# Patient Record
Sex: MALE | Race: WHITE | NOT HISPANIC OR LATINO | Employment: FULL TIME | URBAN - NONMETROPOLITAN AREA
[De-identification: names, ages, dates, MRNs, and addresses within clinical notes are randomized per-mention and may not be internally consistent; named-entity substitution may affect disease eponyms.]

---

## 2017-01-12 ENCOUNTER — HOSPITAL ENCOUNTER (EMERGENCY)
Facility: HOSPITAL | Age: 32
Discharge: HOME | End: 2017-01-12
Admitting: EMERGENCY MEDICINE
Payer: COMMERCIAL

## 2017-01-12 VITALS
OXYGEN SATURATION: 100 % | BODY MASS INDEX: 26.37 KG/M2 | RESPIRATION RATE: 18 BRPM | HEART RATE: 72 BPM | DIASTOLIC BLOOD PRESSURE: 70 MMHG | WEIGHT: 194.4 LBS | TEMPERATURE: 98 F | SYSTOLIC BLOOD PRESSURE: 120 MMHG

## 2017-01-12 DIAGNOSIS — G89.29 CHRONIC BACK PAIN: Primary | ICD-10-CM

## 2017-01-12 DIAGNOSIS — M54.9 CHRONIC BACK PAIN: Primary | ICD-10-CM

## 2017-01-12 PROCEDURE — 99283 EMERGENCY DEPT VISIT LOW MDM: CPT

## 2017-01-12 PROCEDURE — 96372 THER/PROPH/DIAG INJ SC/IM: CPT

## 2017-01-12 RX ORDER — KETOROLAC TROMETHAMINE 30 MG/ML
15 INJECTION, SOLUTION INTRAMUSCULAR; INTRAVENOUS ONCE
Status: COMPLETED | OUTPATIENT
Start: 2017-01-12 | End: 2017-01-12

## 2017-01-12 RX ORDER — TRAMADOL HYDROCHLORIDE 50 MG/1
50 TABLET ORAL EVERY 6 HOURS PRN
Qty: 12 TABLET | Refills: 0 | Status: SHIPPED | OUTPATIENT
Start: 2017-01-12 | End: 2017-01-22

## 2017-01-12 RX ORDER — PREDNISONE 20 MG/1
20 TABLET ORAL 2 TIMES DAILY
Qty: 30 TABLET | Refills: 0 | Status: SHIPPED | OUTPATIENT
Start: 2017-01-12 | End: 2017-01-15

## 2017-01-12 RX ORDER — PREDNISONE 20 MG/1
40 TABLET ORAL ONCE
Status: COMPLETED | OUTPATIENT
Start: 2017-01-12 | End: 2017-01-12

## 2017-01-12 RX ADMIN — PREDNISONE 40 MG: 20 TABLET ORAL at 10:31

## 2017-01-12 RX ADMIN — KETOROLAC TROMETHAMINE 15 MG: 30 INJECTION, SOLUTION INTRAMUSCULAR at 10:32

## 2017-01-25 ENCOUNTER — ALLSCRIPTS OFFICE VISIT (OUTPATIENT)
Dept: OTHER | Facility: OTHER | Age: 32
End: 2017-01-25

## 2017-02-13 DIAGNOSIS — M54.6 PAIN IN THORACIC SPINE: ICD-10-CM

## 2017-02-22 ENCOUNTER — APPOINTMENT (OUTPATIENT)
Dept: PHYSICAL THERAPY | Facility: HOME HEALTHCARE | Age: 32
End: 2017-02-22
Payer: COMMERCIAL

## 2017-02-22 ENCOUNTER — GENERIC CONVERSION - ENCOUNTER (OUTPATIENT)
Dept: OTHER | Facility: OTHER | Age: 32
End: 2017-02-22

## 2017-02-22 PROCEDURE — 97535 SELF CARE MNGMENT TRAINING: CPT

## 2017-02-22 PROCEDURE — 97162 PT EVAL MOD COMPLEX 30 MIN: CPT

## 2017-03-20 ENCOUNTER — GENERIC CONVERSION - ENCOUNTER (OUTPATIENT)
Dept: OTHER | Facility: OTHER | Age: 32
End: 2017-03-20

## 2017-07-03 ENCOUNTER — ALLSCRIPTS OFFICE VISIT (OUTPATIENT)
Dept: OTHER | Facility: OTHER | Age: 32
End: 2017-07-03

## 2017-07-03 DIAGNOSIS — M54.6 PAIN IN THORACIC SPINE: ICD-10-CM

## 2017-07-05 ENCOUNTER — HOSPITAL ENCOUNTER (OUTPATIENT)
Dept: MRI IMAGING | Facility: HOSPITAL | Age: 32
Discharge: HOME | End: 2017-07-05
Payer: COMMERCIAL

## 2017-07-05 DIAGNOSIS — M54.6 PAIN IN THORACIC SPINE: ICD-10-CM

## 2017-07-05 PROCEDURE — 72146 MRI CHEST SPINE W/O DYE: CPT

## 2017-07-06 ENCOUNTER — GENERIC CONVERSION - ENCOUNTER (OUTPATIENT)
Dept: OTHER | Facility: OTHER | Age: 32
End: 2017-07-06

## 2017-08-04 ENCOUNTER — ALLSCRIPTS OFFICE VISIT (OUTPATIENT)
Dept: OTHER | Facility: OTHER | Age: 32
End: 2017-08-04

## 2017-08-04 DIAGNOSIS — M79.10 MYALGIA: ICD-10-CM

## 2017-08-04 DIAGNOSIS — M51.34 OTHER INTERVERTEBRAL DISC DEGENERATION, THORACIC REGION: ICD-10-CM

## 2017-10-24 ENCOUNTER — GENERIC CONVERSION - ENCOUNTER (OUTPATIENT)
Dept: OTHER | Facility: OTHER | Age: 32
End: 2017-10-24

## 2017-10-24 ENCOUNTER — GENERIC CONVERSION - ENCOUNTER (OUTPATIENT)
Dept: PAIN MEDICINE | Facility: CLINIC | Age: 32
End: 2017-10-24

## 2017-11-14 ENCOUNTER — ALLSCRIPTS OFFICE VISIT (OUTPATIENT)
Dept: OTHER | Facility: OTHER | Age: 32
End: 2017-11-14

## 2017-11-15 NOTE — PROGRESS NOTES
Assessment    1  Current every day smoker (305 1) (F17 200)   2  Chronic midline thoracic back pain (724 1,338 29) (M54 6,G89 29)    Plan  Chronic midline thoracic back pain    · *1 - [facility] Co-Management  *  Status: Active  Requested for:[id number]  Care Summary provided  : Yes   · Follow-up visit in 2 months Evaluation and Treatment  Follow-up  Status: Hold For -Scheduling  Requested for: [id number]    Discussion/Summary    I told patient that I concerned that since he is a former narcotic addict that I do not wish him to get addicted to anything else  We will refer him back to pain management  We will continue to prescribe tramadol at this time  I will see him back in 1-2 months, he knows that we will continue to monitor with drug screens  Possible side effects of new medications were reviewed with the patient/guardian today  The treatment plan was reviewed with the patient/guardian  The patient/guardian understands and agrees with the treatment plan      Chief Complaint  F/U  Patient is here today for follow up of chronic conditions described in HPI  History of Present Illness  F/U  Patient here today for follow-up on his thoracic back pain  Patient's recent drug screen showed marijuana and morphine  Patient states he took âfriends morphine pill when he was out on a job  Patient does state the tramadol helps some best  He missed his last appointment with pain management  Review of Systems   Constitutional: No fever or chills, feels well, no tiredness, no recent weight gain or weight loss  Cardiovascular: No complaints of slow heart rate, no fast heart rate, no chest pain, no palpitations, no leg claudication, no lower extremity  Respiratory: No complaints of shortness of breath, no wheezing, no cough, no SOB on exertion, no orthopnea or PND  Gastrointestinal: No complaints of abdominal pain, no constipation, no nausea or vomiting, no diarrhea or bloody stools    Genitourinary: No complaints of dysuria, no incontinence, no hesitancy, no nocturia, no genital lesion, no testicular pain  Musculoskeletal: as noted in HPI  Active Problems  1  Anxiety (300 00) (F41 9)   2  Chronic midline thoracic back pain (724 1,338 29) (M54 6,G89 29)   3  Degeneration of intervertebral disc of thoracic region (722 51) (M51 34)   4  Drug dependence, in remission (304 93) (F19 21)   5  Hepatitis C virus (070 70) (B19 20)   6  Myofascial pain (729 1) (M79 1)   7  Noncompliance with treatment (V15 81) (Z91 19)   8  Tobacco abuse counseling (V65 42,305 1) (Z71 6)    Past Medical History  1  History of Acute non-recurrent streptococcal tonsillitis (034 0) (J03 00)   2  Acute upper respiratory infection (465 9) (J06 9)   3  History of hepatitis (V12 09) (Z86 19)   4  History of influenza vaccination (V49 89) (Z92 29)   5  History of tobacco use disorder (V15 82) (Z87 891)   6  History of Increased BMI (783 9) (R63 8)   7  History of Screening for depression (V79 0) (Z13 89)   8  Sore throat (462) (J02 9)    The active problems and past medical history were reviewed and updated today  Surgical History    The surgical history was reviewed and updated today  Family History  Father    1  No pertinent family history  Family History    2  Family history of Hepatitis    The family history was reviewed and updated today  Social History     · Current every day smoker (305 1) (F17 200)   · Intravenous drug use   · Occupation   · Social alcohol use (Z78 9)  The social history was reviewed and updated today  The social history was reviewed and is unchanged  Current Meds   1  Naproxen 500 MG Oral Tablet; TAKE 1 TABLET Twice daily PRN pain take with food; Therapy: 50JVZ2096 to ([phone])  Requested for: 20Tgr7442; Last Rx:37Gju3939 Ordered   2  RaNITidine HCl - 150 MG Oral Tablet; take 1 tablet by mouth twice a day;  Therapy: 14MXE1403 to (NVSFRTAF:82YFV4427)  Requested for: 38Yvr7657; Last Rx: 63HXK8463 Ordered   3  TiZANidine HCl - 2 MG Oral Tablet; TAKE 1 TABLET EVERY 8 HOURS AS NEEDED; Therapy: 44KLO4941 to (Cherylle Fall)  Requested for: 85Cve5916; Last Rx:55Obj7850 Ordered    The medication list was reviewed and updated today  Allergies  1  Clindamycin    Vitals  Vital Signs    Recorded: 59ZIJ1995 55:41MQ   Systolic 153   Diastolic 66   Height 6 ft    Weight 204 lb 8 oz   BMI Calculated 27 74   BSA Calculated 2 15       Physical Exam   Constitutional  General appearance: No acute distress, well appearing and well nourished  Musculoskeletal  Inspection/palpation of joints, bones, and muscles: Abnormal   Palpation - upper mid-thoracic tenderness    Psychiatric  Orientation to person, place and time: Normal    Mood and affect: Normal          Future Appointments    Date/Time Provider Specialty Site   01/15/2018 09:15 AM Netta Becerra DO Family Medicine [address] [place] [address]       Signatures   Electronically signed by : Kevin Gandhi DO; Nov 14 2017 12:06PM EST                       (Author)

## 2017-12-04 ENCOUNTER — ALLSCRIPTS OFFICE VISIT (OUTPATIENT)
Dept: OTHER | Facility: OTHER | Age: 32
End: 2017-12-04

## 2017-12-04 DIAGNOSIS — M54.6 PAIN IN THORACIC SPINE: ICD-10-CM

## 2018-01-09 NOTE — RESULT NOTES
Verified Results  * MRI THORACIC SPINE [address] [id number] 10:27AM Eber Moe Order Number: [id number]    - Patient Instructions: To schedule this appointment, please contact Central Scheduling at [phone]  Test Name Result Flag Reference   MRI THORACIC SPINE WO CONTRAST (Report)     MRI THORACIC SPINE WITHOUT CONTRAST     INDICATION: Chronic mid and upper back pain     COMPARISON: Plain film dated 3/22/2016     TECHNIQUE: Sagittal T1, sagittal T2, sagittal inversion recovery, axial T2, axial 2D MERGE  IMAGE QUALITY: Diagnostic  FINDINGS:     ALIGNMENT: Normal alignment of the thoracic spine  No compression fracture  No subluxation  No scoliosis  MARROW SIGNAL: Scattered endplate Schmorl's nodes at lower levels including opposed Schmorl's nodes at T10-11 and T11-12  Minimal reactive endplate marrow changes at lower lumbar levels  No worrisome spine lesion  THORACIC CORD: Normal signal within the thoracic cord  PARAVERTEBRAL SOFT TISSUES: Paraspinal soft tissues are unremarkable  THORACIC DEGENERATIVE CHANGE:      At T8-9 there is a left paracentral disc herniation, protrusion type, resulting in ventral impression upon the thecal sac as well as slight left ventral distortion of the cord best seen on 6/30  No cord myelomalacia  No significant central canal or    foraminal encroachment  At T9-10 there is a right paracentral disc herniation, extrusion type, with minimal caudal and cranial migration resulting in right ventral impression upon the thecal sac as well as minimal right ventral cord distortion best seen on 6/34  No cord    myelomalacia  No significant central canal or foraminal encroachment  At T10-11 there is a left paracentral disc herniation, protrusion type resulting in left ventral thecal sac impression and slight left ventral CORD impression at best seen on 6/39 and 38 without cord myelomalacia   No central canal or foraminal stenosis    at this level      Remaining levels are unremarkable  IMPRESSION:   Mild lower thoracic spine spondylotic changes without significant stenosis as above         Workstation performed: [id number]     Signed by:   Shelley Kumar MD   7/6/17

## 2018-01-10 NOTE — MISCELLANEOUS
Message  GI Reminder Recall [address] [place] [address] [address]:   Date: 04/13/2016   Dear Homero Graham:     Review of our records shows you are due for the following: Follow Up Visit  Please call the following office to schedule your appointment:   [address] [place] [address], [place], Ana Rodriguez [address] [phone]  We look forward to hearing from you!      Sincerely,     [facility]      Signatures   Electronically signed by : Mikel Baum, ; Apr 13 2016  5:16PM EST                       (Author)

## 2018-01-12 VITALS
BODY MASS INDEX: 26.44 KG/M2 | WEIGHT: 195.25 LBS | DIASTOLIC BLOOD PRESSURE: 70 MMHG | HEIGHT: 72 IN | SYSTOLIC BLOOD PRESSURE: 124 MMHG

## 2018-01-12 VITALS
HEIGHT: 72 IN | WEIGHT: 204.5 LBS | SYSTOLIC BLOOD PRESSURE: 124 MMHG | BODY MASS INDEX: 27.7 KG/M2 | DIASTOLIC BLOOD PRESSURE: 66 MMHG

## 2018-01-13 VITALS
SYSTOLIC BLOOD PRESSURE: 92 MMHG | BODY MASS INDEX: 26.58 KG/M2 | WEIGHT: 196.25 LBS | TEMPERATURE: 97.9 F | DIASTOLIC BLOOD PRESSURE: 60 MMHG | HEIGHT: 72 IN

## 2018-01-13 VITALS
SYSTOLIC BLOOD PRESSURE: 122 MMHG | WEIGHT: 191.25 LBS | DIASTOLIC BLOOD PRESSURE: 70 MMHG | HEIGHT: 72 IN | BODY MASS INDEX: 25.9 KG/M2

## 2018-01-13 NOTE — MISCELLANEOUS
Provider Comments  Provider Comments:   Dyan Jara did not show up to his appointment today  Attempted to call patient to reschedule his no show appointment, but he did not answer and voicemail was full  A letter is being sent to patient to reschedule his missed appointment        Signatures   Electronically signed by : Lalit Rivera MD; Apr 13 2016  4:29PM EST                       (Author)

## 2018-01-18 NOTE — MISCELLANEOUS
Signatures   Electronically signed by :  Petr Washington, ; Oct 24 2017  3:25PM EST                       (Author)

## 2018-01-23 ENCOUNTER — GENERIC CONVERSION - ENCOUNTER (OUTPATIENT)
Dept: OTHER | Facility: OTHER | Age: 33
End: 2018-01-23

## 2018-01-23 VITALS
DIASTOLIC BLOOD PRESSURE: 60 MMHG | WEIGHT: 213 LBS | BODY MASS INDEX: 28.85 KG/M2 | SYSTOLIC BLOOD PRESSURE: 100 MMHG | HEIGHT: 72 IN

## 2018-01-24 NOTE — MISCELLANEOUS
Signatures   Electronically signed by :  Josefina Wilder, ; Jan 23 2018  4:00PM EST                       (Author)

## 2018-01-29 DIAGNOSIS — R52 PAIN: Primary | ICD-10-CM

## 2018-01-29 RX ORDER — TRAMADOL HYDROCHLORIDE 50 MG/1
TABLET ORAL
Qty: 60 TABLET | Refills: 0 | OUTPATIENT
Start: 2018-01-29 | End: 2018-02-05 | Stop reason: SDUPTHER

## 2018-02-05 DIAGNOSIS — R52 PAIN: ICD-10-CM

## 2018-02-05 RX ORDER — TRAMADOL HYDROCHLORIDE 50 MG/1
TABLET ORAL
Qty: 60 TABLET | Refills: 0 | OUTPATIENT
Start: 2018-02-05 | End: 2018-02-12 | Stop reason: SDUPTHER

## 2018-02-12 DIAGNOSIS — R52 PAIN: ICD-10-CM

## 2018-02-12 RX ORDER — TRAMADOL HYDROCHLORIDE 50 MG/1
TABLET ORAL
Qty: 60 TABLET | Refills: 0 | OUTPATIENT
Start: 2018-02-12 | End: 2018-02-19 | Stop reason: SDUPTHER

## 2018-02-19 DIAGNOSIS — F41.9 ANXIETY: Primary | ICD-10-CM

## 2018-02-19 DIAGNOSIS — R52 PAIN: ICD-10-CM

## 2018-02-19 RX ORDER — TRAMADOL HYDROCHLORIDE 50 MG/1
TABLET ORAL
Qty: 60 TABLET | Refills: 0 | OUTPATIENT
Start: 2018-02-19 | End: 2018-02-26 | Stop reason: SDUPTHER

## 2018-02-19 RX ORDER — PAROXETINE HYDROCHLORIDE 20 MG/1
1 TABLET, FILM COATED ORAL DAILY
COMMUNITY
Start: 2015-05-28 | End: 2018-02-19 | Stop reason: SDUPTHER

## 2018-02-19 RX ORDER — PAROXETINE HYDROCHLORIDE 20 MG/1
20 TABLET, FILM COATED ORAL DAILY
Qty: 30 TABLET | Refills: 5 | Status: SHIPPED | OUTPATIENT
Start: 2018-02-19 | End: 2018-03-12 | Stop reason: SDUPTHER

## 2018-02-26 DIAGNOSIS — R52 PAIN: ICD-10-CM

## 2018-02-26 RX ORDER — TRAMADOL HYDROCHLORIDE 50 MG/1
TABLET ORAL
Qty: 60 TABLET | Refills: 0 | OUTPATIENT
Start: 2018-02-26 | End: 2018-03-05 | Stop reason: SDUPTHER

## 2018-03-05 DIAGNOSIS — R52 PAIN: ICD-10-CM

## 2018-03-05 RX ORDER — TRAMADOL HYDROCHLORIDE 50 MG/1
TABLET ORAL
Qty: 60 TABLET | Refills: 0 | OUTPATIENT
Start: 2018-03-05 | End: 2018-03-12 | Stop reason: SDUPTHER

## 2018-03-12 DIAGNOSIS — R52 PAIN: ICD-10-CM

## 2018-03-12 DIAGNOSIS — F41.9 ANXIETY: ICD-10-CM

## 2018-03-12 RX ORDER — TRAMADOL HYDROCHLORIDE 50 MG/1
TABLET ORAL
Qty: 60 TABLET | Refills: 0 | OUTPATIENT
Start: 2018-03-12 | End: 2018-03-14

## 2018-03-12 RX ORDER — PAROXETINE HYDROCHLORIDE 20 MG/1
20 TABLET, FILM COATED ORAL DAILY
Qty: 30 TABLET | Refills: 0 | Status: SHIPPED | OUTPATIENT
Start: 2018-03-12 | End: 2018-03-13 | Stop reason: ALTCHOICE

## 2018-03-13 ENCOUNTER — HOSPITAL ENCOUNTER (EMERGENCY)
Facility: HOSPITAL | Age: 33
Discharge: HOME | End: 2018-03-13
Attending: EMERGENCY MEDICINE | Admitting: EMERGENCY MEDICINE
Payer: COMMERCIAL

## 2018-03-13 VITALS
RESPIRATION RATE: 18 BRPM | DIASTOLIC BLOOD PRESSURE: 88 MMHG | SYSTOLIC BLOOD PRESSURE: 151 MMHG | OXYGEN SATURATION: 99 % | HEART RATE: 102 BPM | TEMPERATURE: 98.9 F | BODY MASS INDEX: 28.11 KG/M2 | WEIGHT: 207.3 LBS

## 2018-03-13 DIAGNOSIS — F15.10 AMPHETAMINE ABUSE (HCC): ICD-10-CM

## 2018-03-13 DIAGNOSIS — F11.10 OPIOID ABUSE (HCC): ICD-10-CM

## 2018-03-13 DIAGNOSIS — F12.10 CANNABIS ABUSE: ICD-10-CM

## 2018-03-13 DIAGNOSIS — R56.9 SEIZURE (HCC): Primary | ICD-10-CM

## 2018-03-13 LAB
AMPHETAMINES SERPL QL SCN: POSITIVE
ANION GAP SERPL CALCULATED.3IONS-SCNC: 16 MMOL/L (ref 4–13)
BACTERIA UR QL AUTO: NORMAL /HPF
BARBITURATES UR QL: NEGATIVE
BASOPHILS # BLD AUTO: 0.02 THOUSANDS/ΜL (ref 0–0.1)
BASOPHILS NFR BLD AUTO: 0 % (ref 0–1)
BENZODIAZ UR QL: NEGATIVE
BILIRUB UR QL STRIP: NEGATIVE
BUN SERPL-MCNC: 19 MG/DL (ref 5–25)
CALCIUM SERPL-MCNC: 8.8 MG/DL (ref 8.3–10.1)
CHLORIDE SERPL-SCNC: 97 MMOL/L (ref 100–108)
CLARITY UR: CLEAR
CO2 SERPL-SCNC: 24 MMOL/L (ref 21–32)
COCAINE UR QL: NEGATIVE
COLOR UR: YELLOW
CREAT SERPL-MCNC: 1.17 MG/DL (ref 0.6–1.3)
EOSINOPHIL # BLD AUTO: 0.05 THOUSAND/ΜL (ref 0–0.61)
EOSINOPHIL NFR BLD AUTO: 1 % (ref 0–6)
ERYTHROCYTE [DISTWIDTH] IN BLOOD BY AUTOMATED COUNT: 13.7 % (ref 11.6–15.1)
GFR SERPL CREATININE-BSD FRML MDRD: 82 ML/MIN/1.73SQ M
GLUCOSE SERPL-MCNC: 102 MG/DL (ref 65–140)
GLUCOSE SERPL-MCNC: 94 MG/DL (ref 65–140)
GLUCOSE UR STRIP-MCNC: NEGATIVE MG/DL
HCT VFR BLD AUTO: 39.4 % (ref 36.5–49.3)
HGB BLD-MCNC: 13.7 G/DL (ref 12–17)
HGB UR QL STRIP.AUTO: NEGATIVE
KETONES UR STRIP-MCNC: NEGATIVE MG/DL
LEUKOCYTE ESTERASE UR QL STRIP: NEGATIVE
LYMPHOCYTES # BLD AUTO: 2.54 THOUSANDS/ΜL (ref 0.6–4.47)
LYMPHOCYTES NFR BLD AUTO: 24 % (ref 14–44)
MCH RBC QN AUTO: 30.8 PG (ref 26.8–34.3)
MCHC RBC AUTO-ENTMCNC: 34.8 G/DL (ref 31.4–37.4)
MCV RBC AUTO: 89 FL (ref 82–98)
METHADONE UR QL: NEGATIVE
MONOCYTES # BLD AUTO: 1.31 THOUSAND/ΜL (ref 0.17–1.22)
MONOCYTES NFR BLD AUTO: 13 % (ref 4–12)
NEUTROPHILS # BLD AUTO: 6.48 THOUSANDS/ΜL (ref 1.85–7.62)
NEUTS SEG NFR BLD AUTO: 62 % (ref 43–75)
NITRITE UR QL STRIP: NEGATIVE
NON-SQ EPI CELLS URNS QL MICRO: NORMAL /HPF
OPIATES UR QL SCN: POSITIVE
PCP UR QL: NEGATIVE
PH UR STRIP.AUTO: 6 [PH] (ref 4.5–8)
PLATELET # BLD AUTO: 276 THOUSANDS/UL (ref 149–390)
PMV BLD AUTO: 9.7 FL (ref 8.9–12.7)
POTASSIUM SERPL-SCNC: 3.3 MMOL/L (ref 3.5–5.3)
PROLACTIN SERPL-MCNC: 27.4 NG/ML (ref 2.5–17.4)
PROT UR STRIP-MCNC: ABNORMAL MG/DL
RBC # BLD AUTO: 4.45 MILLION/UL (ref 3.88–5.62)
RBC #/AREA URNS AUTO: NORMAL /HPF
SODIUM SERPL-SCNC: 137 MMOL/L (ref 136–145)
SP GR UR STRIP.AUTO: >=1.03 (ref 1–1.03)
THC UR QL: POSITIVE
UROBILINOGEN UR QL STRIP.AUTO: 0.2 E.U./DL
WBC # BLD AUTO: 10.4 THOUSAND/UL (ref 4.31–10.16)
WBC #/AREA URNS AUTO: NORMAL /HPF

## 2018-03-13 PROCEDURE — 84146 ASSAY OF PROLACTIN: CPT | Performed by: EMERGENCY MEDICINE

## 2018-03-13 PROCEDURE — 80307 DRUG TEST PRSMV CHEM ANLYZR: CPT | Performed by: EMERGENCY MEDICINE

## 2018-03-13 PROCEDURE — 99284 EMERGENCY DEPT VISIT MOD MDM: CPT

## 2018-03-13 PROCEDURE — 85025 COMPLETE CBC W/AUTO DIFF WBC: CPT | Performed by: EMERGENCY MEDICINE

## 2018-03-13 PROCEDURE — 80048 BASIC METABOLIC PNL TOTAL CA: CPT | Performed by: EMERGENCY MEDICINE

## 2018-03-13 PROCEDURE — 81001 URINALYSIS AUTO W/SCOPE: CPT | Performed by: EMERGENCY MEDICINE

## 2018-03-13 PROCEDURE — 36415 COLL VENOUS BLD VENIPUNCTURE: CPT | Performed by: EMERGENCY MEDICINE

## 2018-03-13 PROCEDURE — 82948 REAGENT STRIP/BLOOD GLUCOSE: CPT

## 2018-03-13 NOTE — ED PROVIDER NOTES
History  Chief Complaint   Patient presents with    Seizure - Prior Hx Of     Pt had witnessed seizure lasting less than 1 minute, he was assisted to ground, did not hit head  GF reports to EMS that he is a recovering addict that started taking percocet  Pt states he has a hx of seizures and was told "its when my sugar is low"  Patient arrives via EMS after his girlfriend reported a tonic-clonic seizure at home  He remembers going to work this morning, eating a cheeseburger for lunch and coming home  He last remembers sitting on the couch before awakening on the floor with his girlfriend crouched over him  He reports having a h/o sporadic seizures that he was told was due to hypoglycemic episodes though he is not a diabetic  He is not now and has never been on antiepileptic medications  He has also a recovering opioid addict; he states he last used Percocet about 3 weeks ago  He has used heroin as well  He denies any alcohol use in the past month  He reports getting typical, good sleep recently  He has not had any recent illnesses or injuries  He currently denies any pain  He has mild confusion (was unable to immediately recall what he did for work, though did recall it within a few minutes)  No recent travel or sick contacts  Denies preceding f/c, HA, CP, SOB, abdominal pain, n/v/d  12 system ROS o/w negative                 History provided by:  Patient, medical records and EMS personnel  Seizure - Prior Hx Of   Seizure activity on arrival: no    Seizure type:  Grand mal  Preceding symptoms: no sensation of an aura present, no dizziness, no euphoria, no headache, no hyperventilation, no nausea, no numbness, no panic and no vision change    Episode characteristics: confusion, generalized shaking and unresponsiveness    Episode characteristics: no incontinence and no tongue biting    Postictal symptoms: confusion (Brief)    Postictal symptoms: no memory loss and no somnolence    Return to baseline: yes    Severity:  Mild  Duration:  1 minute  Timing:  Once  Number of seizures this episode:  1  Progression:  Resolved  Context: drug use, possible hypoglycemia and possible medication ingestion    Context: not alcohol withdrawal, not change in medication, not sleeping less, not developmental delay, not emotional upset, not family hx of seizures, not fever, not flashing visual stimuli, not hydrocephalus, not intracranial lesion, not intracranial shunt, medical compliance, not pregnant, not previous head injury and not stress    Recent head injury:  No recent head injuries  PTA treatment:  None  History of seizures: yes        Prior to Admission Medications   Prescriptions Last Dose Informant Patient Reported? Taking?   traMADol (ULTRAM) 50 mg tablet   No No   Sig: Take 1-2 tabs every 6 hours      Facility-Administered Medications: None       Past Medical History:   Diagnosis Date    Addiction to drug ([place] [address] )     Chronic pain     Hepatitis C virus     HIV (human immunodeficiency virus infection) ([place] [address] )        History reviewed  No pertinent surgical history  History reviewed  No pertinent family history  I have reviewed and agree with the history as documented  Social History   Substance Use Topics    Smoking status: Heavy Tobacco Smoker     Packs/day: 1 00    Smokeless tobacco: Never Used    Alcohol use Yes      Comment: occasional        Review of Systems   Constitutional: Negative for chills, diaphoresis and fever  HENT: Negative for rhinorrhea, sore throat and trouble swallowing  Respiratory: Negative for cough, chest tightness, shortness of breath and wheezing  Cardiovascular: Negative for chest pain, palpitations and leg swelling  Gastrointestinal: Negative for abdominal distention, abdominal pain, constipation, diarrhea, nausea and vomiting  Genitourinary: Negative for difficulty urinating, dysuria, flank pain, frequency, hematuria and urgency     Musculoskeletal: Negative for arthralgias, back pain, myalgias, neck pain and neck stiffness  Skin: Negative for pallor and rash  Neurological: Negative for dizziness, weakness, light-headedness and headaches  Hematological: Negative for adenopathy  Psychiatric/Behavioral: Positive for confusion (Mild, improving)  The patient is not nervous/anxious  All other systems reviewed and are negative  Physical Exam  ED Triage Vitals [03/13/18 1654]   Temperature Pulse Respirations Blood Pressure SpO2   98 9 °F (37 2 °C) (!) 123 20 134/78 99 %      Temp Source Heart Rate Source Patient Position - Orthostatic VS BP Location FiO2 (%)   Temporal Monitor Sitting Left arm --      Pain Score       No Pain           Orthostatic Vital Signs  Vitals:    03/13/18 1654 03/13/18 1900 03/13/18 1922   BP: 134/78 137/83 151/88   Pulse: (!) 123 101 102   Patient Position - Orthostatic VS: Sitting  Sitting       Physical Exam   Constitutional: He is oriented to person, place, and time  He appears well-developed and well-nourished  No distress  HENT:   Head: Normocephalic  Mouth/Throat: Oropharynx is clear and moist    No tongue injury   Eyes: Conjunctivae and EOM are normal  Pupils are equal, round, and reactive to light  Neck: Normal range of motion  Neck supple  Cardiovascular: Normal rate and regular rhythm  No murmur heard  Pulmonary/Chest: Effort normal and breath sounds normal    Abdominal: Soft  Bowel sounds are normal  He exhibits no distension  There is no tenderness  Genitourinary:   Genitourinary Comments: No incontinence   Musculoskeletal: Normal range of motion  He exhibits no edema or tenderness  Lymphadenopathy:     He has no cervical adenopathy  Neurological: He is alert and oriented to person, place, and time  He has normal reflexes  No cranial nerve deficit or sensory deficit  He exhibits normal muscle tone  Skin: Skin is warm and dry  Capillary refill takes less than 2 seconds  No rash noted  He is not diaphoretic  No erythema  No pallor  Psychiatric: He has a normal mood and affect  His behavior is normal  Thought content normal    Vitals reviewed  ED Medications  Medications - No data to display    Diagnostic Studies  Results Reviewed     Procedure Component Value Units Date/Time    Rapid drug screen, urine [[id number]]  (Abnormal) Collected:  03/13/18 1838    Lab Status:  Final result Specimen:  Urine from Urine, Clean Catch Updated:  03/13/18 1905     Amph/Meth UR Positive (A)     Barbiturate Ur Negative     Benzodiazepine Urine Negative     Cocaine Urine Negative     Methadone Urine Negative     Opiate Urine Positive (A)     PCP Ur Negative     THC Urine Positive (A)    Narrative:         FOR MEDICAL PURPOSES ONLY  IF CONFIRMATION NEEDED PLEASE CONTACT THE LAB WITHIN 5 DAYS  Drug Screen Cutoff Levels:  AMPHETAMINE/METHAMPHETAMINES  1000 ng/mL  BARBITURATES     200 ng/mL  BENZODIAZEPINES     200 ng/mL  COCAINE      300 ng/mL  METHADONE      300 ng/mL  OPIATES      300 ng/mL  PHENCYCLIDINE     25 ng/mL  THC       50 ng/mL    Presumptive report  If requested, specimen will be sent to reference lab for confirmation      Urine Microscopic [[id number]]  (Normal) Collected:  03/13/18 1838    Lab Status:  Final result Specimen:  Urine from Urine, Clean Catch Updated:  03/13/18 1852     RBC, UA None Seen /hpf      WBC, UA None Seen /hpf      Epithelial Cells Occasional /hpf      Bacteria, UA Occasional /hpf     UA w Reflex to Microscopic w Reflex to Culture [[id number]]  (Abnormal) Collected:  03/13/18 1838    Lab Status:  Final result Specimen:  Urine from Urine, Clean Catch Updated:  03/13/18 1849     Color, UA Yellow     Clarity, UA Clear     Specific Gravity, UA >=1 030     pH, UA 6 0     Leukocytes, UA Negative     Nitrite, UA Negative     Protein, UA 30 (1+) (A) mg/dl      Glucose, UA Negative mg/dl      Ketones, UA Negative mg/dl      Urobilinogen, UA 0 2 E U /dl      Bilirubin, UA Negative     Blood, UA Negative Basic metabolic panel [[id number]]  (Abnormal) Collected:  03/13/18 1704    Lab Status:  Final result Specimen:  Blood from Arm, Right Updated:  03/13/18 1730     Sodium 137 mmol/L      Potassium 3 3 (L) mmol/L      Chloride 97 (L) mmol/L      CO2 24 mmol/L      Anion Gap 16 (H) mmol/L      BUN 19 mg/dL      Creatinine 1 17 mg/dL      Glucose 94 mg/dL      Calcium 8 8 mg/dL      eGFR 82 ml/min/1 73sq m     Narrative:         National Kidney Disease Education Program recommendations are as follows:  GFR calculation is accurate only with a steady state creatinine  Chronic Kidney disease less than 60 ml/min/1 73 sq  meters  Kidney failure less than 15 ml/min/1 73 sq  meters  CBC and differential [[id number]]  (Abnormal) Collected:  03/13/18 1704    Lab Status:  Final result Specimen:  Blood from Arm, Right Updated:  03/13/18 1711     WBC 10 40 (H) Thousand/uL      RBC 4 45 Million/uL      Hemoglobin 13 7 g/dL      Hematocrit 39 4 %      MCV 89 fL      MCH 30 8 pg      MCHC 34 8 g/dL      RDW 13 7 %      MPV 9 7 fL      Platelets 762 Thousands/uL      Neutrophils Relative 62 %      Lymphocytes Relative 24 %      Monocytes Relative 13 (H) %      Eosinophils Relative 1 %      Basophils Relative 0 %      Neutrophils Absolute 6 48 Thousands/µL      Lymphocytes Absolute 2 54 Thousands/µL      Monocytes Absolute 1 31 (H) Thousand/µL      Eosinophils Absolute 0 05 Thousand/µL      Basophils Absolute 0 02 Thousands/µL     Prolactin [[id number]] Collected:  03/13/18 1704    Lab Status: In process Specimen:  Blood from Arm, Right Updated:  03/13/18 1707    Fingerstick Glucose (POCT) [[id number]]  (Normal) Collected:  03/13/18 1658    Lab Status:  Final result Updated:  03/13/18 1701     POC Glucose 102 mg/dl                  No orders to display              Procedures  Procedures       Phone Contacts  ED Phone Contact    ED Course  ED Course as of Mar 13 1942   Tue Mar 13, 2018   1908 Results reviewed with patient  Feels better   All questions answered to the patient's satisfaction  Drug rehab referrals provided to patient  Recommend continued supportive treatment at home and follow up with PCP  MDM  Number of Diagnoses or Management Options  Diagnosis management comments: DDx: Breakthrough seizure - abnormal electrolytes, drug abuse, EtOH w/d, doubt trauma, medication misuse, sleep deprivation  A/P: Will check metabolic w/u, urine/UDS, treat symptoms, reevaluate for disposition  Amount and/or Complexity of Data Reviewed  Clinical lab tests: ordered and reviewed  Obtain history from someone other than the patient: yes (EMS)  Review and summarize past medical records: yes      CritCare Time    Disposition  Final diagnoses:   Seizure ([address] )   Amphetamine abuse   Opioid abuse   Cannabis abuse     Time reflects when diagnosis was documented in both MDM as applicable and the Disposition within this note     Time User Action Codes Description Comment    3/13/2018  7:08  [facility] [R56 9] Seizure ([facility] [address] )     3/13/2018  7:08 PM [address],[place]  [address] [F15 10] Amphetamine abuse     3/13/2018  7:09 PM [address],[place]  [address] [F11 10] Opioid abuse     3/13/2018  7:09 PM Destin Rascon Add [F12 10] Cannabis abuse       ED Disposition     ED Disposition Condition Comment    Discharge  Lisa Cherry discharge to home/self care  Condition at discharge: Stable        Follow-up Information     Follow up With Specialties Details Why [address], DO Family Medicine Schedule an appointment as soon as possible for a visit in 1 week  [address]  [place] [phone]  [phone]          Discharge Medication List as of 3/13/2018  7:10 PM      CONTINUE these medications which have NOT CHANGED    Details   traMADol (ULTRAM) 50 mg tablet Take 1-2 tabs every 6 hours, Phone In           No discharge procedures on file      ED Provider  Electronically Signed by           Lorraine Puckett DO  03/13/18 [address]

## 2018-03-13 NOTE — DISCHARGE INSTRUCTIONS
Methamphetamine Abuse   WHAT YOU NEED TO KNOW:   Methamphetamine (meth) abuse is any use of meth, or needing more meth for the same effects you got from smaller amounts  DISCHARGE INSTRUCTIONS:   Return to the emergency department if:   · You have chest pain, and your heartbeat or breathing is faster than usual     · You are so nervous that you cannot function  · You feel sick or vomit, or have headaches or trouble breathing while being around or cooking meth  You may also feel dizzy  · Children or others who have been near meth look or act ill, or will not wake up  · You have a seizure  · You want to hurt yourself or someone else  Contact your healthcare provider if:   · You have a fever  · You are using meth and know or think you may be pregnant  · You have withdrawal symptoms and want to start using meth again  · You have questions or concerns about your condition or care  Medicines:   · Medicines  may be given to help you stay calm, reduce depression, or decrease false thoughts  · Take your medicine as directed  Contact your healthcare provider if you think your medicine is not helping or if you have side effects  Tell him or her if you are allergic to any medicine  Keep a list of the medicines, vitamins, and herbs you take  Include the amounts, and when and why you take them  Bring the list or the pill bottles to follow-up visits  Carry your medicine list with you in case of an emergency  Long-term effects of meth abuse:   · Memory and concentration problems  can make it hard to learn or remember information  You may feel confused  You may also do things more slowly than before  · Behavior problems  may include violent or impulsive actions  Impulsive means you act without thinking first      · Physical problems  include heart weakness or damage  Your heart may have trouble working correctly  Men may have a decreased ability to have sex      · Self-care problems  include not keeping yourself clean and not eating properly because you are focused on using meth  Meth may cause you to look older than you really are  · Skin problems  may happen if you start picking at your skin or do not care for needle marks  You may think you see or feel bugs on or under your skin and try to pick them off  Skin picking causes sores to grow, and the sores can get infected  Meth injection causes needle marks on your skin  Needle marks can also get infected  · Mouth problems  can develop from meth use  Meth can cause dry mouth and make you chew, clench, or grind your teeth more than normal  This causes your teeth to wear down  Your teeth may turn dark or black  They may break, crumble, or fall apart  Your teeth may need to be pulled out  Dangers of cooking meth:  Meth is cooked from chemicals and materials that can cause a fire or explosion  These substances can cause severe burns  How meth affects unborn or  babies and children:   · If you are pregnant and use meth, your baby may not grow in your womb as he should  He may be born too early or die before birth  Your baby may have problems with his heart, brain, or body development  Do not breastfeed your baby if you use meth  You will give meth to your baby through your breast milk  Ask healthcare providers for more information about treatment programs and drug use while breastfeeding  · Your child may not grow as he should  He also may have trouble learning or managing anger  Meth withdrawal:  Withdrawal occurs when you decrease or stop using a drug you are addicted to  Meth users may have trouble coping with the symptoms of withdrawal and may start using meth again   Meth withdrawal can cause the following signs and symptoms:  · Seizures    · Feeling sad or wanting to kill yourself    · Strong cravings for meth    · Feeling tired, sleeping longer than usual or not being able sleep at all, or bad dreams    · Trouble focusing on a task, moving more slowly and taking longer to complete tasks, or feeling restless    · Feeling nervous, angry, hungry, or unwell, or thinking people are trying to hurt you  Meth abuse treatment:  Most people need therapy and support to stop using meth  Several different kinds of therapy and support are available  Ask your healthcare provider where you can find a therapy or support group  Follow up with your healthcare provider as directed:  Write down your questions so you remember to ask them during your visits  © 2017 [facility] Information is for End User's use only and may not be sold, redistributed or otherwise used for commercial purposes  All illustrations and images included in CareNotes® are the copyrighted property of A D A M , Inc  or [address]  The above information is an  only  It is not intended as medical advice for individual conditions or treatments  Talk to your doctor, nurse or pharmacist before following any medical regimen to see if it is safe and effective for you  Cannabis Abuse   WHAT YOU NEED TO KNOW:   Cannabis, also called marijuana, is a drug that comes from the cannabis sativa (hemp plant)  It may also be called pot, weed, or hash  Cannabis can be smoked, baked into food and eaten, or made into a tea you can drink  It can make you feel high, happy, or excited  The effects may start right away and last for 3 to 4 hours depending on whether you smoke or eat cannabis  DISCHARGE INSTRUCTIONS:   Return to the emergency department if:   · The effects of cannabis have worn off, and you have shortness of breath, a fast heart rate, or chest pain  · You want to hurt or kill yourself or others  Contact your healthcare provider if:   · You cannot fight the urge to use cannabis  · You have stopped using cannabis, and feel that you cannot cope with your withdrawal symptoms      · You want help or more information on how to decrease or stop using cannabis  · You have questions or concerns about your condition or care  Signs of cannabis abuse:  Cannabis abuse is a pattern of use that causes physical or mental problems:  · The use of cannabis makes you unable to function at work, school, or in your home  You may be absent often, or your work may be done poorly  You may not be able to take care of your children or your home  · You use cannabis when it is dangerous to be under the effects of the drug  This includes when you drive a vehicle or use machinery  · You have problems with the police when you are under the effects of cannabis  · You keep using cannabis even when you argue with your family and friends about your use  · You need to use more cannabis to give you the high feeling or other effects that you want  You have withdrawal symptoms after you stop using cannabis  How cannabis affects your baby:  Cannabis use may keep your unborn baby from growing as fast as he should  It may harm your unborn baby's eyes and nervous system  When he gets older, he may have difficulty in school and with solving problems  He may also have a poor memory, or problems focusing or paying attention  He may be impulsive (reacting without thinking first)  He may be at an increased risk for depression  He may have a higher risk of smoking cigarettes and using cannabis when he is an adult  Signs of cannabis withdrawal:  Cannabis withdrawal happens when you have used cannabis for a long period of time, and you suddenly take less or stop taking it  Withdrawal symptoms may start on the first day and may last up to 2 weeks   You may have more than one of the following:  · Decreased appetite and weight loss     · Night sweats and trouble sleeping    · Craving for cannabis    · Irritability     · Feeling agitated, anxious, or restless    · Depressed or negative mood  Treatment:   · Brief intervention therapy  is done by meeting with a healthcare provider who will talk to and encourage you  During therapy, you will discuss your cannabis use with the healthcare provider  The healthcare provider will help you understand that you are responsible for making changes in your life  He will explain how you can be helped by decreasing or stopping cannabis use, and give you treatment options  · Cognitive behavioral therapy (CBT)  helps you change your thinking and behavior  It can help you manage depression and anxiety caused by cannabis use  CBT can help you learn good coping skills and ways to manage stress  CBT can be done with you and a talk therapist or in a group with others  · Motivational enhancement therapy  is used to help you set goals to change your behavior and stop cannabis abuse  · Group, marriage, and family therapy  can help you find support and motivation to stop cannabis abuse  Self-help group therapy includes meeting with others who also want to stop using cannabis  Marriage and family therapy can help you by including others to support you in your treatment  · A voucher program  provides vouchers (rewards) for attending therapy or not using cannabis  You may need to provide urine samples for testing  If your urine shows no signs of cannabis use, you may get a voucher  This program may report you to the court, or tell your family or friends if you decide to use cannabis  Follow up with your healthcare provider as directed:  Write down your questions so you remember to ask them during your visits  For more information:   · [facility] on Drug Abuse  [address] [place] [address]  Phone: 3- 991 - 927-0339  Web Address: www fer nih gov  © 2017 [facility] Information is for End User's use only and may not be sold, redistributed or otherwise used for commercial purposes   All illustrations and images included in CareNotes® are the copyrighted property of A D A M , Inc  or [facility]  The above information is an  only  It is not intended as medical advice for individual conditions or treatments  Talk to your doctor, nurse or pharmacist before following any medical regimen to see if it is safe and effective for you  Generalized Tonic Clonic Seizures   WHAT YOU NEED TO KNOW:   A generalized tonic-clonic seizure may also be called a grand mal seizure  A seizure means an abnormal area in your brain sometimes sends bursts of electrical activity  A generalized seizure affects both sides of your brain  Tonic and clonic are phases that happen during the seizure  The tonic phase causes your muscles to become stiff  You lose consciousness and may fall down  The clonic phase causes convulsions (repeated muscle contractions)  A seizure may last from a few seconds up to 3 minutes  It is an emergency if it lasts longer than 5 minutes  DISCHARGE INSTRUCTIONS:   Call 911 for any of the following:   · This is the first seizure you have ever had  · You have trouble breathing or feeling alert after a seizure  · The seizure lasts longer than 5 minutes  · You had a seizure in water, such as in a swimming pool or hot tub  · You have diabetes or are pregnant and had a seizure  Return to the emergency department if:   · You have a second seizure within 24 hours of the first      · You are injured during a seizure  Contact your healthcare provider if:   · You feel you are not able to cope with your condition  · Your seizures start to happen more often  · You are confused longer than usual after a seizure  · You are planning to get pregnant or are currently pregnant  · You have questions or concerns about your condition or care  Medicines:   · Medicines  may be given to treat certain health conditions  You may need antiepileptic medicine if your seizures are caused by epilepsy  You may need medicine daily to prevent seizures or during a seizure to stop it   Do not stop taking your medicine unless directed by your healthcare provider  · Take your medicine as directed  Contact your healthcare provider if you think your medicine is not helping or if you have side effects  Tell him of her if you are allergic to any medicine  Keep a list of the medicines, vitamins, and herbs you take  Include the amounts, and when and why you take them  Bring the list or the pill bottles to follow-up visits  Carry your medicine list with you in case of an emergency  What you can do to prevent a tonic-clonic seizure: You may not be able to prevent every seizure  The following can help you manage triggers that may make a seizure start:  · Take antiseizure medicine every day at the same time  This will also help prevent medicine side effects  Set an alarm to help remind you to take your medicine every day  If you are a woman, talk to your provider about family planning while you are taking this medicine  · Manage stress  Stress can be a trigger for epilepsy  Exercise can help you reduce stress  Talk to your healthcare provider about exercise that is safe for you  Illness can be a form of stress  Eat a variety of healthy foods and drink plenty of liquids during an illness  Talk to your healthcare provider about other ways to manage stress  · Set a regular sleep schedule  A lack of sleep can trigger a seizure  Try to go to sleep and wake up at the same time every day  Keep your bedroom quiet and dark  Talk to your healthcare provider if you are having trouble sleeping  · Limit or do not drink alcohol as directed  Alcohol can trigger a seizure, especially if you drink a large amount at one time  A drink of alcohol is 12 ounces of beer, 1½ ounces of liquor, or 5 ounces of wine  Talk to your healthcare provider about a safe amount of alcohol for you  Your provider may recommend that you do not drink any alcohol  Tell him or her if you need help to quit drinking    What you can do to manage tonic-clonic seizures: The following can help you manage the seizures if you have more than one:  · Keep a seizure diary  This can help you find your triggers and avoid them  Possible triggers include illness, lack of sleep, hormone changes, alcohol, drugs, lights, and stress  Write down the dates of your seizures, where you were, and what you were doing  Include how you felt before and after  · Record any auras you have before a seizure  An aura is a sign that you are about to have a seizure  Auras happen before certain types of seizures that are in only 1 part of the brain  The aura may happen seconds before a seizure, or up to an hour before  You may feel, see, hear, or smell something  Examples include part of your body becoming hot  You may see a flash of light or hear something  You may have anxiety or déjà vu  If you have an aura, include it in your seizure diary  · Create a care plan  Tell family, friends, and coworkers about your epilepsy  Give them instructions that tell them how they can keep you safe if you have a seizure  · Ask what safety precautions you should take  Talk with your healthcare provider about driving  You may not be able to drive until you are seizure-free for a period of time  You will need to check the law where you live  Also talk to your healthcare provider about swimming and bathing  You may drown or develop life-threatening heart or lung damage if you have a seizure in water  · Carry medical alert identification  Wear medical alert jewelry or carry a card that says you have tonic-clonic seizures  Ask your healthcare provider where to get these items  How others can keep you safe during a seizure:  Give the following instructions to family, friends, and coworkers:  · Do not panic  · Do not hold me down or put anything in my mouth  · Gently guide me to the floor or a soft surface             · Place me on my side to help prevent me from swallowing saliva or vomit  · Protect me from injury  Remove sharp or hard objects from the area surrounding me, or cushion my head  · Loosen the clothing around my head and neck  · Time how long my seizure lasts  Call 911 if my seizure lasts longer than 5 minutes or if I have a second seizure  · Stay with me until my seizure ends  Let me rest until I am fully awake  · Perform CPR if I stop breathing or you cannot feel my pulse  · Do not give me anything to eat or drink until I am fully awake  Follow up with your healthcare provider or neurologist as directed: If you take antiseizure medicine, you will need blood tests to check the level in your blood  The medicine may need to be changed or adjusted  Write down your questions so you remember to ask them during your visits  © 2017 [address] Information is for End User's use only and may not be sold, redistributed or otherwise used for commercial purposes  All illustrations and images included in CareNotes® are the copyrighted property of [employer] M , Inc  or [employer]  The above information is an  only  It is not intended as medical advice for individual conditions or treatments  Talk to your doctor, nurse or pharmacist before following any medical regimen to see if it is safe and effective for you  Narcotic Abuse   WHAT YOU NEED TO KNOW:   Narcotics are medicines used to decrease or take away severe pain  Narcotics may also be called opioids  Some common names of narcotics ordered by a doctor are codeine and morphine  Heroin is an illegal street drug that is made from morphine  DISCHARGE INSTRUCTIONS:   Return to the emergency department if:   · You are very drowsy  · Your speech is slurred  · You have trouble thinking, remembering things, or focusing  Contact your healthcare provider if:   · You want help or information on how to stop using or abusing narcotics      Follow up with your healthcare provider as directed:  Write down your questions so you remember to ask them during your visits  Narcotic intoxication  usually lasts for several hours  You may have the following during or after you use narcotics:  · Behavior or mood changes, such as a great feeling followed by the feeling that you do not care about anyone or anything    · Trouble thinking, remembering things, or focusing    · Small pupils    · Feeling very drowsy    · Slurred speech  Narcotic withdrawal  occurs if you stop using narcotics after using them heavily over a period of time  Signs and symptoms may begin within minutes or days and continue for days or even months:  · Depression and anxiety    · Nausea or vomiting    · Muscle aches    · Watery eyes or runny nose    · Large pupils    · Sweating or goosebumps on your skin    · Diarrhea    · Fever    · Trouble sleeping  How narcotics can harm a pregnant woman and her baby:   · Tell your healthcare provider right away if you are trying to get pregnant or you are pregnant and you are using narcotics  Your doctor may suggest other medicines to control pain and prevent withdrawal  If you go through withdrawal while pregnant, you may miscarry your baby, or he may be stillborn  He may be very small and have other medical problems  · When your baby is born, he may show signs of withdrawal  This includes unexpected weight loss, poor feeding, and more crying than normal  Your baby may also have a fever, vomit, and have diarrhea  He may also have learning problems or other health issues when he gets older  If you have a baby and you are using narcotics, you may have trouble caring for your baby  Narcotics may be passed to your baby through breast milk  Talk to your healthcare provider before breastfeeding your baby if you are using narcotics    For support and more information:   · Substance Abuse and [address] [facility] , [facility] Address: https://GOQii/  · [facility] on Drug Abuse  [address] [facility] [place], [place] [address]  Phone: 8- 918 - 534-7192  Web Address: www fer nih gov  © 2017 [facility] [facility] Information is for End User's use only and may not be sold, redistributed or otherwise used for commercial purposes  All illustrations and images included in CareNotes® are the copyrighted property of A [employer] A M , [employer]  or [employer]  The above information is an  only  It is not intended as medical advice for individual conditions or treatments  Talk to your doctor, nurse or pharmacist before following any medical regimen to see if it is safe and effective for you

## 2018-03-14 ENCOUNTER — TELEPHONE (OUTPATIENT)
Dept: FAMILY MEDICINE CLINIC | Facility: CLINIC | Age: 33
End: 2018-03-14

## 2018-03-14 DIAGNOSIS — B19.20 HEPATITIS C VIRUS INFECTION WITHOUT HEPATIC COMA, UNSPECIFIED CHRONICITY: ICD-10-CM

## 2018-03-14 DIAGNOSIS — F41.9 ANXIETY: ICD-10-CM

## 2018-03-14 DIAGNOSIS — M54.6 CHRONIC MIDLINE THORACIC BACK PAIN: Primary | ICD-10-CM

## 2018-03-14 DIAGNOSIS — G89.29 CHRONIC MIDLINE THORACIC BACK PAIN: Primary | ICD-10-CM

## 2018-03-14 PROBLEM — M79.18 MYOFASCIAL PAIN: Status: ACTIVE | Noted: 2017-08-04

## 2018-03-14 PROBLEM — M51.34 DEGENERATION OF INTERVERTEBRAL DISC OF THORACIC REGION: Status: ACTIVE | Noted: 2017-08-04

## 2018-03-14 RX ORDER — NAPROXEN 500 MG/1
1 TABLET ORAL 2 TIMES DAILY
COMMUNITY
Start: 2017-01-25 | End: 2018-03-15 | Stop reason: SDUPTHER

## 2018-03-14 RX ORDER — RANITIDINE 150 MG/1
TABLET ORAL
Refills: 0 | COMMUNITY
Start: 2017-12-26 | End: 2018-03-15 | Stop reason: SDUPTHER

## 2018-03-14 NOTE — TELEPHONE ENCOUNTER
Girlfriend called, asking for you to call her  Patient had a seizer last night and over dosed  Almost didn't come back from it, used 40 Tramadol in 2 days, was positive for Meth and Mariajuana  He is a known Heroin user but she is unsure if he was using at this time  She wants to discuss this with you and what their next step should be      Please call her at [phone]

## 2018-03-14 NOTE — TELEPHONE ENCOUNTER
We will no longer prescribe tramadol  May need to seek counseling and /or rehab    They should call their insurance for referrals

## 2018-03-15 ENCOUNTER — TELEPHONE (OUTPATIENT)
Dept: FAMILY MEDICINE CLINIC | Facility: CLINIC | Age: 33
End: 2018-03-15

## 2018-03-15 RX ORDER — RANITIDINE 150 MG/1
150 TABLET ORAL 2 TIMES DAILY
Qty: 60 TABLET | Refills: 0 | Status: SHIPPED | OUTPATIENT
Start: 2018-03-15 | End: 2018-09-07

## 2018-03-15 RX ORDER — PAROXETINE HYDROCHLORIDE 20 MG/1
20 TABLET, FILM COATED ORAL DAILY
Qty: 30 TABLET | Refills: 1 | Status: SHIPPED | OUTPATIENT
Start: 2018-03-15 | End: 2018-03-29 | Stop reason: SDUPTHER

## 2018-03-15 RX ORDER — NAPROXEN 500 MG/1
500 TABLET ORAL 2 TIMES DAILY WITH MEALS
Qty: 60 TABLET | Refills: 0 | Status: SHIPPED | OUTPATIENT
Start: 2018-03-15 | End: 2018-05-02 | Stop reason: ALTCHOICE

## 2018-03-15 NOTE — TELEPHONE ENCOUNTER
I spoke to girlfriend and the patient  We will put in for naproxen for his back pain now  We will refer to GI for his hepatitis  We will get blood work  We will refer to Pain Management for his back pain  Follow-up in 2 weeks p r n

## 2018-03-26 ENCOUNTER — OFFICE VISIT (OUTPATIENT)
Dept: PAIN MEDICINE | Facility: CLINIC | Age: 33
End: 2018-03-26
Payer: COMMERCIAL

## 2018-03-26 VITALS — BODY MASS INDEX: 27.8 KG/M2 | SYSTOLIC BLOOD PRESSURE: 120 MMHG | WEIGHT: 205 LBS | DIASTOLIC BLOOD PRESSURE: 80 MMHG

## 2018-03-26 DIAGNOSIS — G89.29 CHRONIC MIDLINE THORACIC BACK PAIN: Primary | ICD-10-CM

## 2018-03-26 DIAGNOSIS — M79.18 MYOFASCIAL PAIN: ICD-10-CM

## 2018-03-26 DIAGNOSIS — M51.34 DEGENERATION OF INTERVERTEBRAL DISC OF THORACIC REGION: ICD-10-CM

## 2018-03-26 DIAGNOSIS — M54.6 CHRONIC MIDLINE THORACIC BACK PAIN: Primary | ICD-10-CM

## 2018-03-26 PROCEDURE — 99214 OFFICE O/P EST MOD 30 MIN: CPT | Performed by: ANESTHESIOLOGY

## 2018-03-26 RX ORDER — GABAPENTIN 100 MG/1
100 CAPSULE ORAL 3 TIMES DAILY
Qty: 90 CAPSULE | Refills: 1 | Status: SHIPPED | OUTPATIENT
Start: 2018-03-26 | End: 2018-07-26 | Stop reason: SDUPTHER

## 2018-03-26 RX ORDER — DICLOFENAC POTASSIUM 50 MG/1
50 TABLET, FILM COATED ORAL 3 TIMES DAILY
Qty: 90 TABLET | Refills: 1 | Status: SHIPPED | OUTPATIENT
Start: 2018-03-26 | End: 2018-05-02 | Stop reason: ALTCHOICE

## 2018-03-26 NOTE — PROGRESS NOTES
Pt is c/o back pain  Assessment:  1  Chronic midline thoracic back pain - Bilateral    2  Myofascial pain    3  Degeneration of intervertebral disc of thoracic region        Plan:  Pt is a 34yo M with h/o mid back pain with radiculoathy and myofacial pain presents today for f/u visit  Pt was seen in the [facility] secondary to LOC and seizures secondary to opioids  At this time we feel it is not appropriate to provide opioid regimen  1  We will schedule pt for T9-10 AMNOJ  2  We will trial diclofenac 50mg po TID  3  We will trial gabapentin 100mg titration schedule   4  We will f/u complete metabolic panel  5  D/c tramadol        Complete risks and benefits including bleeding, infection, tissue reaction, nerve injury and allergic reaction were discussed  The approach was demonstrated using models and literature was provided  Verbal and written consent was obtained  [place] Prescription Drug Monitoring Program report was reviewed and was appropriate       History of Present Illness: The patient is a 29 y o  male who presents for a follow up office visit in regards to Back Pain  The patients current symptoms include 7/10 constant sharp, throbbing, cramping, shooting, numbness, and pens and needles without any typical pain pattern,     Current pain medications includes: Tramadol  The patient reports that this regimen is providing 0% pain relief  The patient is reporting no side effects from this pain medication regimen  I have personally reviewed and/or updated the patient's past medical history, past surgical history, family history, social history, current medications, allergies, and vital signs today  Review of Systems  Review of Systems   Respiratory: Negative for shortness of breath  Cardiovascular: Negative for chest pain  Gastrointestinal: Negative for constipation, diarrhea, nausea and vomiting  Musculoskeletal: Negative for arthralgias, gait problem, joint swelling and myalgias  Difficulty walking  Decreased rom  Muscle weakness  Joint stiffness   Skin: Negative for rash  Neurological: Negative for dizziness, seizures and weakness  Memory loss   All other systems reviewed and are negative  Past Medical History:   Diagnosis Date    Addiction [facility] drug ([facility] )     Chronic pain     Hepatitis C virus     HIV (human immunodeficiency virus infection) ([facility] )        No past surgical history on file  No family history on file  Social History     Occupational History    Not on file  Social History Main Topics    Smoking status: Heavy Tobacco Smoker     Packs/day: 1 00    Smokeless tobacco: Never Used    Alcohol use Yes      Comment: occasional    Drug use: Yes     Types: Heroin, Prescription      Comment: past opiate abuser   Sexual activity: Yes         Current Outpatient Prescriptions:     naproxen (NAPROSYN) 500 mg tablet, Take 1 tablet (500 mg total) by mouth 2 (two) times a day with meals, Disp: 60 tablet, Rfl: 0    PARoxetine (PAXIL) 20 mg tablet, Take 1 tablet (20 mg total) by mouth daily, Disp: 30 tablet, Rfl: 1    ranitidine (ZANTAC) 150 mg tablet, Take 1 tablet (150 mg total) by mouth 2 (two) times a day, Disp: 60 tablet, Rfl: 0    Allergies   Allergen Reactions    Clindamycin/Lincomycin Hives       Physical Exam:    /80   Wt 93 kg (205 lb)   BMI 27 80 kg/m²     Constitutional:normal, well developed, well nourished, alert, in no distress and non-toxic and no overt pain behavior  Eyes:anicteric  HEENT:grossly intact  Neck:supple, symmetric, trachea midline and no masses   Pulmonary:even and unlabored  Cardiovascular:No edema or pitting edema present  Skin:Normal without rashes or lesions and well hydrated  Psychiatric:Mood and affect appropriate  Neurologic:Cranial Nerves II-XII grossly intact  Musculoskeletal:normal     Thoracic Spine Examination: Decreased Rom with flexion, extension, and rotation to the left and right with pain   Pain with palpation bilateral paraspinal muscles    Lumbar/Sacral Spine examination demonstrates  Full range of motion lumbar spine with pain upon: flexion, lateral rotation to the left/right, and bending to the left/right  Bilateral lumbar paraspinals tender to palpation  Muscle spasms noted in the lumbar area bilaterally  4/5 lower extremity strength in all muscle groups bilaterally  Negative seated straight leg raise for bilateral lower extremities  Sensitivity to light touch intact bilateral lower extremities  4+ reflexes in the patella and Achilles  No ankle clonus     Imaging  No orders to display   MRI thoracic spine wo contrast   Status: Final result   PACS Images     Show images for MRI thoracic spine wo contrast   Order Report      Order Details   Study Result     MRI THORACIC SPINE WITHOUT CONTRAST     INDICATION:  Chronic mid and upper back pain     COMPARISON:  Plain film dated 3/22/2016     TECHNIQUE:  Sagittal T1, sagittal T2, sagittal inversion recovery, axial T2,  axial 2D MERGE          IMAGE QUALITY: Diagnostic      FINDINGS:     ALIGNMENT: Normal alignment of the thoracic spine  No compression fracture  No subluxation  No scoliosis      MARROW SIGNAL:  Scattered endplate Schmorl's nodes at lower levels including opposed Schmorl's nodes at T10-11 and T11-12  Minimal reactive endplate marrow changes at lower lumbar levels  No worrisome spine lesion      THORACIC CORD: Normal signal within the thoracic cord      PARAVERTEBRAL SOFT TISSUES: Paraspinal soft tissues are unremarkable      THORACIC DEGENERATIVE CHANGE:      At T8-9 there is a left paracentral disc herniation, protrusion type, resulting in ventral impression upon the thecal sac as well as slight left ventral distortion of the cord best seen on 6/30  No cord myelomalacia    No significant central canal or   foraminal encroachment      At T9-10 there is a right paracentral disc herniation, extrusion type, with minimal caudal and cranial migration resulting in right ventral impression upon the thecal sac as well as minimal right ventral cord distortion best seen on 6/34  No cord   myelomalacia  No significant central canal or foraminal encroachment      At T10-11 there is a left paracentral disc herniation, protrusion type resulting in left ventral thecal sac impression and slight left ventral CORD impression at best seen on 6/39 and 38 without cord myelomalacia  No central canal or foraminal stenosis   at this level      Remaining levels are unremarkable      IMPRESSION:  Mild lower thoracic spine spondylotic changes without significant stenosis as above  No orders of the defined types were placed in this encounter

## 2018-03-27 PROBLEM — M51.34 DEGENERATION OF THORACIC INTERVERTEBRAL DISC: Status: ACTIVE | Noted: 2018-03-27

## 2018-03-29 DIAGNOSIS — F41.9 ANXIETY: ICD-10-CM

## 2018-03-29 RX ORDER — PAROXETINE HYDROCHLORIDE 20 MG/1
20 TABLET, FILM COATED ORAL DAILY
Qty: 30 TABLET | Refills: 1 | Status: SHIPPED | OUTPATIENT
Start: 2018-03-29 | End: 2018-05-24 | Stop reason: SDUPTHER

## 2018-04-06 ENCOUNTER — TELEPHONE (OUTPATIENT)
Dept: GASTROENTEROLOGY | Facility: HOSPITAL | Age: 33
End: 2018-04-06

## 2018-04-19 ENCOUNTER — APPOINTMENT (OUTPATIENT)
Dept: RADIOLOGY | Facility: HOSPITAL | Age: 33
End: 2018-04-19
Payer: COMMERCIAL

## 2018-04-19 ENCOUNTER — HOSPITAL ENCOUNTER (OUTPATIENT)
Facility: HOSPITAL | Age: 33
Setting detail: OUTPATIENT SURGERY
Discharge: HOME | End: 2018-04-19
Attending: ANESTHESIOLOGY | Admitting: ANESTHESIOLOGY
Payer: COMMERCIAL

## 2018-04-19 VITALS
DIASTOLIC BLOOD PRESSURE: 65 MMHG | RESPIRATION RATE: 18 BRPM | HEART RATE: 109 BPM | BODY MASS INDEX: 27.77 KG/M2 | OXYGEN SATURATION: 99 % | HEIGHT: 72 IN | WEIGHT: 205 LBS | TEMPERATURE: 97.6 F | SYSTOLIC BLOOD PRESSURE: 125 MMHG

## 2018-04-19 PROCEDURE — 64479 NJX AA&/STRD TFRM EPI C/T 1: CPT | Performed by: ANESTHESIOLOGY

## 2018-04-19 PROCEDURE — 72070 X-RAY EXAM THORAC SPINE 2VWS: CPT

## 2018-04-19 RX ORDER — LIDOCAINE HYDROCHLORIDE 10 MG/ML
INJECTION, SOLUTION INFILTRATION; PERINEURAL AS NEEDED
Status: DISCONTINUED | OUTPATIENT
Start: 2018-04-19 | End: 2018-04-19 | Stop reason: HOSPADM

## 2018-04-19 RX ORDER — METHYLPREDNISOLONE ACETATE 80 MG/ML
INJECTION, SUSPENSION INTRA-ARTICULAR; INTRALESIONAL; INTRAMUSCULAR; SOFT TISSUE AS NEEDED
Status: DISCONTINUED | OUTPATIENT
Start: 2018-04-19 | End: 2018-04-19 | Stop reason: HOSPADM

## 2018-04-19 RX ADMIN — IOHEXOL 49 ML: 300 INJECTION, SOLUTION INTRAVENOUS at 13:00

## 2018-04-19 NOTE — H&P (VIEW-ONLY)
Pt is c/o back pain  Assessment:  1  Chronic midline thoracic back pain - Bilateral    2  Myofascial pain    3  Degeneration of intervertebral disc of thoracic region        Plan:  Pt is a 36yo M with h/o mid back pain with radiculoathy and myofacial pain presents today for f/u visit  Pt was seen in the Er secondary to LOC and seizures secondary to opioids  At this time we feel it is not appropriate to provide opioid regimen  1  We will schedule pt for T9-10 MANOJ  2  We will trial diclofenac 50mg po TID  3  We will trial gabapentin 100mg titration schedule   4  We will f/u complete metabolic panel  5  D/c tramadol        Complete risks and benefits including bleeding, infection, tissue reaction, nerve injury and allergic reaction were discussed  The approach was demonstrated using models and literature was provided  Verbal and written consent was obtained  [id number] Prescription Drug Monitoring Program report was reviewed and was appropriate       History of Present Illness: The patient is a 29 y o  male who presents for a follow up office visit in regards to Back Pain  The patients current symptoms include 7/10 constant sharp, throbbing, cramping, shooting, numbness, and pens and needles without any typical pain pattern,     Current pain medications includes: Tramadol  The patient reports that this regimen is providing 0% pain relief  The patient is reporting no side effects from this pain medication regimen  I have personally reviewed and/or updated the patient's past medical history, past surgical history, family history, social history, current medications, allergies, and vital signs today  Review of Systems  Review of Systems   Respiratory: Negative for shortness of breath  Cardiovascular: Negative for chest pain  Gastrointestinal: Negative for constipation, diarrhea, nausea and vomiting  Musculoskeletal: Negative for arthralgias, gait problem, joint swelling and myalgias  Difficulty walking  Decreased rom  Muscle weakness  Joint stiffness   Skin: Negative for rash  Neurological: Negative for dizziness, seizures and weakness  Memory loss   All other systems reviewed and are negative  Past Medical History:   Diagnosis Date    Addiction [facility] drug ([facility] )     Chronic pain     Hepatitis C virus     HIV (human immunodeficiency virus infection) ([facility] )        No past surgical history on file  No family history on file  Social History     Occupational History    Not on file  Social History Main Topics    Smoking status: Heavy Tobacco Smoker     Packs/day: 1 00    Smokeless tobacco: Never Used    Alcohol use Yes      Comment: occasional    Drug use: Yes     Types: Heroin, Prescription      Comment: past opiate abuser   Sexual activity: Yes         Current Outpatient Prescriptions:     naproxen (NAPROSYN) 500 mg tablet, Take 1 tablet (500 mg total) by mouth 2 (two) times a day with meals, Disp: 60 tablet, Rfl: 0    PARoxetine (PAXIL) 20 mg tablet, Take 1 tablet (20 mg total) by mouth daily, Disp: 30 tablet, Rfl: 1    ranitidine (ZANTAC) 150 mg tablet, Take 1 tablet (150 mg total) by mouth 2 (two) times a day, Disp: 60 tablet, Rfl: 0    Allergies   Allergen Reactions    Clindamycin/Lincomycin Hives       Physical Exam:    /80   Wt 93 kg (205 lb)   BMI 27 80 kg/m²     Constitutional:normal, well developed, well nourished, alert, in no distress and non-toxic and no overt pain behavior  Eyes:anicteric  HEENT:grossly intact  Neck:supple, symmetric, trachea midline and no masses   Pulmonary:even and unlabored  Cardiovascular:No edema or pitting edema present  Skin:Normal without rashes or lesions and well hydrated  Psychiatric:Mood and affect appropriate  Neurologic:Cranial Nerves II-XII grossly intact  Musculoskeletal:normal     Thoracic Spine Examination: Decreased Rom with flexion, extension, and rotation to the left and right with pain   Pain with palpation bilateral paraspinal muscles    Lumbar/Sacral Spine examination demonstrates  Full range of motion lumbar spine with pain upon: flexion, lateral rotation to the left/right, and bending to the left/right  Bilateral lumbar paraspinals tender to palpation  Muscle spasms noted in the lumbar area bilaterally  4/5 lower extremity strength in all muscle groups bilaterally  Negative seated straight leg raise for bilateral lower extremities  Sensitivity to light touch intact bilateral lower extremities  4+ reflexes in the patella and Achilles  No ankle clonus     Imaging  No orders to display   MRI thoracic spine wo contrast   Status: Final result   PACS Images     Show images for MRI thoracic spine wo contrast   Order Report      Order Details   Study Result     MRI THORACIC SPINE WITHOUT CONTRAST     INDICATION:  Chronic mid and upper back pain     COMPARISON:  Plain film dated 3/22/2016     TECHNIQUE:  Sagittal T1, sagittal T2, sagittal inversion recovery, axial T2,  axial 2D MERGE          IMAGE QUALITY: Diagnostic      FINDINGS:     ALIGNMENT: Normal alignment of the thoracic spine  No compression fracture  No subluxation  No scoliosis      MARROW SIGNAL:  Scattered endplate Schmorl's nodes at lower levels including opposed Schmorl's nodes at T10-11 and T11-12  Minimal reactive endplate marrow changes at lower lumbar levels  No worrisome spine lesion      THORACIC CORD: Normal signal within the thoracic cord      PARAVERTEBRAL SOFT TISSUES: Paraspinal soft tissues are unremarkable      THORACIC DEGENERATIVE CHANGE:      At T8-9 there is a left paracentral disc herniation, protrusion type, resulting in ventral impression upon the thecal sac as well as slight left ventral distortion of the cord best seen on 6/30  No cord myelomalacia    No significant central canal or   foraminal encroachment      At T9-10 there is a right paracentral disc herniation, extrusion type, with minimal caudal and cranial migration resulting in right ventral impression upon the thecal sac as well as minimal right ventral cord distortion best seen on 6/34  No cord   myelomalacia  No significant central canal or foraminal encroachment      At T10-11 there is a left paracentral disc herniation, protrusion type resulting in left ventral thecal sac impression and slight left ventral CORD impression at best seen on 6/39 and 38 without cord myelomalacia  No central canal or foraminal stenosis   at this level      Remaining levels are unremarkable      IMPRESSION:  Mild lower thoracic spine spondylotic changes without significant stenosis as above  No orders of the defined types were placed in this encounter

## 2018-04-19 NOTE — DISCHARGE INSTRUCTIONS
Epidural Steroid Injection   WHAT YOU NEED TO KNOW:   An epidural steroid injection (MANOJ) is a procedure to inject steroid medicine into the epidural space  The epidural space is between your spinal cord and vertebrae  Steroids reduce inflammation and fluid buildup in your spine that may be causing pain  You may be given pain medicine along with the steroids  ACTIVITY  · Do not drive or operate machinery today  · No strenuous activity today - bending, lifting, etc   · You may resume normal activites starting tomorrow - start slowly and as tolerated  · You may shower today, but no tub baths or hot tubs  · You may have numbness for several hours from the local anesthetic  Please use caution and common sense, especially with weight-bearing activities  CARE OF THE INJECTION SITE  · If you have soreness or pain, apply ice to the area today (20 minutes on/20 minutes off)  · Starting tomorrow, you may use warm, moist heat or ice if needed  · You may have an increase or change in your discomfort for 36-48 hours after your treatment  · Apply ice and continue with any pain medication you have been prescribed  · Notify the [facility] if you have any of the following: redness, drainage, swelling, headache, stiff neck or fever above 100°F     SPECIAL INSTRUCTIONS  · Our office will contact you in approximately 7 days for a progress report  MEDICATIONS  · Continue to take all routine medications  · Our office may have instructed you to hold some medications  If you have a problem specifically related to your procedure, please call our office at [phone]  Problems not related to your procedure should be directed to your primary care physician

## 2018-04-19 NOTE — OP NOTE
OPERATIVE REPORT  PATIENT NAME: Prakash Alvares    :  1985  MRN: [id number]  Pt Location: [facility] OR ROOM 01    SURGERY DATE: 2018    Surgeon(s) and Role:     * Garrett Yeung MD - Primary    Preop Diagnosis:  Degeneration of thoracic intervertebral disc [M51 34]    Post-Op Diagnosis Codes:     * Degeneration of thoracic intervertebral disc [M51 34]    Procedure(s) (LRB):  T9-10 INTERLAMINAR EPIDURAL STEROID INJECTION (Bilateral)    Specimen(s):  * No specimens in log *    Estimated Blood Loss:   Minimal    Drains:       Anesthesia Type:   Local    Operative Indications:  Degeneration of thoracic intervertebral disc [M51 34]      Operative Findings:  same    Complications:   None    Procedure and Technique:  Fluoroscopically-guided lumbar Interlaminar Epidural Steroid Injection     Indication:  Lumbar and bilateral leg pain  Preoperative diagnosis:  Lumbar radiculitis  Postoperative diagnosis:  Lumbar radiculitis    Procedure: Fluoroscopically-guided T9-T10 interlaminar epidural steroid injection under fluoroscopy      After discussing the risks, benefits, and alternatives to the procedure, the patient expressed understanding and wished to proceed  The patient was brought to the fluoroscopy suite and placed in the prone position  A procedural pause was conducted to verify:  correct patient identity, procedure to be performed and as applicable, correct side and site, correct patient position, and availability of implants, special equipment and special requirements  After identifying the T9-T10 space fluoroscopically, the skin was sterilely prepped and draped in the usual fashion using Chloraprep skin prep  The skin and subcutaneous tissue were anesthetized with 0 5 % lidocaine  Utilizing a loss of resistance technique and intermittent fluoroscopic guidance, a 3 5 20 gauge Tuohy needle was advanced into the epidural space  Proper needle positioning was confirmed using multiple fluoroscopic views  After negative aspiration, Omnipaque 300 contrast was injected confirming epidural spread without evidence of intravascular or intrathecal spread  A 4 ml solution consisting of 80 mg of Depo-Medrol in sterile saline was injected slowly and incrementally into the epidural space  Following the injection the needle was withdrawn slightly and flushed with 0 5 % lidocaine as it was fully extracted  The patient tolerated the procedure well and there were no apparent complications  After appropriate observation, the patient was dismissed from the clinic in good condition under their own power     I was present for the entire procedure    Patient Disposition:  hemodynamically stable    SIGNATURE: Mahsa Rush MD  DATE: April 19, 2018  TIME: 12:11 PM

## 2018-04-24 ENCOUNTER — TELEPHONE (OUTPATIENT)
Dept: INTERNAL MEDICINE CLINIC | Facility: CLINIC | Age: 33
End: 2018-04-24

## 2018-04-26 ENCOUNTER — TELEPHONE (OUTPATIENT)
Dept: PAIN MEDICINE | Facility: CLINIC | Age: 33
End: 2018-04-26

## 2018-05-02 ENCOUNTER — OFFICE VISIT (OUTPATIENT)
Dept: INTERNAL MEDICINE CLINIC | Facility: CLINIC | Age: 33
End: 2018-05-02
Payer: COMMERCIAL

## 2018-05-02 VITALS
HEIGHT: 72 IN | SYSTOLIC BLOOD PRESSURE: 120 MMHG | WEIGHT: 202.4 LBS | DIASTOLIC BLOOD PRESSURE: 80 MMHG | TEMPERATURE: 97.5 F | HEART RATE: 77 BPM | RESPIRATION RATE: 18 BRPM | OXYGEN SATURATION: 96 % | BODY MASS INDEX: 27.41 KG/M2

## 2018-05-02 DIAGNOSIS — F11.10 HEROIN ABUSE (HCC): ICD-10-CM

## 2018-05-02 DIAGNOSIS — F19.20 DRUG DEPENDENCE (HCC): Primary | ICD-10-CM

## 2018-05-02 PROBLEM — Z79.899 ENCOUNTER FOR MONITORING SUBOXONE MAINTENANCE THERAPY: Status: ACTIVE | Noted: 2018-05-02

## 2018-05-02 PROBLEM — Z79.899 DRUG THERAPY CONTINUED: Status: ACTIVE | Noted: 2018-05-02

## 2018-05-02 PROBLEM — Z51.81 ENCOUNTER FOR MONITORING SUBOXONE MAINTENANCE THERAPY: Status: ACTIVE | Noted: 2018-05-02

## 2018-05-02 PROCEDURE — 80307 DRUG TEST PRSMV CHEM ANLYZR: CPT | Performed by: INTERNAL MEDICINE

## 2018-05-02 PROCEDURE — 99214 OFFICE O/P EST MOD 30 MIN: CPT | Performed by: INTERNAL MEDICINE

## 2018-05-02 RX ORDER — BUPRENORPHINE HYDROCHLORIDE AND NALOXONE HYDROCHLORIDE DIHYDRATE 8; 2 MG/1; MG/1
TABLET SUBLINGUAL
Qty: 14 TABLET | Refills: 0 | Status: SHIPPED | OUTPATIENT
Start: 2018-05-02 | End: 2018-09-07

## 2018-05-02 RX ORDER — BUPRENORPHINE HYDROCHLORIDE AND NALOXONE HYDROCHLORIDE DIHYDRATE 8; 2 MG/1; MG/1
TABLET SUBLINGUAL
Qty: 2 TABLET | Refills: 0 | Status: SHIPPED | OUTPATIENT
Start: 2018-05-02 | End: 2018-05-02

## 2018-05-02 NOTE — PATIENT INSTRUCTIONS
Buprenorphine/Naloxone (Into the mouth)   Buprenorphine (bue-pre-NOR-feen), Naloxone (nal-OX-one)  Treats narcotic dependence  Brand Name(s): Bunavail, Suboxone, Zubsolv   There may be other brand names for this medicine  When This Medicine Should Not Be Used: This medicine is not right for everyone  Do not use it if you had an allergic reaction to buprenorphine or naloxone  How to Use This Medicine: Thin Sheet, Tablet  · Take your medicine as directed  Your dose may need to be changed several times to find what works best for you  · You must let the medicine dissolve  Never swallow the film or tablet  Your body may not absorb enough of the medicine if you swallow it  · Your health caregiver should show you how to use the medicine  If you do not understand, ask for help  It is important to use the medicine correctly  · Do not talk while the medicine is in your mouth  · Buccal film: Rinse your mouth with water to moisten it  Place the film against the inside of your cheek  If your doctor told you to use more than 1 film, place the second film inside your other cheek  Do not place more than 2 films inside of 1 cheek at a time  Do not move or touch the film  Do not eat or drink anything until the film is completely dissolved  · Sublingual tablet: Place the tablet under your tongue  If your doctor told you to use more than 1 tablet, place all of the tablets in different places under your tongue at the same time  You can use 2 tablets at a time until you have taken all of the medicine, if that is easier for you  Let the tablets dissolve completely in your mouth  Do not eat or drink anything until the tablets are completely dissolved  · Sublingual film: Drink some water to help moisten your mouth  Place the film under your tongue  If your doctor told you to use more than 1 film, place the second film on the opposite side from the first one  Do not move the film after you place it under your tongue   If you are supposed to use more than 2 films, use them the same way, but do not start until the first 2 films are completely dissolved  · Do not break, crush, chew, or cut the film or tablet  · This medicine should come with a Medication Guide  Ask your pharmacist for a copy if you do not have one  · Missed dose: Take a dose as soon as you remember  If it is almost time for your next dose, wait until then and take a regular dose  Do not take extra medicine to make up for a missed dose  · Store the medicine in a closed container at room temperature, away from heat, moisture, and direct light  Ask your pharmacist about the best way to dispose of medicine you do not use  Drugs and Foods to Avoid:   Ask your doctor or pharmacist before using any other medicine, including over-the-counter medicines, vitamins, and herbal products  · Do not use this medicine if you are using or have used an MAO inhibitor within the past 14 days  · Some medicines can affect how buprenorphine/naloxone works  Tell your doctor if you are using the following:   ¨ Carbamazepine, erythromycin, ketoconazole, mirtazapine, phenobarbital, phenytoin, rifampin, tramadol, or trazodone  ¨ Medicine to treat depression  ¨ Medicine to treat HIV/AIDS (including atazanavir, delavirdine, efavirenz, etravirine, nevirapine, ritonavir)  ¨ Phenothiazine medicine  · Do not drink alcohol while you are using this medicine  · Tell your doctor if you use anything else that makes you sleepy  Some examples are allergy medicine, narcotic pain medicine, and alcohol  Tell your doctor if you are also using butorphanol, nalbuphine, pentazocine, or a muscle relaxer    Warnings While Using This Medicine:   · Tell your doctor if you are pregnant or breastfeeding, or if you have kidney disease, liver disease (including hepatitis), lung or breathing problems, adrenal gland problems, an enlarged prostate, trouble urinating, gallbladder problems, low thyroid levels, stomach problems, or a history of depression, brain tumor, head injury, alcohol or drug abuse  · This medicine may cause the following problems:  ¨ High risk of overdose, which can lead to death  ¨ Respiratory depression (serious breathing problem that can be life-threatening)  ¨ Liver problems  ¨ Serotonin syndrome, when used with certain medicines  · This medicine may make you dizzy or drowsy  Do not drive or do anything that could be dangerous until you know how this medicine affects you  Stand or sit up slowly if you feel lightheaded or dizzy  · Tell any doctor or dentist who treats you that you are using this medicine  · This medicine can be habit-forming  Do not use more than your prescribed dose  Call your doctor if you think your medicine is not working  · Do not stop using this medicine suddenly  Your doctor will need to slowly decrease your dose before you stop it completely  · This medicine could cause infertility  Talk with your doctor before using this medicine if you plan to have children  · Keep all medicine out of the reach of children  Never share your medicine with anyone    Possible Side Effects While Using This Medicine:   Call your doctor right away if you notice any of these side effects:  · Allergic reaction: Itching or hives, swelling in your face or hands, swelling or tingling in your mouth or throat, chest tightness, trouble breathing  · Blue lips, fingernails, or skin  · Dark urine or pale stools, nausea, vomiting, loss of appetite, stomach pain, yellow skin or eyes  · Extreme dizziness or weakness, shallow breathing, sweating, seizures, cold or clammy skin  · Severe confusion, lightheadedness, dizziness, or fainting  · Trouble breathing or slow breathing  If you notice these less serious side effects, talk with your doctor:   · Headache, trouble sleeping  · Constipation or upset stomach  · Shaking, feeling hot or cold, runny nose, watery eyes, diarrhea, vomiting, and muscle aches  If you notice other side effects that you think are caused by this medicine, tell your doctor  Call your doctor for medical advice about side effects  You may report side effects to FDA at 5-003-FZX-2525  © 2017 [address] [employer] Information is for End User's use only and may not be sold, redistributed or otherwise used for commercial purposes  The above information is an  only  It is not intended as medical advice for individual conditions or treatments  Talk to your doctor, nurse or pharmacist before following any medical regimen to see if it is safe and effective for you

## 2018-05-02 NOTE — PROGRESS NOTES
Assessment/Plan:    No problem-specific Assessment & Plan notes found for this encounter  Diagnoses and all orders for this visit:    Drug dependence ([facility] [address] )  -     Discontinue: buprenorphine-naloxone (SUBOXONE) 8-2 mg per SL tablet; Return to office with sample to be dispensed  -     buprenorphine-naloxone (SUBOXONE) 8-2 mg per SL tablet; One or two sl q day  Heroin abuse  -     Discontinue: buprenorphine-naloxone (SUBOXONE) 8-2 mg per SL tablet; Return to office with sample to be dispensed  -     buprenorphine-naloxone (SUBOXONE) 8-2 mg per SL tablet; One or two sl q day  Pt RTC with the meds and was dosed  After about 15 minutes, pt feeling better and no side effects  D/c'd to home  A/P: Will start 16mg tabs, dose 1/6 and get back into counseling  UDT obtained and sent to the lab  Reminded to keep meds safe and out of reach of children  RTC one week  Subjective:      Patient ID: Susanna Bowers is a 29 y o  male  WM formerly in my suboxone program, presents to re-enter the program  Pt last seen 2/16 and then reports dropping out of the program due to loosing his insurance and being unable to afford the meds  Stayed cleaned until the end of summer 2017 when he split up with his wife and had worsening back pain  Was placed on ultram and quickly began abusing them and eventually went back to IV heroin, one bundle/day  Last used yesterday and starting to withdraw  Trouble with the law in the distant past, but no recently  No current counseling  No more detox since back in [place] years ago  No major changes in PMH, PSH, All, OH, SH, or FHx except for the separation  The following portions of the patient's history were reviewed and updated as appropriate:   He  has a past medical history of Addiction to drug [facility]); Anxiety; Arthritis; Chronic pain; Hepatitis; Hepatitis C virus; Heroin abuse (5/2/2018); HIV (human immunodeficiency virus infection) ([facility] [address] ); and Tobacco use disorder    He Patient Active Problem List    Diagnosis Date Noted    Drug dependence ([facility] [address] ) 05/02/2018    Heroin abuse 05/02/2018    Encounter for monitoring Suboxone maintenance therapy 05/02/2018    Drug therapy continued 05/02/2018    Degeneration of thoracic intervertebral disc 03/27/2018    Degeneration of intervertebral disc of thoracic region 08/04/2017    Myofascial pain 08/04/2017    Chronic midline thoracic back pain 05/18/2016    Anxiety 05/28/2015    Hepatitis C virus 05/28/2015     He  has a past surgical history that includes Epidural block injection (Bilateral, 4/19/2018)  His family history includes Hepatitis in his family; No Known Problems in his father  He  reports that he has been smoking  He has been smoking about 1 00 pack per day  He has quit using smokeless tobacco  He reports that he drinks alcohol  He reports that he uses drugs, including Heroin and Prescription  Current Outpatient Prescriptions   Medication Sig Dispense Refill    gabapentin (NEURONTIN) 100 mg capsule Take 1 capsule (100 mg total) by mouth 3 (three) times a day for 30 days 90 capsule 1    PARoxetine (PAXIL) 20 mg tablet Take 1 tablet (20 mg total) by mouth daily 30 tablet 1    ranitidine (ZANTAC) 150 mg tablet Take 1 tablet (150 mg total) by mouth 2 (two) times a day 60 tablet 0    buprenorphine-naloxone (SUBOXONE) 8-2 mg per SL tablet One or two sl q day  14 tablet 0     No current facility-administered medications for this visit        Current Outpatient Prescriptions on File Prior to Visit   Medication Sig    gabapentin (NEURONTIN) 100 mg capsule Take 1 capsule (100 mg total) by mouth 3 (three) times a day for 30 days    PARoxetine (PAXIL) 20 mg tablet Take 1 tablet (20 mg total) by mouth daily    ranitidine (ZANTAC) 150 mg tablet Take 1 tablet (150 mg total) by mouth 2 (two) times a day    [DISCONTINUED] diclofenac potassium (CATAFLAM) 50 mg tablet Take 1 tablet (50 mg total) by mouth 3 (three) times a day for 30 days    [DISCONTINUED] naproxen (NAPROSYN) 500 mg tablet Take 1 tablet (500 mg total) by mouth 2 (two) times a day with meals     No current facility-administered medications on file prior to visit  He is allergic to clindamycin/lincomycin       Review of Systems   Constitutional: Positive for chills, diaphoresis and fatigue  Negative for activity change and fever  HENT: Positive for postnasal drip and rhinorrhea  Eyes: Negative for visual disturbance  Respiratory: Positive for wheezing  Negative for cough, chest tightness and shortness of breath  Cardiovascular: Positive for palpitations  Negative for chest pain and leg swelling  Gastrointestinal: Positive for abdominal pain, diarrhea and nausea  Negative for constipation and vomiting  Genitourinary: Negative for difficulty urinating, dysuria and frequency  Musculoskeletal: Positive for myalgias  Negative for arthralgias and gait problem  Neurological: Positive for light-headedness and headaches  Negative for dizziness, seizures and weakness  Psychiatric/Behavioral: Negative for confusion, dysphoric mood, hallucinations, self-injury, sleep disturbance and suicidal ideas  The patient is not nervous/anxious  Objective:      /80 (BP Location: Left arm, Patient Position: Sitting, Cuff Size: Adult)   Pulse 77   Temp 97 5 °F (36 4 °C) (Tympanic)   Resp 18   Ht 6' (1 829 m)   Wt 91 8 kg (202 lb 6 4 oz)   SpO2 96%   BMI 27 45 kg/m²          Physical Exam   Constitutional: He is oriented to person, place, and time  He appears well-developed and well-nourished  No distress  HENT:   Head: Normocephalic and atraumatic  Mouth/Throat: Oropharynx is clear and moist  No oropharyngeal exudate  Eyes: Conjunctivae and EOM are normal  Pupils are equal, round, and reactive to light  Neck: Neck supple  No JVD present  Cardiovascular: Normal rate, regular rhythm and normal heart sounds  No murmur heard    Pulmonary/Chest: Effort normal and breath sounds normal  No respiratory distress  He has no wheezes  Abdominal: Soft  Bowel sounds are normal  He exhibits no distension  There is no tenderness  Neurological: He is alert and oriented to person, place, and time  No cranial nerve deficit  Psychiatric: He has a normal mood and affect  His behavior is normal  Judgment and thought content normal    Nursing note and vitals reviewed

## 2018-05-08 NOTE — TELEPHONE ENCOUNTER
Letter sent
Please see previous task 
Please send [address] letter 
Pt has not pain in lower back but has up by shoulder blades  When asked how much % relief was received, pt stated the pain is above where the injection was   Please call [phone]
Spoke with pt's girlfriend and she said pt was sleeping and would have him call the office back    2nd attempt
md rajput
s/p T9-10 Interlaminar Epidural Steroid Injection on 04/19/18 w/RM   Pt girlfriend answered phone and will have him call the office back  f/u scheduled for 05/16/18 
bruising

## 2018-05-11 LAB
AMPHETAMINES UR QL SCN: NORMAL
BARBITURATES UR QL SCN: NEGATIVE NG/ML
BENZODIAZ UR QL SCN: NORMAL
BZE UR QL: NEGATIVE NG/ML
CANNABINOIDS UR QL SCN: NORMAL
METHADONE UR QL SCN: NEGATIVE NG/ML
OPIATES UR QL: NORMAL
PCP UR QL: NEGATIVE NG/ML
PROPOXYPH UR QL: NEGATIVE NG/ML

## 2018-05-22 ENCOUNTER — TELEPHONE (OUTPATIENT)
Dept: INTERNAL MEDICINE CLINIC | Facility: CLINIC | Age: 33
End: 2018-05-22

## 2018-05-22 NOTE — TELEPHONE ENCOUNTER
Afia Schaffer from [facility] called today to speak with a medical assistant regarding this patient  They called to get information on this patient regarding the suboxone program that he is/was enrolled in  [facility] was contacted due to an incident, and the patient signed paperwork to have us release information to them regarding his visits here in the program    Jodie asked questions about why he was on the suboxone  I had told Jodie he was on the suboxone due to heroin use and prescription drug addiction per the notes you had in his chart from his visit on 5/2  Jodie stated that the patient told him that he was only on the suboxone for prescription drug use only and that he did not do heroin  So he will be investigating further with the patient himself regarding this matter  Jodie had also stated that the patient was very non-compliant with urine drug screenings requested by [facility]  Jodie had asked about if he had followed up with the suboxone program and I stated that he had missed his 1 week follow up for this and that he has not been contacted or has not contacted us about rescheduling  This is also the second time this has occurred with this patient, with dropping out of the program    Jodie will be calling back for further information if needed regarding this patient  I just wanted to give you an update on this current situation with this patient

## 2018-05-24 DIAGNOSIS — F41.9 ANXIETY: ICD-10-CM

## 2018-05-24 RX ORDER — PAROXETINE HYDROCHLORIDE 20 MG/1
20 TABLET, FILM COATED ORAL DAILY
Qty: 30 TABLET | Refills: 5 | Status: SHIPPED | OUTPATIENT
Start: 2018-05-24 | End: 2018-09-07

## 2018-06-22 NOTE — TELEPHONE ENCOUNTER
Pt called in to schedule an appt in [place] as a new patient  I show no availability with any provider  Please contact the pt once the sept schedule is available   Thank you [phone]

## 2018-06-29 ENCOUNTER — TELEPHONE (OUTPATIENT)
Dept: PAIN MEDICINE | Facility: CLINIC | Age: 33
End: 2018-06-29

## 2018-06-29 NOTE — TELEPHONE ENCOUNTER
Hi Dr Gale Roth,    Patient no showed today  This would be patient's 3rd no show in a row  Please advise if we can reschedule patient  Thank you

## 2018-07-05 NOTE — TELEPHONE ENCOUNTER
Spoke with pt girlfriend Theresa Belle as per release form  Made Garth Dodson aware that pt is being discharged from the practice for multiple no call no show for scheduled appointments  Pt girlfriend wants you to be aware that 2 of those no call/no show appt where because pt was in detox program then went to rehab  Theresa Odonnell stated that she called to make spa aware that pt was in detox for his scheduled appt on 6/29/18  Looked through pt chart to see if there was a message that Garth Dodson called to make us aware that pt was in detox, there is nothing charted that pt's girlfriend called  Do you still want to discharge this pt from spa?

## 2018-07-10 NOTE — TELEPHONE ENCOUNTER
Attempted to reach pt and Jessie's daughter answered the phone, asked her to have the pt call SPA back, SPA # provided  She stated he will give the pt the message

## 2018-07-13 NOTE — TELEPHONE ENCOUNTER
--CHRISTA--    S/W Jessie-pt's girlfriend as per release of health information  Advised her the pt is discharge from [facility] for missing his appointments and the pt will be receiving a discharge letter along with a list of other pain management providers  She verbalized understanding and she stated she will tell Jessi Kraft  Emailed Yue Diehl about the discharge

## 2018-07-26 ENCOUNTER — TELEPHONE (OUTPATIENT)
Dept: FAMILY MEDICINE CLINIC | Facility: CLINIC | Age: 33
End: 2018-07-26

## 2018-07-26 DIAGNOSIS — K08.89 TOOTH PAIN: Primary | ICD-10-CM

## 2018-07-26 DIAGNOSIS — M51.34 DEGENERATION OF INTERVERTEBRAL DISC OF THORACIC REGION: ICD-10-CM

## 2018-07-26 RX ORDER — GABAPENTIN 100 MG/1
100 CAPSULE ORAL 3 TIMES DAILY
Qty: 90 CAPSULE | Refills: 0 | Status: SHIPPED | OUTPATIENT
Start: 2018-07-26 | End: 2018-09-07 | Stop reason: SDUPTHER

## 2018-07-26 RX ORDER — CEPHALEXIN 500 MG/1
1000 CAPSULE ORAL EVERY 12 HOURS SCHEDULED
Qty: 28 CAPSULE | Refills: 0 | Status: SHIPPED | OUTPATIENT
Start: 2018-07-26 | End: 2018-08-02

## 2018-07-26 NOTE — TELEPHONE ENCOUNTER
Patient thinks has an abcess, cant get in to dentist until 8/1/18, wants antibiotic called in so doesn't get any worse  Also asking if you can refill his Gabapentin due to out of it and really helped his back  Dr Aman Ying was the last one to fill it and he will not see him back due to him missing his apts  Please advise    Breana

## 2018-09-05 ENCOUNTER — TRANSITIONAL CARE MANAGEMENT (OUTPATIENT)
Dept: FAMILY MEDICINE CLINIC | Facility: CLINIC | Age: 33
End: 2018-09-05

## 2018-09-07 ENCOUNTER — OFFICE VISIT (OUTPATIENT)
Dept: FAMILY MEDICINE CLINIC | Facility: CLINIC | Age: 33
End: 2018-09-07
Payer: COMMERCIAL

## 2018-09-07 VITALS
HEIGHT: 72 IN | BODY MASS INDEX: 29.8 KG/M2 | WEIGHT: 220 LBS | DIASTOLIC BLOOD PRESSURE: 72 MMHG | SYSTOLIC BLOOD PRESSURE: 110 MMHG

## 2018-09-07 DIAGNOSIS — M51.34 DEGENERATION OF INTERVERTEBRAL DISC OF THORACIC REGION: ICD-10-CM

## 2018-09-07 DIAGNOSIS — F41.9 ANXIETY: ICD-10-CM

## 2018-09-07 DIAGNOSIS — B19.20 HEPATITIS C VIRUS INFECTION WITHOUT HEPATIC COMA, UNSPECIFIED CHRONICITY: Primary | ICD-10-CM

## 2018-09-07 PROCEDURE — 99495 TRANSJ CARE MGMT MOD F2F 14D: CPT | Performed by: FAMILY MEDICINE

## 2018-09-07 RX ORDER — GABAPENTIN 400 MG/1
400 CAPSULE ORAL 3 TIMES DAILY
Qty: 90 CAPSULE | Refills: 0 | Status: SHIPPED | OUTPATIENT
Start: 2018-09-07 | End: 2018-10-08 | Stop reason: SDUPTHER

## 2018-09-07 RX ORDER — ESCITALOPRAM OXALATE 10 MG/1
10 TABLET ORAL DAILY
Qty: 30 TABLET | Refills: 1 | Status: SHIPPED | OUTPATIENT
Start: 2018-09-07 | End: 2020-02-05

## 2018-09-07 RX ORDER — QUETIAPINE FUMARATE 200 MG/1
100 TABLET, FILM COATED ORAL
Qty: 30 TABLET | Refills: 1 | Status: SHIPPED | OUTPATIENT
Start: 2018-09-07 | End: 2018-11-06 | Stop reason: SDUPTHER

## 2018-09-07 NOTE — PROGRESS NOTES
Transition of Care  Follow-up After Hospitalization    Tacho Kelly 29 y o  male   Date:  9/7/2018    Date and time hospital follow up call was made:  9/5/2018  2:19 PM  Patient was hopsitalized at: Other (comment)  Date of admission:  8/1/18  Date of discharge:  9/5/18  Diagnosis:  REHAB FOR DETOX  Disposition:  Home  Were the patients medicaitons reviewed and updated:  No  Current symptoms:  None  Post hospital issues:  None  Should patient be enrolled in anticoag monitoring?:  No  Scheduled for follow up?:  Yes  Did you obtain your prescribed medications:  Yes  Do you need help managing your perscriptions or medications:  No  Is transportation to your appointments needed:  No  I have advised the patient to call PCP with any new or worsening symptoms (please type in name along with any credentials):  Eliverto Lawn  Living Arrangements:  Spouse or Significiant other  Support System:  Partner  The type of support provided:  Emotional  Do you have social support:  Yes, some  Are you recieving outpatient services:  No  Are you recieving home care services:  No  Are you using any community resources:  No  Current waiver service:  No  Have you fallen in the last 12 months:  No  Interperter language line required?:  No  Counseling:  Patient       Hospital records were reviewed  Medications upon discharge reviewed/updated  Discharge Disposition:    Follow up visits with other specialists:       Assessment and Plan: For his anxiety/depression will start him on Lexapro  He will continue the Seroquel at night  I increased his Neurontin to help with his pain  We will get lab work on him to the assesses hepatitis C and also refer him to GI for treatment  We will see him back in the next month or p r n  [place] was seen today for transition of care management      Diagnoses and all orders for this visit:    Hepatitis C virus infection without hepatic coma, unspecified chronicity  -     Hepatitis C RNA, quantitative, PCR; Future  -     Hepatitis C genotype; Future  -     Ambulatory referral to Gastroenterology; Future  -     Comprehensive metabolic panel  -     CBC and differential    Degeneration of intervertebral disc of thoracic region  -     gabapentin (NEURONTIN) 400 mg capsule; Take 1 capsule (400 mg total) by mouth 3 (three) times a day for 30 days    Anxiety  -     QUEtiapine (SEROquel) 200 mg tablet; Take 0 5 tablets (100 mg total) by mouth daily at bedtime  -     escitalopram (LEXAPRO) 10 mg tablet; Take 1 tablet (10 mg total) by mouth daily  -     Comprehensive metabolic panel  -     CBC and differential            HPI:  Patient here today for ODILIA  Patient was admitted to rehab for heroin addiction  Patient was released 2 days ago  Patient was given Seroquel and Neurontin for pain  Patient feels the Neurontin was helping him at 1st, but he feels he needs an increased dose  The Seroquel helps him sleep at night  Would like something to help with his depression  He denies any SI or HI  Patient also wants to get treated for his hepatitis-C         ROS: Review of Systems   Constitutional: Negative  Respiratory: Negative  Cardiovascular: Negative  Gastrointestinal: Negative  History of hepatitis-C   Genitourinary: Negative  Psychiatric/Behavioral: Positive for dysphoric mood         Past Medical History:   Diagnosis Date    [facility] drug ([facility] )     Anxiety     Arthritis     Chronic pain     Hepatitis     Hepatitis C virus     Heroin abuse 5/2/2018    HIV (human immunodeficiency virus infection) ([facility] )     Tobacco use disorder     last assessed 11/2/15        Past Surgical History:   Procedure Laterality Date    EPIDURAL BLOCK INJECTION Bilateral 4/19/2018    Procedure: T9-10 INTERLAMINAR EPIDURAL STEROID INJECTION;  Surgeon: Krista Sparks MD;  Location: [facility];  Service: Pain Management        Social History     Social History    Marital status: /Civil Union     Spouse name: N/A    Number of children: N/A    Years of education: N/A     Occupational History           At [employer]      Social History Main Topics    Smoking status: Heavy Tobacco Smoker     Packs/day: 1 00    Smokeless tobacco: Former User      Comment: current every day smoker per allscript     Alcohol use Yes      Comment: occasional / social per allscript     Drug use: Yes     Types: Heroin, Prescription      Comment: past opiate abuser / intravenous drug use per allscript                   Sexual activity: Yes     Other Topics Concern    None     Social History Narrative      Three children  Family History   Problem Relation Age of Onset    No Known Problems Father     Hepatitis Family        Allergies   Allergen Reactions    Clindamycin/Lincomycin Hives         Current Outpatient Prescriptions:     escitalopram (LEXAPRO) 10 mg tablet, Take 1 tablet (10 mg total) by mouth daily, Disp: 30 tablet, Rfl: 1    gabapentin (NEURONTIN) 400 mg capsule, Take 1 capsule (400 mg total) by mouth 3 (three) times a day for 30 days, Disp: 90 capsule, Rfl: 0    QUEtiapine (SEROquel) 200 mg tablet, Take 0 5 tablets (100 mg total) by mouth daily at bedtime, Disp: 30 tablet, Rfl: 1      Physical Exam:  /72   Ht 6' (1 829 m)   Wt 99 8 kg (220 lb)   BMI 29 84 kg/m²     Physical Exam   Constitutional: He is oriented to person, place, and time  He appears well-developed and well-nourished  No distress  Cardiovascular: Normal rate, regular rhythm and normal heart sounds  Exam reveals no gallop and no friction rub  No murmur heard  Pulmonary/Chest: Effort normal and breath sounds normal  No respiratory distress  He has no wheezes  He has no rales  Musculoskeletal: He exhibits no edema  Neurological: He is alert and oriented to person, place, and time  Skin: He is not diaphoretic  Psychiatric: He has a normal mood and affect   His behavior is normal  Judgment and thought content normal    Vitals reviewed            Labs:  Lab Results   Component Value Date    WBC 10 40 (H) 03/13/2018    HGB 13 7 03/13/2018    HCT 39 4 03/13/2018    MCV 89 03/13/2018     03/13/2018     Lab Results   Component Value Date     03/13/2018    K 3 3 (L) 03/13/2018    CL 97 (L) 03/13/2018    CO2 24 03/13/2018    ANIONGAP 9 06/03/2015    BUN 19 03/13/2018    CREATININE 1 17 03/13/2018    GLUCOSE 76 06/03/2015    CALCIUM 8 8 03/13/2018    AST 33 06/03/2015    ALT 79 (H) 06/03/2015    ALKPHOS 37 (L) 06/03/2015    PROT 7 7 06/03/2015    BILITOT 0 4 06/03/2015    EGFR 82 03/13/2018

## 2018-10-08 DIAGNOSIS — M51.34 DEGENERATION OF INTERVERTEBRAL DISC OF THORACIC REGION: ICD-10-CM

## 2018-10-08 RX ORDER — GABAPENTIN 400 MG/1
CAPSULE ORAL
Qty: 90 CAPSULE | Refills: 0 | Status: SHIPPED | OUTPATIENT
Start: 2018-10-08 | End: 2018-11-06 | Stop reason: SDUPTHER

## 2018-10-16 ENCOUNTER — OFFICE VISIT (OUTPATIENT)
Dept: GASTROENTEROLOGY | Facility: HOSPITAL | Age: 33
End: 2018-10-16
Payer: COMMERCIAL

## 2018-10-16 VITALS
TEMPERATURE: 98 F | BODY MASS INDEX: 30.26 KG/M2 | SYSTOLIC BLOOD PRESSURE: 119 MMHG | HEIGHT: 72 IN | DIASTOLIC BLOOD PRESSURE: 77 MMHG | WEIGHT: 223.4 LBS | HEART RATE: 86 BPM

## 2018-10-16 DIAGNOSIS — B19.20 HEPATITIS C VIRUS INFECTION WITHOUT HEPATIC COMA, UNSPECIFIED CHRONICITY: ICD-10-CM

## 2018-10-16 PROCEDURE — 99244 OFF/OP CNSLTJ NEW/EST MOD 40: CPT | Performed by: INTERNAL MEDICINE

## 2018-10-16 NOTE — PROGRESS NOTES
[facility] - Outpatient Consultation  Shameka Bergeron 29 y o  male MRN: [id number]  Encounter: [id number]          ASSESSMENT AND PLAN:      1  Hepatitis C virus infection without hepatic coma, unspecified chronicity    He most likely has chronic active hepatitis-C  I will recheck his viral load to make sure he still has infection before starting him on treatment  I will also check a chronic hepatitis panel and an HIV test to look for other infections such as hepatitis B or HIV  I will check a urine drug screen as his insurance company will require this prior to treatment  I will check his hepatitis C genotype and viral load to confirm he still has disease and his liver fibrosis panel to assess the severity of his fibrosis  - Chronic Hepatitis Panel; Future  - CBC; Future  - Comprehensive metabolic panel; Future  - Protime-INR; Future  - Hepatitis C genotype; Future  - Hepatitis C RNA, quantitative, PCR; Future  - HCV FIBROSURE; Future  - Cryoglobulin; Future  - Toxicology screen, urine  - HIV 1/2 AG-AB combo; Future    ______________________________________________________________________    HPI:  He presents for evaluation because of a diagnosis of hepatitis C infection  He believes he acquired through intravenous drug use in the past and said he was diagnosed initially about four years ago  He has never had treatment for hepatitis-C and he stopped using intravenous drugs more than two months ago  He denies any nausea, vomiting, reflux, abdominal pain, change in bowel habits, bleeding, or weight loss  In the past his lab testing was positive for genotype two and a positive viral load  He denies any prior history of an upper endoscopy or colonoscopy  REVIEW OF SYSTEMS:    CONSTITUTIONAL: Denies any fever, chills, rigors, and weight loss  HEENT: No earache or tinnitus  Denies hearing loss or visual disturbances  CARDIOVASCULAR: No chest pain or palpitations     RESPIRATORY: Denies any cough, hemoptysis, shortness of breath or dyspnea on exertion  GASTROINTESTINAL: As noted in the History of Present Illness  GENITOURINARY: No problems with urination  Denies any hematuria or dysuria  NEUROLOGIC: No dizziness or vertigo, denies headaches  MUSCULOSKELETAL: Denies any muscle or joint pain  SKIN: Denies skin rashes or itching  ENDOCRINE: Denies excessive thirst  Denies intolerance to heat or cold  PSYCHOSOCIAL: Denies depression or anxiety  Denies any recent memory loss         Historical Information   Past Medical History:   Diagnosis Date    Addiction to drug ([place] [address] )     Anxiety     Arthritis     Chronic pain     Hepatitis     Hepatitis C virus     Heroin abuse ([place] [address] ) 5/2/2018    HIV (human immunodeficiency virus infection) ([place] [address] )     Tobacco use disorder     last assessed 11/2/15      Past Surgical History:   Procedure Laterality Date    EPIDURAL BLOCK INJECTION Bilateral 4/19/2018    Procedure: T9-10 INTERLAMINAR EPIDURAL STEROID INJECTION;  Surgeon: Belinda Shaver MD;  Location: [facility];  Service: Pain Management      Social History   History   Alcohol Use    Yes     Comment: occasional / social per allscript      History   Drug Use    Types: Heroin, Prescription, Marijuana     Comment: past opiate abuser / intravenous drug use per allscript                    History   Smoking Status    Heavy Tobacco Smoker    Packs/day: 1 00   Smokeless Tobacco    Former User     Comment: current every day smoker per allscript      Family History   Problem Relation Age of Onset    No Known Problems Father     Hepatitis Family        Meds/Allergies       Current Outpatient Prescriptions:     escitalopram (LEXAPRO) 10 mg tablet    gabapentin (NEURONTIN) 400 mg capsule    QUEtiapine (SEROquel) 200 mg tablet    Allergies   Allergen Reactions    Clindamycin/Lincomycin Hives           Objective     Blood pressure 119/77, pulse 86, temperature 98 °F (36 7 °C), temperature source Tympanic, height 6' (1 829 m), weight 101 kg (223 lb 6 4 oz)  Body mass index is 30 3 kg/m²  PHYSICAL EXAM:      General Appearance:   Alert, cooperative, no distress   HEENT:   Normocephalic, atraumatic, anicteric      Neck:  Supple, symmetrical, trachea midline   Lungs:   Clear to auscultation bilaterally; no rales, rhonchi or wheezing; respirations unlabored    Heart[de-identified]   Regular rate and rhythm; no murmur, rub, or gallop  Abdomen:   Soft, non-tender, non-distended; normal bowel sounds; no masses, no organomegaly    Genitalia:   Deferred    Rectal:   Deferred    Extremities:  No cyanosis, clubbing or edema    Pulses:  2+ and symmetric    Skin:  No jaundice, rashes, or lesions    Lymph nodes:  No palpable cervical lymphadenopathy        Lab Results:   No visits with results within 1 Day(s) from this visit  Latest known visit with results is:   Office Visit on 05/02/2018   Component Date Value    Amphetamine Screen, Ur 05/02/2018 Comment     Barbiturate Screen, Ur 05/02/2018 Negative     Benzodiazepine Screen, U* 05/02/2018 Comment     Cannabinoid Scrn, Ur 05/02/2018 Comment     Methadone Screen, Urine 05/02/2018 Negative     Opiate Scrn, Ur 05/02/2018 Comment     Phencyclidine (PCP), Diania Simpler* 05/02/2018 Negative     Propoxyphene, Screen 05/02/2018 Negative     Cocaine (Metab ) Urine 05/02/2018 Negative          Radiology Results:   No results found

## 2018-10-16 NOTE — LETTER
October 16, 2018     Ingrid Godwin DO  [address] [facility] [address]    Patient: Lakeshia Ortiz   YOB: 1985   Date of Visit: 10/16/2018       Dear Dr Nataliia Lucas: Thank you for referring Lakeshia Ortiz to me for evaluation  Below are my notes for this consultation  If you have questions, please do not hesitate to call me  I look forward to following your patient along with you  Sincerely,        Jules Garcia MD        CC: No Recipients  Jules Garcia MD  10/16/2018 12:57 PM  Sign at close encounter  [facility] - Outpatient Consultation  Lakeshia Ortiz 29 y o  male MRN: [id number]  Encounter: [id number]          ASSESSMENT AND PLAN:      1  Hepatitis C virus infection without hepatic coma, unspecified chronicity    He most likely has chronic active hepatitis-C  I will recheck his viral load to make sure he still has infection before starting him on treatment  I will also check a chronic hepatitis panel and an HIV test to look for other infections such as hepatitis B or HIV  I will check a urine drug screen as his insurance company will require this prior to treatment  I will check his hepatitis C genotype and viral load to confirm he still has disease and his liver fibrosis panel to assess the severity of his fibrosis  - Chronic Hepatitis Panel; Future  - CBC; Future  - Comprehensive metabolic panel; Future  - Protime-INR; Future  - Hepatitis C genotype; Future  - Hepatitis C RNA, quantitative, PCR; Future  - HCV FIBROSURE; Future  - Cryoglobulin; Future  - Toxicology screen, urine  - HIV 1/2 AG-AB combo; Future    ______________________________________________________________________    HPI:  He presents for evaluation because of a diagnosis of hepatitis C infection  He believes he acquired through intravenous drug use in the past and said he was diagnosed initially about four years ago    He has never had treatment for hepatitis-C and he stopped using intravenous drugs more than two months ago  He denies any nausea, vomiting, reflux, abdominal pain, change in bowel habits, bleeding, or weight loss  In the past his lab testing was positive for genotype two and a positive viral load  He denies any prior history of an upper endoscopy or colonoscopy  REVIEW OF SYSTEMS:    CONSTITUTIONAL: Denies any fever, chills, rigors, and weight loss  HEENT: No earache or tinnitus  Denies hearing loss or visual disturbances  CARDIOVASCULAR: No chest pain or palpitations  RESPIRATORY: Denies any cough, hemoptysis, shortness of breath or dyspnea on exertion  GASTROINTESTINAL: As noted in the History of Present Illness  GENITOURINARY: No problems with urination  Denies any hematuria or dysuria  NEUROLOGIC: No dizziness or vertigo, denies headaches  MUSCULOSKELETAL: Denies any muscle or joint pain  SKIN: Denies skin rashes or itching  ENDOCRINE: Denies excessive thirst  Denies intolerance to heat or cold  PSYCHOSOCIAL: Denies depression or anxiety  Denies any recent memory loss         Historical Information   Past Medical History:   Diagnosis Date    Addiction to drug ([place] [address] )     Anxiety     Arthritis     Chronic pain     Hepatitis     Hepatitis C virus     Heroin abuse ([place] [address] ) 5/2/2018    HIV (human immunodeficiency virus infection) ([place] [address] )     Tobacco use disorder     last assessed 11/2/15      Past Surgical History:   Procedure Laterality Date    EPIDURAL BLOCK INJECTION Bilateral 4/19/2018    Procedure: T9-10 INTERLAMINAR EPIDURAL STEROID INJECTION;  Surgeon: Vicente Chu MD;  Location: [facility];  Service: Pain Management      Social History   History   Alcohol Use    Yes     Comment: occasional / social per allscript      History   Drug Use    Types: Heroin, Prescription, Marijuana     Comment: past opiate abuser / intravenous drug use per allscript                    History   Smoking Status    Heavy Tobacco Smoker    Packs/day: 1 00   Smokeless Tobacco    Former User     Comment: current every day smoker per allscript      Family History   Problem Relation Age of Onset    No Known Problems Father     Hepatitis Family        Meds/Allergies       Current Outpatient Prescriptions:     escitalopram (LEXAPRO) 10 mg tablet    gabapentin (NEURONTIN) 400 mg capsule    QUEtiapine (SEROquel) 200 mg tablet    Allergies   Allergen Reactions    Clindamycin/Lincomycin Hives           Objective     Blood pressure 119/77, pulse 86, temperature 98 °F (36 7 °C), temperature source Tympanic, height 6' (1 829 m), weight 101 kg (223 lb 6 4 oz)  Body mass index is 30 3 kg/m²  PHYSICAL EXAM:      General Appearance:   Alert, cooperative, no distress   HEENT:   Normocephalic, atraumatic, anicteric      Neck:  Supple, symmetrical, trachea midline   Lungs:   Clear to auscultation bilaterally; no rales, rhonchi or wheezing; respirations unlabored    Heart[de-identified]   Regular rate and rhythm; no murmur, rub, or gallop  Abdomen:   Soft, non-tender, non-distended; normal bowel sounds; no masses, no organomegaly    Genitalia:   Deferred    Rectal:   Deferred    Extremities:  No cyanosis, clubbing or edema    Pulses:  2+ and symmetric    Skin:  No jaundice, rashes, or lesions    Lymph nodes:  No palpable cervical lymphadenopathy        Lab Results:   No visits with results within 1 Day(s) from this visit  Latest known visit with results is:   Office Visit on 05/02/2018   Component Date Value    Amphetamine Screen, Ur 05/02/2018 Comment     Barbiturate Screen, Ur 05/02/2018 Negative     Benzodiazepine Screen, U* 05/02/2018 Comment     Cannabinoid Scrn, Ur 05/02/2018 Comment     Methadone Screen, Urine 05/02/2018 Negative     Opiate Scrn, Ur 05/02/2018 Comment     Phencyclidine (PCP), Tiffany Mejiaolla* 05/02/2018 Negative     Propoxyphene, Screen 05/02/2018 Negative     Cocaine (Metab ) Urine 05/02/2018 Negative          Radiology Results:   No results found

## 2018-11-06 DIAGNOSIS — F41.9 ANXIETY: ICD-10-CM

## 2018-11-06 DIAGNOSIS — M51.34 DEGENERATION OF INTERVERTEBRAL DISC OF THORACIC REGION: ICD-10-CM

## 2018-11-07 RX ORDER — QUETIAPINE FUMARATE 200 MG/1
100 TABLET, FILM COATED ORAL
Qty: 30 TABLET | Refills: 0 | Status: SHIPPED | OUTPATIENT
Start: 2018-11-07 | End: 2018-12-05 | Stop reason: SDUPTHER

## 2018-11-07 RX ORDER — GABAPENTIN 400 MG/1
CAPSULE ORAL
Qty: 90 CAPSULE | Refills: 0 | Status: SHIPPED | OUTPATIENT
Start: 2018-11-07 | End: 2018-12-05 | Stop reason: SDUPTHER

## 2018-12-05 DIAGNOSIS — M51.34 DEGENERATION OF INTERVERTEBRAL DISC OF THORACIC REGION: ICD-10-CM

## 2018-12-05 DIAGNOSIS — F41.9 ANXIETY: ICD-10-CM

## 2018-12-05 RX ORDER — GABAPENTIN 400 MG/1
400 CAPSULE ORAL 3 TIMES DAILY
Qty: 90 CAPSULE | Refills: 0 | Status: SHIPPED | OUTPATIENT
Start: 2018-12-05 | End: 2019-01-07 | Stop reason: SDUPTHER

## 2018-12-05 RX ORDER — QUETIAPINE FUMARATE 200 MG/1
100 TABLET, FILM COATED ORAL
Qty: 30 TABLET | Refills: 5 | Status: SHIPPED | OUTPATIENT
Start: 2018-12-05 | End: 2019-01-07 | Stop reason: SDUPTHER

## 2018-12-28 ENCOUNTER — TELEPHONE (OUTPATIENT)
Dept: FAMILY MEDICINE CLINIC | Facility: CLINIC | Age: 33
End: 2018-12-28

## 2018-12-28 DIAGNOSIS — J06.9 UPPER RESPIRATORY TRACT INFECTION, UNSPECIFIED TYPE: Primary | ICD-10-CM

## 2018-12-28 RX ORDER — AZITHROMYCIN 250 MG/1
TABLET, FILM COATED ORAL
Qty: 6 TABLET | Refills: 0 | Status: SHIPPED | OUTPATIENT
Start: 2018-12-28 | End: 2019-01-02

## 2018-12-28 NOTE — TELEPHONE ENCOUNTER
Patient C/O sinus congestion and pressure  Asking for Mone Heaton be called in to pharm   NO apts available today

## 2019-01-07 DIAGNOSIS — M51.34 DEGENERATION OF INTERVERTEBRAL DISC OF THORACIC REGION: ICD-10-CM

## 2019-01-07 DIAGNOSIS — F41.9 ANXIETY: ICD-10-CM

## 2019-01-07 RX ORDER — GABAPENTIN 400 MG/1
400 CAPSULE ORAL 3 TIMES DAILY
Qty: 90 CAPSULE | Refills: 0 | Status: SHIPPED | OUTPATIENT
Start: 2019-01-07 | End: 2019-02-07 | Stop reason: SDUPTHER

## 2019-01-07 RX ORDER — QUETIAPINE FUMARATE 200 MG/1
100 TABLET, FILM COATED ORAL
Qty: 30 TABLET | Refills: 5 | Status: SHIPPED | OUTPATIENT
Start: 2019-01-07 | End: 2019-02-07 | Stop reason: SDUPTHER

## 2019-01-07 RX ORDER — GABAPENTIN 400 MG/1
CAPSULE ORAL
Qty: 90 CAPSULE | Refills: 0 | Status: SHIPPED | OUTPATIENT
Start: 2019-01-07 | End: 2019-05-08 | Stop reason: SDUPTHER

## 2019-02-07 DIAGNOSIS — M51.34 DEGENERATION OF INTERVERTEBRAL DISC OF THORACIC REGION: ICD-10-CM

## 2019-02-07 DIAGNOSIS — F41.9 ANXIETY: ICD-10-CM

## 2019-02-07 RX ORDER — QUETIAPINE FUMARATE 200 MG/1
100 TABLET, FILM COATED ORAL
Qty: 30 TABLET | Refills: 5 | Status: SHIPPED | OUTPATIENT
Start: 2019-02-07 | End: 2019-03-27 | Stop reason: SDUPTHER

## 2019-02-07 RX ORDER — GABAPENTIN 400 MG/1
400 CAPSULE ORAL 3 TIMES DAILY
Qty: 90 CAPSULE | Refills: 0 | Status: SHIPPED | OUTPATIENT
Start: 2019-02-07 | End: 2019-04-11 | Stop reason: SDUPTHER

## 2019-03-27 DIAGNOSIS — F41.9 ANXIETY: ICD-10-CM

## 2019-03-27 RX ORDER — QUETIAPINE FUMARATE 200 MG/1
100 TABLET, FILM COATED ORAL
Qty: 30 TABLET | Refills: 5 | Status: SHIPPED | OUTPATIENT
Start: 2019-03-27 | End: 2019-05-08 | Stop reason: SDUPTHER

## 2019-04-11 DIAGNOSIS — M51.34 DEGENERATION OF INTERVERTEBRAL DISC OF THORACIC REGION: ICD-10-CM

## 2019-04-11 RX ORDER — GABAPENTIN 400 MG/1
400 CAPSULE ORAL 3 TIMES DAILY
Qty: 90 CAPSULE | Refills: 0 | Status: SHIPPED | OUTPATIENT
Start: 2019-04-11 | End: 2019-07-10

## 2019-05-08 DIAGNOSIS — F41.9 ANXIETY: ICD-10-CM

## 2019-05-08 DIAGNOSIS — M51.34 DEGENERATION OF INTERVERTEBRAL DISC OF THORACIC REGION: ICD-10-CM

## 2019-05-08 RX ORDER — GABAPENTIN 400 MG/1
400 CAPSULE ORAL 3 TIMES DAILY
Qty: 90 CAPSULE | Refills: 0 | Status: SHIPPED | OUTPATIENT
Start: 2019-05-08 | End: 2019-07-10

## 2019-05-08 RX ORDER — QUETIAPINE FUMARATE 200 MG/1
100 TABLET, FILM COATED ORAL
Qty: 30 TABLET | Refills: 5 | Status: SHIPPED | OUTPATIENT
Start: 2019-05-08 | End: 2019-08-07 | Stop reason: SDUPTHER

## 2019-05-14 NOTE — TELEPHONE ENCOUNTER
Patient contacted office to reschedule appointment  Message was left on voicemail for office  Contacted patient back, left message with wife to contact our office so appointment can get rescheduled  details…

## 2019-07-10 DIAGNOSIS — M51.34 DEGENERATION OF INTERVERTEBRAL DISC OF THORACIC REGION: ICD-10-CM

## 2019-07-10 RX ORDER — GABAPENTIN 400 MG/1
400 CAPSULE ORAL 3 TIMES DAILY
Qty: 90 CAPSULE | Refills: 0 | Status: SHIPPED | OUTPATIENT
Start: 2019-07-10 | End: 2019-08-07 | Stop reason: SDUPTHER

## 2019-08-07 DIAGNOSIS — M51.34 DEGENERATION OF INTERVERTEBRAL DISC OF THORACIC REGION: ICD-10-CM

## 2019-08-07 DIAGNOSIS — F41.9 ANXIETY: ICD-10-CM

## 2019-08-08 RX ORDER — GABAPENTIN 400 MG/1
400 CAPSULE ORAL 3 TIMES DAILY
Qty: 90 CAPSULE | Refills: 0 | Status: SHIPPED | OUTPATIENT
Start: 2019-08-08 | End: 2020-02-05

## 2019-08-08 RX ORDER — QUETIAPINE FUMARATE 200 MG/1
100 TABLET, FILM COATED ORAL
Qty: 30 TABLET | Refills: 0 | Status: SHIPPED | OUTPATIENT
Start: 2019-08-08 | End: 2020-02-05

## 2019-09-13 ENCOUNTER — TELEPHONE (OUTPATIENT)
Dept: FAMILY MEDICINE CLINIC | Facility: CLINIC | Age: 34
End: 2019-09-13

## 2019-09-13 DIAGNOSIS — K08.89 TOOTH PAIN: Primary | ICD-10-CM

## 2019-09-13 RX ORDER — CEPHALEXIN 500 MG/1
1000 CAPSULE ORAL EVERY 12 HOURS SCHEDULED
Qty: 28 CAPSULE | Refills: 0 | Status: SHIPPED | OUTPATIENT
Start: 2019-09-13 | End: 2019-09-20

## 2019-09-26 ENCOUNTER — TELEPHONE (OUTPATIENT)
Dept: FAMILY MEDICINE CLINIC | Facility: CLINIC | Age: 34
End: 2019-09-26

## 2020-02-05 ENCOUNTER — OFFICE VISIT (OUTPATIENT)
Dept: FAMILY MEDICINE CLINIC | Facility: CLINIC | Age: 35
End: 2020-02-05
Payer: COMMERCIAL

## 2020-02-05 VITALS
DIASTOLIC BLOOD PRESSURE: 78 MMHG | WEIGHT: 194 LBS | BODY MASS INDEX: 27.16 KG/M2 | HEIGHT: 71 IN | SYSTOLIC BLOOD PRESSURE: 118 MMHG

## 2020-02-05 DIAGNOSIS — Z13.6 SCREENING FOR CARDIOVASCULAR CONDITION: ICD-10-CM

## 2020-02-05 DIAGNOSIS — B19.20 HEPATITIS C VIRUS INFECTION WITHOUT HEPATIC COMA, UNSPECIFIED CHRONICITY: ICD-10-CM

## 2020-02-05 DIAGNOSIS — F33.1 MODERATE EPISODE OF RECURRENT MAJOR DEPRESSIVE DISORDER (HCC): ICD-10-CM

## 2020-02-05 DIAGNOSIS — Z00.00 ANNUAL PHYSICAL EXAM: Primary | ICD-10-CM

## 2020-02-05 DIAGNOSIS — F41.9 ANXIETY: ICD-10-CM

## 2020-02-05 DIAGNOSIS — M54.6 CHRONIC MIDLINE THORACIC BACK PAIN: ICD-10-CM

## 2020-02-05 DIAGNOSIS — G89.29 CHRONIC MIDLINE THORACIC BACK PAIN: ICD-10-CM

## 2020-02-05 PROCEDURE — 99395 PREV VISIT EST AGE 18-39: CPT | Performed by: FAMILY MEDICINE

## 2020-02-05 PROCEDURE — 3008F BODY MASS INDEX DOCD: CPT | Performed by: FAMILY MEDICINE

## 2020-02-05 RX ORDER — ESCITALOPRAM OXALATE 10 MG/1
10 TABLET ORAL DAILY
Qty: 30 TABLET | Refills: 1 | Status: SHIPPED | OUTPATIENT
Start: 2020-02-05 | End: 2020-03-05 | Stop reason: SDUPTHER

## 2020-02-05 RX ORDER — PREGABALIN 50 MG/1
50 CAPSULE ORAL 3 TIMES DAILY
Qty: 90 CAPSULE | Refills: 0 | Status: SHIPPED | OUTPATIENT
Start: 2020-02-05 | End: 2020-03-11

## 2020-02-05 RX ORDER — QUETIAPINE FUMARATE 200 MG/1
200 TABLET, FILM COATED ORAL
Qty: 30 TABLET | Refills: 1 | Status: SHIPPED | OUTPATIENT
Start: 2020-02-05 | End: 2020-04-17 | Stop reason: SDUPTHER

## 2020-02-05 NOTE — PROGRESS NOTES
[facility]    NAME: Sarah Ribeiro  AGE: 30 y o  SEX: male  : 1985     DATE: 2020     Assessment and Plan:   Refilled patient's Lexapro and Seroquel  Instead of Neurontin, we will try him on Lyrica instead  Rx for blood work to test for cholesterol and his hepatitis C viral load  Referred him back to GI for treatment of his hepatitis C  Referral to psychiatry for his depression  We will see him back in 4-6 weeks or p r n     Problem List Items Addressed This Visit        Digestive    Hepatitis C virus    Relevant Orders    Hepatitis C RNA, quantitative, PCR    Ambulatory referral to Gastroenterology       Other    Anxiety    Relevant Medications    escitalopram (LEXAPRO) 10 mg tablet    QUEtiapine (SEROquel) 200 mg tablet    Chronic midline thoracic back pain    Relevant Medications    pregabalin (LYRICA) 50 mg capsule    Moderate episode of recurrent major depressive disorder (HCC)    Relevant Medications    escitalopram (LEXAPRO) 10 mg tablet    QUEtiapine (SEROquel) 200 mg tablet    Other Relevant Orders    Ambulatory referral to Psychiatry      Other Visit Diagnoses     Annual physical exam    -  Primary    Screening for cardiovascular condition        Relevant Orders    Lipid panel    Comprehensive metabolic panel    BMI 24 5-57 7,[facility]              Immunizations and preventive care screenings were discussed with patient today  Appropriate education was printed on patient's after visit summary  Counseling:  Alcohol/drug use: discussed moderation in alcohol intake, the recommendations for healthy alcohol use, and avoidance of illicit drug use  · Dental Health: discussed importance of regular tooth brushing, flossing, and dental visits  BMI Counseling: Body mass index is 27 06 kg/m²   The BMI is above normal  Nutrition recommendations include decreasing portion sizes, consuming healthier snacks, moderation in carbohydrate intake, increasing intake of lean protein, reducing intake of saturated and trans fat and reducing intake of cholesterol  No pharmacotherapy was ordered  Depression Screening and Follow-up Plan: Patient's depression screening was positive with a PHQ-2 score of 4  Their PHQ-9 score was 22  Patient assessed for underlying major depression  Brief counseling provided and recommend additional follow-up/re-evaluation next office visit  Tobacco Cessation Counseling: Tobacco cessation counseling was provided  The patient is sincerely urged to quit consumption of tobacco  He is not ready to quit tobacco        No follow-ups on file  Chief Complaint:     Chief Complaint   Patient presents with    Annual Exam      History of Present Illness:     Adult Annual Physical   Patient here for a comprehensive physical exam  The patient reports problems - Depression, chronic back pain  Diet and Physical Activity  · Diet/Nutrition: well balanced diet  · Exercise: no formal exercise  Depression Screening  PHQ-9 Depression Screening    PHQ-9:    Frequency of the following problems over the past two weeks:       Little interest or pleasure in doing things:  3 - nearly every day  Feeling down, depressed, or hopeless:  1 - several days  Trouble falling or staying asleep, or sleeping too much:  3 - nearly every day  Feeling tired or having little energy:  3 - nearly every day  Poor appetite or overeating:  3 - nearly every day  Feeling bad about yourself - or that you are a failure or have let yourself or your family down:  3 - nearly every day  Trouble concentrating on things, such as reading the newspaper or watching television:  3 - nearly every day  Moving or speaking so slowly that other people could have noticed   Or the opposite - being so fidgety or restless that you have been moving around a lot more than usual:  3 - nearly every day  Thoughts that you would be better off dead, or of hurting yourself in some way:  0 - not at all  PHQ-2 Score:  4  PHQ-9 Score:  22       General Health  · Sleep: sleeps poorly  · Hearing: normal - bilateral   · Vision: no vision problems  · Dental: regular dental visits   Health  · History of STDs?: no      Review of Systems:     Review of Systems   Constitutional: Negative  Respiratory: Negative  Cardiovascular: Negative  Gastrointestinal: Negative  Genitourinary: Negative  Musculoskeletal: Positive for back pain  Psychiatric/Behavioral: Positive for dysphoric mood  Negative for suicidal ideas        Past Medical History:     Past Medical History:   Diagnosis Date    Addiction to drug ([place] [address] )     Anxiety     Arthritis     Chronic pain     Hepatitis     Hepatitis C virus     Heroin abuse ([place] [address] ) 5/2/2018    HIV (human immunodeficiency virus infection) ([place] [address] )     Moderate episode of recurrent major depressive disorder ([place] [address] ) 2/5/2020    Tobacco use disorder     last assessed 11/2/15       Past Surgical History:     Past Surgical History:   Procedure Laterality Date    EPIDURAL BLOCK INJECTION Bilateral 4/19/2018    Procedure: T9-10 INTERLAMINAR EPIDURAL STEROID INJECTION;  Surgeon: Iza Morales MD;  Location: [facility];  Service: Pain Management       Social History:     Social History     Socioeconomic History    Marital status:      Spouse name: None    Number of children: None    Years of education: None    Highest education level: None   Occupational History    Occupation:       Comment: At [place] [address] Financial resource strain: None    Food insecurity:     Worry: None     Inability: None    Transportation needs:     Medical: None     Non-medical: None   Tobacco Use    Smoking status: Heavy Tobacco Smoker     Packs/day: 1 00    Smokeless tobacco: Former User    Tobacco comment: current every day smoker per allscript    Substance and Sexual Activity    Alcohol use: Yes     Frequency: 4 or more times a week     Drinks per session: 3 or 4     Binge frequency: Less than monthly     Comment: occasional / social per allscript     Drug use: Yes     Types: Heroin, Prescription, Marijuana     Comment: past opiate abuser / intravenous drug use per allscript                   Sexual activity: Yes   Lifestyle    Physical activity:     Days per week: None     Minutes per session: None    Stress: None   Relationships    Social connections:     Talks on phone: None     Gets together: None     Attends Church service: None     Active member of club or organization: None     Attends meetings of clubs or organizations: None     Relationship status: None    Intimate partner violence:     Fear of current or ex partner: None     Emotionally abused: None     Physically abused: None     Forced sexual activity: None   Other Topics Concern    None   Social History Narrative      Three children  Family History:     Family History   Problem Relation Age of Onset    No Known Problems Father     Hepatitis Family       Current Medications:     Current Outpatient Medications   Medication Sig Dispense Refill    escitalopram (LEXAPRO) 10 mg tablet Take 1 tablet (10 mg total) by mouth daily 30 tablet 1    pregabalin (LYRICA) 50 mg capsule Take 1 capsule (50 mg total) by mouth 3 (three) times a day 90 capsule 0    QUEtiapine (SEROquel) 200 mg tablet Take 1 tablet (200 mg total) by mouth daily at bedtime 30 tablet 1     No current facility-administered medications for this visit  Allergies: Allergies   Allergen Reactions    Clindamycin/Lincomycin Hives      Physical Exam:     /78   Ht 5' 11" (1 803 m)   Wt 88 kg (194 lb)   BMI 27 06 kg/m²     Physical Exam   Constitutional: He is oriented to person, place, and time  He appears well-developed and well-nourished  No distress  HENT:   Head: Normocephalic and atraumatic     Eyes: Conjunctivae are normal  Cardiovascular: Normal rate, regular rhythm and normal heart sounds  Exam reveals no gallop and no friction rub  No murmur heard  Pulmonary/Chest: Effort normal and breath sounds normal  No respiratory distress  He has no wheezes  He has no rales  Musculoskeletal: He exhibits no edema  Neurological: He is alert and oriented to person, place, and time  Skin: He is not diaphoretic  Psychiatric: He has a normal mood and affect  His behavior is normal  Judgment and thought content normal    Vitals reviewed  Nonda Smoker,    [facility]  BMI Counseling: Body mass index is 27 06 kg/m²  The BMI is above normal  Nutrition recommendations include reducing portion sizes, consuming healthier snacks, moderation in carbohydrate intake, increasing intake of lean protein, reducing intake of saturated fat and trans fat and reducing intake of cholesterol

## 2020-02-05 NOTE — PATIENT INSTRUCTIONS
Wellness Visit for Adults   AMBULATORY CARE:   A wellness visit  is when you see your healthcare provider to get screened for health problems  You can also get advice on how to stay healthy  Write down your questions so you remember to ask them  Ask your healthcare provider how often you should have a wellness visit  What happens at a wellness visit:  Your healthcare provider will ask about your health, and your family history of health problems  This includes high blood pressure, heart disease, and cancer  He or she will ask if you have symptoms that concern you, if you smoke, and about your mood  You may also be asked about your intake of medicines, supplements, food, and alcohol  Any of the following may be done:  · Your weight  will be checked  Your height may also be checked so your body mass index (BMI) can be calculated  Your BMI shows if you are at a healthy weight  · Your blood pressure  and heart rate will be checked  Your temperature may also be checked  · Blood and urine tests  may be done  Blood tests may be done to check your cholesterol levels  Abnormal cholesterol levels increase your risk for heart disease and stroke  You may also need a blood or urine test to check for diabetes if you are at increased risk  Urine tests may be done to look for signs of an infection or kidney disease  · A physical exam  includes checking your heartbeat and lungs with a stethoscope  Your healthcare provider may also check your skin to look for sun damage  · Screening tests  may be recommended  A screening test is done to check for diseases that may not cause symptoms  The screening tests you may need depend on your age, gender, family history, and lifestyle habits  For example, colorectal screening may be recommended if you are 48years old or older  Screening tests you need if you are a woman:   · A Pap smear  is used to screen for cervical cancer   Pap smears are usually done every 3 to 5 years depending on your age  You may need them more often if you have had abnormal Pap smear test results in the past  Ask your healthcare provider how often you should have a Pap smear  · A mammogram  is an x-ray of your breasts to screen for breast cancer  Experts recommend mammograms every 2 years starting at age 48 years  You may need a mammogram at age 52 years or younger if you have an increased risk for breast cancer  Talk to your healthcare provider about when you should start having mammograms and how often you need them  Vaccines you may need:   · Get an influenza vaccine  every year  The influenza vaccine protects you from the flu  Several types of viruses cause the flu  The viruses change over time, so new vaccines are made each year  · Get a tetanus-diphtheria (Td) booster vaccine  every 10 years  This vaccine protects you against tetanus and diphtheria  Tetanus is a severe infection that may cause painful muscle spasms and lockjaw  Diphtheria is a severe bacterial infection that causes a thick covering in the back of your mouth and throat  · Get a human papillomavirus (HPV) vaccine  if you are female and aged 23 to 32 or male 23 to 24 and never received it  This vaccine protects you from HPV infection  HPV is the most common infection spread by sexual contact  HPV may also cause vaginal, penile, and anal cancers  · Get a pneumococcal vaccine  if you are aged 72 years or older  The pneumococcal vaccine is an injection given to protect you from pneumococcal disease  Pneumococcal disease is an infection caused by pneumococcal bacteria  The infection may cause pneumonia, meningitis, or an ear infection  · Get a shingles vaccine  if you are aged 61 or older, even if you have had shingles before  The shingles vaccine is an injection to protect you from the varicella-zoster virus  This is the same virus that causes chickenpox   Shingles is a painful rash that develops in people who had chickenpox or have been exposed to the virus  How to eat healthy:  My Plate is a model for planning healthy meals  It shows the types and amounts of foods that should go on your plate  Fruits and vegetables make up about half of your plate, and grains and protein make up the other half  A serving of dairy is included on the side of your plate  The amount of calories and serving sizes you need depends on your age, gender, weight, and height  Examples of healthy foods are listed below:  · Eat a variety of vegetables  such as dark green, red, and orange vegetables  You can also include canned vegetables low in sodium (salt) and frozen vegetables without added butter or sauces  · Eat a variety of fresh fruits , canned fruit in 100% juice, frozen fruit, and dried fruit  · Include whole grains  At least half of the grains you eat should be whole grains  Examples include whole-wheat bread, wheat pasta, brown rice, and whole-grain cereals such as oatmeal     · Eat a variety of protein foods such as seafood (fish and shellfish), lean meat, and poultry without skin (turkey and chicken)  Examples of lean meats include pork leg, shoulder, or tenderloin, and beef round, sirloin, tenderloin, and extra lean ground beef  Other protein foods include eggs and egg substitutes, beans, peas, soy products, nuts, and seeds  · Choose low-fat dairy products such as skim or 1% milk or low-fat yogurt, cheese, and cottage cheese  · Limit unhealthy fats  such as butter, hard margarine, and shortening  Exercise:  Exercise at least 30 minutes per day on most days of the week  Some examples of exercise include walking, biking, dancing, and swimming  You can also fit in more physical activity by taking the stairs instead of the elevator or parking farther away from stores  Include muscle strengthening activities 2 days each week  Regular exercise provides many health benefits   It helps you manage your weight, and decreases your risk for type 2 diabetes, heart disease, stroke, and high blood pressure  Exercise can also help improve your mood  Ask your healthcare provider about the best exercise plan for you  General health and safety guidelines:   · Do not smoke  Nicotine and other chemicals in cigarettes and cigars can cause lung damage  Ask your healthcare provider for information if you currently smoke and need help to quit  E-cigarettes or smokeless tobacco still contain nicotine  Talk to your healthcare provider before you use these products  · Limit alcohol  A drink of alcohol is 12 ounces of beer, 5 ounces of wine, or 1½ ounces of liquor  · Lose weight, if needed  Being overweight increases your risk of certain health conditions  These include heart disease, high blood pressure, type 2 diabetes, and certain types of cancer  · Protect your skin  Do not sunbathe or use tanning beds  Use sunscreen with a SPF 15 or higher  Apply sunscreen at least 15 minutes before you go outside  Reapply sunscreen every 2 hours  Wear protective clothing, hats, and sunglasses when you are outside  · Drive safely  Always wear your seatbelt  Make sure everyone in your car wears a seatbelt  A seatbelt can save your life if you are in an accident  Do not use your cell phone when you are driving  This could distract you and cause an accident  Pull over if you need to make a call or send a text message  · Practice safe sex  Use latex condoms if are sexually active and have more than one partner  Your healthcare provider may recommend screening tests for sexually transmitted infections (STIs)  · Wear helmets, lifejackets, and protective gear  Always wear a helmet when you ride a bike or motorcycle, go skiing, or play sports that could cause a head injury  Wear protective equipment when you play sports  Wear a lifejacket when you are on a boat or doing water sports    © 2017 [employer] Information is for End User's use only and may not be sold, redistributed or otherwise used for commercial purposes  All illustrations and images included in CareNotes® are the copyrighted property of [employer]  or [employer]  The above information is an  only  It is not intended as medical advice for individual conditions or treatments  Talk to your doctor, nurse or pharmacist before following any medical regimen to see if it is safe and effective for you  Heart Healthy Diet   AMBULATORY CARE:   A heart healthy diet  is an eating plan low in total fat, unhealthy fats, and sodium (salt)  A heart healthy diet helps decrease your risk for heart disease and stroke  Limit the amount of fat you eat to 25% to 35% of your total daily calories  Limit sodium to less than 2,300 mg each day  Healthy fats:  Healthy fats can help improve cholesterol levels  The risk for heart disease is decreased when cholesterol levels are normal  Choose healthy fats, such as the following:  · Unsaturated fat  is found in foods such as soybean, canola, olive, corn, and safflower oils  It is also found in soft tub margarine that is made with liquid vegetable oil  · Omega-3 fat  is found in certain fish, such as salmon, tuna, and trout, and in walnuts and flaxseed  Unhealthy fats:  Unhealthy fats can cause unhealthy cholesterol levels in your blood and increase your risk of heart disease  Limit unhealthy fats, such as the following:  · Cholesterol  is found in animal foods, such as eggs and lobster, and in dairy products made from whole milk  Limit cholesterol to less than 300 milligrams (mg) each day  You may need to limit cholesterol to 200 mg each day if you have heart disease  · Saturated fat  is found in meats, such as lyons and hamburger  It is also found in chicken or turkey skin, whole milk, and butter  Limit saturated fat to less than 7% of your total daily calories   Limit saturated fat to less than 6% if you have heart disease or are at increased risk for it  · Trans fat  is found in packaged foods, such as potato chips and cookies  It is also in hard margarine, some fried foods, and shortening  Avoid trans fats as much as possible    Heart healthy foods and drinks to include:  Ask your dietitian or healthcare provider how many servings to have from each of the following food groups:  · Grains:      ¨ Whole-wheat breads, cereals, and pastas, and brown rice    ¨ Low-fat, low-sodium crackers and chips    · Vegetables:      ¨ Broccoli, green beans, green peas, and spinach    ¨ Collards, kale, and lima beans    ¨ Carrots, sweet potatoes, tomatoes, and peppers    ¨ Canned vegetables with no salt added    · Fruits:      ¨ Bananas, peaches, pears, and pineapple    ¨ Grapes, raisins, and dates    ¨ Oranges, tangerines, grapefruit, orange juice, and grapefruit juice    ¨ Apricots, mangoes, melons, and papaya    ¨ Raspberries and strawberries    ¨ Canned fruit with no added sugar    · Low-fat dairy products:      ¨ Nonfat (skim) milk, 1% milk, and low-fat almond, cashew, or soy milks fortified with calcium    ¨ Low-fat cheese, regular or frozen yogurt, and cottage cheese    · Meats and proteins , such as lean cuts of beef and pork (loin, leg, round), skinless chicken and turkey, legumes, soy products, egg whites, and nuts  Foods and drinks to limit or avoid:  Ask your dietitian or healthcare provider about these and other foods that are high in unhealthy fat, sodium, and sugar:  · Snack or packaged foods , such as frozen dinners, cookies, macaroni and cheese, and cereals with more than 300 mg of sodium per serving    · Canned or dry mixes  for cakes, soups, sauces, or gravies    · Vegetables with added sodium , such as instant potatoes, vegetables with added sauces, or regular canned vegetables    · Other foods high in sodium , such as ketchup, barbecue sauce, salad dressing, pickles, olives, soy sauce, and miso    · High-fat dairy foods  such as whole or 2% milk, cream cheese, or sour cream, and cheeses     · High-fat protein foods  such as high-fat cuts of beef (T-bone steaks, ribs), chicken or turkey with skin, and organ meats, such as liver    · Cured or smoked meats , such as hot dogs, lyons, and sausage    · Unhealthy fats and oils , such as butter, stick margarine, shortening, and cooking oils such as coconut or palm oil    · Food and drinks high in sugar , such as soft drinks (soda), sports drinks, sweetened tea, candy, cake, cookies, pies, and doughnuts  Other diet guidelines to follow:   · Eat more foods containing omega-3 fats  Eat fish high in omega-3 fats at least 2 times a week  · Limit alcohol  Too much alcohol can damage your heart and raise your blood pressure  Women should limit alcohol to 1 drink a day  Men should limit alcohol to 2 drinks a day  A drink of alcohol is 12 ounces of beer, 5 ounces of wine, or 1½ ounces of liquor  · Choose low-sodium foods  High-sodium foods can lead to high blood pressure  Add little or no salt to food you prepare  Use herbs and spices in place of salt  · Eat more fiber  to help lower cholesterol levels  Eat at least 5 servings of fruits and vegetables each day  Eat 3 ounces of whole-grain foods each day  Legumes (beans) are also a good source of fiber  Lifestyle guidelines:   · Do not smoke  Nicotine and other chemicals in cigarettes and cigars can cause lung and heart damage  Ask your healthcare provider for information if you currently smoke and need help to quit  E-cigarettes or smokeless tobacco still contain nicotine  Talk to your healthcare provider before you use these products  · Exercise regularly  to help you maintain a healthy weight and improve your blood pressure and cholesterol levels  Ask your healthcare provider about the best exercise plan for you  Do not start an exercise program without asking your healthcare provider     Follow up with your healthcare provider as directed:  Write down your questions so you remember to ask them during your visits  © 2017 [employer] Information is for End User's use only and may not be sold, redistributed or otherwise used for commercial purposes  All illustrations and images included in CareNotes® are the copyrighted property of [employer]  or [employer]  The above information is an  only  It is not intended as medical advice for individual conditions or treatments  Talk to your doctor, nurse or pharmacist before following any medical regimen to see if it is safe and effective for you

## 2020-02-06 ENCOUNTER — TELEPHONE (OUTPATIENT)
Dept: GASTROENTEROLOGY | Facility: CLINIC | Age: 35
End: 2020-02-06

## 2020-02-06 NOTE — TELEPHONE ENCOUNTER
Attempted to contact patient to schedule appointment   No answer, voicemail not set up     ----- Message from Redell Souza sent at 2/6/2020  1:11 PM EST -----  NEEDS CONSULT

## 2020-03-05 DIAGNOSIS — F41.9 ANXIETY: ICD-10-CM

## 2020-03-05 RX ORDER — ESCITALOPRAM OXALATE 10 MG/1
10 TABLET ORAL DAILY
Qty: 30 TABLET | Refills: 1 | Status: SHIPPED | OUTPATIENT
Start: 2020-03-05 | End: 2020-04-17 | Stop reason: SDUPTHER

## 2020-03-11 DIAGNOSIS — G89.29 CHRONIC MIDLINE THORACIC BACK PAIN: ICD-10-CM

## 2020-03-11 DIAGNOSIS — M54.6 CHRONIC MIDLINE THORACIC BACK PAIN: ICD-10-CM

## 2020-03-11 RX ORDER — PREGABALIN 50 MG/1
CAPSULE ORAL
Qty: 90 CAPSULE | Refills: 0 | Status: SHIPPED | OUTPATIENT
Start: 2020-03-11 | End: 2020-04-17 | Stop reason: SDUPTHER

## 2020-03-23 ENCOUNTER — TELEPHONE (OUTPATIENT)
Dept: FAMILY MEDICINE CLINIC | Facility: CLINIC | Age: 35
End: 2020-03-23

## 2020-03-23 ENCOUNTER — TELEMEDICINE (OUTPATIENT)
Dept: FAMILY MEDICINE CLINIC | Facility: CLINIC | Age: 35
End: 2020-03-23
Payer: COMMERCIAL

## 2020-03-23 DIAGNOSIS — R09.81 CONGESTED NOSE: Primary | ICD-10-CM

## 2020-03-23 PROCEDURE — 99212 OFFICE O/P EST SF 10 MIN: CPT | Performed by: FAMILY MEDICINE

## 2020-03-23 NOTE — PROGRESS NOTES
Virtual Brief Visit    Reason for visit is congestion      Encounter provider Alex Sebastian DO    Provider located at [place] [id number]  [address] [place] [id number]      Recent Visits  No visits were found meeting these conditions  Showing recent visits within past 7 days and meeting all other requirements     Future Appointments  No visits were found meeting these conditions  Showing future appointments within next 150 days and meeting all other requirements        Patient agrees to participate in a virtual check in via telephone or video visit instead of presenting to the office to address urgent/immediate medical needs  Patient is aware this is a billable service  After connecting through telephone, the patient was identified by name and date of birth  Vladimir Frank was informed that this was a telemedicine visit and that the visit is being conducted through telephone which may not be secure and therefore might not be HIPAA-compliant  My office door was closed  No one else was in the room  He acknowledged consent and understanding of privacy and security of the virtual check-in visit  I informed the patient that I have reviewed his record in Epic and presented the opportunity for him to ask any questions regarding the visit today  The patient initiated communication and agreed to participate  Subjective  Vladimir Frank is a 30 y o  male stating Thursday of last week had some congestion and tooth pain  This has resolved  No cough, no fever chills  Needs a note in order to go back to work        Past Medical History:   Diagnosis Date    Addiction to drug ([facility] )     Anxiety     Arthritis     Chronic pain     Hepatitis     Hepatitis C virus     Heroin abuse ([facility] ) 5/2/2018    HIV (human immunodeficiency virus infection) ([facility] )     Moderate episode of recurrent major depressive disorder ([facility] ) 2/5/2020    Tobacco use disorder     last assessed 11/2/15        Past Surgical History:   Procedure Laterality Date    EPIDURAL BLOCK INJECTION Bilateral 4/19/2018    Procedure: T9-10 INTERLAMINAR EPIDURAL STEROID INJECTION;  Surgeon: Katrina Cruz MD;  Location: [facility];  Service: Pain Management        Current Outpatient Medications   Medication Sig Dispense Refill    escitalopram (LEXAPRO) 10 mg tablet Take 1 tablet (10 mg total) by mouth daily 30 tablet 1    pregabalin (LYRICA) 50 mg capsule take 1 capsule by mouth three times a day 90 capsule 0    QUEtiapine (SEROquel) 200 mg tablet Take 1 tablet (200 mg total) by mouth daily at bedtime 30 tablet 1     No current facility-administered medications for this visit  Allergies   Allergen Reactions    Clindamycin/Lincomycin Cynthia Santos was seen today for virtual brief visit  Diagnoses and all orders for this visit:    Congested nose     Patient currently asymptomatic  I will fax a note to his work  [phone]  Call us with any concerns  I spent 5 minutes with the patient during this virtual check-in visit    Level of service: [phone]

## 2020-03-23 NOTE — LETTER
March 23, 2020     Patient: Ricky Yee   YOB: 1985   Date of Visit: 3/23/2020       To Whom it May Concern:    Ricky Yee is under my professional care  Dorian Alva He may return to work on 3/24/2020  Please excuse from work 3/19 through 03/23/2020 due to congestion  If you have any questions or concerns, please don't hesitate to call           Sincerely,          Neville Swedona, DO        CC: No Recipients

## 2020-04-17 DIAGNOSIS — F41.9 ANXIETY: ICD-10-CM

## 2020-04-17 DIAGNOSIS — M54.6 CHRONIC MIDLINE THORACIC BACK PAIN: ICD-10-CM

## 2020-04-17 DIAGNOSIS — G89.29 CHRONIC MIDLINE THORACIC BACK PAIN: ICD-10-CM

## 2020-04-17 RX ORDER — ESCITALOPRAM OXALATE 10 MG/1
10 TABLET ORAL DAILY
Qty: 30 TABLET | Refills: 1 | Status: SHIPPED | OUTPATIENT
Start: 2020-04-17 | End: 2020-09-17

## 2020-04-17 RX ORDER — QUETIAPINE FUMARATE 200 MG/1
200 TABLET, FILM COATED ORAL
Qty: 30 TABLET | Refills: 1 | Status: SHIPPED | OUTPATIENT
Start: 2020-04-17 | End: 2020-05-11 | Stop reason: SDUPTHER

## 2020-04-17 RX ORDER — PREGABALIN 50 MG/1
50 CAPSULE ORAL 3 TIMES DAILY
Qty: 90 CAPSULE | Refills: 0 | Status: SHIPPED | OUTPATIENT
Start: 2020-04-17 | End: 2020-05-11 | Stop reason: SDUPTHER

## 2020-05-11 DIAGNOSIS — M54.6 CHRONIC MIDLINE THORACIC BACK PAIN: ICD-10-CM

## 2020-05-11 DIAGNOSIS — G89.29 CHRONIC MIDLINE THORACIC BACK PAIN: ICD-10-CM

## 2020-05-11 DIAGNOSIS — F41.9 ANXIETY: ICD-10-CM

## 2020-05-11 RX ORDER — QUETIAPINE FUMARATE 200 MG/1
200 TABLET, FILM COATED ORAL
Qty: 30 TABLET | Refills: 1 | Status: SHIPPED | OUTPATIENT
Start: 2020-05-11 | End: 2020-06-09 | Stop reason: SDUPTHER

## 2020-05-11 RX ORDER — PREGABALIN 50 MG/1
50 CAPSULE ORAL 3 TIMES DAILY
Qty: 90 CAPSULE | Refills: 0 | Status: SHIPPED | OUTPATIENT
Start: 2020-05-11 | End: 2020-05-12 | Stop reason: SDUPTHER

## 2020-05-12 ENCOUNTER — TELEPHONE (OUTPATIENT)
Dept: FAMILY MEDICINE CLINIC | Facility: CLINIC | Age: 35
End: 2020-05-12

## 2020-05-12 DIAGNOSIS — G89.29 CHRONIC MIDLINE THORACIC BACK PAIN: ICD-10-CM

## 2020-05-12 DIAGNOSIS — M54.6 CHRONIC MIDLINE THORACIC BACK PAIN: ICD-10-CM

## 2020-05-12 RX ORDER — PREGABALIN 75 MG/1
75 CAPSULE ORAL 3 TIMES DAILY
Qty: 90 CAPSULE | Refills: 0 | Status: SHIPPED | OUTPATIENT
Start: 2020-05-12 | End: 2020-06-09 | Stop reason: SDUPTHER

## 2020-06-09 DIAGNOSIS — M54.6 CHRONIC MIDLINE THORACIC BACK PAIN: ICD-10-CM

## 2020-06-09 DIAGNOSIS — G89.29 CHRONIC MIDLINE THORACIC BACK PAIN: ICD-10-CM

## 2020-06-09 DIAGNOSIS — F41.9 ANXIETY: ICD-10-CM

## 2020-06-10 RX ORDER — QUETIAPINE FUMARATE 200 MG/1
200 TABLET, FILM COATED ORAL
Qty: 30 TABLET | Refills: 1 | Status: SHIPPED | OUTPATIENT
Start: 2020-06-10 | End: 2020-08-05 | Stop reason: SDUPTHER

## 2020-06-10 RX ORDER — PREGABALIN 75 MG/1
75 CAPSULE ORAL 3 TIMES DAILY
Qty: 90 CAPSULE | Refills: 0 | Status: SHIPPED | OUTPATIENT
Start: 2020-06-10 | End: 2020-07-07

## 2020-07-07 DIAGNOSIS — G89.29 CHRONIC MIDLINE THORACIC BACK PAIN: ICD-10-CM

## 2020-07-07 DIAGNOSIS — M54.6 CHRONIC MIDLINE THORACIC BACK PAIN: ICD-10-CM

## 2020-07-07 RX ORDER — PREGABALIN 75 MG/1
CAPSULE ORAL
Qty: 90 CAPSULE | Refills: 0 | Status: SHIPPED | OUTPATIENT
Start: 2020-07-07 | End: 2020-08-05 | Stop reason: SDUPTHER

## 2020-07-08 DIAGNOSIS — M54.6 CHRONIC MIDLINE THORACIC BACK PAIN: ICD-10-CM

## 2020-07-08 DIAGNOSIS — G89.29 CHRONIC MIDLINE THORACIC BACK PAIN: ICD-10-CM

## 2020-07-08 RX ORDER — PREGABALIN 75 MG/1
75 CAPSULE ORAL 3 TIMES DAILY
Qty: 90 CAPSULE | Refills: 0 | OUTPATIENT
Start: 2020-07-08

## 2020-08-05 DIAGNOSIS — G89.29 CHRONIC MIDLINE THORACIC BACK PAIN: ICD-10-CM

## 2020-08-05 DIAGNOSIS — M54.6 CHRONIC MIDLINE THORACIC BACK PAIN: ICD-10-CM

## 2020-08-05 DIAGNOSIS — F41.9 ANXIETY: ICD-10-CM

## 2020-08-05 RX ORDER — PREGABALIN 75 MG/1
75 CAPSULE ORAL 3 TIMES DAILY
Qty: 90 CAPSULE | Refills: 0 | Status: SHIPPED | OUTPATIENT
Start: 2020-08-05 | End: 2020-09-02 | Stop reason: SDUPTHER

## 2020-08-05 RX ORDER — QUETIAPINE FUMARATE 200 MG/1
200 TABLET, FILM COATED ORAL
Qty: 30 TABLET | Refills: 1 | Status: SHIPPED | OUTPATIENT
Start: 2020-08-05 | End: 2020-09-02 | Stop reason: SDUPTHER

## 2020-09-02 DIAGNOSIS — F41.9 ANXIETY: ICD-10-CM

## 2020-09-02 DIAGNOSIS — M54.6 CHRONIC MIDLINE THORACIC BACK PAIN: ICD-10-CM

## 2020-09-02 DIAGNOSIS — G89.29 CHRONIC MIDLINE THORACIC BACK PAIN: ICD-10-CM

## 2020-09-02 RX ORDER — QUETIAPINE FUMARATE 200 MG/1
200 TABLET, FILM COATED ORAL
Qty: 15 TABLET | Refills: 0 | Status: SHIPPED | OUTPATIENT
Start: 2020-09-02 | End: 2020-09-17

## 2020-09-02 RX ORDER — PREGABALIN 75 MG/1
75 CAPSULE ORAL 3 TIMES DAILY
Qty: 45 CAPSULE | Refills: 0 | Status: SHIPPED | OUTPATIENT
Start: 2020-09-02 | End: 2020-09-17

## 2020-09-10 ENCOUNTER — TELEPHONE (OUTPATIENT)
Dept: FAMILY MEDICINE CLINIC | Facility: CLINIC | Age: 35
End: 2020-09-10

## 2020-09-17 ENCOUNTER — OFFICE VISIT (OUTPATIENT)
Dept: FAMILY MEDICINE CLINIC | Facility: CLINIC | Age: 35
End: 2020-09-17
Payer: COMMERCIAL

## 2020-09-17 VITALS
SYSTOLIC BLOOD PRESSURE: 126 MMHG | DIASTOLIC BLOOD PRESSURE: 84 MMHG | WEIGHT: 219.2 LBS | BODY MASS INDEX: 30.69 KG/M2 | HEIGHT: 71 IN | TEMPERATURE: 98.9 F

## 2020-09-17 DIAGNOSIS — Z91.030 HISTORY OF BEE STING ALLERGY: ICD-10-CM

## 2020-09-17 DIAGNOSIS — Z13.6 SCREENING FOR CARDIOVASCULAR CONDITION: ICD-10-CM

## 2020-09-17 DIAGNOSIS — R10.32 GROIN PAIN, LEFT: ICD-10-CM

## 2020-09-17 DIAGNOSIS — B19.20 HEPATITIS C VIRUS INFECTION WITHOUT HEPATIC COMA, UNSPECIFIED CHRONICITY: ICD-10-CM

## 2020-09-17 DIAGNOSIS — F41.9 ANXIETY: Primary | ICD-10-CM

## 2020-09-17 PROCEDURE — 99214 OFFICE O/P EST MOD 30 MIN: CPT | Performed by: FAMILY MEDICINE

## 2020-09-17 RX ORDER — QUETIAPINE FUMARATE 100 MG/1
100 TABLET, FILM COATED ORAL
Qty: 30 TABLET | Refills: 0
Start: 2020-09-17 | End: 2020-10-01 | Stop reason: SDUPTHER

## 2020-09-17 RX ORDER — EPINEPHRINE 0.3 MG/.3ML
0.3 INJECTION SUBCUTANEOUS ONCE
Qty: 2 EACH | Refills: 1 | Status: SHIPPED | OUTPATIENT
Start: 2020-09-17 | End: 2020-09-17

## 2020-09-17 RX ORDER — ESCITALOPRAM OXALATE 10 MG/1
10 TABLET ORAL DAILY
Qty: 30 TABLET | Refills: 1 | Status: SHIPPED | OUTPATIENT
Start: 2020-09-17 | End: 2020-11-23 | Stop reason: SDUPTHER

## 2020-09-17 NOTE — PROGRESS NOTES
Assessment/Plan:  I encouraged patient to call us insurance to get into counseling again  Will restart him on Lexapro  He may continue the Seroquel at 100 mg at bedtime  I told him to call us if his groin pain starts again or gets worse  We will check blood work on him, including a viral load  We will see him back in the next 2-3 weeks or p r n     Problem List Items Addressed This Visit        Digestive    Hepatitis C virus    Relevant Orders    Hepatitis C RNA, quantitative, PCR       Other    Anxiety - Primary    Relevant Medications    QUEtiapine (SEROquel) 100 mg tablet    escitalopram (LEXAPRO) 10 mg tablet    Other Relevant Orders    CBC and differential    TSH, 3rd generation with Free T4 reflex      Other Visit Diagnoses     History of bee sting allergy        Relevant Medications    EPINEPHrine (EPIPEN) 0 3 mg/0 3 mL SOAJ    Screening for cardiovascular condition        Relevant Orders    Comprehensive metabolic panel    Lipid Panel with Direct LDL reflex           Diagnoses and all orders for this visit:    Anxiety  -     QUEtiapine (SEROquel) 100 mg tablet; Take 1 tablet (100 mg total) by mouth daily at bedtime  -     CBC and differential  -     TSH, 3rd generation with Free T4 reflex  -     escitalopram (LEXAPRO) 10 mg tablet; Take 1 tablet (10 mg total) by mouth daily    History of bee sting allergy  -     EPINEPHrine (EPIPEN) 0 3 mg/0 3 mL SOAJ; Inject 0 3 mL (0 3 mg total) into a muscle once for 1 dose    Hepatitis C virus infection without hepatic coma, unspecified chronicity  -     Hepatitis C RNA, quantitative, PCR; Future    Screening for cardiovascular condition  -     Comprehensive metabolic panel  -     Lipid Panel with Direct LDL reflex        No problem-specific Assessment & Plan notes found for this encounter  Subjective:      Patient ID: Juwan Carroll is a 30 y o  male  Patient here today after a long absence  Patient admits that he was using meth  Has been sober for a week  Patient has not been taking his Lexapro or Lyrica  He does take the Seroquel at night, though splits it in half, because a whole pill made him too tired  He would like to get his hepatitis-C treated again  Patient also states had noticed that he had some left groin pain off and on  Does not hurt if he lifts or strains  No bowel or bladder problems  Anxiety   Presents for follow-up visit  Symptoms include nervous/anxious behavior  Patient reports no suicidal ideas  Symptoms occur constantly  The severity of symptoms is causing significant distress  The quality of sleep is good  Compliance with medications is 51-75%  Back Pain   This is a chronic problem  The problem occurs daily  The problem is unchanged  The pain is present in the lumbar spine  The quality of the pain is described as aching  The pain does not radiate  The pain is moderate  The symptoms are aggravated by bending, position and twisting  Pertinent negatives include no bladder incontinence, bowel incontinence, leg pain or perianal numbness  He has tried nothing for the symptoms  The following portions of the patient's history were reviewed and updated as appropriate:   He has a past medical history of Addiction to drug [facility]), Anxiety, Arthritis, Chronic pain, Hepatitis, Hepatitis C virus, Heroin abuse ([facility] [address] ) (5/2/2018), HIV (human immunodeficiency virus infection) ([facility] [address] ), Moderate episode of recurrent major depressive disorder ([facility] [address] ) (2/5/2020), and Tobacco use disorder  ,  does not have any pertinent problems on file  ,   has a past surgical history that includes Epidural block injection (Bilateral, 4/19/2018)  ,  family history includes Hepatitis in his family; No Known Problems in his father  ,   reports that he has been smoking  He has been smoking about 1 00 pack per day  He has quit using smokeless tobacco  He reports current alcohol use  He reports current drug use  Drugs: Heroin, Prescription, and Marijuana  ,  is allergic to clindamycin/lincomycin     Current Outpatient Medications   Medication Sig Dispense Refill    QUEtiapine (SEROquel) 100 mg tablet Take 1 tablet (100 mg total) by mouth daily at bedtime 30 tablet 0    EPINEPHrine (EPIPEN) 0 3 mg/0 3 mL SOAJ Inject 0 3 mL (0 3 mg total) into a muscle once for 1 dose 2 each 1    escitalopram (LEXAPRO) 10 mg tablet Take 1 tablet (10 mg total) by mouth daily 30 tablet 1     No current facility-administered medications for this visit  Review of Systems   Constitutional: Negative  Respiratory: Negative  Cardiovascular: Negative  Gastrointestinal: Negative  Negative for bowel incontinence  Genitourinary: Negative  Negative for bladder incontinence  Musculoskeletal: Positive for back pain  Psychiatric/Behavioral: Negative for suicidal ideas  The patient is nervous/anxious  Objective:  Vitals:    09/17/20 1427   BP: 126/84   Temp: 98 9 °F (37 2 °C)   Weight: 99 4 kg (219 lb 3 2 oz)   Height: 5' 11" (1 803 m)     Body mass index is 30 57 kg/m²  Physical Exam  Vitals signs reviewed  Constitutional:       General: He is not in acute distress  Appearance: He is well-developed  He is not diaphoretic  HENT:      Head: Normocephalic and atraumatic  Eyes:      Conjunctiva/sclera: Conjunctivae normal    Cardiovascular:      Rate and Rhythm: Normal rate and regular rhythm  Heart sounds: Normal heart sounds  No murmur  No friction rub  No gallop  Pulmonary:      Effort: Pulmonary effort is normal  No respiratory distress  Breath sounds: Normal breath sounds  No wheezing or rales  Genitourinary:     Comments: No hernia palpated  No groin tenderness  Neurological:      Mental Status: He is alert and oriented to person, place, and time  Psychiatric:         Behavior: Behavior normal          Thought Content:  Thought content normal          Judgment: Judgment normal

## 2020-09-26 ENCOUNTER — APPOINTMENT (OUTPATIENT)
Dept: LAB | Facility: HOSPITAL | Age: 35
End: 2020-09-26
Payer: COMMERCIAL

## 2020-09-26 DIAGNOSIS — M51.34 DEGENERATION OF THORACIC INTERVERTEBRAL DISC: Primary | ICD-10-CM

## 2020-09-26 DIAGNOSIS — B19.20 HEPATITIS C VIRUS INFECTION WITHOUT HEPATIC COMA, UNSPECIFIED CHRONICITY: ICD-10-CM

## 2020-09-26 LAB
ALBUMIN SERPL BCP-MCNC: 3.8 G/DL (ref 3.5–5)
ALP SERPL-CCNC: 50 U/L (ref 46–116)
ALT SERPL W P-5'-P-CCNC: 71 U/L (ref 12–78)
ANION GAP SERPL CALCULATED.3IONS-SCNC: 7 MMOL/L (ref 4–13)
AST SERPL W P-5'-P-CCNC: 31 U/L (ref 5–45)
BASOPHILS # BLD AUTO: 0.04 THOUSANDS/ΜL (ref 0–0.1)
BASOPHILS NFR BLD AUTO: 1 % (ref 0–1)
BILIRUB SERPL-MCNC: 0.2 MG/DL (ref 0.2–1)
BUN SERPL-MCNC: 17 MG/DL (ref 5–25)
CALCIUM SERPL-MCNC: 8.6 MG/DL (ref 8.3–10.1)
CHLORIDE SERPL-SCNC: 107 MMOL/L (ref 100–108)
CHOLEST SERPL-MCNC: 170 MG/DL (ref 50–200)
CO2 SERPL-SCNC: 24 MMOL/L (ref 21–32)
CREAT SERPL-MCNC: 0.97 MG/DL (ref 0.6–1.3)
EOSINOPHIL # BLD AUTO: 0.2 THOUSAND/ΜL (ref 0–0.61)
EOSINOPHIL NFR BLD AUTO: 3 % (ref 0–6)
ERYTHROCYTE [DISTWIDTH] IN BLOOD BY AUTOMATED COUNT: 13.2 % (ref 11.6–15.1)
GFR SERPL CREATININE-BSD FRML MDRD: 101 ML/MIN/1.73SQ M
GLUCOSE SERPL-MCNC: 111 MG/DL (ref 65–140)
HCT VFR BLD AUTO: 40.3 % (ref 36.5–49.3)
HDLC SERPL-MCNC: 45 MG/DL
HGB BLD-MCNC: 13.6 G/DL (ref 12–17)
IMM GRANULOCYTES # BLD AUTO: 0.02 THOUSAND/UL (ref 0–0.2)
IMM GRANULOCYTES NFR BLD AUTO: 0 % (ref 0–2)
LDLC SERPL CALC-MCNC: 111 MG/DL (ref 0–100)
LYMPHOCYTES # BLD AUTO: 1.85 THOUSANDS/ΜL (ref 0.6–4.47)
LYMPHOCYTES NFR BLD AUTO: 31 % (ref 14–44)
MCH RBC QN AUTO: 31.1 PG (ref 26.8–34.3)
MCHC RBC AUTO-ENTMCNC: 33.7 G/DL (ref 31.4–37.4)
MCV RBC AUTO: 92 FL (ref 82–98)
MONOCYTES # BLD AUTO: 0.53 THOUSAND/ΜL (ref 0.17–1.22)
MONOCYTES NFR BLD AUTO: 9 % (ref 4–12)
NEUTROPHILS # BLD AUTO: 3.39 THOUSANDS/ΜL (ref 1.85–7.62)
NEUTS SEG NFR BLD AUTO: 56 % (ref 43–75)
NRBC BLD AUTO-RTO: 0 /100 WBCS
PLATELET # BLD AUTO: 232 THOUSANDS/UL (ref 149–390)
PMV BLD AUTO: 9.5 FL (ref 8.9–12.7)
POTASSIUM SERPL-SCNC: 4.5 MMOL/L (ref 3.5–5.3)
PROT SERPL-MCNC: 7.3 G/DL (ref 6.4–8.2)
RBC # BLD AUTO: 4.37 MILLION/UL (ref 3.88–5.62)
SODIUM SERPL-SCNC: 138 MMOL/L (ref 136–145)
TRIGL SERPL-MCNC: 68 MG/DL
TSH SERPL DL<=0.05 MIU/L-ACNC: 0.95 UIU/ML (ref 0.36–3.74)
WBC # BLD AUTO: 6.03 THOUSAND/UL (ref 4.31–10.16)

## 2020-09-26 PROCEDURE — 80053 COMPREHEN METABOLIC PANEL: CPT | Performed by: FAMILY MEDICINE

## 2020-09-26 PROCEDURE — 80061 LIPID PANEL: CPT | Performed by: FAMILY MEDICINE

## 2020-09-26 PROCEDURE — 87522 HEPATITIS C REVRS TRNSCRPJ: CPT

## 2020-09-26 PROCEDURE — 84443 ASSAY THYROID STIM HORMONE: CPT | Performed by: FAMILY MEDICINE

## 2020-09-26 PROCEDURE — 36415 COLL VENOUS BLD VENIPUNCTURE: CPT | Performed by: FAMILY MEDICINE

## 2020-09-26 PROCEDURE — 85025 COMPLETE CBC W/AUTO DIFF WBC: CPT | Performed by: FAMILY MEDICINE

## 2020-09-28 RX ORDER — PREGABALIN 75 MG/1
75 CAPSULE ORAL 3 TIMES DAILY
Qty: 90 CAPSULE | Refills: 0 | Status: SHIPPED | OUTPATIENT
Start: 2020-09-28 | End: 2020-09-30

## 2020-09-29 DIAGNOSIS — B19.20 HEPATITIS C VIRUS INFECTION WITHOUT HEPATIC COMA, UNSPECIFIED CHRONICITY: Primary | ICD-10-CM

## 2020-09-29 LAB
HCV RNA SERPL NAA+PROBE-ACNC: NORMAL IU/ML
HCV RNA SERPL NAA+PROBE-LOG IU: 6.79 LOG10 IU/ML
TEST INFORMATION: NORMAL

## 2020-09-30 ENCOUNTER — OFFICE VISIT (OUTPATIENT)
Dept: FAMILY MEDICINE CLINIC | Facility: CLINIC | Age: 35
End: 2020-09-30
Payer: COMMERCIAL

## 2020-09-30 VITALS
BODY MASS INDEX: 31.05 KG/M2 | HEIGHT: 71 IN | SYSTOLIC BLOOD PRESSURE: 122 MMHG | DIASTOLIC BLOOD PRESSURE: 86 MMHG | WEIGHT: 221.8 LBS | TEMPERATURE: 97.9 F

## 2020-09-30 DIAGNOSIS — B19.20 HEPATITIS C VIRUS INFECTION WITHOUT HEPATIC COMA, UNSPECIFIED CHRONICITY: ICD-10-CM

## 2020-09-30 DIAGNOSIS — F41.9 ANXIETY: ICD-10-CM

## 2020-09-30 DIAGNOSIS — M51.34 DEGENERATION OF THORACIC INTERVERTEBRAL DISC: Primary | ICD-10-CM

## 2020-09-30 PROCEDURE — 4004F PT TOBACCO SCREEN RCVD TLK: CPT | Performed by: FAMILY MEDICINE

## 2020-09-30 PROCEDURE — 99214 OFFICE O/P EST MOD 30 MIN: CPT | Performed by: FAMILY MEDICINE

## 2020-09-30 RX ORDER — CELECOXIB 100 MG/1
100 CAPSULE ORAL 2 TIMES DAILY
Qty: 60 CAPSULE | Refills: 0 | Status: SHIPPED | OUTPATIENT
Start: 2020-09-30 | End: 2020-10-16 | Stop reason: SDUPTHER

## 2020-09-30 RX ORDER — PREGABALIN 150 MG/1
150 CAPSULE ORAL 3 TIMES DAILY
Start: 2020-09-30 | End: 2020-10-16 | Stop reason: SDUPTHER

## 2020-09-30 NOTE — PROGRESS NOTES
Assessment/Plan: We will increase his Lyrica to 150 mg 3 times a day  We will add Celebrex 100 mg twice a day  He will continue the Lexapro and call us if symptoms continue or increase  We will see him back in the next couple weeks  He will call us if GI does not get a hold of him by tomorrow  Problem List Items Addressed This Visit        Digestive    Hepatitis C virus       Musculoskeletal and Integument    Degeneration of thoracic intervertebral disc - Primary    Relevant Medications    pregabalin (LYRICA) 150 mg capsule    celecoxib (CeleBREX) 100 mg capsule       Other    Anxiety           Diagnoses and all orders for this visit:    Degeneration of thoracic intervertebral disc  -     pregabalin (LYRICA) 150 mg capsule; Take 1 capsule (150 mg total) by mouth 3 (three) times a day  -     celecoxib (CeleBREX) 100 mg capsule; Take 1 capsule (100 mg total) by mouth 2 (two) times a day    Anxiety    Hepatitis C virus infection without hepatic coma, unspecified chronicity        No problem-specific Assessment & Plan notes found for this encounter  Subjective:      Patient ID: Jeaneth Wright is a 30 y o  male  Patient here today for follow-up  Patient feels that the Lexapro isn't quite working yet  He has only been on it for 2 weeks, but he is tolerating it well  Patient states the Lyrica did not help with his back pain at 75 mg 3 times a day, so he is taking 2 at a time  Patient has not heard back from GI yet for treatment of his hepatitis C    Anxiety   Presents for follow-up visit  Symptoms include nervous/anxious behavior  Patient reports no suicidal ideas  Symptoms occur constantly  The severity of symptoms is moderate  The quality of sleep is fair  Nighttime awakenings: occasional      Compliance with medications is %  Back Pain   This is a chronic problem  The problem occurs constantly  The problem is unchanged  The pain is present in the thoracic spine   The quality of the pain is described as aching  The pain is moderate  The pain is the same all the time  The symptoms are aggravated by bending, position, standing and twisting  Stiffness is present all day  Pertinent negatives include no bladder incontinence, bowel incontinence, leg pain, perianal numbness or weakness  Treatments tried: Lyrica  The treatment provided mild relief  The following portions of the patient's history were reviewed and updated as appropriate:   He has a past medical history of Addiction to drug [facility]), Anxiety, Arthritis, Chronic pain, Hepatitis, Hepatitis C virus, Heroin abuse ([facility] [address] ) (5/2/2018), HIV (human immunodeficiency virus infection) ([facility] [address] ), Moderate episode of recurrent major depressive disorder ([facility] ) (2/5/2020), and Tobacco use disorder  ,  does not have any pertinent problems on file  ,   has a past surgical history that includes Epidural block injection (Bilateral, 4/19/2018)  ,  family history includes Hepatitis in his family; No Known Problems in his father  ,   reports that he has been smoking  He has been smoking about 1 00 pack per day  He has quit using smokeless tobacco  He reports current alcohol use  He reports current drug use  Drugs: Heroin, Prescription, and Marijuana  ,  is allergic to clindamycin/lincomycin     Current Outpatient Medications   Medication Sig Dispense Refill    escitalopram (LEXAPRO) 10 mg tablet Take 1 tablet (10 mg total) by mouth daily 30 tablet 1    pregabalin (LYRICA) 150 mg capsule Take 1 capsule (150 mg total) by mouth 3 (three) times a day      QUEtiapine (SEROquel) 100 mg tablet Take 1 tablet (100 mg total) by mouth daily at bedtime 30 tablet 0    celecoxib (CeleBREX) 100 mg capsule Take 1 capsule (100 mg total) by mouth 2 (two) times a day 60 capsule 0    EPINEPHrine (EPIPEN) 0 3 mg/0 3 mL SOAJ Inject 0 3 mL (0 3 mg total) into a muscle once for 1 dose 2 each 1     No current facility-administered medications for this visit          Review of Systems Constitutional: Negative  Respiratory: Negative  Cardiovascular: Negative  Gastrointestinal: Negative  Negative for bowel incontinence  Genitourinary: Negative  Negative for bladder incontinence  Musculoskeletal: Positive for back pain  Neurological: Negative for weakness  Psychiatric/Behavioral: Negative for suicidal ideas  The patient is nervous/anxious  Objective:  Vitals:    09/30/20 1819   BP: 122/86   Temp: 97 9 °F (36 6 °C)   Weight: 101 kg (221 lb 12 8 oz)   Height: 5' 11" (1 803 m)     Body mass index is 30 93 kg/m²  Physical Exam  Vitals signs reviewed  Constitutional:       General: He is not in acute distress  Appearance: He is well-developed  He is not diaphoretic  HENT:      Head: Normocephalic and atraumatic  Eyes:      Conjunctiva/sclera: Conjunctivae normal    Cardiovascular:      Rate and Rhythm: Normal rate and regular rhythm  Heart sounds: Normal heart sounds  No murmur  No friction rub  No gallop  Pulmonary:      Effort: Pulmonary effort is normal  No respiratory distress  Breath sounds: Normal breath sounds  No wheezing or rales  Neurological:      Mental Status: He is alert and oriented to person, place, and time  Psychiatric:         Behavior: Behavior normal          Thought Content:  Thought content normal          Judgment: Judgment normal

## 2020-10-01 DIAGNOSIS — F41.9 ANXIETY: ICD-10-CM

## 2020-10-01 RX ORDER — QUETIAPINE FUMARATE 100 MG/1
100 TABLET, FILM COATED ORAL
Qty: 30 TABLET | Refills: 0 | Status: SHIPPED | OUTPATIENT
Start: 2020-10-01 | End: 2020-10-29 | Stop reason: SDUPTHER

## 2020-10-16 DIAGNOSIS — M51.34 DEGENERATION OF THORACIC INTERVERTEBRAL DISC: ICD-10-CM

## 2020-10-16 RX ORDER — PREGABALIN 150 MG/1
150 CAPSULE ORAL 3 TIMES DAILY
Qty: 90 CAPSULE | Refills: 0 | Status: SHIPPED | OUTPATIENT
Start: 2020-10-16 | End: 2020-11-04

## 2020-10-16 RX ORDER — CELECOXIB 100 MG/1
100 CAPSULE ORAL 2 TIMES DAILY
Qty: 60 CAPSULE | Refills: 0 | Status: SHIPPED | OUTPATIENT
Start: 2020-10-16 | End: 2020-11-04 | Stop reason: SDUPTHER

## 2020-10-29 DIAGNOSIS — F41.9 ANXIETY: ICD-10-CM

## 2020-10-29 RX ORDER — QUETIAPINE FUMARATE 100 MG/1
100 TABLET, FILM COATED ORAL
Qty: 30 TABLET | Refills: 1 | Status: SHIPPED | OUTPATIENT
Start: 2020-10-29 | End: 2020-11-23 | Stop reason: SDUPTHER

## 2020-11-04 ENCOUNTER — OFFICE VISIT (OUTPATIENT)
Dept: FAMILY MEDICINE CLINIC | Facility: CLINIC | Age: 35
End: 2020-11-04
Payer: COMMERCIAL

## 2020-11-04 VITALS
HEIGHT: 71 IN | BODY MASS INDEX: 32.53 KG/M2 | TEMPERATURE: 97.8 F | SYSTOLIC BLOOD PRESSURE: 120 MMHG | WEIGHT: 232.4 LBS | DIASTOLIC BLOOD PRESSURE: 80 MMHG

## 2020-11-04 DIAGNOSIS — H69.91 EUSTACHIAN TUBE DISORDER, RIGHT: ICD-10-CM

## 2020-11-04 DIAGNOSIS — G89.29 CHRONIC MIDLINE THORACIC BACK PAIN: Primary | ICD-10-CM

## 2020-11-04 DIAGNOSIS — M54.6 CHRONIC MIDLINE THORACIC BACK PAIN: Primary | ICD-10-CM

## 2020-11-04 DIAGNOSIS — M51.34 DEGENERATION OF THORACIC INTERVERTEBRAL DISC: ICD-10-CM

## 2020-11-04 PROCEDURE — 99214 OFFICE O/P EST MOD 30 MIN: CPT | Performed by: FAMILY MEDICINE

## 2020-11-04 PROCEDURE — 3008F BODY MASS INDEX DOCD: CPT | Performed by: FAMILY MEDICINE

## 2020-11-04 PROCEDURE — 3725F SCREEN DEPRESSION PERFORMED: CPT | Performed by: FAMILY MEDICINE

## 2020-11-04 PROCEDURE — 4004F PT TOBACCO SCREEN RCVD TLK: CPT | Performed by: FAMILY MEDICINE

## 2020-11-04 RX ORDER — FLUTICASONE PROPIONATE 50 MCG
2 SPRAY, SUSPENSION (ML) NASAL DAILY
Qty: 16 G | Refills: 1 | Status: SHIPPED | OUTPATIENT
Start: 2020-11-04 | End: 2022-06-07

## 2020-11-04 RX ORDER — PREGABALIN 300 MG/1
300 CAPSULE ORAL 2 TIMES DAILY
Qty: 60 CAPSULE | Refills: 0 | Status: SHIPPED | OUTPATIENT
Start: 2020-11-04 | End: 2020-12-04 | Stop reason: SDUPTHER

## 2020-11-04 RX ORDER — CELECOXIB 100 MG/1
100 CAPSULE ORAL 2 TIMES DAILY
Qty: 60 CAPSULE | Refills: 0 | Status: SHIPPED | OUTPATIENT
Start: 2020-11-04 | End: 2020-12-07 | Stop reason: SDUPTHER

## 2020-11-23 DIAGNOSIS — M51.34 DEGENERATION OF THORACIC INTERVERTEBRAL DISC: ICD-10-CM

## 2020-11-23 DIAGNOSIS — F41.9 ANXIETY: ICD-10-CM

## 2020-11-23 RX ORDER — ESCITALOPRAM OXALATE 10 MG/1
10 TABLET ORAL DAILY
Qty: 30 TABLET | Refills: 1 | Status: SHIPPED | OUTPATIENT
Start: 2020-11-23 | End: 2021-02-22 | Stop reason: SDUPTHER

## 2020-11-23 RX ORDER — QUETIAPINE FUMARATE 100 MG/1
100 TABLET, FILM COATED ORAL
Qty: 30 TABLET | Refills: 1 | Status: SHIPPED | OUTPATIENT
Start: 2020-11-23 | End: 2020-12-22 | Stop reason: SDUPTHER

## 2020-12-04 DIAGNOSIS — M51.34 DEGENERATION OF THORACIC INTERVERTEBRAL DISC: ICD-10-CM

## 2020-12-04 RX ORDER — PREGABALIN 300 MG/1
300 CAPSULE ORAL 2 TIMES DAILY
Qty: 60 CAPSULE | Refills: 0 | Status: SHIPPED | OUTPATIENT
Start: 2020-12-04 | End: 2021-01-04 | Stop reason: SDUPTHER

## 2020-12-07 DIAGNOSIS — M51.34 DEGENERATION OF THORACIC INTERVERTEBRAL DISC: ICD-10-CM

## 2020-12-07 RX ORDER — CELECOXIB 100 MG/1
100 CAPSULE ORAL 2 TIMES DAILY
Qty: 60 CAPSULE | Refills: 1 | Status: SHIPPED | OUTPATIENT
Start: 2020-12-07 | End: 2021-02-03 | Stop reason: SDUPTHER

## 2020-12-17 ENCOUNTER — TELEMEDICINE (OUTPATIENT)
Dept: GASTROENTEROLOGY | Facility: CLINIC | Age: 35
End: 2020-12-17
Payer: COMMERCIAL

## 2020-12-17 DIAGNOSIS — B18.2 CHRONIC HEPATITIS C WITHOUT HEPATIC COMA (HCC): Primary | ICD-10-CM

## 2020-12-17 PROCEDURE — 99243 OFF/OP CNSLTJ NEW/EST LOW 30: CPT | Performed by: INTERNAL MEDICINE

## 2020-12-22 ENCOUNTER — TELEPHONE (OUTPATIENT)
Dept: SURGERY | Facility: CLINIC | Age: 35
End: 2020-12-22

## 2020-12-22 DIAGNOSIS — F41.9 ANXIETY: ICD-10-CM

## 2020-12-22 RX ORDER — QUETIAPINE FUMARATE 100 MG/1
100 TABLET, FILM COATED ORAL
Qty: 30 TABLET | Refills: 1 | Status: SHIPPED | OUTPATIENT
Start: 2020-12-22 | End: 2021-02-16

## 2021-01-04 DIAGNOSIS — M51.34 DEGENERATION OF THORACIC INTERVERTEBRAL DISC: ICD-10-CM

## 2021-01-04 RX ORDER — PREGABALIN 300 MG/1
300 CAPSULE ORAL 2 TIMES DAILY
Qty: 60 CAPSULE | Refills: 0 | Status: SHIPPED | OUTPATIENT
Start: 2021-01-04 | End: 2021-02-03 | Stop reason: SDUPTHER

## 2021-02-03 DIAGNOSIS — M51.34 DEGENERATION OF THORACIC INTERVERTEBRAL DISC: ICD-10-CM

## 2021-02-03 RX ORDER — PREGABALIN 300 MG/1
300 CAPSULE ORAL 2 TIMES DAILY
Qty: 60 CAPSULE | Refills: 0 | Status: SHIPPED | OUTPATIENT
Start: 2021-02-03 | End: 2021-03-02 | Stop reason: SDUPTHER

## 2021-02-03 RX ORDER — CELECOXIB 100 MG/1
100 CAPSULE ORAL 2 TIMES DAILY
Qty: 60 CAPSULE | Refills: 1 | Status: SHIPPED | OUTPATIENT
Start: 2021-02-03 | End: 2021-03-29 | Stop reason: SDUPTHER

## 2021-02-16 DIAGNOSIS — F41.9 ANXIETY: ICD-10-CM

## 2021-02-16 RX ORDER — QUETIAPINE FUMARATE 100 MG/1
TABLET, FILM COATED ORAL
Qty: 30 TABLET | Refills: 1 | Status: SHIPPED | OUTPATIENT
Start: 2021-02-16 | End: 2021-04-12

## 2021-02-22 DIAGNOSIS — F41.9 ANXIETY: ICD-10-CM

## 2021-02-22 RX ORDER — ESCITALOPRAM OXALATE 10 MG/1
10 TABLET ORAL DAILY
Qty: 30 TABLET | Refills: 1 | Status: SHIPPED | OUTPATIENT
Start: 2021-02-22 | End: 2022-06-07

## 2021-02-26 ENCOUNTER — TELEPHONE (OUTPATIENT)
Dept: GASTROENTEROLOGY | Facility: CLINIC | Age: 36
End: 2021-02-26

## 2021-03-02 DIAGNOSIS — M51.34 DEGENERATION OF THORACIC INTERVERTEBRAL DISC: ICD-10-CM

## 2021-03-02 RX ORDER — PREGABALIN 300 MG/1
300 CAPSULE ORAL 2 TIMES DAILY
Qty: 60 CAPSULE | Refills: 0 | Status: SHIPPED | OUTPATIENT
Start: 2021-03-02 | End: 2021-03-29 | Stop reason: SDUPTHER

## 2021-03-29 DIAGNOSIS — M51.34 DEGENERATION OF THORACIC INTERVERTEBRAL DISC: ICD-10-CM

## 2021-03-29 RX ORDER — PREGABALIN 300 MG/1
300 CAPSULE ORAL 2 TIMES DAILY
Qty: 60 CAPSULE | Refills: 0 | Status: SHIPPED | OUTPATIENT
Start: 2021-03-29 | End: 2021-04-27 | Stop reason: SDUPTHER

## 2021-03-29 RX ORDER — CELECOXIB 100 MG/1
100 CAPSULE ORAL 2 TIMES DAILY
Qty: 60 CAPSULE | Refills: 1 | Status: SHIPPED | OUTPATIENT
Start: 2021-03-29 | End: 2021-04-27 | Stop reason: SDUPTHER

## 2021-04-12 DIAGNOSIS — F41.9 ANXIETY: ICD-10-CM

## 2021-04-12 RX ORDER — QUETIAPINE FUMARATE 100 MG/1
TABLET, FILM COATED ORAL
Qty: 30 TABLET | Refills: 0 | Status: SHIPPED | OUTPATIENT
Start: 2021-04-12 | End: 2021-05-10 | Stop reason: SDUPTHER

## 2021-04-13 DIAGNOSIS — F41.9 ANXIETY: ICD-10-CM

## 2021-04-13 RX ORDER — QUETIAPINE FUMARATE 100 MG/1
100 TABLET, FILM COATED ORAL
Qty: 30 TABLET | Refills: 0 | OUTPATIENT
Start: 2021-04-13

## 2021-04-21 ENCOUNTER — OFFICE VISIT (OUTPATIENT)
Dept: FAMILY MEDICINE CLINIC | Facility: CLINIC | Age: 36
End: 2021-04-21
Payer: COMMERCIAL

## 2021-04-21 VITALS
BODY MASS INDEX: 33.63 KG/M2 | SYSTOLIC BLOOD PRESSURE: 120 MMHG | WEIGHT: 240.2 LBS | DIASTOLIC BLOOD PRESSURE: 88 MMHG | TEMPERATURE: 98.5 F | HEIGHT: 71 IN

## 2021-04-21 DIAGNOSIS — F41.9 ANXIETY: ICD-10-CM

## 2021-04-21 DIAGNOSIS — F33.1 MODERATE EPISODE OF RECURRENT MAJOR DEPRESSIVE DISORDER (HCC): ICD-10-CM

## 2021-04-21 DIAGNOSIS — M51.34 DEGENERATION OF INTERVERTEBRAL DISC OF THORACIC REGION: ICD-10-CM

## 2021-04-21 DIAGNOSIS — B19.20 HEPATITIS C VIRUS INFECTION WITHOUT HEPATIC COMA, UNSPECIFIED CHRONICITY: Primary | ICD-10-CM

## 2021-04-21 PROCEDURE — 3008F BODY MASS INDEX DOCD: CPT | Performed by: FAMILY MEDICINE

## 2021-04-21 PROCEDURE — 99213 OFFICE O/P EST LOW 20 MIN: CPT | Performed by: FAMILY MEDICINE

## 2021-04-21 PROCEDURE — 4004F PT TOBACCO SCREEN RCVD TLK: CPT | Performed by: FAMILY MEDICINE

## 2021-04-21 NOTE — PROGRESS NOTES
Assessment/Plan:  I referred him back to GI for treatment of hep C  I told him he must get his blood work done that they ordered  Continue same medications  Follow-up here in 4 months or p r n  Problem List Items Addressed This Visit        Digestive    Hepatitis C virus - Primary    Relevant Orders    Ambulatory referral to Gastroenterology       Musculoskeletal and Integument    Degeneration of intervertebral disc of thoracic region       Other    Anxiety    Moderate episode of recurrent major depressive disorder ([facility] [address] )           Diagnoses and all orders for this visit:    Hepatitis C virus infection without hepatic coma, unspecified chronicity  -     Ambulatory referral to Gastroenterology; Future    Moderate episode of recurrent major depressive disorder (HCC)    Anxiety    Degeneration of intervertebral disc of thoracic region        No problem-specific Assessment & Plan notes found for this encounter  Subjective:      Patient ID: Giovanna Buckley is a 29 y o  male  Patient here today for follow-up  Patient is tolerating his medications well  Unfortunately patient has not followed up with GI for his hepatitis C  He does want to go get treated  Patient states the Celebrex has helped with his pinching pain in his back  The following portions of the patient's history were reviewed and updated as appropriate:   He has a past medical history of Addiction to drug [facility]), Anxiety, Arthritis, Chronic pain, Hepatitis, Hepatitis C virus, Heroin abuse ([facility] [address] ) (5/2/2018), HIV (human immunodeficiency virus infection) ([facility] ), Moderate episode of recurrent major depressive disorder ([facility] ) (2/5/2020), and Tobacco use disorder  ,  does not have any pertinent problems on file  ,   has a past surgical history that includes Epidural block injection (Bilateral, 4/19/2018)  ,  family history includes Hepatitis in his family; No Known Problems in his father  ,   reports that he has been smoking   He has been smoking about 1 00 pack per day  He has quit using smokeless tobacco  He reports current alcohol use  He reports current drug use  Drugs: Heroin, Prescription, and Marijuana  ,  is allergic to clindamycin/lincomycin     Current Outpatient Medications   Medication Sig Dispense Refill    celecoxib (CeleBREX) 100 mg capsule Take 1 capsule (100 mg total) by mouth 2 (two) times a day 60 capsule 1    escitalopram (LEXAPRO) 10 mg tablet Take 1 tablet (10 mg total) by mouth daily 30 tablet 1    ibuprofen (MOTRIN) 800 mg tablet Take 800 mg by mouth every 8 (eight) hours as needed      pregabalin (LYRICA) 300 MG capsule Take 1 capsule (300 mg total) by mouth 2 (two) times a day 60 capsule 0    QUEtiapine (SEROquel) 100 mg tablet take 1 tablet by mouth at bedtime 30 tablet 0    amoxicillin (AMOXIL) 500 MG tablet Take 500 mg by mouth 3 (three) times a day      chlorhexidine (PERIDEX) 0 12 % solution SWISH AND SPIT 15 ML BY MOUTH TWICE A DAY      EPINEPHrine (EPIPEN) 0 3 mg/0 3 mL SOAJ Inject 0 3 mL (0 3 mg total) into a muscle once for 1 dose 2 each 1    fluticasone (FLONASE) 50 mcg/act nasal spray 2 sprays into each nostril daily (Patient not taking: Reported on 4/21/2021) 16 g 1     No current facility-administered medications for this visit  Review of Systems   Constitutional: Negative  Respiratory: Negative  Cardiovascular: Negative  Gastrointestinal: Negative  Genitourinary: Negative  Objective:  Vitals:    04/21/21 1824   BP: 120/88   Temp: 98 5 °F (36 9 °C)   Weight: 109 kg (240 lb 3 2 oz)   Height: 5' 11" (1 803 m)     Body mass index is 33 5 kg/m²  Physical Exam  Vitals signs reviewed  Constitutional:       General: He is not in acute distress  Appearance: Normal appearance  He is well-developed  He is not diaphoretic  HENT:      Head: Normocephalic and atraumatic     Eyes:      Conjunctiva/sclera: Conjunctivae normal    Cardiovascular:      Rate and Rhythm: Normal rate and regular rhythm  Heart sounds: Normal heart sounds  No murmur  No friction rub  No gallop  Pulmonary:      Effort: Pulmonary effort is normal  No respiratory distress  Breath sounds: Normal breath sounds  No wheezing or rales  Musculoskeletal:      Right lower leg: No edema  Left lower leg: No edema  Neurological:      Mental Status: He is alert and oriented to person, place, and time  Psychiatric:         Mood and Affect: Mood normal          Behavior: Behavior normal          Thought Content:  Thought content normal          Judgment: Judgment normal

## 2021-04-27 DIAGNOSIS — M51.34 DEGENERATION OF THORACIC INTERVERTEBRAL DISC: ICD-10-CM

## 2021-04-27 RX ORDER — CELECOXIB 100 MG/1
100 CAPSULE ORAL 2 TIMES DAILY
Qty: 60 CAPSULE | Refills: 2 | Status: SHIPPED | OUTPATIENT
Start: 2021-04-27 | End: 2021-07-26 | Stop reason: SDUPTHER

## 2021-04-27 RX ORDER — PREGABALIN 300 MG/1
300 CAPSULE ORAL 2 TIMES DAILY
Qty: 60 CAPSULE | Refills: 2 | Status: SHIPPED | OUTPATIENT
Start: 2021-04-27 | End: 2021-07-26 | Stop reason: SDUPTHER

## 2021-05-10 DIAGNOSIS — F41.9 ANXIETY: ICD-10-CM

## 2021-05-10 RX ORDER — QUETIAPINE FUMARATE 100 MG/1
100 TABLET, FILM COATED ORAL
Qty: 30 TABLET | Refills: 3 | Status: SHIPPED | OUTPATIENT
Start: 2021-05-10 | End: 2021-08-30

## 2021-05-25 ENCOUNTER — TELEPHONE (OUTPATIENT)
Dept: GASTROENTEROLOGY | Facility: CLINIC | Age: 36
End: 2021-05-25

## 2021-07-06 ENCOUNTER — HOSPITAL ENCOUNTER (EMERGENCY)
Facility: HOSPITAL | Age: 36
Discharge: HOME | End: 2021-07-06
Attending: EMERGENCY MEDICINE
Payer: COMMERCIAL

## 2021-07-06 VITALS
BODY MASS INDEX: 34.72 KG/M2 | HEIGHT: 71 IN | DIASTOLIC BLOOD PRESSURE: 90 MMHG | HEART RATE: 87 BPM | RESPIRATION RATE: 18 BRPM | SYSTOLIC BLOOD PRESSURE: 130 MMHG | WEIGHT: 248.02 LBS | OXYGEN SATURATION: 98 %

## 2021-07-06 DIAGNOSIS — T40.601A OPIATE OVERDOSE, ACCIDENTAL OR UNINTENTIONAL, INITIAL ENCOUNTER (HCC): Primary | ICD-10-CM

## 2021-07-06 PROCEDURE — 99284 EMERGENCY DEPT VISIT MOD MDM: CPT | Performed by: EMERGENCY MEDICINE

## 2021-07-06 PROCEDURE — 99284 EMERGENCY DEPT VISIT MOD MDM: CPT

## 2021-07-06 NOTE — ED PROVIDER NOTES
History  Chief Complaint   Patient presents with    Overdose - Accidental     Pt states he snorted Fentanyl then walked into friend's house and became unresponsice  Pt given Narcan IN by EMS and is now A&O  Pt states he doesn't want to be here  HPI     Pt presents via EMS from a friend's house, hx of opiate and marijuana abuse in the past, who was found unresponsive, agonal respirations and cyanotic by residents of the house  EMS arrived and administered 4mg of intranasal Narcan  He was still unresponsive, and so he was administered an additional 4mg of Narcan  At this time the patient is awake, alert and oriented x 3  Pt admits to taking fentanyl prior to going to his friend's house  Pt denies any other drug use  Pt denies any symptoms  Pt denies ha, fevers, cough, cp, sob, n/v/d/c, abd pain, dysuria or focal def  Prior to Admission Medications   Prescriptions Last Dose Informant Patient Reported? Taking?    EPINEPHrine (EPIPEN) 0 3 mg/0 3 mL SOAJ   No No   Sig: Inject 0 3 mL (0 3 mg total) into a muscle once for 1 dose   QUEtiapine (SEROquel) 100 mg tablet   No No   Sig: Take 1 tablet (100 mg total) by mouth daily at bedtime   amoxicillin (AMOXIL) 500 MG tablet   Yes No   Sig: Take 500 mg by mouth 3 (three) times a day   celecoxib (CeleBREX) 100 mg capsule   No No   Sig: Take 1 capsule (100 mg total) by mouth 2 (two) times a day   chlorhexidine (PERIDEX) 0 12 % solution   Yes No   Sig: SWISH AND SPIT 15 ML BY MOUTH TWICE A DAY   escitalopram (LEXAPRO) 10 mg tablet   No No   Sig: Take 1 tablet (10 mg total) by mouth daily   fluticasone (FLONASE) 50 mcg/act nasal spray   No No   Si sprays into each nostril daily   Patient not taking: Reported on 2021   ibuprofen (MOTRIN) 800 mg tablet   Yes No   Sig: Take 800 mg by mouth every 8 (eight) hours as needed   pregabalin (LYRICA) 300 MG capsule   No No   Sig: Take 1 capsule (300 mg total) by mouth 2 (two) times a day      Facility-Administered Medications: None       Past Medical History:   Diagnosis Date    Addiction to drug ([place] [address] )     Anxiety     Arthritis     Chronic pain     Hepatitis     Hepatitis C virus     Heroin abuse ([place] [address] ) 5/2/2018    HIV (human immunodeficiency virus infection) ([place] [address] )     Moderate episode of recurrent major depressive disorder ([place] [address] ) 2/5/2020    Tobacco use disorder     last assessed 11/2/15        Past Surgical History:   Procedure Laterality Date    EPIDURAL BLOCK INJECTION Bilateral 4/19/2018    Procedure: T9-10 INTERLAMINAR EPIDURAL STEROID INJECTION;  Surgeon: Juan C Killian MD;  Location: [facility];  Service: Pain Management        Family History   Problem Relation Age of Onset    No Known Problems Father     Hepatitis Family      I have reviewed and agree with the history as documented  E-Cigarette/Vaping     E-Cigarette/Vaping Substances     Social History     Tobacco Use    Smoking status: Heavy Tobacco Smoker     Packs/day: 1 00    Smokeless tobacco: Former User    Tobacco comment: current every day smoker per allscript    Substance Use Topics    Alcohol use: Yes     Comment: occasional / social per allscript     Drug use: Yes     Types: Heroin, Prescription, Marijuana     Comment: past opiate abuser / intravenous drug use per allscript                      Review of Systems   Constitutional: Negative for activity change, appetite change and fever  HENT: Negative for congestion, nosebleeds and sore throat  Eyes: Negative for photophobia and discharge  Respiratory: Negative for cough, shortness of breath, wheezing and stridor  Cardiovascular: Negative for chest pain  Gastrointestinal: Negative for abdominal pain, constipation, diarrhea, nausea and vomiting  Endocrine: Negative for cold intolerance and polydipsia  Genitourinary: Negative for discharge, hematuria and penile pain  Musculoskeletal: Negative for arthralgias, myalgias and neck stiffness     Skin: Negative for color change and rash  Allergic/Immunologic: Negative for immunocompromised state  Neurological: Positive for syncope  Negative for dizziness, seizures and headaches  Hematological: Negative for adenopathy  Psychiatric/Behavioral: Negative for confusion and self-injury  The patient is not nervous/anxious  Physical Exam  Physical Exam  Vitals and nursing note reviewed  Constitutional:       General: He is not in acute distress  Appearance: He is well-developed  He is not diaphoretic  HENT:      Head: Normocephalic and atraumatic  Right Ear: External ear normal       Left Ear: External ear normal       Nose: Nose normal       Mouth/Throat:      Pharynx: No oropharyngeal exudate  Eyes:      General: No scleral icterus  Right eye: No discharge  Left eye: No discharge  Conjunctiva/sclera: Conjunctivae normal       Pupils: Pupils are equal, round, and reactive to light  Neck:      Thyroid: No thyromegaly  Vascular: No JVD  Trachea: No tracheal deviation  Cardiovascular:      Rate and Rhythm: Normal rate and regular rhythm  Heart sounds: Normal heart sounds  No murmur heard  No friction rub  No gallop  Pulmonary:      Effort: No respiratory distress  Breath sounds: Normal breath sounds  No stridor  No wheezing or rales  Chest:      Chest wall: No tenderness  Abdominal:      General: Bowel sounds are normal  There is no distension  Palpations: Abdomen is soft  There is no mass  Tenderness: There is no abdominal tenderness  There is no guarding or rebound  Musculoskeletal:         General: No tenderness or deformity  Normal range of motion  Cervical back: Normal range of motion and neck supple  Lymphadenopathy:      Cervical: No cervical adenopathy  Skin:     General: Skin is warm and dry  Coloration: Skin is not pale  Findings: No erythema or rash     Neurological:      Mental Status: He is alert and oriented to person, place, and time  Cranial Nerves: No cranial nerve deficit  Motor: No abnormal muscle tone  Coordination: Coordination normal       Deep Tendon Reflexes: Reflexes are normal and symmetric  Reflexes normal    Psychiatric:         Behavior: Behavior normal          Thought Content: Thought content normal          Judgment: Judgment normal          Vital Signs  ED Triage Vitals   Temp Pulse Respirations Blood Pressure SpO2   -- 07/06/21 1858 07/06/21 1858 07/06/21 1858 07/06/21 1858    96 22 (!) 143/105 99 %      Temp src Heart Rate Source Patient Position - Orthostatic VS BP Location FiO2 (%)   -- 07/06/21 1858 07/06/21 1945 07/06/21 1858 --    Monitor Lying Left arm       Pain Score       07/06/21 1858       No Pain           Vitals:    07/06/21 1858 07/06/21 1945 07/06/21 2000   BP: (!) 143/105 (!) 139/101 (!) 140/102   Pulse: 96 84 96   Patient Position - Orthostatic VS:  Lying Sitting         Visual Acuity      ED Medications  Medications - No data to display    Diagnostic Studies  Results Reviewed     None                 No orders to display              Procedures  Procedures         ED Course            8:31 PM - Pt is improved from prior  He is alert, awake and oriented x 3  He is refusing detox or a Warm Handoff stating that this was a one time "royal screw up," stating that he has been clean for several yeas  He will f/up w/ his pcp in the next 1 to 2 days and he understands the need to return immediately for any worsening  He understands the signs and symptoms for which he should return immediately to the Emergency Room  Pt and family are in agreement with the current plan  MDM  Number of Diagnoses or Management Options  Diagnosis management comments: IMP: opiate overdose requiring Narcan reversal   Doubt acs, pe, dissection, tamponade, cva, bacteremia, surgical abd process    Plan: will monitor the patient for 2 hours since the administration of Narcan  Pt will be discharged if asymptomatic  Amount and/or Complexity of Data Reviewed  Decide to obtain previous medical records or to obtain history from someone other than the patient: yes  Obtain history from someone other than the patient: yes (EMS and police)  Review and summarize past medical records: yes  Independent visualization of images, tracings, or specimens: yes    Risk of Complications, Morbidity, and/or Mortality  Presenting problems: high  Diagnostic procedures: low  Management options: low    Patient Progress  Patient progress: improved      Disposition  Final diagnoses:   Opiate overdose, accidental or unintentional, initial encounter [facility])     Time reflects when diagnosis was documented in both MDM as applicable and the Disposition within this note     Time User Action Codes Description Comment    7/6/2021  8:29 PM [email or web address] JonesRestorationism Add [T40 911K] Opiate overdose, accidental or unintentional, initial encounter [facility])       ED Disposition     ED Disposition Condition Date/Time Comment    Discharge Stable Tue Jul 6, 2021  8:29 PM Fauzia Goodman discharge to home/self care  Follow-up Information     Follow up With Specialties Details Why Stevo Du DO Family Medicine Schedule an appointment as soon as possible for a visit in 2 days Return immediately, If symptoms worsen [address]  [id number]  [phone]            Patient's Medications   Discharge Prescriptions    No medications on file     No discharge procedures on file      PDMP Review       Value Time User    PDMP Reviewed  Yes 9/28/2020  4:26 PM Dima Hernandes DO          ED Provider  Electronically Signed by           Yessica Salgado DO  07/06/21 2032

## 2021-07-07 NOTE — ED NOTES
Pt is tearful as he states " I was stupid and slipped up today " He admits to a history of drug abuse and snorting fentanyl today after being clean for " a few years "      Andree Lundberg RN  07/06/21 2010

## 2021-07-26 DIAGNOSIS — M51.34 DEGENERATION OF THORACIC INTERVERTEBRAL DISC: ICD-10-CM

## 2021-07-26 RX ORDER — CELECOXIB 100 MG/1
100 CAPSULE ORAL 2 TIMES DAILY
Qty: 60 CAPSULE | Refills: 2 | Status: SHIPPED | OUTPATIENT
Start: 2021-07-26 | End: 2021-10-25 | Stop reason: SDUPTHER

## 2021-07-26 RX ORDER — PREGABALIN 300 MG/1
300 CAPSULE ORAL 2 TIMES DAILY
Qty: 60 CAPSULE | Refills: 2 | Status: SHIPPED | OUTPATIENT
Start: 2021-07-26 | End: 2021-10-25 | Stop reason: SDUPTHER

## 2021-08-29 DIAGNOSIS — F41.9 ANXIETY: ICD-10-CM

## 2021-08-30 RX ORDER — QUETIAPINE FUMARATE 100 MG/1
TABLET, FILM COATED ORAL
Qty: 30 TABLET | Refills: 3 | Status: SHIPPED | OUTPATIENT
Start: 2021-08-30 | End: 2022-01-03 | Stop reason: SDUPTHER

## 2021-10-25 DIAGNOSIS — M51.34 DEGENERATION OF THORACIC INTERVERTEBRAL DISC: ICD-10-CM

## 2021-10-25 RX ORDER — CELECOXIB 100 MG/1
100 CAPSULE ORAL 2 TIMES DAILY
Qty: 60 CAPSULE | Refills: 2 | Status: SHIPPED | OUTPATIENT
Start: 2021-10-25 | End: 2022-02-04 | Stop reason: SDUPTHER

## 2021-10-25 RX ORDER — PREGABALIN 300 MG/1
300 CAPSULE ORAL 2 TIMES DAILY
Qty: 60 CAPSULE | Refills: 2 | Status: SHIPPED | OUTPATIENT
Start: 2021-10-25 | End: 2022-02-04 | Stop reason: SDUPTHER

## 2021-11-12 ENCOUNTER — HOSPITAL ENCOUNTER (OUTPATIENT)
Dept: ULTRASOUND IMAGING | Facility: HOSPITAL | Age: 36
Discharge: HOME | End: 2021-11-12
Payer: COMMERCIAL

## 2021-11-12 ENCOUNTER — OFFICE VISIT (OUTPATIENT)
Dept: FAMILY MEDICINE CLINIC | Facility: CLINIC | Age: 36
End: 2021-11-12
Payer: COMMERCIAL

## 2021-11-12 VITALS
DIASTOLIC BLOOD PRESSURE: 74 MMHG | SYSTOLIC BLOOD PRESSURE: 116 MMHG | TEMPERATURE: 98 F | HEART RATE: 87 BPM | OXYGEN SATURATION: 98 % | BODY MASS INDEX: 34.52 KG/M2 | HEIGHT: 71 IN | WEIGHT: 246.6 LBS

## 2021-11-12 DIAGNOSIS — R10.31 BILATERAL GROIN PAIN: ICD-10-CM

## 2021-11-12 DIAGNOSIS — R10.32 BILATERAL GROIN PAIN: ICD-10-CM

## 2021-11-12 DIAGNOSIS — R10.2 SUPRAPUBIC PAIN: ICD-10-CM

## 2021-11-12 DIAGNOSIS — R10.2 SUPRAPUBIC PAIN: Primary | ICD-10-CM

## 2021-11-12 PROCEDURE — 99213 OFFICE O/P EST LOW 20 MIN: CPT | Performed by: FAMILY MEDICINE

## 2021-11-12 PROCEDURE — 76870 US EXAM SCROTUM: CPT

## 2021-11-12 PROCEDURE — 76857 US EXAM PELVIC LIMITED: CPT

## 2021-12-09 ENCOUNTER — TELEPHONE (OUTPATIENT)
Dept: FAMILY MEDICINE CLINIC | Facility: CLINIC | Age: 36
End: 2021-12-09

## 2021-12-09 DIAGNOSIS — J06.9 UPPER RESPIRATORY TRACT INFECTION, UNSPECIFIED TYPE: Primary | ICD-10-CM

## 2021-12-09 RX ORDER — AZITHROMYCIN 250 MG/1
TABLET, FILM COATED ORAL
Qty: 6 TABLET | Refills: 0 | Status: SHIPPED | OUTPATIENT
Start: 2021-12-09 | End: 2021-12-14

## 2021-12-15 ENCOUNTER — TELEPHONE (OUTPATIENT)
Dept: FAMILY MEDICINE CLINIC | Facility: CLINIC | Age: 36
End: 2021-12-15

## 2021-12-15 DIAGNOSIS — B34.9 VIRAL INFECTION, UNSPECIFIED: Primary | ICD-10-CM

## 2021-12-15 PROCEDURE — 87636 SARSCOV2 & INF A&B AMP PRB: CPT | Performed by: FAMILY MEDICINE

## 2021-12-17 ENCOUNTER — TELEPHONE (OUTPATIENT)
Dept: FAMILY MEDICINE CLINIC | Facility: CLINIC | Age: 36
End: 2021-12-17

## 2022-01-03 DIAGNOSIS — F41.9 ANXIETY: ICD-10-CM

## 2022-01-03 RX ORDER — QUETIAPINE FUMARATE 100 MG/1
100 TABLET, FILM COATED ORAL
Qty: 30 TABLET | Refills: 3 | Status: SHIPPED | OUTPATIENT
Start: 2022-01-03 | End: 2022-04-26 | Stop reason: SDUPTHER

## 2022-01-18 ENCOUNTER — TELEPHONE (OUTPATIENT)
Dept: FAMILY MEDICINE CLINIC | Facility: CLINIC | Age: 37
End: 2022-01-18

## 2022-01-18 DIAGNOSIS — R10.32 BILATERAL GROIN PAIN: Primary | ICD-10-CM

## 2022-01-18 DIAGNOSIS — R10.31 BILATERAL GROIN PAIN: Primary | ICD-10-CM

## 2022-01-18 NOTE — TELEPHONE ENCOUNTER
Patient did not get the results of ultrasound from November  Still having pain mostly on left side  Worse at the end of the day

## 2022-01-19 NOTE — TELEPHONE ENCOUNTER
We did try to call him with his results    I will put in a referral to general surgery for evaluation

## 2022-02-04 DIAGNOSIS — M51.34 DEGENERATION OF THORACIC INTERVERTEBRAL DISC: ICD-10-CM

## 2022-02-04 RX ORDER — PREGABALIN 300 MG/1
300 CAPSULE ORAL 2 TIMES DAILY
Qty: 60 CAPSULE | Refills: 2 | Status: SHIPPED | OUTPATIENT
Start: 2022-02-04 | End: 2022-03-10 | Stop reason: SDUPTHER

## 2022-02-04 RX ORDER — CELECOXIB 100 MG/1
100 CAPSULE ORAL 2 TIMES DAILY
Qty: 60 CAPSULE | Refills: 2 | Status: SHIPPED | OUTPATIENT
Start: 2022-02-04 | End: 2022-03-10 | Stop reason: SDUPTHER

## 2022-02-09 ENCOUNTER — CONSULT (OUTPATIENT)
Dept: SURGERY | Facility: CLINIC | Age: 37
End: 2022-02-09
Payer: COMMERCIAL

## 2022-02-09 VITALS
BODY MASS INDEX: 34.44 KG/M2 | DIASTOLIC BLOOD PRESSURE: 78 MMHG | TEMPERATURE: 98.5 F | HEIGHT: 71 IN | SYSTOLIC BLOOD PRESSURE: 118 MMHG | WEIGHT: 246 LBS | HEART RATE: 88 BPM

## 2022-02-09 DIAGNOSIS — R10.32 BILATERAL GROIN PAIN: ICD-10-CM

## 2022-02-09 DIAGNOSIS — R10.31 BILATERAL GROIN PAIN: ICD-10-CM

## 2022-02-09 PROCEDURE — 99244 OFF/OP CNSLTJ NEW/EST MOD 40: CPT | Performed by: SURGERY

## 2022-02-10 NOTE — PROGRESS NOTES
Assessment/Plan:    Bilateral groin pain  Bilateral groin pain of unknown etiology  Intermittent on either side  Starting from the testicle and then radiating up towards the groin  Appears to be worse after a long day of lifting at work  However again there was no specific side  No obvious hernia on exam   Ultrasound showing epididymal cyst on the right  Will obtain CT for further evaluation and taken hopefully completely rule out any obvious hernia  Will call patient with results  Diagnoses and all orders for this visit:    Bilateral groin pain  -     Ambulatory Referral to General Surgery  -     CT abdomen pelvis w contrast; Future          Subjective:      Patient ID: Lakeshia Ortiz is a 40 y o  male  60-year-old male with known drug abuse, hepatitis-C, presents today in consultation for evaluation of bilateral groin pain  Patient was seen by his PCP with complaints of intermittent bilateral groin pain  He states it feels almost as of yet been kicked in the scrotal area  Patient states this been going on for few months now and mostly occurs after a long day at work  At work he does lateral heavy lifting  Denies any bulges in bilateral groins  States the pain starts testicle on CT radiates up into the groin  Patient underwent ultrasound which showed no evidence of hernia and just a small epididymal cyst low of the right  Denies nausea vomiting fevers chills  No changes in bowel bladder habits  Tolerates regular diet  The following portions of the patient's history were reviewed and updated as appropriate:   He  has a past medical history of Addiction to drug [facility]), Anxiety, Arthritis, Chronic pain, Hepatitis, Hepatitis C virus, Heroin abuse ([facility] ) (5/2/2018), HIV (human immunodeficiency virus infection) ([facility] ), Moderate episode of recurrent major depressive disorder ([facility] ) (2/5/2020), and Tobacco use disorder    He   Patient Active Problem List    Diagnosis Date Noted    Bilateral groin pain 02/09/2022    Opiate overdose ([facility] ) 07/06/2021    Moderate episode of recurrent major depressive disorder ([facility] ) 02/05/2020    Drug dependence ([place] [address] ) 05/02/2018    Heroin abuse ([place] [address] ) 05/02/2018    Encounter for monitoring Suboxone maintenance therapy 05/02/2018    Drug therapy continued 05/02/2018    Degeneration of thoracic intervertebral disc 03/27/2018    Degeneration of intervertebral disc of thoracic region 08/04/2017    Myofascial pain 08/04/2017    Chronic midline thoracic back pain 05/18/2016    Anxiety 05/28/2015    Hepatitis C virus 05/28/2015     He  has a past surgical history that includes Epidural block injection (Bilateral, 4/19/2018)  His family history includes Hepatitis in his family; No Known Problems in his father  He  reports that he has been smoking  He has been smoking about 1 00 pack per day  He has quit using smokeless tobacco  He reports current alcohol use  He reports current drug use  Drugs: Heroin, Prescription, and Marijuana    Current Outpatient Medications   Medication Sig Dispense Refill    amoxicillin (AMOXIL) 500 MG tablet Take 500 mg by mouth 3 (three) times a day (Patient not taking: Reported on 11/12/2021 )      betamethasone, augmented, (DIPROLENE-AF) 0 05 % cream Apply 1 application topically 2 (two) times a day To affected area      buprenorphine-naloxone (Suboxone) 8-2 mg dissolve 1 FILM under the tongue twice a day      celecoxib (CeleBREX) 100 mg capsule Take 1 capsule (100 mg total) by mouth 2 (two) times a day 60 capsule 2    chlorhexidine (PERIDEX) 0 12 % solution SWISH AND SPIT 15 ML BY MOUTH TWICE A DAY (Patient not taking: Reported on 11/12/2021)      EPINEPHrine (EPIPEN) 0 3 mg/0 3 mL SOAJ Inject 0 3 mL (0 3 mg total) into a muscle once for 1 dose 2 each 1    escitalopram (LEXAPRO) 10 mg tablet Take 1 tablet (10 mg total) by mouth daily 30 tablet 1    fluticasone (FLONASE) 50 mcg/act nasal spray 2 sprays into each nostril daily (Patient not taking: Reported on 4/21/2021) 16 g 1    ibuprofen (MOTRIN) 800 mg tablet Take 800 mg by mouth every 8 (eight) hours as needed      pregabalin (LYRICA) 300 MG capsule Take 1 capsule (300 mg total) by mouth 2 (two) times a day 60 capsule 2    QUEtiapine (SEROquel) 100 mg tablet Take 1 tablet (100 mg total) by mouth daily at bedtime 30 tablet 3     No current facility-administered medications for this visit  He is allergic to clindamycin/lincomycin       Review of Systems       Review systems completed, all negative except as noted above HPI  Objective:      /78   Pulse 88   Temp 98 5 °F (36 9 °C)   Ht 5' 11" (1 803 m)   Wt 112 kg (246 lb)   BMI 34 31 kg/m²          Physical Exam  Vitals reviewed  Constitutional:       General: He is not in acute distress  Appearance: Normal appearance  He is not ill-appearing, toxic-appearing or diaphoretic  HENT:      Head: Normocephalic and atraumatic  Right Ear: External ear normal       Left Ear: External ear normal    Eyes:      General: No scleral icterus  Right eye: No discharge  Left eye: No discharge  Cardiovascular:      Rate and Rhythm: Normal rate  Pulmonary:      Effort: Pulmonary effort is normal  No respiratory distress  Abdominal:      General: Abdomen is flat  There is no distension  Palpations: Abdomen is soft  There is no mass  Tenderness: There is no abdominal tenderness  Hernia: No hernia is present  There is no hernia in the left inguinal area or right inguinal area  Genitourinary:     Testes:         Right: Mass, tenderness or swelling not present  Left: Mass, tenderness or swelling not present  Musculoskeletal:         General: No swelling, tenderness, deformity or signs of injury  Normal range of motion  Cervical back: Normal range of motion  Right lower leg: No edema  Left lower leg: No edema  Lymphadenopathy:      Lower Body: No right inguinal adenopathy   No left inguinal adenopathy  Skin:     General: Skin is warm and dry  Coloration: Skin is not jaundiced or pale  Findings: No bruising  Neurological:      General: No focal deficit present  Mental Status: He is alert and oriented to person, place, and time  Cranial Nerves: No cranial nerve deficit  Psychiatric:         Mood and Affect: Mood normal          Behavior: Behavior normal          Thought Content: Thought content normal          Judgment: Judgment normal            Note:     Office PCP note from November 12, 2021 was personally reviewed by me  Imaging:     Ultrasound scrotum and groin area dated November 12, 2021 report was personally reviewed by me

## 2022-02-10 NOTE — ASSESSMENT & PLAN NOTE
Bilateral groin pain of unknown etiology  Intermittent on either side  Starting from the testicle and then radiating up towards the groin  Appears to be worse after a long day of lifting at work  However again there was no specific side  No obvious hernia on exam   Ultrasound showing epididymal cyst on the right  Will obtain CT for further evaluation and taken hopefully completely rule out any obvious hernia  Will call patient with results

## 2022-03-10 DIAGNOSIS — M51.34 DEGENERATION OF THORACIC INTERVERTEBRAL DISC: ICD-10-CM

## 2022-03-10 RX ORDER — CELECOXIB 100 MG/1
100 CAPSULE ORAL 2 TIMES DAILY
Qty: 60 CAPSULE | Refills: 2 | Status: SHIPPED | OUTPATIENT
Start: 2022-03-10 | End: 2022-05-09 | Stop reason: SDUPTHER

## 2022-03-10 RX ORDER — PREGABALIN 300 MG/1
300 CAPSULE ORAL 2 TIMES DAILY
Qty: 60 CAPSULE | Refills: 2 | Status: SHIPPED | OUTPATIENT
Start: 2022-03-10 | End: 2022-03-25 | Stop reason: SDUPTHER

## 2022-03-25 DIAGNOSIS — M51.34 DEGENERATION OF THORACIC INTERVERTEBRAL DISC: ICD-10-CM

## 2022-03-25 RX ORDER — PREGABALIN 300 MG/1
300 CAPSULE ORAL 2 TIMES DAILY
Qty: 60 CAPSULE | Refills: 2 | Status: SHIPPED | OUTPATIENT
Start: 2022-03-25 | End: 2022-05-09 | Stop reason: SDUPTHER

## 2022-04-26 DIAGNOSIS — F41.9 ANXIETY: ICD-10-CM

## 2022-04-26 RX ORDER — QUETIAPINE FUMARATE 100 MG/1
100 TABLET, FILM COATED ORAL
Qty: 30 TABLET | Refills: 3 | Status: SHIPPED | OUTPATIENT
Start: 2022-04-26

## 2022-05-02 ENCOUNTER — OFFICE VISIT (OUTPATIENT)
Dept: URGENT CARE | Facility: MEDICAL CENTER | Age: 37
End: 2022-05-02
Payer: COMMERCIAL

## 2022-05-02 VITALS
HEIGHT: 71 IN | HEART RATE: 87 BPM | SYSTOLIC BLOOD PRESSURE: 138 MMHG | RESPIRATION RATE: 18 BRPM | OXYGEN SATURATION: 99 % | DIASTOLIC BLOOD PRESSURE: 85 MMHG | TEMPERATURE: 98 F | WEIGHT: 220 LBS | BODY MASS INDEX: 30.8 KG/M2

## 2022-05-02 DIAGNOSIS — R11.0 NAUSEA: ICD-10-CM

## 2022-05-02 DIAGNOSIS — K52.9 NONINFECTIOUS GASTROENTERITIS, UNSPECIFIED TYPE: Primary | ICD-10-CM

## 2022-05-02 DIAGNOSIS — R10.30 LOWER ABDOMINAL PAIN: ICD-10-CM

## 2022-05-02 LAB
SL AMB  POCT GLUCOSE, UA: ABNORMAL
SL AMB LEUKOCYTE ESTERASE,UA: ABNORMAL
SL AMB POCT BILIRUBIN,UA: ABNORMAL
SL AMB POCT BLOOD,UA: ABNORMAL
SL AMB POCT CLARITY,UA: CLEAR
SL AMB POCT COLOR,UA: ABNORMAL
SL AMB POCT KETONES,UA: 15
SL AMB POCT NITRITE,UA: ABNORMAL
SL AMB POCT PH,UA: 6
SL AMB POCT SPECIFIC GRAVITY,UA: 1.02
SL AMB POCT URINE PROTEIN: 30
SL AMB POCT UROBILINOGEN: 0.2

## 2022-05-02 PROCEDURE — 99214 OFFICE O/P EST MOD 30 MIN: CPT | Performed by: PHYSICIAN ASSISTANT

## 2022-05-02 RX ORDER — ONDANSETRON 8 MG/1
8 TABLET, ORALLY DISINTEGRATING ORAL EVERY 8 HOURS PRN
Qty: 20 TABLET | Refills: 0 | Status: SHIPPED | OUTPATIENT
Start: 2022-05-02 | End: 2022-06-07

## 2022-05-02 NOTE — PROGRESS NOTES
[facility]        NAME: Deepa Jay is a 40 y o  male  : 1985    MRN: [id number]  DATE: May 2, 2022  TIME: 4:52 PM    Assessment and Plan   Noninfectious gastroenteritis, unspecified type [K52 9]  1  Noninfectious gastroenteritis, unspecified type     2  Lower abdominal pain  POCT urine dip auto non-scope   3  Nausea  ondansetron (Zofran ODT) 8 mg disintegrating tablet       Contacts with gastroenteritis  Patient Instructions     Take zofran as prescribed  Fluids (pedialyte) and rest  Broths and clear soups  BRAT diet (bananas, rice, applesauce, and toast)  Progress to normal diet as tolerated  Avoid dairy while symptoms persist  Follow up with PCP in 3-5 days for repeat urine dip  Urine dip today showed protein 30, SG 1 020, ketones 15, bilirubin small  Proceed to  ER if symptoms worsen  Disinfect common household surfaces, do not share drinks, clean dishes in hot soap and water ( is best), and avoid preparing food for an additional week  Virus may continue to spread after symptoms have resolved  Chief Complaint     Chief Complaint   Patient presents with    Abdominal Pain     lower abdominal pain for 4 days and vomiting once a few days ago, no diarrhea         History of Present Illness       Contacts with GI sx  Denies recent suspect food/water intake or recent abx/hospitalizations  LNBM yesterday  Denies suboxone dose changes or recent drug use  Abdominal Pain  This is a new problem  Episode onset: 4d  The problem occurs intermittently  The problem has been waxing and waning  Pain location: lower abdomen  The pain is moderate  The quality of the pain is cramping  The abdominal pain does not radiate  Associated symptoms include nausea and vomiting (twice in the past few days, resolved)  Pertinent negatives include no constipation, diarrhea, dysuria, fever, frequency, headaches, hematochezia, hematuria, melena or myalgias   Treatments tried: gatorade; eating soup; tums; pepto bismol  Review of Systems   Review of Systems   Constitutional: Positive for appetite change (patient is scared to eat as this may cause vomiting)  Negative for chills and fever  HENT: Negative  Respiratory: Negative for cough and shortness of breath  Cardiovascular: Negative for chest pain and palpitations  Gastrointestinal: Positive for abdominal pain, nausea and vomiting (twice in the past few days, resolved)  Negative for abdominal distention, anal bleeding, blood in stool, constipation, diarrhea, hematochezia and melena  Genitourinary: Negative for dysuria, flank pain, frequency, hematuria and urgency  Musculoskeletal: Negative for myalgias  Skin: Negative for rash  Neurological: Negative for dizziness, light-headedness and headaches           Current Medications       Current Outpatient Medications:     buprenorphine-naloxone (Suboxone) 8-2 mg, dissolve 1 FILM under the tongue twice a day, Disp: , Rfl:     celecoxib (CeleBREX) 100 mg capsule, Take 1 capsule (100 mg total) by mouth 2 (two) times a day, Disp: 60 capsule, Rfl: 2    ibuprofen (MOTRIN) 800 mg tablet, Take 800 mg by mouth every 8 (eight) hours as needed, Disp: , Rfl:     pregabalin (LYRICA) 300 MG capsule, Take 1 capsule (300 mg total) by mouth 2 (two) times a day, Disp: 60 capsule, Rfl: 2    QUEtiapine (SEROquel) 100 mg tablet, Take 1 tablet (100 mg total) by mouth daily at bedtime, Disp: 30 tablet, Rfl: 3    amoxicillin (AMOXIL) 500 MG tablet, Take 500 mg by mouth 3 (three) times a day (Patient not taking: Reported on 11/12/2021 ), Disp: , Rfl:     betamethasone, augmented, (DIPROLENE-AF) 0 05 % cream, Apply 1 application topically 2 (two) times a day To affected area (Patient not taking: Reported on 5/2/2022 ), Disp: , Rfl:     chlorhexidine (PERIDEX) 0 12 % solution, SWISH AND SPIT 15 ML BY MOUTH TWICE A DAY (Patient not taking: Reported on 11/12/2021), Disp: , Rfl:     EPINEPHrine (EPIPEN) 0 3 mg/0 3 mL SOAJ, Inject 0 3 mL (0 3 mg total) into a muscle once for 1 dose, Disp: 2 each, Rfl: 1    escitalopram (LEXAPRO) 10 mg tablet, Take 1 tablet (10 mg total) by mouth daily (Patient not taking: Reported on 5/2/2022 ), Disp: 30 tablet, Rfl: 1    fluticasone (FLONASE) 50 mcg/act nasal spray, 2 sprays into each nostril daily (Patient not taking: Reported on 4/21/2021), Disp: 16 g, Rfl: 1    ondansetron (Zofran ODT) 8 mg disintegrating tablet, Take 1 tablet (8 mg total) by mouth every 8 (eight) hours as needed for nausea or vomiting, Disp: 20 tablet, Rfl: 0    Current Allergies     Allergies as of 05/02/2022 - Reviewed 05/02/2022   Allergen Reaction Noted    Clindamycin/lincomycin Hives 02/25/2016            The following portions of the patient's history were reviewed and updated as appropriate: allergies, current medications, past family history, past medical history, past social history, past surgical history and problem list      Past Medical History:   Diagnosis Date    [facility] drug ([facility] )     Anxiety     Arthritis     Chronic pain     Hepatitis     Hepatitis C virus     [facility] 5/2/2018    HIV (human immunodeficiency virus infection) ([facility] )     Moderate episode of recurrent major depressive disorder ([facility] ) 2/5/2020    Tobacco use disorder     last assessed 11/2/15        Past Surgical History:   Procedure Laterality Date    EPIDURAL BLOCK INJECTION Bilateral 4/19/2018    Procedure: T9-10 INTERLAMINAR EPIDURAL STEROID INJECTION;  Surgeon: Ramana Hastings MD;  Location: [facility];  Service: Pain Management        Family History   Problem Relation Age of Onset    No Known Problems Father     Hepatitis Family          Medications have been verified  Objective   /85   Pulse 87   Temp 98 °F (36 7 °C) (Tympanic)   Resp 18   Ht 5' 11" (1 803 m)   Wt 99 8 kg (220 lb)   SpO2 99%   BMI 30 68 kg/m²   No LMP for male patient         Physical Exam     Physical Exam  Constitutional:       General: He is not in acute distress  Appearance: He is well-developed  He is not diaphoretic  Cardiovascular:      Rate and Rhythm: Normal rate and regular rhythm  Heart sounds: No murmur heard  No friction rub  No gallop  Pulmonary:      Effort: Pulmonary effort is normal  No respiratory distress  Breath sounds: Normal breath sounds  No wheezing, rhonchi or rales  Chest:      Chest wall: No tenderness  Abdominal:      General: Bowel sounds are normal  There is no distension  Palpations: Abdomen is soft  There is no mass  Tenderness: There is abdominal tenderness (mild RLQ, suprapubic and LLQ TTP)  There is no guarding or rebound  Negative signs include Rovsing's sign, McBurney's sign, psoas sign and obturator sign  Comments:      Lymphadenopathy:      Cervical: No cervical adenopathy  Skin:     General: Skin is warm  Neurological:      Mental Status: He is alert  Psychiatric:         Behavior: Behavior normal          Thought Content:  Thought content normal          Judgment: Judgment normal

## 2022-05-02 NOTE — PATIENT INSTRUCTIONS
Take zofran as prescribed  Fluids (pedialyte) and rest  Broths and clear soups  BRAT diet (bananas, rice, applesauce, and toast)  Progress to normal diet as tolerated  Avoid dairy while symptoms persist  Follow up with PCP in 3-5 days for repeat urine dip  Urine dip today showed protein 30, SG 1 020, ketones 15, bilirubin small  Proceed to  ER if symptoms worsen  Disinfect common household surfaces, do not share drinks, clean dishes in hot soap and water ( is best), and avoid preparing food for an additional week  Virus may continue to spread after symptoms have resolved  Abdominal Pain   WHAT YOU NEED TO KNOW:   Abdominal pain can be dull, achy, or sharp  You may have pain in one area of your abdomen, or in your entire abdomen  Your pain may be caused by a condition such as constipation, food sensitivity or poisoning, infection, or a blockage  Abdominal pain can also be from a hernia, appendicitis, or an ulcer  Liver, gallbladder, or kidney conditions can also cause abdominal pain  The cause of your abdominal pain may not be known  DISCHARGE INSTRUCTIONS:   Call your local emergency number (911 in the [address] [place] [address],[address]) if:   · You have new chest pain or shortness of breath  Return to the emergency department if:   · You have pulsing pain in your upper abdomen or lower back that suddenly becomes constant  · Your pain is in the right lower abdominal area and worsens with movement  · You have a fever over 100 4°F (38°C) or shaking chills  · You are vomiting and cannot keep food or liquids down  · Your pain does not improve or gets worse over the next 8 to 12 hours  · You see blood in your vomit or bowel movements, or they look black and tarry  · Your skin or the whites of your eyes turn yellow  · You are a woman and have a large amount of vaginal bleeding that is not your monthly period  Call your doctor if:   · You have pain in your lower back      · You are a man and have pain in your testicles  · You have pain when you urinate  · You have questions or concerns about your condition or care  Medicines:   · Prescription pain medicine  may be given  Ask your healthcare provider how to take this medicine safely  Some prescription pain medicines contain acetaminophen  Do not take other medicines that contain acetaminophen without talking to your healthcare provider  Too much acetaminophen may cause liver damage  Prescription pain medicine may cause constipation  Ask your healthcare provider how to prevent or treat constipation  · Medicines  may be given to calm your stomach or prevent vomiting  · Take your medicine as directed  Contact your healthcare provider if you think your medicine is not helping or if you have side effects  Tell him of her if you are allergic to any medicine  Keep a list of the medicines, vitamins, and herbs you take  Include the amounts, and when and why you take them  Bring the list or the pill bottles to follow-up visits  Carry your medicine list with you in case of an emergency  Manage your symptoms:   · Apply heat  on your abdomen for 20 to 30 minutes every 2 hours for as many days as directed  Heat helps decrease pain and muscle spasms  · Make changes to the food you eat, if needed  Do not eat foods that cause abdominal pain or other symptoms  Eat small meals more often  The following changes may also help:    ? Eat more high-fiber foods if you are constipated  High-fiber foods include fruits, vegetables, whole-grain foods, and legumes  ? Do not eat foods that cause gas if you have bloating  Examples include broccoli, cabbage, and cauliflower  Do not drink soda or carbonated drinks  These may also cause gas  ? Do not eat foods or drinks that contain sorbitol or fructose if you have diarrhea and bloating  Some examples are fruit juices, candy, jelly, and sugar-free gum  ? Do not eat high-fat foods    Examples include fried foods, cheeseburgers, hot dogs, and desserts  ? Limit or do not have caffeine  Caffeine may make symptoms such as heartburn or nausea worse  ? Drink more liquids to prevent dehydration from diarrhea or vomiting  Ask your healthcare provider how much liquid to drink each day and which liquids are best for you  · Keep a diary of your abdominal pain  A diary may help your healthcare provider learn what is causing your abdominal pain  Include when the pain happens, how long it lasts, and what the pain feels like  Write down any other symptoms you have with abdominal pain  Also write down what you eat, and what symptoms you have after you eat  · Manage your stress  Stress may cause abdominal pain  Your healthcare provider may recommend relaxation techniques and deep breathing exercises to help decrease your stress  Your healthcare provider may recommend you talk to someone about your stress or anxiety, such as a counselor or a trusted friend  Get plenty of sleep and exercise regularly  · Limit or do not drink alcohol  Alcohol can make your abdominal pain worse  Ask your healthcare provider if it is safe for you to drink alcohol  Also ask how much is safe for you to drink  · Do not smoke  Nicotine and other chemicals in cigarettes can damage your esophagus and stomach  Ask your healthcare provider for information if you currently smoke and need help to quit  E-cigarettes or smokeless tobacco still contain nicotine  Talk to your healthcare provider before you use these products  Follow up with your doctor within 24 hours or as directed:  Write down your questions so you remember to ask them during your visits  © [employer] 2022 Information is for End User's use only and may not be sold, redistributed or otherwise used for commercial purposes   All illustrations and images included in CareNotes® are the copyrighted property of A D A M , Inc  or Kaykay Arizmendi above information is an  only  It is not intended as medical advice for individual conditions or treatments  Talk to your doctor, nurse or pharmacist before following any medical regimen to see if it is safe and effective for you  Gastroenteritis   WHAT YOU NEED TO KNOW:   Gastroenteritis, or stomach flu, is an infection of the stomach and intestines  DISCHARGE INSTRUCTIONS:   Call 911 for any of the following:   · You have trouble breathing or a very fast pulse  Return to the emergency department if:   · You see blood in your diarrhea  · You cannot stop vomiting  · You have not urinated for 12 hours  · You feel like you are going to faint  Contact your healthcare provider if:   · You have a fever  · You continue to vomit or have diarrhea, even after treatment  · You see worms in your diarrhea  · Your mouth or eyes are dry  You are not urinating as much or as often  · You have questions or concerns about your condition or care  Medicines:   · Medicines  may be given to stop vomiting or diarrhea, decrease abdominal cramps, or treat an infection  · Take your medicine as directed  Contact your healthcare provider if you think your medicine is not helping or if you have side effects  Tell him or her if you are allergic to any medicine  Keep a list of the medicines, vitamins, and herbs you take  Include the amounts, and when and why you take them  Bring the list or the pill bottles to follow-up visits  Carry your medicine list with you in case of an emergency  Manage your symptoms:   · Drink liquids as directed  Ask your healthcare provider how much liquid to drink each day, and which liquids are best for you  You may also need to drink an oral rehydration solution (ORS)  An ORS has the right amounts of sugar, salt, and minerals in water to replace body fluids  · Eat bland foods  When you feel hungry, begin eating soft, bland foods   Examples are bananas, clear soup, potatoes, and applesauce  Do not have dairy products, alcohol, sugary drinks, or drinks with caffeine until you feel better  · Rest as much as possible  Slowly start to do more each day when you begin to feel better  Prevent the spread of gastroenteritis:  Gastroenteritis can spread easily  Keep yourself, your family, and your surroundings clean to help prevent the spread of gastroenteritis:  · Wash your hands often  Use soap and water  Wash your hands after you use the bathroom, change a child's diapers, or sneeze  Wash your hands before you prepare or eat food  · Clean surfaces and do laundry often  Wash your clothes and towels separately from the rest of the laundry  Clean surfaces in your home with antibacterial  or bleach  · Clean food thoroughly and cook safely  Wash raw vegetables before you cook  Cook meat, fish, and eggs fully  Do not use the same dishes for raw meat as you do for other foods  Refrigerate any leftover food immediately  · Be aware when you camp or travel  Drink only clean water  Do not drink from rivers or lakes unless you purify or boil the water first  When you travel, drink bottled water and do not add ice  Do not eat fruit that has not been peeled  Do not eat raw fish or meat that is not fully cooked  Follow up with your doctor as directed:  Write down your questions so you remember to ask them during your visits  © Copyright [employer] 2022 Information is for End User's use only and may not be sold, redistributed or otherwise used for commercial purposes  All illustrations and images included in CareNotes® are the copyrighted property of A D A M , Inc  or Kaykay Capps   The above information is an  only  It is not intended as medical advice for individual conditions or treatments  Talk to your doctor, nurse or pharmacist before following any medical regimen to see if it is safe and effective for you

## 2022-05-09 DIAGNOSIS — M51.34 DEGENERATION OF THORACIC INTERVERTEBRAL DISC: ICD-10-CM

## 2022-05-09 RX ORDER — CELECOXIB 100 MG/1
100 CAPSULE ORAL 2 TIMES DAILY
Qty: 60 CAPSULE | Refills: 2 | Status: SHIPPED | OUTPATIENT
Start: 2022-05-09 | End: 2022-06-10 | Stop reason: SDUPTHER

## 2022-05-09 RX ORDER — PREGABALIN 300 MG/1
300 CAPSULE ORAL 2 TIMES DAILY
Qty: 60 CAPSULE | Refills: 2 | Status: SHIPPED | OUTPATIENT
Start: 2022-05-09

## 2022-06-03 ENCOUNTER — HOSPITAL ENCOUNTER (EMERGENCY)
Facility: HOSPITAL | Age: 37
Discharge: HOME | End: 2022-06-03
Attending: EMERGENCY MEDICINE
Payer: COMMERCIAL

## 2022-06-03 ENCOUNTER — TELEPHONE (OUTPATIENT)
Dept: FAMILY MEDICINE CLINIC | Facility: CLINIC | Age: 37
End: 2022-06-03

## 2022-06-03 VITALS
DIASTOLIC BLOOD PRESSURE: 84 MMHG | TEMPERATURE: 98.2 F | BODY MASS INDEX: 29.79 KG/M2 | OXYGEN SATURATION: 97 % | WEIGHT: 213.63 LBS | HEART RATE: 114 BPM | RESPIRATION RATE: 18 BRPM | SYSTOLIC BLOOD PRESSURE: 147 MMHG

## 2022-06-03 DIAGNOSIS — M25.511 ACUTE PAIN OF RIGHT SHOULDER: Primary | ICD-10-CM

## 2022-06-03 DIAGNOSIS — M77.8 RIGHT SHOULDER TENDONITIS: Primary | ICD-10-CM

## 2022-06-03 PROCEDURE — 99284 EMERGENCY DEPT VISIT MOD MDM: CPT | Performed by: EMERGENCY MEDICINE

## 2022-06-03 PROCEDURE — 99283 EMERGENCY DEPT VISIT LOW MDM: CPT

## 2022-06-03 PROCEDURE — 96372 THER/PROPH/DIAG INJ SC/IM: CPT

## 2022-06-03 RX ORDER — METHOCARBAMOL 750 MG/1
750 TABLET, FILM COATED ORAL EVERY 6 HOURS PRN
Qty: 20 TABLET | Refills: 0 | Status: SHIPPED | OUTPATIENT
Start: 2022-06-03

## 2022-06-03 RX ORDER — KETOROLAC TROMETHAMINE 30 MG/ML
30 INJECTION, SOLUTION INTRAMUSCULAR; INTRAVENOUS ONCE
Status: COMPLETED | OUTPATIENT
Start: 2022-06-03 | End: 2022-06-03

## 2022-06-03 RX ORDER — CELECOXIB 200 MG/1
200 CAPSULE ORAL 2 TIMES DAILY
Qty: 10 CAPSULE | Refills: 0 | Status: SHIPPED | OUTPATIENT
Start: 2022-06-03 | End: 2022-06-08

## 2022-06-03 RX ORDER — METHYLPREDNISOLONE 4 MG/1
TABLET ORAL
Qty: 21 TABLET | Refills: 0 | Status: SHIPPED | OUTPATIENT
Start: 2022-06-03 | End: 2022-06-07

## 2022-06-03 RX ORDER — FAMOTIDINE 20 MG/1
20 TABLET, FILM COATED ORAL ONCE
Status: COMPLETED | OUTPATIENT
Start: 2022-06-03 | End: 2022-06-03

## 2022-06-03 RX ORDER — FAMOTIDINE 40 MG/1
40 TABLET, FILM COATED ORAL DAILY
Qty: 20 TABLET | Refills: 0 | Status: SHIPPED | OUTPATIENT
Start: 2022-06-03 | End: 2022-06-13

## 2022-06-03 RX ORDER — PREDNISONE 20 MG/1
40 TABLET ORAL ONCE
Status: COMPLETED | OUTPATIENT
Start: 2022-06-03 | End: 2022-06-03

## 2022-06-03 RX ORDER — METHOCARBAMOL 500 MG/1
750 TABLET, FILM COATED ORAL ONCE
Status: COMPLETED | OUTPATIENT
Start: 2022-06-03 | End: 2022-06-03

## 2022-06-03 RX ADMIN — METHOCARBAMOL 750 MG: 500 TABLET ORAL at 02:21

## 2022-06-03 RX ADMIN — KETOROLAC TROMETHAMINE 30 MG: 30 INJECTION, SOLUTION INTRAMUSCULAR at 02:21

## 2022-06-03 RX ADMIN — FAMOTIDINE 20 MG: 20 TABLET ORAL at 02:21

## 2022-06-03 RX ADMIN — PREDNISONE 40 MG: 20 TABLET ORAL at 02:21

## 2022-06-03 NOTE — ED PROVIDER NOTES
History  Chief Complaint   Patient presents with    Shoulder Pain     Was seen on Monday by a hospital in [place], had XRAY of right shoulder with no findings  Stated it still hurts, is wearing a sling  20-year-old right-hand-dominant male was laying greg proximally 2 days ago he had no triggering event but then started having increasing pain to the suprascapular region overlying the shoulder and deltoid region  Pain is been constant it is definitely worse with movement he was evaluated in emergency department in [place] yesterday he underwent plain film evaluation in EKG he was given Toradol it did help the pain somewhat he has been wearing a sling since he did take intermittent dose of ibuprofen but presents to the fact that he can not sleep due the pain is worse he did try some warmth that seemed to help more than ice he is denying any headache no neck pain no numbness tingling no weakness pain is definitely worse with movement he has no chest pain shortness of breath or pleuritic pain no history of any trauma or falls  No fever chills cough or upper respiratory complaints no abdominal or low back pain  Patient at triage was wondering if he can get an MRI  Past medical history significant for hepatitis-C chronic pain syndrome HIV opiate use disorder currently in remission he is no longer on Suboxone and tobacco use disorder          Prior to Admission Medications   Prescriptions Last Dose Informant Patient Reported? Taking?    EPINEPHrine (EPIPEN) 0 3 mg/0 3 mL SOAJ   No No   Sig: Inject 0 3 mL (0 3 mg total) into a muscle once for 1 dose   QUEtiapine (SEROquel) 100 mg tablet 6/2/2022 at Unknown time  No Yes   Sig: Take 1 tablet (100 mg total) by mouth daily at bedtime   amoxicillin (AMOXIL) 500 MG tablet Not Taking at Unknown time  Yes No   Sig: Take 500 mg by mouth 3 (three) times a day   Patient not taking: No sig reported   betamethasone, augmented, (DIPROLENE-AF) 0 05 % cream Not Taking at Unknown time  Yes No   Sig: Apply 1 application topically 2 (two) times a day To affected area   Patient not taking: No sig reported   buprenorphine-naloxone (Suboxone) 8-2 mg Not Taking at Unknown time  Yes No   Sig: dissolve 1 FILM under the tongue twice a day   Patient not taking: Reported on 6/3/2022   celecoxib (CeleBREX) 100 mg capsule 2022 at Unknown time  No Yes   Sig: Take 1 capsule (100 mg total) by mouth 2 (two) times a day   chlorhexidine (PERIDEX) 0 12 % solution Not Taking at Unknown time  Yes No   Sig: SWISH AND SPIT 15 ML BY MOUTH TWICE A DAY   Patient not taking: No sig reported   escitalopram (LEXAPRO) 10 mg tablet Not Taking at Unknown time  No No   Sig: Take 1 tablet (10 mg total) by mouth daily   Patient not taking: No sig reported   fluticasone (FLONASE) 50 mcg/act nasal spray Not Taking at Unknown time  No No   Si sprays into each nostril daily   Patient not taking: No sig reported   ibuprofen (MOTRIN) 800 mg tablet Not Taking at Unknown time  Yes No   Sig: Take 800 mg by mouth every 8 (eight) hours as needed   Patient not taking: Reported on 6/3/2022   ondansetron (Zofran ODT) 8 mg disintegrating tablet Not Taking at Unknown time  No No   Sig: Take 1 tablet (8 mg total) by mouth every 8 (eight) hours as needed for nausea or vomiting   Patient not taking: Reported on 6/3/2022   pregabalin (LYRICA) 300 MG capsule 2022 at Unknown time  No Yes   Sig: Take 1 capsule (300 mg total) by mouth 2 (two) times a day      Facility-Administered Medications: None       Past Medical History:   Diagnosis Date    [facility]     Anxiety     Arthritis     Chronic pain     Hepatitis     Hepatitis C virus     Hero[facility] abuse ([facility] ) 2018    HIV (human immunodeficiency virus infection) (HCC)     Moderate episode of recurrent major depressive disorder ([facility] ) 2020    Tobacco use disorder     last assessed 11/2/15        Past Surgical History:   Procedure Laterality Date    EPIDURAL BLOCK INJECTION Bilateral 4/19/2018    Procedure: T9-10 INTERLAMINAR EPIDURAL STEROID INJECTION;  Surgeon: Franchesca Giang MD;  Location: [facility];  Service: Pain Management        Family History   Problem Relation Age of Onset    No Known Problems Father     Hepatitis Family      I have reviewed and agree with the history as documented  E-Cigarette/Vaping     E-Cigarette/Vaping Substances    Nicotine No     THC No     CBD No     Flavoring No     Other No     Unknown No      Social History     Tobacco Use    Smoking status: Heavy Tobacco Smoker     Packs/day: 1 00    Smokeless tobacco: Former User    Tobacco comment: current every day smoker per allscript    Substance Use Topics    Alcohol use: Yes     Comment: occasional / social per allscript     Drug use: Yes     Types: Heroin, Prescription, Marijuana     Comment: past opiate abuser / intravenous drug use per allscript                      Review of Systems   Constitutional: Positive for activity change and appetite change  Negative for chills and fever  HENT: Negative for congestion, ear pain, rhinorrhea, sneezing and sore throat  Eyes: Negative for discharge  Respiratory: Negative for cough and shortness of breath  Cardiovascular: Negative for chest pain and leg swelling  Gastrointestinal: Negative for abdominal pain, blood in stool, diarrhea, nausea and vomiting  Endocrine: Negative for polyuria  Genitourinary: Negative for dysuria, frequency and urgency  Musculoskeletal: Positive for arthralgias (shoulder pain)  Negative for back pain, myalgias, neck pain and neck stiffness  Skin: Negative for rash  Neurological: Negative for dizziness, weakness, light-headedness, numbness and headaches  Hematological: Negative for adenopathy  Psychiatric/Behavioral: Negative for confusion  All other systems reviewed and are negative  Physical Exam  Physical Exam  Vitals and nursing note reviewed     Constitutional: General: He is in acute distress  Appearance: He is not ill-appearing, toxic-appearing or diaphoretic  HENT:      Head: Normocephalic  Right Ear: External ear normal       Left Ear: External ear normal       Nose: Nose normal  No congestion or rhinorrhea  Mouth/Throat:      Mouth: Mucous membranes are moist       Pharynx: No oropharyngeal exudate  Eyes:      General:         Right eye: No discharge  Left eye: No discharge  Extraocular Movements: Extraocular movements intact  Conjunctiva/sclera: Conjunctivae normal       Pupils: Pupils are equal, round, and reactive to light  Cardiovascular:      Rate and Rhythm: Tachycardia present  Pulses: Normal pulses  Heart sounds: No murmur heard  No gallop  Pulmonary:      Effort: Pulmonary effort is normal  No respiratory distress  Breath sounds: Normal breath sounds  No stridor  No wheezing, rhonchi or rales  Abdominal:      General: Bowel sounds are normal  There is no distension  Tenderness: There is no abdominal tenderness  There is no right CVA tenderness, left CVA tenderness, guarding or rebound  Musculoskeletal:         General: Tenderness present  No swelling or deformity  Normal range of motion  Cervical back: Normal range of motion and neck supple  No rigidity or tenderness  Right lower leg: No edema  Left lower leg: No edema  Comments: 2+ radial pulses patient is reluctant to move his right shoulder  He has tenderness to the right deltoid region right skewed suprascapular region and along the distal clavicle  There is no erythema  The radial ulnar and median nerves were isolated found be intact in my [place] dermatome to the right hand  Lymphadenopathy:      Cervical: No cervical adenopathy  Skin:     General: Skin is warm and dry  Capillary Refill: Capillary refill takes less than 2 seconds  Neurological:      General: No focal deficit present        Mental Status: He is alert and oriented to person, place, and time  Cranial Nerves: No cranial nerve deficit  Sensory: No sensory deficit  Motor: No weakness  Coordination: Coordination normal       Deep Tendon Reflexes: Reflexes normal    Psychiatric:         Mood and Affect: Mood normal          Vital Signs  ED Triage Vitals [06/03/22 0204]   Temperature Pulse Respirations Blood Pressure SpO2   98 2 °F (36 8 °C) (!) 114 18 147/84 97 %      Temp Source Heart Rate Source Patient Position - Orthostatic VS BP Location FiO2 (%)   Temporal Monitor Sitting Left arm --      Pain Score       9           Vitals:    06/03/22 0204   BP: 147/84   Pulse: (!) 114   Patient Position - Orthostatic VS: Sitting         Visual Acuity      ED Medications  Medications   famotidine (PEPCID) tablet 20 mg (20 mg Oral Given 6/3/22 0221)   predniSONE tablet 40 mg (40 mg Oral Given 6/3/22 0221)   ketorolac (TORADOL) injection 30 mg (30 mg Intramuscular Given 6/3/22 0221)   methocarbamol (ROBAXIN) tablet 750 mg (750 mg Oral Given 6/3/22 0221)       Diagnostic Studies  Results Reviewed     None                 No orders to display              Procedures  Procedures         ED Course  ED Course as of 06/03/22 0244   Fri Jun 03, 2022 0215 Patient declines cxr, ekg, troponin; agrees that narcotic medications are not recommended given his                                              MDM  Number of Diagnoses or Management Options  Right shoulder tendonitis  Diagnosis management comments: Mdm:  Patient presents with right shoulder pain he was evaluated yesterday at an emergency department in [place] underwent plain films results are below  Is no new intervening trauma or falls  He is not demonstrating radicular symptoms but does have increasing pain with movement  Consistent with shoulder tendinitis    Patient declines evaluation for possibility of acute coronary syndrome, pulmonary embolism or intrathoracic process he is not be less likely he is here essentially for pain management will proceed with a dose of Toradol muscle relaxant and burst of steroids and recommending orthopedic follow-up patient is comfortable with plan  Shoulder Films 6/2 Clinical indication: Right shoulder pain  Three views of the right shoulder demonstrate no obvious acute fracture, dislocation or osseous   lesion  Glenohumeral joint space cannot be evaluated on these projections, with mild acromioclavicular   joint arthrosis and subcentimeter diastasis observed  If clinical suspicion for internal derangement is high, MRI may be obtained for further evaluation  Procedure Note    Esperanza Vences MD - 06/02/2022   Formatting of this note might be different from the original      Clinical indication: Right shoulder pain  Three views of the right shoulder demonstrate no obvious acute fracture, dislocation or osseous   lesion  Glenohumeral joint space cannot be evaluated on these projections, with mild acromioclavicular   joint arthrosis and subcentimeter diastasis observed  If clinical suspicion for internal derangement is high, MRI may be obtained for further evaluation  Extensively reviewed discharge instructions with the patient  Reviewed gentle range of motion exercises to prevent adhesive capsulitis  Disposition  Final diagnoses:   Right shoulder tendonitis     Time reflects when diagnosis was documented in both MDM as applicable and the Disposition within this note     Time User Action Codes Description Comment    6/3/2022  2:32 AM Dioni Perez Add [M77 8] Right shoulder tendonitis       ED Disposition     ED Disposition   Discharge    Condition   Stable    Date/Time   Fri Vahid 3, 2022  2:32 AM    Comment   Aby Dos Santos discharge to home/self care                 Follow-up Information     Follow up With Specialties Details Why Contact Info Additional [address] [facility] [place] [facility] Call in 1 day recheck rt shoulder pain [address] [facility] [address]  [address] [facility] Malon Fam [address] [facility], [address] [place] [address], [address]          Discharge Medication List as of 6/3/2022  2:39 AM      START taking these medications    Details   !! celecoxib (CeleBREX) 200 mg capsule Take 1 capsule (200 mg total) by mouth 2 (two) times a day for 5 days, Starting Fri 6/3/2022, Until Wed 6/8/2022, Normal      famotidine (PEPCID) 40 MG tablet Take 1 tablet (40 mg total) by mouth in the morning for 10 doses, Starting Fri 6/3/2022, Until Mon 6/13/2022, Normal      methocarbamol (Robaxin-750) 750 mg tablet Take 1 tablet (750 mg total) by mouth every 6 (six) hours as needed for muscle spasms for up to 20 doses, Starting Fri 6/3/2022, Normal      methylprednisolone (MEDROL) 4 mg tablet Take as directed with food, Normal       !! - Potential duplicate medications found  Please discuss with provider        CONTINUE these medications which have NOT CHANGED    Details   !! celecoxib (CeleBREX) 100 mg capsule Take 1 capsule (100 mg total) by mouth 2 (two) times a day, Starting Mon 5/9/2022, Normal      pregabalin (LYRICA) 300 MG capsule Take 1 capsule (300 mg total) by mouth 2 (two) times a day, Starting Mon 5/9/2022, Normal      QUEtiapine (SEROquel) 100 mg tablet Take 1 tablet (100 mg total) by mouth daily at bedtime, Starting Tue 4/26/2022, Normal      amoxicillin (AMOXIL) 500 MG tablet Take 500 mg by mouth 3 (three) times a day, Starting Tue 2/9/2021, Historical Med      betamethasone, augmented, (DIPROLENE-AF) 0 05 % cream Apply 1 application topically 2 (two) times a day To affected area, Starting Thu 12/9/2021, Historical Med      buprenorphine-naloxone (Suboxone) 8-2 mg dissolve 1 FILM under the tongue twice a day, Historical Med      chlorhexidine (PERIDEX) 0 12 % solution SWISH AND SPIT 15 ML BY MOUTH TWICE A DAY, Historical Med      EPINEPHrine (EPIPEN) 0 3 mg/0 3 mL SOAJ Inject 0 3 mL (0 3 mg total) into a muscle once for 1 dose, Starting Thu 9/17/2020, Normal      escitalopram (LEXAPRO) 10 mg tablet Take 1 tablet (10 mg total) by mouth daily, Starting Mon 2/22/2021, Normal      fluticasone (FLONASE) 50 mcg/act nasal spray 2 sprays into each nostril daily, Starting Wed 11/4/2020, Normal      ibuprofen (MOTRIN) 800 mg tablet Take 800 mg by mouth every 8 (eight) hours as needed, Starting Tue 2/9/2021, Historical Med      ondansetron (Zofran ODT) 8 mg disintegrating tablet Take 1 tablet (8 mg total) by mouth every 8 (eight) hours as needed for nausea or vomiting, Starting Mon 5/2/2022, Normal       !! - Potential duplicate medications found  Please discuss with provider  No discharge procedures on file      PDMP Review       Value Time User    PDMP Reviewed  Yes 3/25/2022  3:37 PM Alex Sebastian DO          ED Provider  Electronically Signed by           Ashley Mcarthur MD  06/03/22 5175

## 2022-06-03 NOTE — ED NOTES
D/C summary with escript to pharmacy on file, expressed and provided via ER Provider       Magno Carbajal RN  06/03/22 9800

## 2022-06-03 NOTE — DISCHARGE INSTRUCTIONS
Ice or warmth whichiever feels better  Can increase celebrex to 200mg twice daily for 5 days for acute pain (fill script for higher dosage or increase your tablets) but do not take additional ibuprofen or aleve (medicine are all in the same class  Famotidine 2-20mg tabs daily over-the-counter or fill script for 40 mg tab daily for 10 days to protect stomach from the increased dose of Celebrex/NSAIDs  Medrol Dose Alcides: take entire row of steriods for a particular day at one sitting  take with food  finish entire course   Do range-of-motion exercises while in the sling to prevent a frozen shoulder  Return with numbness tingling swelling difficulty breathing chest pain or any new or worsening symptoms  No drinking driving or heavy machinery use with the methocarbamol or for 6 hours after discharge

## 2022-06-05 ENCOUNTER — HOSPITAL ENCOUNTER (EMERGENCY)
Facility: HOSPITAL | Age: 37
Discharge: HOME | End: 2022-06-05
Attending: EMERGENCY MEDICINE | Admitting: EMERGENCY MEDICINE
Payer: COMMERCIAL

## 2022-06-05 VITALS
WEIGHT: 213.63 LBS | DIASTOLIC BLOOD PRESSURE: 80 MMHG | OXYGEN SATURATION: 95 % | BODY MASS INDEX: 29.79 KG/M2 | SYSTOLIC BLOOD PRESSURE: 134 MMHG | TEMPERATURE: 98.5 F | HEART RATE: 131 BPM | RESPIRATION RATE: 18 BRPM

## 2022-06-05 DIAGNOSIS — M25.511 RIGHT SHOULDER PAIN: Primary | ICD-10-CM

## 2022-06-05 PROCEDURE — 99284 EMERGENCY DEPT VISIT MOD MDM: CPT | Performed by: EMERGENCY MEDICINE

## 2022-06-05 PROCEDURE — 99283 EMERGENCY DEPT VISIT LOW MDM: CPT

## 2022-06-05 PROCEDURE — 96372 THER/PROPH/DIAG INJ SC/IM: CPT

## 2022-06-05 RX ORDER — LIDOCAINE 50 MG/G
1 PATCH TOPICAL ONCE
Status: DISCONTINUED | OUTPATIENT
Start: 2022-06-05 | End: 2022-06-05 | Stop reason: HOSPADM

## 2022-06-05 RX ORDER — KETOROLAC TROMETHAMINE 30 MG/ML
30 INJECTION, SOLUTION INTRAMUSCULAR; INTRAVENOUS ONCE
Status: COMPLETED | OUTPATIENT
Start: 2022-06-05 | End: 2022-06-05

## 2022-06-05 RX ADMIN — KETOROLAC TROMETHAMINE 30 MG: 30 INJECTION, SOLUTION INTRAMUSCULAR at 05:51

## 2022-06-05 RX ADMIN — LIDOCAINE 5% 1 PATCH: 700 PATCH TOPICAL at 05:50

## 2022-06-05 NOTE — ED PROVIDER NOTES
Final Diagnosis:  1  Right shoulder pain        Chief Complaint   Patient presents with    Shoulder Pain     Seen here June 3rd for same, MRI ordered by PCP     HPI  Patient presents for evaluation of right shoulder pain  Per record review the patient has been seen twice in the last 3 days for right shoulder pain  Initially x-rays were done at another hospital without acute changes  He was then seen here the next day for ongoing right shoulder pain  Was instructing him to follow up with primary care to get an MRI and provided with a robust pain regimen  Patient states that yesterday felt that his shoulder pain had improved but then when he woke up this morning he once again had onset of right shoulder pain  He identifies this as being over the back of the scapula and anterior aspect of the arm  Denies any trauma to this area  States that this originally happened when he was ripping up floor boards  No radiations of the pain  Worse with movement and better at rest       Unless otherwise specified:  - No language barrier    - History obtained from patient  - There are no limitations to the history obtained  - Previous charting was reviewed    PMH:   has a past medical history of Addiction to drug [facility]), Anxiety, Arthritis, Chronic pain, Hepatitis, Hepatitis C virus, Heroin abuse ([facility] ) (5/2/2018), HIV (human immunodeficiency virus infection) ([facility] ), Moderate episode of recurrent major depressive disorder ([facility] ) (2/5/2020), and Tobacco use disorder  PSH:   has a past surgical history that includes Epidural block injection (Bilateral, 4/19/2018)  ROS:  Review of Systems   -   - 13 point ROS was performed and all are normal unless stated in the history above  - Nursing note reviewed  Vitals reviewed  - Orders placed by myself and/or advanced practitioner / resident          PE:   Vitals:    06/05/22 0514 06/05/22 0516   BP: 134/80    BP Location: Left arm    Pulse: (!) 131    Resp: 18    Temp: 98 5 °F (36 9 °C)    TempSrc: Temporal    SpO2: 96% 95%   Weight: 96 9 kg (213 lb 10 oz)      Vitals reviewed by me  Patient with generalized tenderness to palpation over superior aspect as well as back scapula  No tenderness over the rotator cuff  5/5  strength  Patient's right arm in sling  Unless otherwise specified above:    General: VS reviewed  Appears in NAD    Head: Normocephalic, atraumatic  Eyes: EOM-I  No exudate  ENT: Atraumatic external nose and ears  No malocclusion  No stridor  No drooling  Neck: No JVD  CV: No pallor noted  Lungs:   No tachypnea  No respiratory distress    Abdomen:  Soft, non-tender, non-distended    MSK:   FROM spontaneously  No obvious deformity    Skin: Dry, intact  No obvious rash  Neuro: Awake, alert, GCS15, CN II-XII grossly intact  Speaking in full sentences  Motor grossly intact  Psychiatric/Behavioral: Appropriate mood and affect   Exam: deferred    Physical Exam     Procedures   A:  - Nursing note reviewed  No orders to display     Orders Placed This Encounter   Procedures    Ambulatory Referral to Orthopedic Surgery     Labs Reviewed - No data to display      Final Diagnosis:  1  Right shoulder pain        P:  - discussed with the patient that he should continue follow-up with primary care provider in reach out to them to schedule his MRI  Will provide referral for Orthopedic surgery  Analgesia  Return precautions discussed      Medications   ketorolac (TORADOL) injection 30 mg (has no administration in time range)   lidocaine (LIDODERM) 5 % patch 1 patch (has no administration in time range)     Time reflects when diagnosis was documented in both MDM as applicable and the Disposition within this note     Time User Action Codes Description Comment    6/5/2022  5:28 AM Ramone Kenney Add [Q52 945] Right shoulder pain       ED Disposition     ED Disposition   Discharge    Condition   Stable    Date/Time   Sun Jun 5, 2022  5:28 AM    Comment   Ynesjessica Donaldson discharge to home/self care  Follow-up Information     Follow up With Specialties Details Why Contact Info Additional [address],  Family Medicine Call  Discussed your MRI 10 [address] [facility]  [place] [address]  [phone]       [facility] Schedule an appointment as soon as possible for a visit  To evaluate your shoulder [address] [facility] [address] [address] Specialists Jassi Cha [address] [place], [place], [address]        Patient's Medications   Discharge Prescriptions    DICLOFENAC SODIUM (VOLTAREN) 1 %    Apply 2 g topically 4 (four) times a day for 5 days       Start Date: 6/5/2022  End Date: 6/10/2022       Order Dose: 2 g       Quantity: 40 g    Refills: 0       Prior to Admission Medications   Prescriptions Last Dose Informant Patient Reported? Taking?    EPINEPHrine (EPIPEN) 0 3 mg/0 3 mL SOAJ   No No   Sig: Inject 0 3 mL (0 3 mg total) into a muscle once for 1 dose   QUEtiapine (SEROquel) 100 mg tablet   No No   Sig: Take 1 tablet (100 mg total) by mouth daily at bedtime   amoxicillin (AMOXIL) 500 MG tablet   Yes No   Sig: Take 500 mg by mouth 3 (three) times a day   Patient not taking: No sig reported   betamethasone, augmented, (DIPROLENE-AF) 0 05 % cream   Yes No   Sig: Apply 1 application topically 2 (two) times a day To affected area   Patient not taking: No sig reported   buprenorphine-naloxone (Suboxone) 8-2 mg   Yes No   Sig: dissolve 1 FILM under the tongue twice a day   Patient not taking: Reported on 6/3/2022   celecoxib (CeleBREX) 100 mg capsule   No No   Sig: Take 1 capsule (100 mg total) by mouth 2 (two) times a day   celecoxib (CeleBREX) 200 mg capsule   No No   Sig: Take 1 capsule (200 mg total) by mouth 2 (two) times a day for 5 days   chlorhexidine (PERIDEX) 0 12 % solution   Yes No   Sig: SWISH AND SPIT 15 ML BY MOUTH TWICE A DAY   Patient not taking: No sig reported   escitalopram (LEXAPRO) 10 mg tablet   No No   Sig: Take 1 tablet (10 mg total) by mouth daily   Patient not taking: No sig reported   famotidine (PEPCID) 40 MG tablet   No No   Sig: Take 1 tablet (40 mg total) by mouth in the morning for 10 doses   fluticasone (FLONASE) 50 mcg/act nasal spray   No No   Si sprays into each nostril daily   Patient not taking: No sig reported   ibuprofen (MOTRIN) 800 mg tablet   Yes No   Sig: Take 800 mg by mouth every 8 (eight) hours as needed   Patient not taking: Reported on 6/3/2022   methocarbamol (Robaxin-750) 750 mg tablet   No No   Sig: Take 1 tablet (750 mg total) by mouth every 6 (six) hours as needed for muscle spasms for up to 20 doses   methylprednisolone (MEDROL) 4 mg tablet   No No   Sig: Take as directed with food   ondansetron (Zofran ODT) 8 mg disintegrating tablet   No No   Sig: Take 1 tablet (8 mg total) by mouth every 8 (eight) hours as needed for nausea or vomiting   Patient not taking: Reported on 6/3/2022   pregabalin (LYRICA) 300 MG capsule   No No   Sig: Take 1 capsule (300 mg total) by mouth 2 (two) times a day      Facility-Administered Medications: None       Portions of the record may have been created with voice recognition software  Occasional wrong word or "sound a like" substitutions may have occurred due to the inherent limitations of voice recognition software  Read the chart carefully and recognize, using context, where substitutions have occurred      Electronically signed by:  MD Keith Wilder MD  22 [id number]

## 2022-06-06 ENCOUNTER — TELEPHONE (OUTPATIENT)
Dept: FAMILY MEDICINE CLINIC | Facility: CLINIC | Age: 37
End: 2022-06-06

## 2022-06-06 NOTE — TELEPHONE ENCOUNTER
Pt could not get an appointment for right shoulder MRI until 6/21 in [place]  Pt in so much pain and currently being seen at Urgent Care  Girlfriend asking if MRI can get moved up sooner?

## 2022-06-07 ENCOUNTER — OFFICE VISIT (OUTPATIENT)
Dept: FAMILY MEDICINE CLINIC | Facility: CLINIC | Age: 37
End: 2022-06-07
Payer: COMMERCIAL

## 2022-06-07 VITALS
OXYGEN SATURATION: 99 % | HEART RATE: 106 BPM | HEIGHT: 71 IN | WEIGHT: 213.2 LBS | BODY MASS INDEX: 29.85 KG/M2 | DIASTOLIC BLOOD PRESSURE: 98 MMHG | TEMPERATURE: 95.7 F | SYSTOLIC BLOOD PRESSURE: 142 MMHG

## 2022-06-07 DIAGNOSIS — M25.511 ACUTE PAIN OF RIGHT SHOULDER: Primary | ICD-10-CM

## 2022-06-07 PROCEDURE — 99213 OFFICE O/P EST LOW 20 MIN: CPT | Performed by: FAMILY MEDICINE

## 2022-06-07 RX ORDER — PREDNISONE 10 MG/1
TABLET ORAL
Qty: 30 TABLET | Refills: 0 | Status: SHIPPED | OUTPATIENT
Start: 2022-06-07

## 2022-06-07 NOTE — PROGRESS NOTES
Assessment/Plan:  Patient will stop Medrol Dosepak  I put in for stronger prednisone taper  We will have our team try to call Orthopedics to see if we can get him in any sooner  I told patient I would not prescribe any controls for him  Follow-up as scheduled or p r n     Problem List Items Addressed This Visit        Other    Acute pain of right shoulder - Primary    Relevant Medications    predniSONE 10 mg tablet           Diagnoses and all orders for this visit:    Acute pain of right shoulder  -     predniSONE 10 mg tablet; 4 tablets daily for 3 days, then 3 tablets daily for 3 days, then 2 tablets daily for 3 days, then 1 tablet daily for 3 days        No problem-specific Assessment & Plan notes found for this encounter  Subjective:      Patient ID: Aby Vargas is a 40 y o  male  Patient here today with continued right shoulder pain  Patient had injured her moving greg at work  Was seen in the ER in [place] and also in urgent care here  Patient has been given Celebrex, Medrol Dosepak and Robaxin  Patient is even taken to Robaxin at a time without any relief  Patient is scheduled to see Orthopedics on June 21st   Patient has limited range of motion secondary to pain  The following portions of the patient's history were reviewed and updated as appropriate:   He has a past medical history of Addiction to drug [facility]), Anxiety, Arthritis, Chronic pain, Hepatitis, Hepatitis C virus, Heroin abuse ([facility] ) (5/2/2018), HIV (human immunodeficiency virus infection) ([facility] [address] ), Moderate episode of recurrent major depressive disorder ([facility] ) (2/5/2020), and Tobacco use disorder  ,  does not have any pertinent problems on file  ,   has a past surgical history that includes Epidural block injection (Bilateral, 4/19/2018)  ,  family history includes Hepatitis in his family; No Known Problems in his father  ,   reports that he has been smoking  He has been smoking about 1 00 pack per day   He has quit using smokeless tobacco  He reports current alcohol use  He reports current drug use  Drugs: Heroin, Prescription, and Marijuana  ,  is allergic to clindamycin/lincomycin     Current Outpatient Medications   Medication Sig Dispense Refill    celecoxib (CeleBREX) 100 mg capsule Take 1 capsule (100 mg total) by mouth 2 (two) times a day 60 capsule 2    celecoxib (CeleBREX) 200 mg capsule Take 1 capsule (200 mg total) by mouth 2 (two) times a day for 5 days 10 capsule 0    methocarbamol (Robaxin-750) 750 mg tablet Take 1 tablet (750 mg total) by mouth every 6 (six) hours as needed for muscle spasms for up to 20 doses 20 tablet 0    predniSONE 10 mg tablet 4 tablets daily for 3 days, then 3 tablets daily for 3 days, then 2 tablets daily for 3 days, then 1 tablet daily for 3 days 30 tablet 0    pregabalin (LYRICA) 300 MG capsule Take 1 capsule (300 mg total) by mouth 2 (two) times a day 60 capsule 2    QUEtiapine (SEROquel) 100 mg tablet Take 1 tablet (100 mg total) by mouth daily at bedtime 30 tablet 3    Diclofenac Sodium (VOLTAREN) 1 % Apply 2 g topically 4 (four) times a day for 5 days (Patient not taking: Reported on 6/7/2022) 40 g 0    EPINEPHrine (EPIPEN) 0 3 mg/0 3 mL SOAJ Inject 0 3 mL (0 3 mg total) into a muscle once for 1 dose 2 each 1    famotidine (PEPCID) 40 MG tablet Take 1 tablet (40 mg total) by mouth in the morning for 10 doses (Patient not taking: Reported on 6/7/2022) 20 tablet 0     No current facility-administered medications for this visit  Review of Systems   Constitutional: Negative  Respiratory: Negative  Cardiovascular: Negative  Gastrointestinal: Negative  Genitourinary: Negative  Musculoskeletal: Positive for arthralgias  Objective:  Vitals:    06/07/22 1054   BP: 142/98   Pulse: (!) 106   Temp: (!) 95 7 °F (35 4 °C)   SpO2: 99%   Weight: 96 7 kg (213 lb 3 2 oz)   Height: 5' 11" (1 803 m)     Body mass index is 29 74 kg/m²  Physical Exam  Vitals reviewed  Constitutional:       General: He is not in acute distress  Appearance: Normal appearance  He is well-developed  He is not ill-appearing, toxic-appearing or diaphoretic  HENT:      Head: Normocephalic and atraumatic  Eyes:      Conjunctiva/sclera: Conjunctivae normal    Cardiovascular:      Rate and Rhythm: Normal rate and regular rhythm  Heart sounds: Normal heart sounds  No murmur heard  No friction rub  No gallop  Pulmonary:      Effort: Pulmonary effort is normal  No respiratory distress  Breath sounds: Normal breath sounds  No wheezing, rhonchi or rales  Musculoskeletal:         General: Tenderness (Right shoulder) present  Right lower leg: No edema  Left lower leg: No edema  Neurological:      General: No focal deficit present  Mental Status: He is alert and oriented to person, place, and time  Psychiatric:         Mood and Affect: Mood normal          Behavior: Behavior normal          Thought Content:  Thought content normal          Judgment: Judgment normal

## 2022-06-10 DIAGNOSIS — M51.34 DEGENERATION OF THORACIC INTERVERTEBRAL DISC: ICD-10-CM

## 2022-06-10 RX ORDER — CELECOXIB 100 MG/1
100 CAPSULE ORAL 2 TIMES DAILY
Qty: 60 CAPSULE | Refills: 0 | Status: SHIPPED | OUTPATIENT
Start: 2022-06-10

## 2022-06-14 ENCOUNTER — TELEPHONE (OUTPATIENT)
Dept: FAMILY MEDICINE CLINIC | Facility: CLINIC | Age: 37
End: 2022-06-14

## 2022-06-14 DIAGNOSIS — M25.511 ACUTE PAIN OF RIGHT SHOULDER: Primary | ICD-10-CM

## 2022-06-14 NOTE — TELEPHONE ENCOUNTER
Pt seen Orthopedics and was told he needed to have 4-6 weeks of Physical therapy on shoulder before he could get an MRI  Can you please put a referral in? He would like to go to [facility]

## 2022-06-16 ENCOUNTER — TELEPHONE (OUTPATIENT)
Dept: FAMILY MEDICINE CLINIC | Facility: CLINIC | Age: 37
End: 2022-06-16

## 2022-06-16 NOTE — TELEPHONE ENCOUNTER
MRI DENIED BY INSURANCE  Michela Anderson The prior authorization request for this study has not been approved  You can schedule a peer to peer by calling <add payer name and phone number here> with the deadline date of <add date here>  The insurance determined the following:    [] Does not meet Medical Necessity  [] No prior imaging  [] PT or conservative treatment incomplete  [] Missing Labs  [] Frequency    If you choose not to complete a peer to peer then please reply to this message to let us know  Please notify your patient and contact central scheduling by calling [phone] to cancel the appointment and cancel the order in Epic

## 2022-06-24 ENCOUNTER — TELEPHONE (OUTPATIENT)
Dept: OBGYN CLINIC | Facility: OTHER | Age: 37
End: 2022-06-24

## 2022-06-28 ENCOUNTER — TELEPHONE (OUTPATIENT)
Dept: FAMILY MEDICINE CLINIC | Facility: CLINIC | Age: 37
End: 2022-06-28

## 2022-06-28 NOTE — TELEPHONE ENCOUNTER
Pt is on heroin again and girlfriend is terrified he is going to die  She can get him into a rehab asap but they need  A letter from you stating he does not need physical therapy at this time due to his shoulder injury and that he is able to go to rehab from a medical stand point  Without the letter, they will not accept him because they said the shoulder injury and not going to physical therapy would be a liability       [employer] will  the letter when ready

## 2022-06-28 NOTE — TELEPHONE ENCOUNTER
I wrote the letter, but truthfully he does need rehab on that shoulder  He might lose function of it  I want them to be aware   It is more important for him to get clean now

## 2022-08-19 DIAGNOSIS — M51.34 DEGENERATION OF THORACIC INTERVERTEBRAL DISC: ICD-10-CM

## 2022-08-19 DIAGNOSIS — F41.9 ANXIETY: ICD-10-CM

## 2022-08-19 RX ORDER — PREGABALIN 300 MG/1
300 CAPSULE ORAL 2 TIMES DAILY
Qty: 60 CAPSULE | Refills: 0 | Status: SHIPPED | OUTPATIENT
Start: 2022-08-19 | End: 2022-09-27 | Stop reason: SDUPTHER

## 2022-08-19 RX ORDER — QUETIAPINE FUMARATE 100 MG/1
100 TABLET, FILM COATED ORAL
Qty: 30 TABLET | Refills: 0 | Status: SHIPPED | OUTPATIENT
Start: 2022-08-19 | End: 2022-09-15 | Stop reason: SDUPTHER

## 2022-08-19 RX ORDER — CELECOXIB 100 MG/1
100 CAPSULE ORAL 2 TIMES DAILY
Qty: 60 CAPSULE | Refills: 0 | Status: SHIPPED | OUTPATIENT
Start: 2022-08-19 | End: 2022-09-27 | Stop reason: SDUPTHER

## 2022-09-15 DIAGNOSIS — F41.9 ANXIETY: ICD-10-CM

## 2022-09-15 RX ORDER — QUETIAPINE FUMARATE 100 MG/1
100 TABLET, FILM COATED ORAL
Qty: 30 TABLET | Refills: 0 | Status: SHIPPED | OUTPATIENT
Start: 2022-09-15 | End: 2022-10-10 | Stop reason: SDUPTHER

## 2022-09-27 DIAGNOSIS — M51.34 DEGENERATION OF THORACIC INTERVERTEBRAL DISC: ICD-10-CM

## 2022-09-27 RX ORDER — CELECOXIB 100 MG/1
100 CAPSULE ORAL 2 TIMES DAILY
Qty: 60 CAPSULE | Refills: 0 | Status: SHIPPED | OUTPATIENT
Start: 2022-09-27 | End: 2022-10-27 | Stop reason: SDUPTHER

## 2022-09-27 RX ORDER — PREGABALIN 300 MG/1
300 CAPSULE ORAL 2 TIMES DAILY
Qty: 60 CAPSULE | Refills: 0 | Status: SHIPPED | OUTPATIENT
Start: 2022-09-27 | End: 2022-10-27 | Stop reason: SDUPTHER

## 2022-10-10 DIAGNOSIS — F41.9 ANXIETY: ICD-10-CM

## 2022-10-10 RX ORDER — QUETIAPINE FUMARATE 100 MG/1
100 TABLET, FILM COATED ORAL
Qty: 30 TABLET | Refills: 0 | Status: SHIPPED | OUTPATIENT
Start: 2022-10-10

## 2022-10-27 DIAGNOSIS — M51.34 DEGENERATION OF THORACIC INTERVERTEBRAL DISC: ICD-10-CM

## 2022-10-27 RX ORDER — PREGABALIN 300 MG/1
300 CAPSULE ORAL 2 TIMES DAILY
Qty: 60 CAPSULE | Refills: 0 | Status: SHIPPED | OUTPATIENT
Start: 2022-10-27

## 2022-10-27 RX ORDER — CELECOXIB 100 MG/1
100 CAPSULE ORAL 2 TIMES DAILY
Qty: 60 CAPSULE | Refills: 0 | Status: SHIPPED | OUTPATIENT
Start: 2022-10-27

## 2022-11-04 DIAGNOSIS — F41.9 ANXIETY: ICD-10-CM

## 2022-11-04 RX ORDER — QUETIAPINE FUMARATE 100 MG/1
100 TABLET, FILM COATED ORAL
Qty: 30 TABLET | Refills: 0 | Status: SHIPPED | OUTPATIENT
Start: 2022-11-04

## 2022-12-05 DIAGNOSIS — M51.34 DEGENERATION OF THORACIC INTERVERTEBRAL DISC: ICD-10-CM

## 2022-12-05 DIAGNOSIS — F41.9 ANXIETY: ICD-10-CM

## 2022-12-05 RX ORDER — QUETIAPINE FUMARATE 100 MG/1
100 TABLET, FILM COATED ORAL
Qty: 30 TABLET | Refills: 0 | Status: SHIPPED | OUTPATIENT
Start: 2022-12-05

## 2022-12-05 RX ORDER — CELECOXIB 100 MG/1
100 CAPSULE ORAL 2 TIMES DAILY
Qty: 60 CAPSULE | Refills: 0 | Status: SHIPPED | OUTPATIENT
Start: 2022-12-05

## 2022-12-05 RX ORDER — PREGABALIN 300 MG/1
300 CAPSULE ORAL 2 TIMES DAILY
Qty: 60 CAPSULE | Refills: 0 | Status: SHIPPED | OUTPATIENT
Start: 2022-12-05

## 2022-12-23 DIAGNOSIS — M51.34 DEGENERATION OF THORACIC INTERVERTEBRAL DISC: ICD-10-CM

## 2022-12-23 NOTE — TELEPHONE ENCOUNTER
Medication Refill Request     Name Pregabalin   Dose/Frequency 300mg 2xday  Quantity 60  Verified pharmacy   [x]  Verified ordering Provider   [x]  Does patient have enough for the next 3 days?  Yes [x] No []

## 2022-12-26 RX ORDER — PREGABALIN 300 MG/1
300 CAPSULE ORAL 2 TIMES DAILY
Qty: 60 CAPSULE | Refills: 0 | Status: SHIPPED | OUTPATIENT
Start: 2022-12-26

## 2022-12-29 DIAGNOSIS — F41.9 ANXIETY: ICD-10-CM

## 2022-12-29 RX ORDER — QUETIAPINE FUMARATE 100 MG/1
100 TABLET, FILM COATED ORAL
Qty: 30 TABLET | Refills: 0 | Status: SHIPPED | OUTPATIENT
Start: 2022-12-29

## 2023-01-18 ENCOUNTER — HOSPITAL ENCOUNTER (INPATIENT)
Facility: HOSPITAL | Age: 38
LOS: 30 days | Discharge: HOME | End: 2023-02-17
Attending: EMERGENCY MEDICINE | Admitting: INTERNAL MEDICINE

## 2023-01-18 ENCOUNTER — TELEPHONE (OUTPATIENT)
Dept: FAMILY MEDICINE CLINIC | Facility: CLINIC | Age: 38
End: 2023-01-18

## 2023-01-18 DIAGNOSIS — R78.81 BACTEREMIA: Primary | ICD-10-CM

## 2023-01-18 DIAGNOSIS — M54.59 INTRACTABLE LOW BACK PAIN: ICD-10-CM

## 2023-01-18 DIAGNOSIS — F41.9 ANXIETY: ICD-10-CM

## 2023-01-18 DIAGNOSIS — B19.20 HEPATITIS C VIRUS INFECTION WITHOUT HEPATIC COMA, UNSPECIFIED CHRONICITY: ICD-10-CM

## 2023-01-18 DIAGNOSIS — R26.2 AMBULATORY DYSFUNCTION: ICD-10-CM

## 2023-01-18 DIAGNOSIS — B20 HIV INFECTION, UNSPECIFIED SYMPTOM STATUS (HCC): ICD-10-CM

## 2023-01-18 DIAGNOSIS — M51.34 DEGENERATION OF THORACIC INTERVERTEBRAL DISC: ICD-10-CM

## 2023-01-18 DIAGNOSIS — M54.41 ACUTE RIGHT-SIDED LOW BACK PAIN WITH RIGHT-SIDED SCIATICA: ICD-10-CM

## 2023-01-18 DIAGNOSIS — M25.561 ACUTE PAIN OF RIGHT KNEE: ICD-10-CM

## 2023-01-18 DIAGNOSIS — B95.61 BACTEREMIA DUE TO METHICILLIN SUSCEPTIBLE STAPHYLOCOCCUS AUREUS (MSSA): ICD-10-CM

## 2023-01-18 DIAGNOSIS — M77.8 RIGHT SHOULDER TENDONITIS: ICD-10-CM

## 2023-01-18 DIAGNOSIS — R78.81 BACTEREMIA DUE TO METHICILLIN SUSCEPTIBLE STAPHYLOCOCCUS AUREUS (MSSA): ICD-10-CM

## 2023-01-18 DIAGNOSIS — F19.10 INTRAVENOUS DRUG ABUSE, CONTINUOUS (HCC): ICD-10-CM

## 2023-01-18 DIAGNOSIS — M51.34 DEGENERATION OF INTERVERTEBRAL DISC OF THORACIC REGION: ICD-10-CM

## 2023-01-18 DIAGNOSIS — M54.6 CHRONIC MIDLINE THORACIC BACK PAIN: ICD-10-CM

## 2023-01-18 DIAGNOSIS — G89.29 CHRONIC MIDLINE THORACIC BACK PAIN: ICD-10-CM

## 2023-01-18 DIAGNOSIS — K21.9 GERD (GASTROESOPHAGEAL REFLUX DISEASE): ICD-10-CM

## 2023-01-18 DIAGNOSIS — F11.10 HEROIN ABUSE (HCC): ICD-10-CM

## 2023-01-18 PROBLEM — Z21 HIV (HUMAN IMMUNODEFICIENCY VIRUS INFECTION) (HCC): Chronic | Status: ACTIVE | Noted: 2023-01-18

## 2023-01-18 PROBLEM — Z72.0 TOBACCO ABUSE: Status: ACTIVE | Noted: 2023-01-18

## 2023-01-18 LAB
ALBUMIN SERPL BCP-MCNC: 3.8 G/DL (ref 3.5–5)
ALP SERPL-CCNC: 61 U/L (ref 34–104)
ALT SERPL W P-5'-P-CCNC: 44 U/L (ref 7–52)
ANION GAP SERPL CALCULATED.3IONS-SCNC: 7 MMOL/L (ref 4–13)
APTT PPP: 30 SECONDS (ref 23–37)
AST SERPL W P-5'-P-CCNC: 59 U/L (ref 13–39)
BASOPHILS # BLD AUTO: 0.03 THOUSANDS/ÂΜL (ref 0–0.1)
BASOPHILS NFR BLD AUTO: 0 % (ref 0–1)
BILIRUB SERPL-MCNC: 0.33 MG/DL (ref 0.2–1)
BUN SERPL-MCNC: 18 MG/DL (ref 5–25)
CALCIUM SERPL-MCNC: 8.8 MG/DL (ref 8.4–10.2)
CHLORIDE SERPL-SCNC: 100 MMOL/L (ref 96–108)
CO2 SERPL-SCNC: 28 MMOL/L (ref 21–32)
CREAT SERPL-MCNC: 0.79 MG/DL (ref 0.6–1.3)
EOSINOPHIL # BLD AUTO: 0.05 THOUSAND/ÂΜL (ref 0–0.61)
EOSINOPHIL NFR BLD AUTO: 0 % (ref 0–6)
ERYTHROCYTE [DISTWIDTH] IN BLOOD BY AUTOMATED COUNT: 13.6 % (ref 11.6–15.1)
FLUAV RNA RESP QL NAA+PROBE: NEGATIVE
FLUBV RNA RESP QL NAA+PROBE: NEGATIVE
GFR SERPL CREATININE-BSD FRML MDRD: 114 ML/MIN/1.73SQ M
GLUCOSE SERPL-MCNC: 93 MG/DL (ref 65–140)
HCT VFR BLD AUTO: 37 % (ref 36.5–49.3)
HGB BLD-MCNC: 11.8 G/DL (ref 12–17)
IMM GRANULOCYTES # BLD AUTO: 0.05 THOUSAND/UL (ref 0–0.2)
IMM GRANULOCYTES NFR BLD AUTO: 0 % (ref 0–2)
INR PPP: 1.01 (ref 0.84–1.19)
LACTATE SERPL-SCNC: 1.8 MMOL/L (ref 0.5–2)
LYMPHOCYTES # BLD AUTO: 1.15 THOUSANDS/ÂΜL (ref 0.6–4.47)
LYMPHOCYTES NFR BLD AUTO: 10 % (ref 14–44)
MCH RBC QN AUTO: 28.4 PG (ref 26.8–34.3)
MCHC RBC AUTO-ENTMCNC: 31.9 G/DL (ref 31.4–37.4)
MCV RBC AUTO: 89 FL (ref 82–98)
MONOCYTES # BLD AUTO: 1.22 THOUSAND/ÂΜL (ref 0.17–1.22)
MONOCYTES NFR BLD AUTO: 11 % (ref 4–12)
NEUTROPHILS # BLD AUTO: 8.69 THOUSANDS/ÂΜL (ref 1.85–7.62)
NEUTS SEG NFR BLD AUTO: 79 % (ref 43–75)
NRBC BLD AUTO-RTO: 0 /100 WBCS
PLATELET # BLD AUTO: 331 THOUSANDS/UL (ref 149–390)
PMV BLD AUTO: 9.6 FL (ref 8.9–12.7)
POTASSIUM SERPL-SCNC: 4.6 MMOL/L (ref 3.5–5.3)
PROCALCITONIN SERPL-MCNC: 5.24 NG/ML
PROT SERPL-MCNC: 7.5 G/DL (ref 6.4–8.4)
PROTHROMBIN TIME: 13.5 SECONDS (ref 11.6–14.5)
RBC # BLD AUTO: 4.16 MILLION/UL (ref 3.88–5.62)
RSV RNA RESP QL NAA+PROBE: NEGATIVE
SARS-COV-2 RNA RESP QL NAA+PROBE: NEGATIVE
SODIUM SERPL-SCNC: 135 MMOL/L (ref 135–147)
WBC # BLD AUTO: 11.19 THOUSAND/UL (ref 4.31–10.16)

## 2023-01-18 RX ORDER — GABAPENTIN 300 MG/1
300 CAPSULE ORAL 2 TIMES DAILY
COMMUNITY
Start: 2023-01-16 | End: 2023-02-17

## 2023-01-18 RX ORDER — TEMAZEPAM 15 MG/1
15 CAPSULE ORAL
Status: DISCONTINUED | OUTPATIENT
Start: 2023-01-18 | End: 2023-01-23

## 2023-01-18 RX ORDER — LIDOCAINE 50 MG/G
1 PATCH TOPICAL ONCE
Status: COMPLETED | OUTPATIENT
Start: 2023-01-18 | End: 2023-01-19

## 2023-01-18 RX ORDER — ACETAMINOPHEN 325 MG/1
650 TABLET ORAL EVERY 6 HOURS PRN
Status: DISCONTINUED | OUTPATIENT
Start: 2023-01-18 | End: 2023-01-19

## 2023-01-18 RX ORDER — ACETAMINOPHEN 500 MG
500 TABLET ORAL EVERY 6 HOURS PRN
Status: ON HOLD | COMMUNITY
Start: 2023-01-16 | End: 2023-02-17

## 2023-01-18 RX ORDER — CEFEPIME HYDROCHLORIDE 2 G/50ML
2000 INJECTION, SOLUTION INTRAVENOUS EVERY 12 HOURS
Status: DISCONTINUED | OUTPATIENT
Start: 2023-01-19 | End: 2023-01-19

## 2023-01-18 RX ORDER — QUETIAPINE FUMARATE 100 MG/1
100 TABLET, FILM COATED ORAL
Status: DISCONTINUED | OUTPATIENT
Start: 2023-01-18 | End: 2023-01-19

## 2023-01-18 RX ORDER — METHOCARBAMOL 500 MG/1
500 TABLET, FILM COATED ORAL ONCE
Status: COMPLETED | OUTPATIENT
Start: 2023-01-18 | End: 2023-01-18

## 2023-01-18 RX ORDER — NICOTINE 21 MG/24HR
14 PATCH, TRANSDERMAL 24 HOURS TRANSDERMAL DAILY
Status: DISCONTINUED | OUTPATIENT
Start: 2023-01-19 | End: 2023-02-17 | Stop reason: HOSPADM

## 2023-01-18 RX ORDER — ONDANSETRON 2 MG/ML
4 INJECTION INTRAMUSCULAR; INTRAVENOUS EVERY 6 HOURS PRN
Status: DISCONTINUED | OUTPATIENT
Start: 2023-01-18 | End: 2023-01-25

## 2023-01-18 RX ORDER — SODIUM CHLORIDE 9 MG/ML
100 INJECTION, SOLUTION INTRAVENOUS CONTINUOUS
Status: DISPENSED | OUTPATIENT
Start: 2023-01-18 | End: 2023-01-19

## 2023-01-18 RX ORDER — GABAPENTIN 300 MG/1
300 CAPSULE ORAL 2 TIMES DAILY
Status: DISCONTINUED | OUTPATIENT
Start: 2023-01-18 | End: 2023-01-24

## 2023-01-18 RX ORDER — IBUPROFEN 600 MG/1
TABLET ORAL
COMMUNITY
Start: 2023-01-16 | End: 2023-02-17

## 2023-01-18 RX ORDER — ACETAMINOPHEN 325 MG/1
975 TABLET ORAL ONCE
Status: COMPLETED | OUTPATIENT
Start: 2023-01-18 | End: 2023-01-18

## 2023-01-18 RX ORDER — CYCLOBENZAPRINE HCL 10 MG
10 TABLET ORAL 3 TIMES DAILY PRN
Status: DISCONTINUED | OUTPATIENT
Start: 2023-01-18 | End: 2023-01-24

## 2023-01-18 RX ORDER — KETOROLAC TROMETHAMINE 30 MG/ML
15 INJECTION, SOLUTION INTRAMUSCULAR; INTRAVENOUS ONCE
Status: COMPLETED | OUTPATIENT
Start: 2023-01-18 | End: 2023-01-18

## 2023-01-18 RX ORDER — KETOROLAC TROMETHAMINE 30 MG/ML
15 INJECTION, SOLUTION INTRAMUSCULAR; INTRAVENOUS EVERY 6 HOURS PRN
Status: DISCONTINUED | OUTPATIENT
Start: 2023-01-18 | End: 2023-01-22

## 2023-01-18 RX ORDER — ENOXAPARIN SODIUM 100 MG/ML
40 INJECTION SUBCUTANEOUS DAILY
Status: DISCONTINUED | OUTPATIENT
Start: 2023-01-19 | End: 2023-02-12

## 2023-01-18 RX ORDER — CEFEPIME HYDROCHLORIDE 2 G/50ML
2000 INJECTION, SOLUTION INTRAVENOUS ONCE
Status: COMPLETED | OUTPATIENT
Start: 2023-01-18 | End: 2023-01-18

## 2023-01-18 RX ADMIN — KETOROLAC TROMETHAMINE 15 MG: 30 INJECTION, SOLUTION INTRAMUSCULAR at 19:28

## 2023-01-18 RX ADMIN — CEFEPIME HYDROCHLORIDE 2000 MG: 2 INJECTION, SOLUTION INTRAVENOUS at 20:45

## 2023-01-18 RX ADMIN — SODIUM CHLORIDE 100 ML/HR: 0.9 INJECTION, SOLUTION INTRAVENOUS at 22:32

## 2023-01-18 RX ADMIN — QUETIAPINE FUMARATE 100 MG: 100 TABLET ORAL at 22:32

## 2023-01-18 RX ADMIN — ACETAMINOPHEN 975 MG: 325 TABLET, FILM COATED ORAL at 19:27

## 2023-01-18 RX ADMIN — VANCOMYCIN HYDROCHLORIDE 2000 MG: 1 INJECTION, POWDER, LYOPHILIZED, FOR SOLUTION INTRAVENOUS at 22:32

## 2023-01-18 RX ADMIN — SODIUM CHLORIDE 1000 ML: 0.9 INJECTION, SOLUTION INTRAVENOUS at 19:28

## 2023-01-18 RX ADMIN — KETOROLAC TROMETHAMINE 15 MG: 30 INJECTION, SOLUTION INTRAMUSCULAR; INTRAVENOUS at 22:31

## 2023-01-18 RX ADMIN — GABAPENTIN 300 MG: 300 CAPSULE ORAL at 22:32

## 2023-01-18 RX ADMIN — LIDOCAINE 1 PATCH: 140 PATCH CUTANEOUS at 19:24

## 2023-01-18 RX ADMIN — METHOCARBAMOL 500 MG: 500 TABLET ORAL at 19:27

## 2023-01-18 NOTE — TELEPHONE ENCOUNTER
Please call patient  If he is still having his back pain or symptoms of illness, needs to go to the ER ASAP    Needs to make a follow-up here at our office also

## 2023-01-18 NOTE — TELEPHONE ENCOUNTER
Called Pt with Dr's message - Pt said that he is having pain in back & running down his leg, needing to use a cane & street drugs to control the pain  Pt said that he has a Dr's appointment today at 2 pm (Trying to get suboxone) & will go right after appt, to the ER

## 2023-01-18 NOTE — TELEPHONE ENCOUNTER
Spoke to girlfriend Mary Mortensen and she states patient is using street drugs again due to his back pain, found him on the floor and told him she was going to call 911  Wanted to know if whatever is going on with him can be contagious and if this is more important then him going to his 1pm appt to get started on Suboxone? Told her this is more important that he go to the ER and we are not sure what is going on with his results  States she is going to get him to go to ER

## 2023-01-18 NOTE — ED PROVIDER NOTES
History  Chief Complaint   Patient presents with   • Leg Pain     Patient was seen at [facility] today due to his sciatica pain  Patient also had a positive blood culture and was to be admitted but stated he wanted to be transferred here instead  Patient states he had a full work up in the ED today and repeat blood cultures  Pain 9/10 at this time  Last received pain medication a few hours ago  Patient also requesting to be put back on suboxone  Patient is a 78-year-old male with a past medical history of DJD of the spine, Hepatitis C (s/p tx with [place] [facility]) and IVDU, presenting to the ED for evaluation of severe low back pain  Patient reports 2 to 3 days of severe right-sided lower back pain radiating to the right lower extremity  He is having difficulty ambulating secondary to the pain  He presented to [address] [place] [address] 2 days ago for these symptoms  A CT lumbar spine with IV contrast at that time showed chronic degenerative changes with no evidence of osteomyelitis  He was discharged but called back to the ED today due to a positive blood culture growing staph aureus  Patient had repeat blood cultures drawn as well as a TTE that per records was negative for valvular vegetations  Patient was initially admitted at that hospital, but signed himself out AMA and presented to this ED as it is closer to his family  Patient endorses a history of IV drug use and states that he used IV fentanyl shortly prior to arrival   Patient denies any fevers, chills, numbness/weakness in lower extremities, bowel/bladder incontinence, urinary retention or saddle anesthesia  He denies any nausea, vomiting, abdominal pain, diarrhea, constipation, urinary symptoms, chest pain or SOB  Prior to Admission Medications   Prescriptions Last Dose Informant Patient Reported? Taking?    Diclofenac Sodium (VOLTAREN) 1 %   No No   Sig: Apply 2 g topically 4 (four) times a day for 5 days   Patient not taking: Reported on 6/7/2022 EPINEPHrine (EPIPEN) 0 3 mg/0 3 mL SOAJ   No No   Sig: Inject 0 3 mL (0 3 mg total) into a muscle once for 1 dose   QUEtiapine (SEROquel) 100 mg tablet 2023  No Yes   Sig: Take 1 tablet (100 mg total) by mouth daily at bedtime   acetaminophen (TYLENOL) 500 mg tablet 2023  Yes Yes   Sig: Take 500 mg by mouth every 6 (six) hours as needed   celecoxib (CeleBREX) 100 mg capsule Past Week  No Yes   Sig: Take 1 capsule (100 mg total) by mouth 2 (two) times a day   celecoxib (CeleBREX) 200 mg capsule   No No   Sig: Take 1 capsule (200 mg total) by mouth 2 (two) times a day for 5 days   famotidine (PEPCID) 40 MG tablet   No No   Sig: Take 1 tablet (40 mg total) by mouth in the morning for 10 doses   Patient not taking: Reported on 2022   gabapentin (NEURONTIN) 300 mg capsule 2023  Yes Yes   Sig: Take 300 mg by mouth 2 (two) times a day   ibuprofen (MOTRIN) 600 mg tablet 2023  Yes Yes   Sig: take 1 tablet by mouth every 6 hours if needed for MILD PAIN ( PAIN SCALE 1-3 )   methocarbamol (Robaxin-750) 750 mg tablet 2023  No Yes   Sig: Take 1 tablet (750 mg total) by mouth every 6 (six) hours as needed for muscle spasms for up to 20 doses   predniSONE 10 mg tablet Past Week  No Yes   Si tablets daily for 3 days, then 3 tablets daily for 3 days, then 2 tablets daily for 3 days, then 1 tablet daily for 3 days   pregabalin (LYRICA) 300 MG capsule Past Week  No Yes   Sig: Take 1 capsule (300 mg total) by mouth 2 (two) times a day      Facility-Administered Medications: None       Past Medical History:   Diagnosis Date   • Addiction to drug [facility])    • Anxiety    • Arthritis    • Chronic pain    • Hepatitis    • Hepatitis C virus    • Heroin abuse ([facility] ) 2018   • HIV (human immunodeficiency virus infection) ([facility] )    • Moderate episode of recurrent major depressive disorder ([facility] ) 2020   • Tobacco use disorder     last assessed 11/2/15        Past Surgical History:   Procedure Laterality Date • EPIDURAL BLOCK INJECTION Bilateral 4/19/2018    Procedure: T9-10 INTERLAMINAR EPIDURAL STEROID INJECTION;  Surgeon: Alta Traylor MD;  Location: [facility] OR;  Service: Pain Management        Family History   Problem Relation Age of Onset   • No Known Problems Father    • Hepatitis Family      I have reviewed and agree with the history as documented  E-Cigarette/Vaping     E-Cigarette/Vaping Substances   • Nicotine No    • THC No    • CBD No    • Flavoring No    • Other No    • Unknown No      Social History     Tobacco Use   • Smoking status: Every Day     Packs/day: 1 00     Types: Cigarettes   • Smokeless tobacco: Former   • Tobacco comments:     current every day smoker per allscript    Substance Use Topics   • Alcohol use: Yes     Comment: occasional / social per allscript    • Drug use: Not Currently     Types: Heroin, Prescription, Marijuana     Comment: past opiate abuser / intravenous drug use per allscript                      Review of Systems   Constitutional: Negative for chills, diaphoresis, fatigue and fever  HENT: Negative for congestion, ear pain, mouth sores, rhinorrhea, sinus pain, sore throat and trouble swallowing  Eyes: Negative for photophobia and visual disturbance  Respiratory: Negative for cough, chest tightness, shortness of breath and wheezing  Cardiovascular: Negative for chest pain, palpitations and leg swelling  Gastrointestinal: Negative for abdominal pain, blood in stool, constipation, diarrhea, nausea and vomiting  Genitourinary: Negative for dysuria, flank pain, frequency, hematuria and urgency  Musculoskeletal: Positive for back pain and gait problem  Negative for arthralgias, joint swelling, myalgias and neck pain  Skin: Negative for color change, pallor and rash  Neurological: Negative for dizziness, syncope, speech difficulty, weakness, light-headedness, numbness and headaches     Psychiatric/Behavioral: Negative for confusion and sleep disturbance  All other systems reviewed and are negative  Physical Exam  Physical Exam  Vitals and nursing note reviewed  Constitutional:       General: He is awake  He is not in acute distress  Appearance: Normal appearance  He is well-developed  He is not ill-appearing or diaphoretic  HENT:      Head: Normocephalic and atraumatic  Right Ear: External ear normal       Left Ear: External ear normal       Nose: Nose normal       Mouth/Throat:      Lips: Pink  Mouth: Mucous membranes are moist    Eyes:      General: Lids are normal  No scleral icterus  Conjunctiva/sclera: Conjunctivae normal       Pupils: Pupils are equal, round, and reactive to light  Cardiovascular:      Rate and Rhythm: Normal rate and regular rhythm  Pulses: Normal pulses  Radial pulses are 2+ on the right side and 2+ on the left side  Heart sounds: Normal heart sounds, S1 normal and S2 normal    Pulmonary:      Effort: Pulmonary effort is normal  No accessory muscle usage  Breath sounds: Normal breath sounds  No stridor  No decreased breath sounds, wheezing, rhonchi or rales  Abdominal:      General: Abdomen is flat  Bowel sounds are normal  There is no distension  Palpations: Abdomen is soft  Tenderness: There is no abdominal tenderness  There is no right CVA tenderness, left CVA tenderness, guarding or rebound  Genitourinary:     Comments: Patient refused rectal exam at this time  Musculoskeletal:      Cervical back: Full passive range of motion without pain, normal range of motion and neck supple  No signs of trauma  No pain with movement  Normal range of motion  Lumbar back: Positive right straight leg raise test       Right lower leg: No edema  Left lower leg: No edema  Comments: Tenderness to palpation of right-sided lumbar paraspinal muscles  No overlying erythema  No midline tenderness, deformities or step-offs     Sensation intact, PT/DP pulses 2+   Limited effort with strength exam due to pain but patient is able to stand/ambulate and lift both legs off the bed against gravity  Lymphadenopathy:      Cervical: No cervical adenopathy  Skin:     General: Skin is warm and dry  Capillary Refill: Capillary refill takes less than 2 seconds  Coloration: Skin is not cyanotic, jaundiced or pale  Neurological:      Mental Status: He is alert and oriented to person, place, and time  GCS: GCS eye subscore is 4  GCS verbal subscore is 5  GCS motor subscore is 6  Cranial Nerves: No dysarthria or facial asymmetry  Gait: Gait normal    Psychiatric:         Attention and Perception: Attention normal          Mood and Affect: Mood normal          Speech: Speech normal          Behavior: Behavior normal  Behavior is cooperative           Vital Signs  ED Triage Vitals   Temperature Pulse Respirations Blood Pressure SpO2   01/18/23 1734 01/18/23 1734 01/18/23 1734 01/18/23 1734 01/18/23 1734   97 8 °F (36 6 °C) 104 16 134/75 97 %      Temp Source Heart Rate Source Patient Position - Orthostatic VS BP Location FiO2 (%)   01/18/23 1734 01/18/23 1734 01/18/23 2152 01/18/23 2152 --   Temporal Monitor Lying Left arm       Pain Score       01/18/23 1734       10 - Worst Possible Pain           Vitals:    01/18/23 1937 01/18/23 2152 01/19/23 0700 01/19/23 1406   BP:  134/69 126/99 134/69   Pulse: 98 86 71 80   Patient Position - Orthostatic VS:  Lying Lying          Visual Acuity  Visual Acuity    Flowsheet Row Most Recent Value   L Pupil Size (mm) 2   R Pupil Size (mm) 2   L Pupil Shape Round   R Pupil Shape Round          ED Medications  Medications   temazepam (RESTORIL) capsule 15 mg (15 mg Oral Given 1/19/23 0105)   ketorolac (TORADOL) injection 15 mg (15 mg Intravenous Given 1/19/23 1734)   cyclobenzaprine (FLEXERIL) tablet 10 mg (10 mg Oral Given 1/19/23 1308)   nicotine (NICODERM CQ) 14 mg/24hr TD 24 hr patch 14 mg (14 mg Transdermal Medication Applied 1/19/23 0820)   gabapentin (NEURONTIN) capsule 300 mg (300 mg Oral Given 1/19/23 1734)   sodium chloride 0 9 % infusion (0 mL/hr Intravenous Stopped 1/19/23 1059)   ondansetron (ZOFRAN) injection 4 mg (has no administration in time range)   enoxaparin (LOVENOX) subcutaneous injection 40 mg (40 mg Subcutaneous Refused 1/19/23 0821)   vancomycin (VANCOCIN) 1,500 mg in sodium chloride 0 9 % 250 mL IVPB (1,500 mg Intravenous New Bag 1/19/23 1123)   acetaminophen (TYLENOL) tablet 650 mg (650 mg Oral Given 1/19/23 1734)   cloNIDine (CATAPRES) tablet 0 1 mg (0 1 mg Oral Given 1/19/23 1355)   traZODone (DESYREL) tablet 50 mg (has no administration in time range)   buprenorphine-naloxone (Suboxone) film 2 mg (2 mg Sublingual Given 1/19/23 2010)   sodium chloride 0 9 % bolus 1,000 mL (0 mL Intravenous Stopped 1/18/23 2038)   ketorolac (TORADOL) injection 15 mg (15 mg Intravenous Given 1/18/23 1928)   methocarbamol (ROBAXIN) tablet 500 mg (500 mg Oral Given 1/18/23 1927)   lidocaine (LIDODERM) 5 % patch 1 patch (1 patch Topical Patch Removed 1/19/23 0821)   acetaminophen (TYLENOL) tablet 975 mg (975 mg Oral Given 1/18/23 1927)   vancomycin (VANCOCIN) 2,000 mg in sodium chloride 0 9 % 500 mL IVPB (2,000 mg Intravenous New Bag 1/18/23 2232)   cefepime (MAXIPIME) IVPB (premix in dextrose) 2,000 mg 50 mL (0 mg Intravenous Stopped 1/18/23 2124)       Diagnostic Studies  Results Reviewed     Procedure Component Value Units Date/Time    Blood culture #1 [[id number]] Collected: 01/18/23 1926    Lab Status: Preliminary result Specimen: Blood from Arm, Right Updated: 01/19/23 1001     Blood Culture Received in Microbiology Lab  Culture in Progress  Blood culture #2 [[id number]] Collected: 01/18/23 1926    Lab Status: Preliminary result Specimen: Blood from Arm, Right Updated: 01/19/23 1001     Blood Culture Received in Microbiology Lab  Culture in Progress      FLU/RSV/COVID - if FLU/RSV clinically relevant [[id number]]  (Normal) Collected: 01/18/23 1926    Lab Status: Final result Specimen: Nares from Nose Updated: 01/18/23 2023     SARS-CoV-2 Negative     INFLUENZA A PCR Negative     INFLUENZA B PCR Negative     RSV PCR Negative    Narrative:      FOR PEDIATRIC PATIENTS - copy/paste COVID Guidelines URL to browser: [email or web address]    SARS-CoV-2 assay is a Nucleic Acid Amplification assay intended for the  qualitative detection of nucleic acid from SARS-CoV-2 in nasopharyngeal  swabs  Results are for the presumptive identification of SARS-CoV-2 RNA  Positive results are indicative of infection with SARS-CoV-2, the virus  causing COVID-19, but do not rule out bacterial infection or co-infection  with other viruses  Laboratories within the [place] and its  territories are required to report all positive results to the appropriate  public health authorities  Negative results do not preclude SARS-CoV-2  infection and should not be used as the sole basis for treatment or other  patient management decisions  Negative results must be combined with  clinical observations, patient history, and epidemiological information  This test has not been FDA cleared or approved  This test has been authorized by FDA under an Emergency Use Authorization  (EUA)  This test is only authorized for the duration of time the  declaration that circumstances exist justifying the authorization of the  emergency use of an in vitro diagnostic tests for detection of SARS-CoV-2  virus and/or diagnosis of COVID-19 infection under section 564(b)(1) of  the Act, 21 U  S C  583GOT-6(G)(1), unless the authorization is terminated  or revoked sooner  The test has been validated but independent review by FDA  and CLIA is pending  Test performed using [employer] GeneXpert: This RT-PCR assay targets N2,  a region unique to SARS-CoV-2   A conserved region in the E-gene was chosen  for pan-Sarbecovirus detection which includes SARS-CoV-2  According to CMS-2020-01-R, this platform meets the definition of high-throughput technology  Procalcitonin [[id number]]  (Abnormal) Collected: 01/18/23 1926    Lab Status: Final result Specimen: Blood from Arm, Right Updated: 01/18/23 2011     Procalcitonin 5 24 ng/ml     Lactic acid [[id number]]  (Normal) Collected: 01/18/23 1926    Lab Status: Final result Specimen: Blood from Arm, Right Updated: 01/18/23 2004     LACTIC ACID 1 8 mmol/L     Narrative:      Result may be elevated if tourniquet was used during collection      Comprehensive metabolic panel [[id number]]  (Abnormal) Collected: 01/18/23 1926    Lab Status: Final result Specimen: Blood from Arm, Right Updated: 01/18/23 2003     Sodium 135 mmol/L      Potassium 4 6 mmol/L      Chloride 100 mmol/L      CO2 28 mmol/L      ANION GAP 7 mmol/L      BUN 18 mg/dL      Creatinine 0 79 mg/dL      Glucose 93 mg/dL      Calcium 8 8 mg/dL      AST 59 U/L      ALT 44 U/L      Alkaline Phosphatase 61 U/L      Total Protein 7 5 g/dL      Albumin 3 8 g/dL      Total Bilirubin 0 33 mg/dL      eGFR 114 ml/min/1 73sq m     Narrative:      [place] guidelines for Chronic Kidney Disease (CKD):   •  Stage 1 with normal or high GFR (GFR > 90 mL/min/1 73 square meters)  •  Stage 2 Mild CKD (GFR = 60-89 mL/min/1 73 square meters)  •  Stage 3A Moderate CKD (GFR = 45-59 mL/min/1 73 square meters)  •  Stage 3B Moderate CKD (GFR = 30-44 mL/min/1 73 square meters)  •  Stage 4 Severe CKD (GFR = 15-29 mL/min/1 73 square meters)  •  Stage 5 End Stage CKD (GFR <15 mL/min/1 73 square meters)  Note: GFR calculation is accurate only with a steady state creatinine    Protime-INR [[id number]]  (Normal) Collected: 01/18/23 1926    Lab Status: Final result Specimen: Blood from Arm, Right Updated: 01/18/23 1956     Protime 13 5 seconds      INR 1 01    APTT [[id number]]  (Normal) Collected: 01/18/23 1926    Lab Status: Final result Specimen: Blood from Arm, Right Updated: 01/18/23 1956     PTT 30 seconds     CBC and differential [[id number]]  (Abnormal) Collected: 01/18/23 1926    Lab Status: Final result Specimen: Blood from Arm, Right Updated: 01/18/23 1946     WBC 11 19 Thousand/uL      RBC 4 16 Million/uL      Hemoglobin 11 8 g/dL      Hematocrit 37 0 %      MCV 89 fL      MCH 28 4 pg      MCHC 31 9 g/dL      RDW 13 6 %      MPV 9 6 fL      Platelets 434 Thousands/uL      nRBC 0 /100 WBCs      Neutrophils Relative 79 %      Immat GRANS % 0 %      Lymphocytes Relative 10 %      Monocytes Relative 11 %      Eosinophils Relative 0 %      Basophils Relative 0 %      Neutrophils Absolute 8 69 Thousands/µL      Immature Grans Absolute 0 05 Thousand/uL      Lymphocytes Absolute 1 15 Thousands/µL      Monocytes Absolute 1 22 Thousand/µL      Eosinophils Absolute 0 05 Thousand/µL      Basophils Absolute 0 03 Thousands/µL                  XR hip/pelv 2-3 vws right if performed    (Results Pending)   MRI lumbar spine w wo contrast    (Results Pending)              Procedures  Procedures         ED Course                                             Medical Decision Making  Patient is a 16-year-old male with a past medical history of DJD of the spine, Hepatitis C (s/p tx with [place]) and IVDU, presenting to the ED for evaluation of severe low back pain  Patient presenting with known bacteremia identified at another hospital   Repeat labs including blood cultures x2 ordered  Patient has no red flag symptoms for cauda equina at this time  Broad-spectrum antibiotics were initiated  Patient was admitted for further work-up and MRI to rule out osteomyelitis  Discussed with general medicine attending and patient was accepted for inpatient admission  The management plan was discussed with patient in detail and all questions were answered      Bacteremia: acute illness or injury  Intractable low back pain: acute illness or injury  Amount and/or Complexity of Data Reviewed  Labs: ordered  Risk  OTC drugs  Prescription drug management  Decision regarding hospitalization  Disposition  Final diagnoses:   Bacteremia   Intractable low back pain     Time reflects when diagnosis was documented in both MDM as applicable and the Disposition within this note     Time User Action Codes Description Comment    1/18/2023  8:13 PM Harry Leon Add [R78 81] Bacteremia     1/18/2023  8:36 PM Paty De Santiago Add [M54 59] Intractable low back pain     1/18/2023 10:19 PM AndrésBharti parrish Add [F19 10] Intravenous drug abuse, continuous ([facility] )     1/18/2023 10:19 PM Andrés, Bharti Witt Add [B20] HIV infection, unspecified symptom status ([facility] )     1/18/2023 10:20 PM Andrés, Bharti Witt Add [B19 20] Hepatitis C virus infection without hepatic coma, unspecified chronicity       ED Disposition     ED Disposition   Admit    Condition   Stable    Date/Time   Wed Jan 18, 2023  9:04 PM    Comment   Case was discussed with CHETAN and the patient's admission status was agreed to be Admission Status: inpatient status to the service of Dr Burgess Hobbs             Follow-up Information    None         Current Discharge Medication List      CONTINUE these medications which have NOT CHANGED    Details   acetaminophen (TYLENOL) 500 mg tablet Take 500 mg by mouth every 6 (six) hours as needed      celecoxib (CeleBREX) 100 mg capsule Take 1 capsule (100 mg total) by mouth 2 (two) times a day  Qty: 60 capsule, Refills: 0    Associated Diagnoses: Degeneration of thoracic intervertebral disc      gabapentin (NEURONTIN) 300 mg capsule Take 300 mg by mouth 2 (two) times a day      ibuprofen (MOTRIN) 600 mg tablet take 1 tablet by mouth every 6 hours if needed for MILD PAIN ( PAIN SCALE 1-3 )      methocarbamol (Robaxin-750) 750 mg tablet Take 1 tablet (750 mg total) by mouth every 6 (six) hours as needed for muscle spasms for up to 20 doses  Qty: 20 tablet, Refills: 0    Comments: No drinking driving or heavy machinery use  Associated Diagnoses: Right shoulder tendonitis      predniSONE 10 mg tablet 4 tablets daily for 3 days, then 3 tablets daily for 3 days, then 2 tablets daily for 3 days, then 1 tablet daily for 3 days  Qty: 30 tablet, Refills: 0    Associated Diagnoses: Acute pain of right shoulder      pregabalin (LYRICA) 300 MG capsule Take 1 capsule (300 mg total) by mouth 2 (two) times a day  Qty: 60 capsule, Refills: 0    Associated Diagnoses: Degeneration of thoracic intervertebral disc      QUEtiapine (SEROquel) 100 mg tablet Take 1 tablet (100 mg total) by mouth daily at bedtime  Qty: 30 tablet, Refills: 0    Associated Diagnoses: Anxiety      Diclofenac Sodium (VOLTAREN) 1 % Apply 2 g topically 4 (four) times a day for 5 days  Qty: 40 g, Refills: 0    Associated Diagnoses: Right shoulder pain      EPINEPHrine (EPIPEN) 0 3 mg/0 3 mL SOAJ Inject 0 3 mL (0 3 mg total) into a muscle once for 1 dose  Qty: 2 each, Refills: 1    Associated Diagnoses: History of bee sting allergy      famotidine (PEPCID) 40 MG tablet Take 1 tablet (40 mg total) by mouth in the morning for 10 doses  Qty: 20 tablet, Refills: 0    Associated Diagnoses: Right shoulder tendonitis             No discharge procedures on file      PDMP Review       Value Time User    PDMP Reviewed  Yes 12/5/2022  5:14 PM Nitin Ochoa DO          ED Provider  Electronically Signed by           Mario Townsend PA-C  01/19/23 2012

## 2023-01-19 ENCOUNTER — TELEPHONE (OUTPATIENT)
Dept: FAMILY MEDICINE CLINIC | Facility: CLINIC | Age: 38
End: 2023-01-19

## 2023-01-19 ENCOUNTER — APPOINTMENT (OUTPATIENT)
Dept: NON INVASIVE DIAGNOSTICS | Facility: HOSPITAL | Age: 38
End: 2023-01-19

## 2023-01-19 ENCOUNTER — TELEPHONE (OUTPATIENT)
Dept: MRI IMAGING | Facility: HOSPITAL | Age: 38
End: 2023-01-19

## 2023-01-19 ENCOUNTER — APPOINTMENT (INPATIENT)
Dept: MRI IMAGING | Facility: HOSPITAL | Age: 38
End: 2023-01-19

## 2023-01-19 ENCOUNTER — APPOINTMENT (INPATIENT)
Dept: RADIOLOGY | Facility: HOSPITAL | Age: 38
End: 2023-01-19

## 2023-01-19 LAB
ALBUMIN SERPL BCP-MCNC: 3.7 G/DL (ref 3.5–5)
ALP SERPL-CCNC: 65 U/L (ref 34–104)
ALT SERPL W P-5'-P-CCNC: 38 U/L (ref 7–52)
ANION GAP SERPL CALCULATED.3IONS-SCNC: 9 MMOL/L (ref 4–13)
AORTIC ROOT: 2.9 CM
AST SERPL W P-5'-P-CCNC: 33 U/L (ref 13–39)
BASOPHILS # BLD AUTO: 0.03 THOUSANDS/ÂΜL (ref 0–0.1)
BASOPHILS NFR BLD AUTO: 0 % (ref 0–1)
BILIRUB SERPL-MCNC: 0.39 MG/DL (ref 0.2–1)
BUN SERPL-MCNC: 12 MG/DL (ref 5–25)
CALCIUM SERPL-MCNC: 8.5 MG/DL (ref 8.4–10.2)
CHLORIDE SERPL-SCNC: 102 MMOL/L (ref 96–108)
CO2 SERPL-SCNC: 26 MMOL/L (ref 21–32)
CREAT SERPL-MCNC: 0.81 MG/DL (ref 0.6–1.3)
EOSINOPHIL # BLD AUTO: 0.04 THOUSAND/ÂΜL (ref 0–0.61)
EOSINOPHIL NFR BLD AUTO: 0 % (ref 0–6)
ERYTHROCYTE [DISTWIDTH] IN BLOOD BY AUTOMATED COUNT: 13.7 % (ref 11.6–15.1)
FRACTIONAL SHORTENING: 37 % (ref 28–44)
GFR SERPL CREATININE-BSD FRML MDRD: 113 ML/MIN/1.73SQ M
GLUCOSE SERPL-MCNC: 104 MG/DL (ref 65–140)
HCT VFR BLD AUTO: 34.4 % (ref 36.5–49.3)
HGB BLD-MCNC: 11.4 G/DL (ref 12–17)
IMM GRANULOCYTES # BLD AUTO: 0.07 THOUSAND/UL (ref 0–0.2)
IMM GRANULOCYTES NFR BLD AUTO: 1 % (ref 0–2)
INR PPP: 1.06 (ref 0.84–1.19)
INTERVENTRICULAR SEPTUM IN DIASTOLE (PARASTERNAL SHORT AXIS VIEW): 1.5 CM
INTERVENTRICULAR SEPTUM: 1.5 CM (ref 0.6–1.1)
LEFT ATRIUM SIZE: 4.1 CM
LEFT INTERNAL DIMENSION IN SYSTOLE: 2.6 CM (ref 2.1–4)
LEFT VENTRICULAR INTERNAL DIMENSION IN DIASTOLE: 4.1 CM (ref 3.5–6)
LEFT VENTRICULAR POSTERIOR WALL IN END DIASTOLE: 1.4 CM
LEFT VENTRICULAR STROKE VOLUME: 49 ML
LVSV (TEICH): 49 ML
LYMPHOCYTES # BLD AUTO: 1.39 THOUSANDS/ÂΜL (ref 0.6–4.47)
LYMPHOCYTES NFR BLD AUTO: 11 % (ref 14–44)
MAGNESIUM SERPL-MCNC: 1.8 MG/DL (ref 1.9–2.7)
MCH RBC QN AUTO: 29.4 PG (ref 26.8–34.3)
MCHC RBC AUTO-ENTMCNC: 33.1 G/DL (ref 31.4–37.4)
MCV RBC AUTO: 89 FL (ref 82–98)
MONOCYTES # BLD AUTO: 1.54 THOUSAND/ÂΜL (ref 0.17–1.22)
MONOCYTES NFR BLD AUTO: 12 % (ref 4–12)
NEUTROPHILS # BLD AUTO: 9.59 THOUSANDS/ÂΜL (ref 1.85–7.62)
NEUTS SEG NFR BLD AUTO: 76 % (ref 43–75)
NRBC BLD AUTO-RTO: 0 /100 WBCS
PHOSPHATE SERPL-MCNC: 3 MG/DL (ref 2.7–4.5)
PLATELET # BLD AUTO: 307 THOUSANDS/UL (ref 149–390)
PMV BLD AUTO: 9.4 FL (ref 8.9–12.7)
POTASSIUM SERPL-SCNC: 4 MMOL/L (ref 3.5–5.3)
PROT SERPL-MCNC: 7.2 G/DL (ref 6.4–8.4)
PROTHROMBIN TIME: 14 SECONDS (ref 11.6–14.5)
RA PRESSURE ESTIMATED: 8 MMHG
RBC # BLD AUTO: 3.88 MILLION/UL (ref 3.88–5.62)
RV PSP: 39 MMHG
SL CV LV EF: 65
SL CV PED ECHO LEFT VENTRICLE DIASTOLIC VOLUME (MOD BIPLANE) 2D: 74 ML
SL CV PED ECHO LEFT VENTRICLE SYSTOLIC VOLUME (MOD BIPLANE) 2D: 25 ML
SODIUM SERPL-SCNC: 137 MMOL/L (ref 135–147)
TR MAX PG: 31 MMHG
TR PEAK VELOCITY: 2.8 M/S
TRICUSPID VALVE PEAK REGURGITATION VELOCITY: 2.81 M/S
WBC # BLD AUTO: 12.66 THOUSAND/UL (ref 4.31–10.16)

## 2023-01-19 RX ORDER — BUPRENORPHINE AND NALOXONE 2; .5 MG/1; MG/1
2 FILM, SOLUBLE BUCCAL; SUBLINGUAL
Status: DISCONTINUED | OUTPATIENT
Start: 2023-01-19 | End: 2023-01-19

## 2023-01-19 RX ORDER — ACETAMINOPHEN 325 MG/1
650 TABLET ORAL EVERY 6 HOURS SCHEDULED
Status: DISCONTINUED | OUTPATIENT
Start: 2023-01-19 | End: 2023-02-17 | Stop reason: HOSPADM

## 2023-01-19 RX ORDER — CLONIDINE HYDROCHLORIDE 0.1 MG/1
0.1 TABLET ORAL EVERY 8 HOURS SCHEDULED
Status: DISCONTINUED | OUTPATIENT
Start: 2023-01-19 | End: 2023-01-23

## 2023-01-19 RX ORDER — TRAZODONE HYDROCHLORIDE 50 MG/1
50 TABLET ORAL
Status: DISCONTINUED | OUTPATIENT
Start: 2023-01-19 | End: 2023-01-20

## 2023-01-19 RX ORDER — BUPRENORPHINE AND NALOXONE 2; .5 MG/1; MG/1
2 FILM, SOLUBLE BUCCAL; SUBLINGUAL
Status: COMPLETED | OUTPATIENT
Start: 2023-01-19 | End: 2023-01-20

## 2023-01-19 RX ADMIN — CLONIDINE HYDROCHLORIDE 0.1 MG: 0.1 TABLET ORAL at 21:22

## 2023-01-19 RX ADMIN — ACETAMINOPHEN 650 MG: 325 TABLET, FILM COATED ORAL at 11:25

## 2023-01-19 RX ADMIN — TEMAZEPAM 15 MG: 15 CAPSULE ORAL at 01:05

## 2023-01-19 RX ADMIN — BUPRENORPHINE AND NALOXONE 2 MG: 2; .5 FILM BUCCAL; SUBLINGUAL at 15:57

## 2023-01-19 RX ADMIN — GABAPENTIN 300 MG: 300 CAPSULE ORAL at 08:21

## 2023-01-19 RX ADMIN — ACETAMINOPHEN 650 MG: 325 TABLET, FILM COATED ORAL at 17:34

## 2023-01-19 RX ADMIN — CLONIDINE HYDROCHLORIDE 0.1 MG: 0.1 TABLET ORAL at 13:55

## 2023-01-19 RX ADMIN — BUPRENORPHINE AND NALOXONE 2 MG: 2; .5 FILM BUCCAL; SUBLINGUAL at 22:32

## 2023-01-19 RX ADMIN — ACETAMINOPHEN 650 MG: 325 TABLET, FILM COATED ORAL at 03:49

## 2023-01-19 RX ADMIN — KETOROLAC TROMETHAMINE 15 MG: 30 INJECTION, SOLUTION INTRAMUSCULAR; INTRAVENOUS at 11:25

## 2023-01-19 RX ADMIN — NICOTINE 14 MG: 14 PATCH, EXTENDED RELEASE TRANSDERMAL at 08:20

## 2023-01-19 RX ADMIN — VANCOMYCIN HYDROCHLORIDE 1500 MG: 5 INJECTION, POWDER, LYOPHILIZED, FOR SOLUTION INTRAVENOUS at 23:25

## 2023-01-19 RX ADMIN — KETOROLAC TROMETHAMINE 15 MG: 30 INJECTION, SOLUTION INTRAMUSCULAR; INTRAVENOUS at 17:34

## 2023-01-19 RX ADMIN — VANCOMYCIN HYDROCHLORIDE 1500 MG: 5 INJECTION, POWDER, LYOPHILIZED, FOR SOLUTION INTRAVENOUS at 11:23

## 2023-01-19 RX ADMIN — CEFEPIME HYDROCHLORIDE 2000 MG: 2 INJECTION, SOLUTION INTRAVENOUS at 08:21

## 2023-01-19 RX ADMIN — TRAZODONE HYDROCHLORIDE 50 MG: 50 TABLET ORAL at 21:22

## 2023-01-19 RX ADMIN — BUPRENORPHINE AND NALOXONE 2 MG: 2; .5 FILM BUCCAL; SUBLINGUAL at 18:00

## 2023-01-19 RX ADMIN — GABAPENTIN 300 MG: 300 CAPSULE ORAL at 17:34

## 2023-01-19 RX ADMIN — KETOROLAC TROMETHAMINE 15 MG: 30 INJECTION, SOLUTION INTRAMUSCULAR; INTRAVENOUS at 04:36

## 2023-01-19 RX ADMIN — KETOROLAC TROMETHAMINE 15 MG: 30 INJECTION, SOLUTION INTRAMUSCULAR; INTRAVENOUS at 23:54

## 2023-01-19 RX ADMIN — CYCLOBENZAPRINE 10 MG: 10 TABLET, FILM COATED ORAL at 13:08

## 2023-01-19 RX ADMIN — BUPRENORPHINE AND NALOXONE 2 MG: 2; .5 FILM BUCCAL; SUBLINGUAL at 20:10

## 2023-01-19 RX ADMIN — BUPRENORPHINE AND NALOXONE 2 MG: 2; .5 FILM BUCCAL; SUBLINGUAL at 13:55

## 2023-01-19 RX ADMIN — ACETAMINOPHEN 650 MG: 325 TABLET, FILM COATED ORAL at 23:54

## 2023-01-19 NOTE — ASSESSMENT & PLAN NOTE
H/o right sided back pain with sciatica and hip pain  CT 1/16/23 shows:  1  Mild multilevel disc degeneration  2  Grade 1 retrolisthesis of L5 on S1    3  Disc bulge with small herniation at L5-S1  Disc bulging from L2-3 through   L4-5    4  No evidence of periostitis along the endplates to suggest advanced   osteomyelitis on CT imaging  If there is clinical concern for underlying   infection, MRI would be a more sensitive modality to assess for early changes of   discitis osteomyelitis      Pain rated 9/10  Patient unable to ambulate without cane  Plan:  Scheduled Tylenol 650mg Q6  PRN Toradol for moderate/severe pain  Lidocaine patch  Follow-up right hip xray  Follow-up MRI of lumbar and sacroiliac joints  Hold PT/OT today until x-ray results

## 2023-01-19 NOTE — PHYSICAL THERAPY NOTE
Physical Therapy Cancellation Note    PT order received  Chart review performed  At this time, PT evaluation cancelled as patient pending spinal MRI  PT will follow and evaluate as appropriate         01/19/23 8652   Note Type   Note type Cancelled Session   Cancel Reasons Medical status         Alycia Hart, PT

## 2023-01-19 NOTE — ASSESSMENT & PLAN NOTE
Using fentanyl  Last use -1 bag of fentanyl at 12pm on 1/18/23  Currently no withdrawal symptoms  Current COWS score = 0   Pulse <80 = 0   Sweating none = 0   Restlessness = 0   Pupils small = 0   Bone ache (unable to assess due to septic joint)   Runny nose not present = 0   GI upset none = 0   Tremor none = 0   Yawning no = 0   Anxiety none = 0   Gooseflesh none = 0  Plan:  Zofran for nausea  When COWS score 10 - start Suboxone 2mg every 2 hrs until withdrawal symptoms subside

## 2023-01-19 NOTE — ASSESSMENT & PLAN NOTE
Ambulatory dysfunction due to pain  Patient utilizing a cane to ambulate  Pain rated 9 out of 10  Plan:  Schedule Tylenol 650mg Q6  PT/OT

## 2023-01-19 NOTE — PLAN OF CARE
Problem: PAIN - ADULT  Goal: Verbalizes/displays adequate comfort level or baseline comfort level  Description: Interventions:  - Encourage patient to monitor pain and request assistance  - Assess pain using appropriate pain scale  - Administer analgesics based on type and severity of pain and evaluate response  - Implement non-pharmacological measures as appropriate and evaluate response  - Consider cultural and social influences on pain and pain management  - Notify physician/advanced practitioner if interventions unsuccessful or patient reports new pain  Outcome: Progressing     Problem: INFECTION - ADULT  Goal: Absence or prevention of progression during hospitalization  Description: INTERVENTIONS:  - Assess and monitor for signs and symptoms of infection  - Monitor lab/diagnostic results  - Monitor all insertion sites, i e  indwelling lines, tubes, and drains  - Monitor endotracheal if appropriate and nasal secretions for changes in amount and color  - [place] appropriate cooling/warming therapies per order  - Administer medications as ordered  - Instruct and encourage patient and family to use good hand hygiene technique  - Identify and instruct in appropriate isolation precautions for identified infection/condition  Outcome: Progressing  Goal: Absence of fever/infection during neutropenic period  Description: INTERVENTIONS:  - Monitor WBC    Outcome: Progressing     Problem: MUSCULOSKELETAL - ADULT  Goal: Maintain or return mobility to safest level of function  Description: INTERVENTIONS:  - Assess patient's ability to carry out ADLs; assess patient's baseline for ADL function and identify physical deficits which impact ability to perform ADLs (bathing, care of mouth/teeth, toileting, grooming, dressing, etc )  - Assess/evaluate cause of self-care deficits   - Assess range of motion  - Assess patient's mobility  - Assess patient's need for assistive devices and provide as appropriate  - Encourage maximum independence but intervene and supervise when necessary  - Involve family in performance of ADLs  - Assess for home care needs following discharge   - Consider OT consult to assist with ADL evaluation and planning for discharge  - Provide patient education as appropriate  Outcome: Progressing  Goal: Maintain proper alignment of affected body part  Description: INTERVENTIONS:  - Support, maintain and protect limb and body alignment  - Provide patient/ family with appropriate education  Outcome: Progressing

## 2023-01-19 NOTE — ASSESSMENT & PLAN NOTE
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  Last use - IV,1 bag of fentanyl, at 12pm on 1/18/23  Currently no withdrawal symptoms  Current COWS score = 0   Pulse <80 = 0   Sweating none = 0   Restlessness = 0   Pupils small = 0   Bone ache (unable to assess due to septic joint)   Runny nose not present = 0   GI upset none = 0   Tremor none = 0   Yawning no = 0   Anxiety none = 0   Gooseflesh none = 0    Patient had appointment yesterday with Dr Parish Westbrook ([phone]) in  [place] to start Suboxone  Patient missed appointment due to ER visit  I left a message with Dr Parish Westbrook to call me back to discuss the plan below      Plan:  When COWS score 10 - start Suboxone 2mg every 2 hrs until withdrawal symptoms subside  Zofran for nausea  Imodium for diarrhea

## 2023-01-19 NOTE — PROGRESS NOTES
[facility] [address] [place]  Progress Note - Derrek Kerns 1985, 41 y o  male MRN: [id number]  Unit/Bed#: [facility] [place] Encounter: [id number]  Primary Care Provider: Jolene Peguero DO   Date and time admitted to hospital: 1/18/2023  5:14 PM    * Bacteremia due to Staphylococcus aureus  Assessment & Plan  Patient recently admitted to [facility] for back pain  1/16/2023 blood cultures positive for staph aureus  Plan:  Continue IV vancomycin  Discontinue IV cefepime  Consult ID  Monitor WBC, Vitals    Acute right-sided low back pain with right-sided sciatica  Assessment & Plan  H/o right side back pain with sciatica and hip pain  CT 1/16/23 shows:  1  Mild multilevel disc degeneration  2  Grade 1 retrolisthesis of L5 on S1    3  Disc bulge with small herniation at L5-S1  Disc bulging from L2-3 through   L4-5    4  No evidence of periostitis along the endplates to suggest advanced   osteomyelitis on CT imaging  If there is clinical concern for underlying   infection, MRI would be a more sensitive modality to assess for early changes of   discitis osteomyelitis      Pain rated 9/10  Patient unable to ambulate without cane  Plan:  Scheduled Tylenol 650mg Q6  PRN Toradol for moderate/severe pain  Follow-up right hip xray  Hold PT/OT today until x-ray results      Intravenous drug abuse, continuous ([facility] [address] )  Assessment & Plan  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  Last use - IV,1 bag of fentanyl, at 12pm on 1/18/23  Currently no withdrawal symptoms  Current COWS score = 0   Pulse <80 = 0   Sweating none = 0   Restlessness = 0   Pupils small = 0   Bone ache (unable to assess due to septic joint)   Runny nose not present = 0   GI upset none = 0   Tremor none = 0   Yawning no = 0   Anxiety none = 0   Gooseflesh none = 0    Patient had appointment yesterday with Dr Raúl Lion ([phone]) in  [place] to re-start Suboxone  Patient missed appointment due to ER visit   I spoke with Dr Raúl Lion this morning, he agrees with the plan below  Plan:  When COWS score reaches 15 - start Suboxone 2mg every 2 hrs with a max dose of 16 mg in 24 hrs  If patient feels worse after initial 2mg dose, hold Suboxone and resume tomorrow a m  Goal is to administer 8mg BID  Zofran for nausea  Imodium for diarrhea  Catapres for BP control  Trazodone for Sleep  Follow up with Dr Anurag Bryan as outpatient    Tobacco abuse  Assessment & Plan  History of tobacco smoking  Plan:  Nicotine patch  Smoking cessation    Ambulatory dysfunction  Assessment & Plan  Ambulatory dysfunction due to pain  Patient utilizing a cane to ambulate  Pain rated 9 out of 10  Plan:  Schedule Tylenol 650mg Q6  PT/OT    HIV (human immunodeficiency virus infection) ([facility] [address] )  Assessment & Plan  HIV antibody negative on 3/8/2022  Not taking HIV meds  Plan:  Consult ID    Heroin abuse [facility])  Assessment & Plan  See ' IV drug abuse' for plan    Hepatitis C virus  Assessment & Plan  Most recent labs 3/7/2022  Positive Hep A  Positive Hep B surface antibody  Positive Hep C antibody  Not taking hepatitis meds  Plan:  Hepatitis panel  Consult ID        VTE Pharmacologic Prophylaxis: VTE Score: 1 Moderate Risk (Score 3-4) - Pharmacological DVT Prophylaxis Ordered: enoxaparin (Lovenox)  Patient Centered Rounds: I performed bedside rounds with nursing staff today  Discussions with Specialists or Other Care Team Provider: ID      Time Spent for Care: 30 minutes  More than 50% of total time spent on counseling and coordination of care as described above  Current Length of Stay: 1 day(s)  Current Patient Status: Inpatient   Certification Statement: The patient will continue to require additional inpatient hospital stay due to IV antibitocs  Discharge Plan: Anticipate discharge in 24-48 hrs to home  Code Status: Level 1 - Full Code    Subjective:   Patient seen at bedside this morning, laying in bed comfortably  Patient elicits having right hip pain, rated 9 out of 10    Patient states that he had pain overnight and that the pain meds he received did not help  Patient is a IV drug user, currently not experiencing withdrawal symptoms, denies abdominal pain  Objective:     Vitals:   Temp (24hrs), Av 7 °F (37 1 °C), Min:97 5 °F (36 4 °C), Max:100 °F (37 8 °C)    Temp:  [97 5 °F (36 4 °C)-100 °F (37 8 °C)] 97 5 °F (36 4 °C)  HR:  [] 71  Resp:  [16-17] 17  BP: (126-134)/(69-99) 126/99  SpO2:  [96 %-99 %] 99 %  Body mass index is 27 72 kg/m²  Input and Output Summary (last 24 hours): Intake/Output Summary (Last 24 hours) at 2023 1239  Last data filed at 2023 1231  Gross per 24 hour   Intake 2245 ml   Output 2225 ml   Net 20 ml       Physical Exam:   Physical Exam  Constitutional:       General: He is not in acute distress  Appearance: He is normal weight  He is not ill-appearing  Comments: Somnolent  Verbally arousable   HENT:      Head: Atraumatic  Right Ear: External ear normal       Left Ear: External ear normal       Nose: No rhinorrhea  Mouth/Throat:      Mouth: Mucous membranes are moist    Eyes:      General:         Right eye: No discharge  Left eye: No discharge  Cardiovascular:      Rate and Rhythm: Normal rate and regular rhythm  Pulmonary:      Effort: Pulmonary effort is normal    Abdominal:      General: There is no distension  Palpations: Abdomen is soft  Musculoskeletal:        Arms:       Cervical back: Normal range of motion  Right lower leg: No edema  Left lower leg: No edema  Skin:     General: Skin is warm and dry  Findings: Bruising present  No rash  Comments: Injection spots over inner arm crease   Neurological:      General: No focal deficit present     Psychiatric:         Mood and Affect: Mood normal          Behavior: Behavior normal          Additional Data:     Labs:  Results from last 7 days   Lab Units 23  0445   WBC Thousand/uL 12 66*   HEMOGLOBIN g/dL 11 4* HEMATOCRIT % 34 4*   PLATELETS Thousands/uL 307   NEUTROS PCT % 76*   LYMPHS PCT % 11*   MONOS PCT % 12   EOS PCT % 0     Results from last 7 days   Lab Units 01/19/23  0445   SODIUM mmol/L 137   POTASSIUM mmol/L 4 0   CHLORIDE mmol/L 102   CO2 mmol/L 26   BUN mg/dL 12   CREATININE mg/dL 0 81   ANION GAP mmol/L 9   CALCIUM mg/dL 8 5   ALBUMIN g/dL 3 7   TOTAL BILIRUBIN mg/dL 0 39   ALK PHOS U/L 65   ALT U/L 38   AST U/L 33   GLUCOSE RANDOM mg/dL 104     Results from last 7 days   Lab Units 01/19/23  0445   INR  1 06             Results from last 7 days   Lab Units 01/18/23  1926   LACTIC ACID mmol/L 1 8   PROCALCITONIN ng/ml 5 24*       Lines/Drains:  Invasive Devices     Peripheral Intravenous Line  Duration           Peripheral IV 01/18/23 Right Antecubital <1 day                      Imaging: Reviewed radiology reports from this admission including: Lumbar CT    Recent Cultures (last 7 days):   Results from last 7 days   Lab Units 01/18/23  1926   BLOOD CULTURE  Received in Microbiology Lab  Culture in Progress  Received in Microbiology Lab  Culture in Progress         Last 24 Hours Medication List:   Current Facility-Administered Medications   Medication Dose Route Frequency Provider Last Rate   • acetaminophen  650 mg Oral Q6H PRN Savita Catherine MD     • cyclobenzaprine  10 mg Oral TID PRN Savita Catherine MD     • enoxaparin  40 mg Subcutaneous Daily Savita Catherine MD     • gabapentin  300 mg Oral BID Savita Catherine MD     • ketorolac  15 mg Intravenous Q6H PRN Savita Catherine MD     • nicotine  14 mg Transdermal Daily Savita Catherine MD     • ondansetron  4 mg Intravenous Q6H PRN Savita Catherine MD     • QUEtiapine  100 mg Oral HS Savita Catherine MD     • temazepam  15 mg Oral HS PRN Savita Catherine MD     • vancomycin  1,500 mg Intravenous Q12H [facility] brennan, DO 1,500 mg (01/19/23 1123)        Today, Patient Was Seen By: Keren Frank MD    **Please Note: This note may have been constructed using a voice recognition system  **

## 2023-01-19 NOTE — ASSESSMENT & PLAN NOTE
Patient recently admitted to [facility] for back pain  1/16/2023 blood cultures positive for staph aureus  Plan:  Continue IV vancomycin  Discontinue IV cefepime  Consult ID  Monitor WBC, Vitals

## 2023-01-19 NOTE — UTILIZATION REVIEW
Initial Clinical Review    Admission: Date/Time/Statement:   Admission Orders (From admission, onward)     Ordered        01/18/23 2115  INPATIENT ADMISSION  Once                      Orders Placed This Encounter   Procedures   • INPATIENT ADMISSION     Standing Status:   Standing     Number of Occurrences:   1     Order Specific Question:   Level of Care     Answer:   Med Surg [16]     Order Specific Question:   Estimated length of stay     Answer:   More than 2 Midnights     Order Specific Question:   Certification     Answer:   I certify that inpatient services are medically necessary for this patient for a duration of greater than two midnights  See H&P and MD Progress Notes for additional information about the patient's course of treatment  ED Arrival Information     Expected   -    Arrival   1/18/2023 17:11    Acuity   Urgent            Means of arrival   Wheelchair    Escorted by   Family Member    Service   Hospitalist    Admission type   Emergency            Arrival complaint   leg pain           Chief Complaint   Patient presents with   • Leg Pain     Patient was seen at [facility] today due to his sciatica pain  Patient also had a positive blood culture and was to be admitted but stated he wanted to be transferred here instead  Patient states he had a full work up in the ED today and repeat blood cultures  Pain 9/10 at this time  Last received pain medication a few hours ago  Patient also requesting to be put back on suboxone  Initial Presentation: 41 y o  male presents to ed from home for evaluation and treatment of hip pain 9/10 and positive blood culture  Evaluated at outside [facility] hospital on 1-16 for back and RLE pain and found to have blood cultures that grew staph aureus  He was admitted and TTE negative  signed out AMA and came here to be closer to my family  Request to resume suboxone    PMHX: IV DRUG USE, HEP C, HIV Clinical assessment significant for pain 10/10,ht rate  and temp 100  Initially treated in ed with iv  9% ns bolus,iv toradol x1, po robaxin x1, lidoderm patch, po tylenol and iv cefepime  Admit to inpatient med surg for bacteremia complicated by current  iv drug use  Plan to continue iv vancomycin, iv  9% ns 100/hr and consult infectious disease, analgesia for right sciatic pain and low back pain  Date:  1- 19-23   Day 2: inpatient med surg    Procalcitonin 5 24, WBC 12 66  Patient reports pain 9/10 treated with lidoderm patch, iv toradol prn (2231 / 0436, 1125) and tylenol po ( 0349 , 1125)  Attempting to confirm suboxone dosage  Infectious disease consult completed with recommendation for MRI of L spine /SI joints to rule out osteomyelitis  Recheck TTE  Collect ESR  Screen for HIV  Collect hepatitis C PCR  Continue iv vancomycin  Date: 1-20-23 inpatient med surg    Continue iv vancomycin  Plan for xray of bilateral hips and pelvis, MRI of right shoulder, MRI of lumbar spine, CT abdomen and pelvis  Echo shows left ventricular wall thickness increased  EF 65%  ED Triage Vitals   01/18/23 1734 01/18/23 1734 01/18/23 1734 01/18/23 1734 01/18/23 1734   97 8 °F (36 6 °C) 104 16 134/75 97 %      Temporal Monitor         10 - Worst Possible Pain          01/18/23 92 7 kg (204 lb 5 9 oz)     Additional Vital Signs:     Date/Time Temp Pulse Resp BP MAP (mmHg) SpO2 O2 Device   01/19/23 0700 97 5 °F (36 4 °C) 71 17 126/99 110 99 % --   01/18/23 2152 99 4 °F (37 4 °C) 86 16 134/69 93 98 % None (Room air)   01/18/23 2145 -- -- -- -- -- 97 % None (Room air)   01/18/23 2038 100 °F (37 8 °C) -- -- -- -- -- --   01/18/23 1937 -- 98 16 -- -- 96 % None (Room air)   01/18/23 1734 97 8 °F (36 6 °C) 104 16 134/75 -- 97 % None (Room air)             Pertinent Labs/Diagnostic Test Results:     LVH lab results  Pt had labs done at hospital 1-16-23, blood culture showed gram positive coccyx  Clusters   Sed 48,  8     ECHO  1-19  •  Left Ventricle: Left ventricular cavity size is normal  Wall thickness is increased  There is mild concentric hypertrophy  The left ventricular ejection fraction is 65%  Systolic function is normal  Wall motion is normal  Diastolic function is normal   •  Left Atrium: The atrium is mildly dilated  •  Mitral Valve: Cannot exclude a small vegetation of the tip and the anterior leaflet of the MV  No MR is seen  The mitral valve has normal structure and normal function  •  Tricuspid Valve:  The estimated right ventricular systolic pressure is 72 40 mmHg          Results from last 7 days   Lab Units 01/18/23 1926   SARS-COV-2  Negative     Results from last 7 days   Lab Units 01/19/23 0445 01/18/23 1926   WBC Thousand/uL 12 66* 11 19*   HEMOGLOBIN g/dL 11 4* 11 8*   HEMATOCRIT % 34 4* 37 0   PLATELETS Thousands/uL 307 331   NEUTROS ABS Thousands/µL 9 59* 8 69*         Results from last 7 days   Lab Units 01/19/23 0445 01/18/23 1926   SODIUM mmol/L 137 135   POTASSIUM mmol/L 4 0 4 6   CHLORIDE mmol/L 102 100   CO2 mmol/L 26 28   ANION GAP mmol/L 9 7   BUN mg/dL 12 18   CREATININE mg/dL 0 81 0 79   EGFR ml/min/1 73sq m 113 114   CALCIUM mg/dL 8 5 8 8   MAGNESIUM mg/dL 1 8*  --    PHOSPHORUS mg/dL 3 0  --      Results from last 7 days   Lab Units 01/19/23 0445 01/18/23 1926   AST U/L 33 59*   ALT U/L 38 44   ALK PHOS U/L 65 61   TOTAL PROTEIN g/dL 7 2 7 5   ALBUMIN g/dL 3 7 3 8   TOTAL BILIRUBIN mg/dL 0 39 0 33         Results from last 7 days   Lab Units 01/19/23 0445 01/18/23 1926   GLUCOSE RANDOM mg/dL 104 93         Results from last 7 days   Lab Units 01/19/23 0445 01/18/23 1926   PROTIME seconds 14 0 13 5   INR  1 06 1 01   PTT seconds  --  30         Results from last 7 days   Lab Units 01/18/23 1926   PROCALCITONIN ng/ml 5 24*     Results from last 7 days   Lab Units 01/18/23  1926   LACTIC ACID mmol/L 1 8         Results from last 7 days   Lab Units 01/18/23 1926   INFLUENZA A PCR  Negative   INFLUENZA B PCR  Negative   RSV PCR  Negative Results from last 7 days   Lab Units 01/18/23 1926   BLOOD CULTURE  Received in Microbiology Lab  Culture in Progress  Received in Microbiology Lab  Culture in Progress           ED Treatment:   Medication Administration from 01/18/2023 1710 to 01/18/2023 2150       Date/Time Order Dose Route Action     01/18/2023 1928 EST sodium chloride 0 9 % bolus 1,000 mL 1,000 mL Intravenous New Bag     01/18/2023 1928 EST ketorolac (TORADOL) injection 15 mg 15 mg Intravenous Given     01/18/2023 1927 EST methocarbamol (ROBAXIN) tablet 500 mg 500 mg Oral Given     01/18/2023 1924 EST lidocaine (LIDODERM) 5 % patch 1 patch 1 patch Topical Medication Applied     01/18/2023 1927 EST acetaminophen (TYLENOL) tablet 975 mg 975 mg Oral Given     01/18/2023 2045 EST cefepime (MAXIPIME) IVPB (premix in dextrose) 2,000 mg 50 mL 2,000 mg Intravenous New Bag        Past Medical History:   Diagnosis Date   • Addiction to drug [facility])    • Anxiety    • Arthritis    • Chronic pain    • Hepatitis    • Hepatitis C virus    • [facility]in abuse ([facility] ) 5/2/2018   • HIV (human immunodeficiency virus infection) ([facility] )    • Moderate episode of recurrent major depressive disorder ([facility] ) 2/5/2020   • Tobacco use disorder     last assessed 11/2/15      Present on Admission:  • Bacteremia due to Staphylococcus aureus  • Intravenous drug abuse, continuous ([facility] [address] )  • Heroin abuse ([facility] )  • Hepatitis C virus  • HIV (human immunodeficiency virus infection) ([facility] )  • Ambulatory dysfunction  • Acute right-sided low back pain with right-sided sciatica  • Tobacco abuse      Admitting Diagnosis:     Bacteremia [R78 81]  Leg pain [M79 606]  Intractable low back pain [M54 59]    Age/Sex: 41 y o  male    Scheduled Medications:    enoxaparin, 40 mg, Subcutaneous, Daily  gabapentin, 300 mg, Oral, BID  nicotine, 14 mg, Transdermal, Daily  QUEtiapine, 100 mg, Oral, HS  vancomycin, 1,500 mg, Intravenous, Q12H      Continuous IV Infusions:     PRN Meds:  acetaminophen, 650 mg, Oral, Q6H PRN  cyclobenzaprine, 10 mg, Oral, TID PRN  ketorolac, 15 mg, Intravenous, Q6H PRN  ondansetron, 4 mg, Intravenous, Q6H PRN  temazepam, 15 mg, Oral, HS PRN        IP CONSULT TO INFECTIOUS DISEASES  IP CONSULT TO PHARMACY    Network Utilization Review Department  ATTENTION: Please call with any questions or concerns to [phone] and carefully listen to the prompts so that you are directed to the right person  All voicemails are confidential   [place] all requests for admission clinical reviews, approved or denied determinations and any other requests to dedicated fax number below belonging to the campus where the patient is receiving treatment   List of dedicated fax numbers for the Facilities:  [address] DENIALS (Administrative/Medical Necessity) [phone]   [address] (Maternity/NICU/Pediatrics) [phone]   [address] Aileen Hinojosa [phone]   [facility] [phone]   [id number] [place] [address] 28 [phone]   [address] [facility] 815 [facility] [phone]

## 2023-01-19 NOTE — CASE MANAGEMENT
Case Management Assessment & Discharge Planning Note    Patient name Faiza Boyle  Location [address]/ MRN [id number]  : 1985 Date 2023       Current Admission Date: 2023  Current Admission Diagnosis:Bacteremia due to Staphylococcus aureus   Patient Active Problem List    Diagnosis Date Noted   • Bacteremia due to Staphylococcus aureus 2023   • Intravenous [facility] abuse, continuous ([facility] ) 2023   • HIV (human immunodeficiency virus infection) ([facility] ) 2023   • Ambulatory dysfunction 2023   • Acute right-sided low back pain with right-sided sciatica 2023   • Tobacco abuse 2023   • Acute pain of right shoulder 2022   • Bilateral groin pain 2022   • Opiate overdose ([facility] ) 2021   • Moderate episode of recurrent major depressive disorder ([facility] ) 2020   • Drug dependence ([facility] ) 2018   • Heroin abuse ([facility] ) 2018   • Encounter for monitoring Suboxone maintenance therapy 2018   • Drug therapy continued 2018   • Degeneration of thoracic intervertebral disc 2018   • Degeneration of intervertebral disc of thoracic region 2017   • Myofascial pain 2017   • Chronic midline thoracic back pain 2016   • Anxiety 2015   • Hepatitis C virus 2015      LOS (days): 1  Geometric Mean LOS (GMLOS) (days): 2 80  Days to GMLOS:2     OBJECTIVE:    Risk of Unplanned Readmission Score: 11 38         Current admission status: Inpatient       Preferred Pharmacy:   [address] [place] [id number]  Phone: [phone] Fax: [phone]    Primary Care Provider: Kanwal Silver DO    Primary Insurance: Sue Fitzgerald  Secondary Insurance:     ASSESSMENT:  Jennifer 26 Proxies    There are no active Health Care Proxies on file                   Readmission Root Cause  30 Day Readmission: No    Patient Information  Admitted from[de-identified] Home  Mental Status: Alert, Other (Comment) (Pt very lethargic answering questions with eyes closed )  During Assessment patient was accompanied by: Not accompanied during assessment  Assessment information provided by[de-identified] Patient  Primary Caregiver: Self  Support Systems: Spouse/significant other  [place] of Residence: One [facility] options   Select all that apply : Stairs  Number of steps to enter home :  (Pt has 40 stairs on the outside )  Do the steps have railings?: Yes  Type of Current Residence: 3 story home  Upon entering residence, is there a bedroom on the main floor (no further steps)?: No  A bedroom is located on the following floor levels of residence (select all that apply):: 3rd Floor  Upon entering residence, is there a bathroom on the main floor (no further steps)?: No  Indicate which floors of current residence have a bathroom (select all the apply):: 2nd Floor, 3rd Floor  Number of steps to 2nd floor from main floor: One Flight  Number of steps to 3rd floor from main floor: Two Flights  In the last 12 months, was there a time when you were not able to pay the mortgage or rent on time?: No  In the last 12 months, how many places have you lived?: 1  In the last 12 months, was there a time when you did not have a steady place to sleep or slept in a shelter (including now)?: No  Homeless/housing insecurity resource given?: N/A  Living Arrangements: Lives w/ Spouse/significant other  Is patient a ?: No    Activities of Daily Living Prior to Admission  Functional Status: Independent  Completes ADLs independently?: Yes  Ambulates independently?: Yes  Does patient use assisted devices?: No  Does patient currently own DME?: No  Does patient have a history of Outpatient Therapy (PT/OT)?: No  Does the patient have a history of Short-Term Rehab?: No  Does patient have a history of HHC?: No  Does patient currently have [place] [address]?: No         Patient Information Continued  Income Source: Unemployed  Does patient have prescription coverage?: Yes  Within the past 12 months, you worried that your food would run out before you got the money to buy more : Never true  Within the past 12 months, the food you bought just didn't last and you didn't have money to get more : Never true  Food insecurity resource given?: N/A  Does patient receive dialysis treatments?: No  Does patient have a history of substance abuse?: Yes, Currently using  Current substance use preference: Fentanyl, Methamphetamines  Historical substance use preference: Methamphetamines, Fentanyl  History of Withdrawal Symptoms: Denies past symptoms  Is patient currently in treatment for substance abuse?: No  Patient declined treatment information  Does patient have a history of Mental Health Diagnosis?: No         Means of Transportation  Means of Transport to Appts[de-identified] Drives Self (Pt and his SO both drive )  In the past 12 months, has lack of transportation kept you from medical appointments or from getting medications?: No  In the past 12 months, has lack of transportation kept you from meetings, work, or from getting things needed for daily living?: No  Was application for public transport provided?: N/A        DISCHARGE DETAILS:    Discharge planning discussed with[de-identified] Pt  Freedom of Choice: Yes     CM contacted family/caregiver?: No- see comments (Pt is alert and oriented x3 )      I will continue to follow for any Case Management needs

## 2023-01-19 NOTE — CONSULTS
REQUIRED DOCUMENTATION:     1  This service was provided via Telemedicine  2  Provider located at [facility]  3  TeleMed provider: Baljeet Hilliard MD   4  Identify all parties in room with patient during tele consult:  Patient only  5  After connecting through enEvolv, patient was identified by name and date of birth and assistant checked wristband  Patient was then informed that this was a Telemedicine visit and that the exam was being conducted confidentially over secure lines  My office door was closed  No one else was in the room  Patient acknowledged consent and understanding of privacy and security of the Telemedicine visit, and gave us permission to have the assistant stay in the room in order to assist with the history and to conduct the exam   I informed the patient that I have reviewed their record in Epic and presented the opportunity for them to ask any questions regarding the visit today  The patient agreed to participate  TeleConsultation - Infectious Disease   Bianca Trammell 41 y o  male MRN: [id number]  Unit/Bed#: [facility] Encounter: [id number]      IMPRESSION & RECOMMENDATIONS:     1  Staph aureus bacteremia  1/16 blood cultures positive from [facility], growing staph aureus  Likely MSSA based on BCID, but cultures in process  Likely due to IVDU  In the setting of staph aureus bacteremia and change in character of chronic lower back pain, consider osteomyelitis/discitis  CT lumbar spine at [facility] showed DJD but no evidence of OM  Patient is afebrile, with mild leukocytosis today  He is hemodynamically stable    -for now, continue IV Vancomycin, dosing by pharmacy   -follow up OSH and admission blood cultures here   -recheck TTE   -check MRI L spine and SI joints with contrast to evaluate for OM   -monitor WBC count, fever curve    2  Lower back pain  In setting of staph aureus bacteremia, need to consider seeding of the spine as above   CT lumbar spine shows DJD, no obvious osseous destruction  CRP elevated   -check MRI L spine/SI joints   -pain management per primary   -check ESR    3  Opiate use disorder with IVDU  Patient initiated on Suboxone  Recently used fentanyl prior to admission  He has a history of hepatitis C status post treatment  There is documentation of a history of HIV, but he has several prior negative tests in the chart, last in March 2022    -screen for HIV given ongoing drug use; if negative please remove this diagnosis from the chart   -suboxone, management of withdrawal per primary    4  History of Hepatitis C  Reports treatment with Tiffany Passtrav in the summer 2022 but no documentation of SVR  -check Hepatitis C PCR    Extensive review of the medical records in [facility] was performed including review of the notes, radiographs, and laboratory results  [facility] from [facility] in [facility]    Discussed the above management plan in detail with the primary service and patient  ID will follow  I spent 50 minutes in evaluation of the patient of which 35 minutes was in counseling/coordination of care    HISTORY OF PRESENT ILLNESS:  Reason for Consult: staph aureus bacteremia  HPI: Marielena Camara is a 41 y o  man with a history of opiate use disorder with IVDU, Hepatitis C status post treatment with Mavy[facility], tobacco abuse, DJD of the spine who presented to the [address] [facility] ED 1/18/2023 to continue treatment for staph aureus bacteremia  The patient was previously seen in the [address] [place] [address] due to severe right lower back pain shooting down the entire right leg to the foot     CT of the lumbar spine with contrast showed chronic degenerative changes, no evidence of osteomyelitis  He was discharged but called back to the ED on 1/18/2023 due to a positive blood culture growing staph aureus (MEC A/C negative on BCID)  He was admitted, started on antibiotics, and a TTE was performed which did not show any valvular vegetations    The patient signed out AMA and then presented to monitor to be readmitted to be closer to his family  The patient does endorse a history of intravenous drug use and used IV fentanyl the day prior to coming to the hospital   Usually injects into his arm, he does reuse needle but does not share needles  He has a history of hepatitis C and reports completing treatment with Mavy[facility] over the summer; SVR was not confirmed  There is documentation of an HIV diagnosis in his chart but the patient denied this and lab review shows multiple negative HIV tests, last in March 2020  The patient denies fever, chills, cough, shortness of breath, chest pain at home  He has no skin abscesses present  The patient reports that he has chronic back pain, but that his pain is currently different than his usual pain  On presentation to the [address] [facility] ED, temperature was 100 and white blood cell count was 11 19  He was started on vancomycin and cefepime,  and is now only receiving vancomycin  REVIEW OF SYSTEMS:  A complete 12 point system-based review of systems is negative other than that noted in the HPI      PAST MEDICAL HISTORY:  Past Medical History:   Diagnosis Date   • Addiction to drug [facility])    • Anxiety    • Arthritis    • Chronic pain    • Hepatitis    • Hepatitis C virus    • Heroin abuse ([facility] ) 5/2/2018   • HIV (human immunodeficiency virus infection) ([facility] )    • Moderate episode of recurrent major depressive disorder [facility] ) 2/5/2020   • Tobacco use disorder     last assessed 11/2/15      Past Surgical History:   Procedure Laterality Date   • EPIDURAL BLOCK INJECTION Bilateral 4/19/2018    Procedure: T9-10 INTERLAMINAR EPIDURAL STEROID INJECTION;  Surgeon: Harleen Salgado MD;  Location: [facility];  Service: Pain Management        FAMILY HISTORY:  Non-contributory    SOCIAL HISTORY:  Social History   Social History     Substance and Sexual Activity   Alcohol Use Yes    Comment: occasional / social per allscript      Social History Substance and Sexual Activity   Drug Use Not Currently   • Types: Heroin, Prescription, Marijuana    Comment: past opiate abuser / intravenous drug use per allscript                    Social History     Tobacco Use   Smoking Status Every Day   • Packs/day: 1 00   • Types: Cigarettes   Smokeless Tobacco Former   Tobacco Comments    current every day smoker per allscript        ALLERGIES:  Allergies   Allergen Reactions   • Clindamycin/Lincomycin Hives       MEDICATIONS:  All current active medications have been reviewed  PHYSICAL EXAM:  Temp:  [97 5 °F (36 4 °C)-100 °F (37 8 °C)] 97 5 °F (36 4 °C)  HR:  [] 80  Resp:  [16-17] 17  BP: (126-134)/(69-99) 134/69  SpO2:  [96 %-99 %] 99 %  Temp (24hrs), Av 7 °F (37 1 °C), Min:97 5 °F (36 4 °C), Max:100 °F (37 8 °C)  Current: Temperature: 97 5 °F (36 4 °C)    Intake/Output Summary (Last 24 hours) at 2023 1522  Last data filed at 2023 1324  Gross per 24 hour   Intake 2485 ml   Output 2725 ml   Net -240 ml       Documented physical exam has been primarily done by the patient's nurse and/or the primary service due to limited examination abilities on telemedicine    General Appearance:  Appearing well, nontoxic, and in no distress   Head:  Normocephalic, without obvious abnormality, atraumatic   Eyes:  Conjunctiva pink and sclera anicteric, both eyes   Nose: Nares normal, mucosa normal, no drainage   Throat: Oropharynx moist without lesions   Neck: Supple, symmetrical, no adenopathy   Back:   Symmetric, no curvature, ROM normal, no CVA tenderness   Lungs:   Clear to auscultation bilaterally, respirations unlabored   Chest Wall:  No tenderness or deformity   Heart:  RRR; no murmur, rub or gallop   Abdomen:   Soft, non-tender, non-distended, positive bowel sounds,    Extremities: No cyanosis, clubbing or edema   Skin: No rashes or lesions  No draining wounds noted     Lymph nodes: Cervical, supraclavicular nodes normal   Neurologic: Alert and oriented times 3, extremity strength 5/5 and symmetric       LABS, IMAGING, & OTHER STUDIES:  Lab Results:  I have personally reviewed pertinent labs  Results from last 7 days   Lab Units 01/19/23  0445 01/18/23  1926   WBC Thousand/uL 12 66* 11 19*   HEMOGLOBIN g/dL 11 4* 11 8*   PLATELETS Thousands/uL 307 331     Results from last 7 days   Lab Units 01/19/23  0445 01/18/23  1926   POTASSIUM mmol/L 4 0 4 6   CHLORIDE mmol/L 102 100   CO2 mmol/L 26 28   BUN mg/dL 12 18   CREATININE mg/dL 0 81 0 79   EGFR ml/min/1 73sq m 113 114   CALCIUM mg/dL 8 5 8 8   AST U/L 33 59*   ALT U/L 38 44   ALK PHOS U/L 65 61     Results from last 7 days   Lab Units 01/18/23  1926   PROCALCITONIN ng/ml 5 24*                   Micro:  Results from last 7 days   Lab Units 01/18/23 1926   BLOOD CULTURE  Received in Microbiology Lab  Culture in Progress  Received in Microbiology Lab  Culture in Progress  Imaging Studies:   I have personally reviewed pertinent imaging study reports and images in PACS  CT L spine [facility]): mild multilevel disc degeneration, no evidence of periostitis along the endplates to suggest advanced osteomyelitis on CT imaging    Other Studies:   I have personally reviewed pertinent reports

## 2023-01-19 NOTE — PROGRESS NOTES
Eulalio Yarbrough is a 41 y o  male who is currently ordered Vancomycin IV with management by the Pharmacy Consult service  Relevant clinical data and objective / subjective history reviewed  Vancomycin Assessment:  Indication and Goal AUC/Trough: Bacteremia (goal -600, trough >10), -600, trough >10  Clinical Status: stable  Micro:   pending  Renal Function:  SCr: 0 81 mg/dL  CrCl: 137 1 mL/min  Renal replacement: Not on dialysis  Days of Therapy: 2  Current Dose: 2000mg load once  Vancomycin Plan:  New Dosinmg q12h  Estimated AUC: 554 mcg*hr/mL  Estimated Trough: 16 6 mcg/mL  Next Level: 23 0600  Renal Function Monitoring: Daily BMP and UOP  Pharmacy will continue to follow closely for s/sx of nephrotoxicity, infusion reactions and appropriateness of therapy  BMP and CBC will be ordered per protocol  We will continue to follow the patient’s culture results and clinical progress daily      Mamta Mac, Pharmacist

## 2023-01-19 NOTE — TELEPHONE ENCOUNTER
Patient currently admitted at [address] [place] [address] PAST SURGICAL HISTORY:  History of repair of congenital atrial septal defect (ASD)     Midline sternotomy scar

## 2023-01-19 NOTE — H&P
History and Physical - [facility] Internal Medicine    Patient Information: Savita Turcios 41 y o  male MRN: [id number]  Unit/Bed#:  Encounter: [id number]  Admitting Physician: Jonathon Carmichael MD  PCP: Kayden Gregory DO  Date of Admission:  01/18/23    Assessment/Plan:    Hospital Problem List:     Principal Problem:    Bacteremia due to Staphylococcus aureus  Active Problems:    Acute right-sided low back pain with right-sided sciatica    Ambulatory dysfunction    Intravenous drug abuse, continuous (HCC)    Hepatitis C virus    Heroin abuse ([facility] )    HIV (human immunodeficiency virus infection) ([facility] )    Tobacco abuse      Plan for the Primary Problem(s):  · Admit to telemetry  · Continue empiric IV vancomycin & cefepime while awaiting blood c+s  · TTE if persistent bacteremia  · MRI T/L/S spine with contrast( ? disciitis vs osteomyelitis )    Plan for Additional Problems:   · ID consult in AM  · PT/OT eval  · Pain control, with NSAID until his suboxone dose is confirmed with the prescribing clinic in AM  · I strongly advised the patient to refrain from further illicit drug use      VTE Prophylaxis: Enoxaparin (Lovenox)  / sequential compression device   Code Status: full    Anticipated Length of Stay:  Patient will be admitted on an Inpatient basis with an anticipated length of stay of  > 2 midnights  Justification for Hospital Stay: IV antibiotics    Total Time for Visit, including Counseling / Coordination of Care: 45 minutes  Greater than 50% of this total time spent on direct patient counseling and coordination of care  Chief Complaint:   "positive blood cultures at [facility]"    History of Present Illness:    Savita Turcios is a 41 y o  male whose PMH is remarkable for heroin & fentanyl IVDA, supposedly on suboxone for his opiate addiction, HIV, Hep C, tobacco abuse  He reports chronic DJD spine( "T8-T11" )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   He denies previous diagnosis with infective endocarditis  He initially went to the ED at [facility] 1/16/23 complaining of difficulty walking due to severe R lower back pain shooting down his entire R lower extremity posteriorly to his R foot  He returned to that facility earlier today after being informed blood cultures from that ED visit 2d ago grew staph aureus  After he was admitted there for further management of staph aureus bacteremia & a TTE done that was reportedly neg for valvular vegetations, patient signed himself out AMA & presented to this facility's ED to get admitted( "it is closer to my family" )  He last used IV fentanyl a few days ago & requests to be resumed on suboxone  He denies any stool/urinary retention or incontinence, leg weakness, abd pain, chest pain, cough, headache, neck stiffness, skin rash or acute confusion  After receiving IV fluid & IV antbiotics at the ED, patient hospitalization was consequently sought  Review of Systems:    A 10+ point review of systems was obtained & is as above, otherwise negative  Review of Systems    Past Medical and Surgical History:     Past Medical History:   Diagnosis Date   • Addiction to drug [facility])    • Anxiety    • Arthritis    • Chronic pain    • Hepatitis    • Hepatitis C virus    • Heroin abuse ([facility] ) 5/2/2018   • HIV (human immunodeficiency virus infection) ([facility] )    • Moderate episode of recurrent major depressive disorder ([facility] ) 2/5/2020   • Tobacco use disorder     last assessed 11/2/15        Past Surgical History:   Procedure Laterality Date   • EPIDURAL BLOCK INJECTION Bilateral 4/19/2018    Procedure: T9-10 INTERLAMINAR EPIDURAL STEROID INJECTION;  Surgeon: Megan Mendoza MD;  Location: [facility] MAIN OR;  Service: Pain Management        Meds/Allergies:    Prior to Admission medications    Medication Sig Start Date End Date Taking?  Authorizing Provider   acetaminophen (TYLENOL) 500 mg tablet Take 500 mg by mouth every 6 (six) hours as needed 1/16/23 1/26/23 Yes Historical Provider, MD   celecoxib (CeleBREX) 100 mg capsule Take 1 capsule (100 mg total) by mouth 2 (two) times a day 12/5/22  Yes Kayden Gregory DO   gabapentin (NEURONTIN) 300 mg capsule Take 300 mg by mouth 2 (two) times a day 1/16/23 1/16/24 Yes Historical Provider, MD   ibuprofen (MOTRIN) 600 mg tablet take 1 tablet by mouth every 6 hours if needed for MILD PAIN ( PAIN SCALE 1-3 ) 1/16/23  Yes Historical Provider, MD   methocarbamol (Robaxin-750) 750 mg tablet Take 1 tablet (750 mg total) by mouth every 6 (six) hours as needed for muscle spasms for up to 20 doses 6/3/22  Yes Aldo Townsend MD   predniSONE 10 mg tablet 4 tablets daily for 3 days, then 3 tablets daily for 3 days, then 2 tablets daily for 3 days, then 1 tablet daily for 3 days 6/7/22  Yes Kayden Gregory DO   pregabalin (LYRICA) 300 MG capsule Take 1 capsule (300 mg total) by mouth 2 (two) times a day 12/26/22  Yes Kayden Gregory DO   QUEtiapine (SEROquel) 100 mg tablet Take 1 tablet (100 mg total) by mouth daily at bedtime 12/29/22  Yes Kayden Gregory DO   celecoxib (CeleBREX) 200 mg capsule Take 1 capsule (200 mg total) by mouth 2 (two) times a day for 5 days 6/3/22 6/8/22  Aldo Townsend MD   Diclofenac Sodium (VOLTAREN) 1 % Apply 2 g topically 4 (four) times a day for 5 days  Patient not taking: Reported on 6/7/2022 6/5/22 6/10/22  Bre Osorio MD   EPINEPHrine (EPIPEN) 0 3 mg/0 3 mL SOAJ Inject 0 3 mL (0 3 mg total) into a muscle once for 1 dose 9/17/20 9/17/20  Kayden Gregory DO   famotidine (PEPCID) 40 MG tablet Take 1 tablet (40 mg total) by mouth in the morning for 10 doses  Patient not taking: Reported on 6/7/2022 6/3/22 6/13/22  Aldo Townsend MD     I have reviewed home medications with patient personally  Allergies:    Allergies   Allergen Reactions   • Clindamycin/Lincomycin Hives       Social History:     Marital Status:    Patient Pre-hospital Living Situation: home  Patient Pre-hospital Level of Mobility: independent  Substance Use History:   Social History     Substance and Sexual Activity   Alcohol Use Yes    Comment: occasional / social per allscript      Social History     Tobacco Use   Smoking Status Every Day   • Packs/day: 1 00   • Types: Cigarettes   Smokeless Tobacco Former   Tobacco Comments    current every day smoker per allscript      Social History     Substance and Sexual Activity   Drug Use Not Currently   • Types: Heroin, Prescription, Marijuana    Comment: past opiate abuser / intravenous drug use per allscript                      Family History:    non-contributory    Physical Exam:   General: well nourished, in no overt distress  HEENT: oral mucosa moist, not pale or jaundiced  Neck: supple, no JVD  Resp: clear lungs, no crackles or wheeze  Cardiovascular: S1 S2 audible, regular, no murmur or rub  GI: soft abdomen, nontender, bowel sounds present  Musculoskeletal: no pedal edema or cyanosis  Skin: no petechiae or suspicious rash  Psych: appropriately oriented in all spheres, blunt affect  Neuro: Awake, alert, verbally responsive & follows commands, moves all extremities equally, essentially nonfocal      Vitals:   Blood Pressure: 134/69 (01/18/23 2152)  Pulse: 86 (01/18/23 2152)  Temperature: 99 4 °F (37 4 °C) (01/18/23 2152)  Temp Source: Temporal (01/18/23 2152)  Respirations: 16 (01/18/23 2152)  Height: 6' (182 9 cm) (01/18/23 2152)  Weight - Scale: 92 7 kg (204 lb 5 9 oz) (01/18/23 2152)  SpO2: 98 % (01/18/23 2152)      Additional Data:     Lab Results: I have personally reviewed pertinent reports        Results from last 7 days   Lab Units 01/18/23  1926   WBC Thousand/uL 11 19*   HEMOGLOBIN g/dL 11 8*   HEMATOCRIT % 37 0   PLATELETS Thousands/uL 331   NEUTROS PCT % 79*   LYMPHS PCT % 10*   MONOS PCT % 11   EOS PCT % 0     Results from last 7 days   Lab Units 01/18/23 1926   POTASSIUM mmol/L 4 6   CHLORIDE mmol/L 100   CO2 mmol/L 28   BUN mg/dL 18   CREATININE mg/dL 0 79   CALCIUM mg/dL 8 8   ALK PHOS U/L 61   ALT U/L 44   AST U/L 59*     Results from last 7 days   Lab Units 01/18/23  1926   INR  1 01     Invalid input(s): TROIP    Imaging: I have personally reviewed pertinent reports  No results found  ** Please Note: Dragon 360 Dictation voice to text software may have been used in the creation of this document

## 2023-01-19 NOTE — ASSESSMENT & PLAN NOTE
Patient recently admitted to [facility] for back pain  1/16/2023 blood cultures positive for staph aureus    1/19/23 -spoke with ID, continue IV vancomycin, obtain MRI lumbar and sacroiliac joint, order TTE  Discontinued Cefepime      1/20/23 - WBC trending down, Procal trending down, elevated ESR  TTE - normal  Unable to tolerate MRI and Xray due to back pain/hip pain - will give Lidocaine patch and try X-ray today, retry MRI Monday    Plan:  Continue IV vancomycin  Monitor WBC, Vitals  ID recs appreciated  Added lidocaine patch

## 2023-01-19 NOTE — TELEPHONE ENCOUNTER
Patient unable to tolerate lying flat for MRI or Xray due to pain, Asked to be returned to his room  Nurse notified pt was returned without imaging being completed

## 2023-01-19 NOTE — ASSESSMENT & PLAN NOTE
Most recent labs 3/7/2022  Positive Hep A  Positive Hep B surface antibody  Positive Hep C antibody  Not taking hepatitis meds  Plan:  Hepatitis panel  Consult ID

## 2023-01-19 NOTE — ASSESSMENT & PLAN NOTE
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  Last use - IV,1 bag of fentanyl, at 12pm on 1/18/23  Currently no withdrawal symptoms  Current COWS score = 0   Pulse <80 = 0   Sweating none = 0   Restlessness = 0   Pupils small = 0   Bone ache (unable to assess due to septic joint)   Runny nose not present = 0   GI upset none = 0   Tremor none = 0   Yawning no = 0   Anxiety none = 0   Gooseflesh none = 0    Patient had appointment yesterday with Dr Mary Cobian ([phone]) in  [place] to re-start Suboxone  Patient missed appointment due to ER visit  I spoke with Dr Mary Cobian this morning, he agrees with the plan below  1/20 - pt received Suboxone 2mg Q2, last dose 10am  COWS score currently 2  Will give 8mg this evening  Start 8mg BID tomorrow  Pt has not needed any prn meds  Plan:  Administer Suboxone 8mg BID    Zofran for nausea  Imodium for diarrhea  Catapres for BP control  Trazodone for Sleep  Follow up with Dr Mary Cobian as outpatient

## 2023-01-19 NOTE — ASSESSMENT & PLAN NOTE
H/o Back pain with sciatica  CT 1/16/23 shows:  1  Mild multilevel disc degeneration  2  Grade 1 retrolisthesis of L5 on S1    3  Disc bulge with small herniation at L5-S1  Disc bulging from L2-3 through   L4-5    4  No evidence of periostitis along the endplates to suggest advanced   osteomyelitis on CT imaging  If there is clinical concern for underlying   infection, MRI would be a more sensitive modality to assess for early changes of   discitis osteomyelitis      Pain rated 9/10  Patient unable to ambulate  Plan:  Scheduled Tylenol 650mg Q6  PRN Toradol for moderate pain  PRN Tramadol for severe pain  Follow-up MRI T/L/S  PT/OT

## 2023-01-19 NOTE — ASSESSMENT & PLAN NOTE
Most recent labs 3/7/2022  Positive Hep A  Positive Hep B surface antibody  Positive Hep C antibody  Not taking hepatitis meds    ID recs: ordered Hep C PCR    Plan:  F/u Hep C PCR  Consult ID

## 2023-01-19 NOTE — ASSESSMENT & PLAN NOTE
HIV antibody negative on 3/8/2022  Not taking HIV meds    ID recs: ordered repeat HIV labs    Plan:  F/u HIV labs  Consult ID

## 2023-01-19 NOTE — PLAN OF CARE
Problem: PAIN - ADULT  Goal: Verbalizes/displays adequate comfort level or baseline comfort level  Description: Interventions:  - Encourage patient to monitor pain and request assistance  - Assess pain using appropriate pain scale  - Administer analgesics based on type and severity of pain and evaluate response  - Implement non-pharmacological measures as appropriate and evaluate response  - Consider cultural and social influences on pain and pain management  - Notify physician/advanced practitioner if interventions unsuccessful or patient reports new pain  Outcome: Progressing     Problem: INFECTION - ADULT  Goal: Absence or prevention of progression during hospitalization  Description: INTERVENTIONS:  - Assess and monitor for signs and symptoms of infection  - Monitor lab/diagnostic results  - Monitor all insertion sites, i e  indwelling lines, tubes, and drains  - Monitor endotracheal if appropriate and nasal secretions for changes in amount and color  - [place] appropriate cooling/warming therapies per order  - Administer medications as ordered  - Instruct and encourage patient and family to use good hand hygiene technique  - Identify and instruct in appropriate isolation precautions for identified infection/condition  Outcome: Progressing  Goal: Absence of fever/infection during neutropenic period  Description: INTERVENTIONS:  - Monitor WBC    Outcome: Progressing     Problem: MUSCULOSKELETAL - ADULT  Goal: Maintain or return mobility to safest level of function  Description: INTERVENTIONS:  - Assess patient's ability to carry out ADLs; assess patient's baseline for ADL function and identify physical deficits which impact ability to perform ADLs (bathing, care of mouth/teeth, toileting, grooming, dressing, etc )  - Assess/evaluate cause of self-care deficits   - Assess range of motion  - Assess patient's mobility  - Assess patient's need for assistive devices and provide as appropriate  - Encourage maximum independence but intervene and supervise when necessary  - Involve family in performance of ADLs  - Assess for home care needs following discharge   - Consider OT consult to assist with ADL evaluation and planning for discharge  - Provide patient education as appropriate  Outcome: Progressing  Goal: Maintain proper alignment of affected body part  Description: INTERVENTIONS:  - Support, maintain and protect limb and body alignment  - Provide patient/ family with appropriate education  Outcome: Progressing     Problem: NEUROSENSORY - ADULT  Goal: Achieves stable or improved neurological status  Description: INTERVENTIONS  - Monitor and report changes in neurological status  - Monitor vital signs such as temperature, blood pressure, glucose, and any other labs ordered   - Initiate measures to prevent increased intracranial pressure  - Monitor for seizure activity and implement precautions if appropriate      Outcome: Progressing  Goal: Achieves maximal functionality and self care  Description: INTERVENTIONS  - Monitor swallowing and airway patency with patient fatigue and changes in neurological status  - Encourage and assist patient to increase activity and self care     - Encourage visually impaired, hearing impaired and aphasic patients to use assistive/communication devices  Outcome: Progressing

## 2023-01-19 NOTE — TELEPHONE ENCOUNTER
Critical labs results    Culture 1/16/23 - Staphylococcus Aureus    Information sent & given to Dr personally

## 2023-01-19 NOTE — PROGRESS NOTES
Vancomycin Monitoring    Demographics    Luke Fofana    Assessment    Today is day 1 of vancomycin therapy for bacteremia  Patient received first dose of 2,000 mg tonight at approx 2230  Renal function is stable      Plan    Vanco 1,250 mg q12h starting 1/19 1000    Level ordered for 1/20 am labs     David Sosa, HeatherD

## 2023-01-19 NOTE — OCCUPATIONAL THERAPY NOTE
Occupational Therapy         Patient Name: Luke Fofana  Today's Date: 1/19/2023 01/19/23 1310   OT Last Visit   OT Visit Date 01/19/23   Note Type   Note type Cancelled Session   Cancel Reasons Medical status       Julia Stock

## 2023-01-20 ENCOUNTER — APPOINTMENT (INPATIENT)
Dept: RADIOLOGY | Facility: HOSPITAL | Age: 38
End: 2023-01-20

## 2023-01-20 LAB
ALBUMIN SERPL BCP-MCNC: 3.2 G/DL (ref 3.5–5)
ALP SERPL-CCNC: 69 U/L (ref 34–104)
ALT SERPL W P-5'-P-CCNC: 28 U/L (ref 7–52)
ANION GAP SERPL CALCULATED.3IONS-SCNC: 9 MMOL/L (ref 4–13)
AST SERPL W P-5'-P-CCNC: 16 U/L (ref 13–39)
BASOPHILS # BLD AUTO: 0.02 THOUSANDS/ÂΜL (ref 0–0.1)
BASOPHILS NFR BLD AUTO: 0 % (ref 0–1)
BILIRUB SERPL-MCNC: 0.27 MG/DL (ref 0.2–1)
BUN SERPL-MCNC: 6 MG/DL (ref 5–25)
CALCIUM ALBUM COR SERPL-MCNC: 8.9 MG/DL (ref 8.3–10.1)
CALCIUM SERPL-MCNC: 8.3 MG/DL (ref 8.4–10.2)
CHLORIDE SERPL-SCNC: 105 MMOL/L (ref 96–108)
CO2 SERPL-SCNC: 22 MMOL/L (ref 21–32)
CREAT SERPL-MCNC: 0.71 MG/DL (ref 0.6–1.3)
EOSINOPHIL # BLD AUTO: 0.05 THOUSAND/ÂΜL (ref 0–0.61)
EOSINOPHIL NFR BLD AUTO: 0 % (ref 0–6)
ERYTHROCYTE [DISTWIDTH] IN BLOOD BY AUTOMATED COUNT: 14.1 % (ref 11.6–15.1)
ERYTHROCYTE [SEDIMENTATION RATE] IN BLOOD: 69 MM/HOUR (ref 0–14)
GFR SERPL CREATININE-BSD FRML MDRD: 119 ML/MIN/1.73SQ M
GLUCOSE SERPL-MCNC: 138 MG/DL (ref 65–140)
HCT VFR BLD AUTO: 32.5 % (ref 36.5–49.3)
HGB BLD-MCNC: 10.4 G/DL (ref 12–17)
IMM GRANULOCYTES # BLD AUTO: 0.05 THOUSAND/UL (ref 0–0.2)
IMM GRANULOCYTES NFR BLD AUTO: 0 % (ref 0–2)
LYMPHOCYTES # BLD AUTO: 1.61 THOUSANDS/ÂΜL (ref 0.6–4.47)
LYMPHOCYTES NFR BLD AUTO: 14 % (ref 14–44)
MAGNESIUM SERPL-MCNC: 1.9 MG/DL (ref 1.9–2.7)
MCH RBC QN AUTO: 29.2 PG (ref 26.8–34.3)
MCHC RBC AUTO-ENTMCNC: 32 G/DL (ref 31.4–37.4)
MCV RBC AUTO: 91 FL (ref 82–98)
MONOCYTES # BLD AUTO: 1.25 THOUSAND/ÂΜL (ref 0.17–1.22)
MONOCYTES NFR BLD AUTO: 11 % (ref 4–12)
NEUTROPHILS # BLD AUTO: 8.32 THOUSANDS/ÂΜL (ref 1.85–7.62)
NEUTS SEG NFR BLD AUTO: 75 % (ref 43–75)
NRBC BLD AUTO-RTO: 0 /100 WBCS
PHOSPHATE SERPL-MCNC: 2.9 MG/DL (ref 2.7–4.5)
PLATELET # BLD AUTO: 294 THOUSANDS/UL (ref 149–390)
PMV BLD AUTO: 10.2 FL (ref 8.9–12.7)
POTASSIUM SERPL-SCNC: 4 MMOL/L (ref 3.5–5.3)
PROCALCITONIN SERPL-MCNC: 2.29 NG/ML
PROT SERPL-MCNC: 6.7 G/DL (ref 6.4–8.4)
RBC # BLD AUTO: 3.56 MILLION/UL (ref 3.88–5.62)
SODIUM SERPL-SCNC: 136 MMOL/L (ref 135–147)
VANCOMYCIN SERPL-MCNC: 12.2 UG/ML (ref 10–20)
WBC # BLD AUTO: 11.3 THOUSAND/UL (ref 4.31–10.16)

## 2023-01-20 RX ORDER — BUPRENORPHINE AND NALOXONE 2; .5 MG/1; MG/1
2 FILM, SOLUBLE BUCCAL; SUBLINGUAL ONCE
Status: COMPLETED | OUTPATIENT
Start: 2023-01-20 | End: 2023-01-20

## 2023-01-20 RX ORDER — BUPRENORPHINE AND NALOXONE 8; 2 MG/1; MG/1
8 FILM, SOLUBLE BUCCAL; SUBLINGUAL 2 TIMES DAILY
Status: DISCONTINUED | OUTPATIENT
Start: 2023-01-21 | End: 2023-01-23

## 2023-01-20 RX ORDER — CEFAZOLIN SODIUM 2 G/50ML
2000 SOLUTION INTRAVENOUS EVERY 8 HOURS
Status: DISCONTINUED | OUTPATIENT
Start: 2023-01-20 | End: 2023-02-13

## 2023-01-20 RX ORDER — BUPRENORPHINE AND NALOXONE 8; 2 MG/1; MG/1
8 FILM, SOLUBLE BUCCAL; SUBLINGUAL ONCE
Status: COMPLETED | OUTPATIENT
Start: 2023-01-20 | End: 2023-01-20

## 2023-01-20 RX ORDER — LIDOCAINE 50 MG/G
1 PATCH TOPICAL DAILY
Status: DISCONTINUED | OUTPATIENT
Start: 2023-01-20 | End: 2023-01-22

## 2023-01-20 RX ORDER — QUETIAPINE FUMARATE 25 MG/1
25 TABLET, FILM COATED ORAL
Status: DISCONTINUED | OUTPATIENT
Start: 2023-01-20 | End: 2023-01-24

## 2023-01-20 RX ORDER — BUPRENORPHINE AND NALOXONE 8; 2 MG/1; MG/1
8 FILM, SOLUBLE BUCCAL; SUBLINGUAL DAILY
Status: DISCONTINUED | OUTPATIENT
Start: 2023-01-20 | End: 2023-01-20

## 2023-01-20 RX ADMIN — NICOTINE 14 MG: 14 PATCH, EXTENDED RELEASE TRANSDERMAL at 09:30

## 2023-01-20 RX ADMIN — BUPRENORPHINE AND NALOXONE 2 MG: 2; .5 FILM BUCCAL; SUBLINGUAL at 02:43

## 2023-01-20 RX ADMIN — KETOROLAC TROMETHAMINE 15 MG: 30 INJECTION, SOLUTION INTRAMUSCULAR; INTRAVENOUS at 06:05

## 2023-01-20 RX ADMIN — BUPRENORPHINE AND NALOXONE 2 MG: 2; .5 FILM BUCCAL; SUBLINGUAL at 10:00

## 2023-01-20 RX ADMIN — VANCOMYCIN HYDROCHLORIDE 1750 MG: 1 INJECTION, POWDER, LYOPHILIZED, FOR SOLUTION INTRAVENOUS at 09:30

## 2023-01-20 RX ADMIN — ACETAMINOPHEN 650 MG: 325 TABLET, FILM COATED ORAL at 06:05

## 2023-01-20 RX ADMIN — KETOROLAC TROMETHAMINE 15 MG: 30 INJECTION, SOLUTION INTRAMUSCULAR; INTRAVENOUS at 18:22

## 2023-01-20 RX ADMIN — CLONIDINE HYDROCHLORIDE 0.1 MG: 0.1 TABLET ORAL at 06:05

## 2023-01-20 RX ADMIN — QUETIAPINE FUMARATE 25 MG: 25 TABLET ORAL at 21:26

## 2023-01-20 RX ADMIN — GABAPENTIN 300 MG: 300 CAPSULE ORAL at 17:57

## 2023-01-20 RX ADMIN — TEMAZEPAM 15 MG: 15 CAPSULE ORAL at 21:37

## 2023-01-20 RX ADMIN — BUPRENORPHINE AND NALOXONE 2 MG: 2; .5 FILM BUCCAL; SUBLINGUAL at 00:30

## 2023-01-20 RX ADMIN — KETOROLAC TROMETHAMINE 15 MG: 30 INJECTION, SOLUTION INTRAMUSCULAR; INTRAVENOUS at 12:02

## 2023-01-20 RX ADMIN — ACETAMINOPHEN 650 MG: 325 TABLET, FILM COATED ORAL at 12:02

## 2023-01-20 RX ADMIN — BUPRENORPHINE AND NALOXONE 8 MG: 8; 2 FILM BUCCAL; SUBLINGUAL at 20:28

## 2023-01-20 RX ADMIN — CEFAZOLIN SODIUM 2000 MG: 2 SOLUTION INTRAVENOUS at 21:26

## 2023-01-20 RX ADMIN — CLONIDINE HYDROCHLORIDE 0.1 MG: 0.1 TABLET ORAL at 16:36

## 2023-01-20 RX ADMIN — GABAPENTIN 300 MG: 300 CAPSULE ORAL at 09:30

## 2023-01-20 RX ADMIN — CYCLOBENZAPRINE 10 MG: 10 TABLET, FILM COATED ORAL at 16:35

## 2023-01-20 RX ADMIN — CLONIDINE HYDROCHLORIDE 0.1 MG: 0.1 TABLET ORAL at 21:26

## 2023-01-20 RX ADMIN — ACETAMINOPHEN 650 MG: 325 TABLET, FILM COATED ORAL at 17:57

## 2023-01-20 RX ADMIN — BUPRENORPHINE AND NALOXONE 2 MG: 2; .5 FILM BUCCAL; SUBLINGUAL at 04:33

## 2023-01-20 NOTE — PROGRESS NOTES
Gabriella Vasquez is a 41 y o  male who is currently ordered Vancomycin IV with management by the Pharmacy Consult service  Relevant clinical data and objective / subjective history reviewed  Vancomycin Assessment:  Indication and Goal AUC/Trough: Bacteremia (goal -600, trough >10), -600, trough >10  Clinical Status: stable  Micro:     Renal Function:  SCr: 0 71 mg/dL  CrCl: 156 4 mL/min  Renal replacement: Not on dialysis  Days of Therapy: 3  Current Dose: 1500mg q12h  Vancomycin Plan:  New Dosinmg q12h  Estimated AUC: 509 mcg*hr/mL  Estimated Trough: 13 8 mcg/mL  Next Level: 23 0600  Renal Function Monitoring: Daily BMP and [place] will continue to follow closely for s/sx of nephrotoxicity, infusion reactions and appropriateness of therapy  BMP and CBC will be ordered per protocol  We will continue to follow the patient’s culture results and clinical progress daily      Nemesio Palma, Pharmacist

## 2023-01-20 NOTE — OCCUPATIONAL THERAPY NOTE
Occupational Therapy Cancellation Note     01/20/23 0815   OT Last Visit   OT Visit Date 01/20/23   Note Type   Note type Evaluation; Cancelled Session   Cancel Reasons Other   Additional Comments Awaiting MRI; pt unable to tolerate lying flat d/t pain  Will continue to follow-up as able and appropriate       Vicki Wesley MS, OTR/L

## 2023-01-20 NOTE — UTILIZATION REVIEW
NOTIFICATION OF INPATIENT ADMISSION   AUTHORIZATION REQUEST   SERVICING FACILITY:   [address] [place] [address]  [address], [place], [address]  Tax ID:  [id number]  NPI: [id number] ATTENDING PROVIDER:  Attending Name and NPI#: Giulia Bolivar [[id number]]  Address: [address]  Phone: [phone]     ADMISSION INFORMATION:  Place of Service: Inpatient [facility]  [address]  Place of Service Code: 21  Inpatient Admission Date/Time: 1/18/23  9:15 PM  Discharge Date/Time: No discharge date for patient encounter  Admitting Diagnosis Code/Description:  Bacteremia [R78 81]  Leg pain [M79 606]  Intractable low back pain [M54 59]     UTILIZATION REVIEW CONTACT:  Tasneem Vargas Utilization   Network Utilization Review Department  Phone: [phone]  Fax [phone]  Email: [email or web address]  Contact for approvals/pending authorizations, clinical reviews, and discharge  PHYSICIAN ADVISORY SERVICES:  Medical Necessity Denial & Lept-nf-Difc Review  Phone: [phone]  Fax: [phone]  Email: [email or web address]

## 2023-01-20 NOTE — PROGRESS NOTES
[facility]  Progress Note - Cm Esquivel 1985, 41 y o  male MRN: [id number]  Unit/Bed#: MS [facility] Encounter: [id number]  Primary Care Provider: Lauren Diallo,    Date and time admitted to hospital: 1/18/2023  5:14 PM    * Bacteremia due to Staphylococcus aureus  Assessment & Plan  Patient recently admitted to [facility] for back pain  1/16/2023 blood cultures positive for staph aureus    1/19/23 -spoke with ID, continue IV vancomycin, obtain MRI lumbar and sacroiliac joint, order TTE  Discontinued Cefepime  1/20/23 - WBC trending down, Procal trending down, elevated ESR  TTE - normal  Unable to tolerate MRI and Xray due to back pain/hip pain - will give Lidocaine patch and try X-ray today, retry MRI Monday    Plan:  Continue IV vancomycin  Monitor WBC, Vitals  ID recs appreciated  Added lidocaine patch    Acute right-sided low back pain with right-sided sciatica  Assessment & Plan  H/o right sided back pain with sciatica and hip pain  CT 1/16/23 shows:  1  Mild multilevel disc degeneration  2  Grade 1 retrolisthesis of L5 on S1    3  Disc bulge with small herniation at L5-S1  Disc bulging from L2-3 through   L4-5    4  No evidence of periostitis along the endplates to suggest advanced   osteomyelitis on CT imaging  If there is clinical concern for underlying   infection, MRI would be a more sensitive modality to assess for early changes of   discitis osteomyelitis      Pain rated 9/10  Patient unable to ambulate without cane  Plan:  Scheduled Tylenol 650mg Q6  PRN Toradol for moderate/severe pain  Lidocaine patch  Follow-up right hip xray  Follow-up MRI of lumbar and sacroiliac joints  Hold PT/OT today until x-ray results      Intravenous drug abuse, continuous ([facility] [address] )  Assessment & Plan  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  Last use - IV,1 bag of fentanyl, at 12pm on 1/18/23  Currently no withdrawal symptoms  Current COWS score = 0   Pulse <80 = 0   Sweating none = 0   Restlessness = 0   Pupils small = 0   Bone ache (unable to assess due to septic joint)   Runny nose not present = 0   GI upset none = 0   Tremor none = 0   Yawning no = 0   Anxiety none = 0   Gooseflesh none = 0    Patient had appointment yesterday with Dr Raj Abernathy ([phone]) in  [place] to re-start Suboxone  Patient missed appointment due to ER visit  I spoke with Dr Raj Abernathy this morning, he agrees with the plan below  1/20 - pt received Suboxone 2mg Q2, last dose 10am  COWS score currently 2  Will give 8mg this evening  Start 8mg BID tomorrow  Pt has not needed any prn meds  Plan:  Administer Suboxone 8mg BID  Zofran for nausea  Imodium for diarrhea  Catapres for BP control  Trazodone for Sleep  Follow up with Dr Raj Abernathy as outpatient    Tobacco abuse  Assessment & Plan  History of tobacco smoking  Plan:  Nicotine patch  Smoking cessation    Ambulatory dysfunction  Assessment & Plan  Ambulatory dysfunction due to pain  Patient utilizing a cane to ambulate  Pain rated 9 out of 10  Plan:  Schedule Tylenol 650mg Q6  PT/OT    HIV (human immunodeficiency virus infection) ([facility] [address] )  Assessment & Plan  HIV antibody negative on 3/8/2022  Not taking HIV meds    ID recs: ordered repeat HIV labs    Plan:  F/u HIV labs  Consult ID    Heroin abuse [facility])  Assessment & Plan  See ' IV drug abuse' for plan    Hepatitis C virus  Assessment & Plan  Most recent labs 3/7/2022  Positive Hep A  Positive Hep B surface antibody  Positive Hep C antibody  Not taking hepatitis meds    ID recs: ordered Hep C PCR    Plan:  F/u Hep C PCR  Consult ID        VTE Pharmacologic Prophylaxis: VTE Score: 1 patient should ambulate but limited due to hip pain, use SCD boots    Patient Centered Rounds: I performed bedside rounds with nursing staff today  Discussions with Specialists or Other Care Team Provider: ID    Education and Discussions with Family / Patient: Patient is in constant contact with deirdre, she can call with questions       Time Spent for Care: 30 minutes  More than 50% of total time spent on counseling and coordination of care as described above  Current Length of Stay: 2 day(s)  Current Patient Status: Inpatient   Certification Statement: The patient will continue to require additional inpatient hospital stay due to IV antibiotics  Discharge Plan: Anticipate discharge in >72 hrs to home  Code Status: Level 1 - Full Code    Subjective:   Patient seen at bedside this morning, laying in bed in mild discomfort  Patient eating and drinking okay  Patient slept okay but prefers Seroquel over trazodone  Patient still complaining of right hip pain from a fall  Patient having no withdrawal symptoms, denies flushing, GI upset  Objective:     Vitals:   Temp (24hrs), Av 7 °F (36 5 °C), Min:97 7 °F (36 5 °C), Max:97 7 °F (36 5 °C)    Temp:  [97 7 °F (36 5 °C)] 97 7 °F (36 5 °C)  HR:  [66-80] 66  Resp:  [18] 18  BP: (113-134)/(62-70) 113/70  SpO2:  [95 %-98 %] 98 %  Body mass index is 27 67 kg/m²  Input and Output Summary (last 24 hours): Intake/Output Summary (Last 24 hours) at 2023 1010  Last data filed at 2023 0957  Gross per 24 hour   Intake 1721 ml   Output 3625 ml   Net -1904 ml       Physical Exam:   Physical Exam  Vitals and nursing note reviewed  Constitutional:       General: He is not in acute distress  Appearance: He is well-developed  HENT:      Head: Normocephalic and atraumatic  Right Ear: External ear normal       Left Ear: External ear normal       Nose: No rhinorrhea  Mouth/Throat:      Mouth: Mucous membranes are moist    Eyes:      General:         Right eye: No discharge  Left eye: No discharge  Conjunctiva/sclera: Conjunctivae normal    Cardiovascular:      Rate and Rhythm: Normal rate and regular rhythm  Pulses: Normal pulses  Heart sounds: No murmur heard  Pulmonary:      Effort: Pulmonary effort is normal  No respiratory distress        Breath sounds: Normal breath sounds  Abdominal:      General: Bowel sounds are normal  There is no distension  Palpations: Abdomen is soft  Tenderness: There is no abdominal tenderness  Musculoskeletal:         General: Tenderness present  No swelling  Cervical back: Neck supple  Right lower leg: No edema  Left lower leg: No edema  Comments: Right hip:  Reduced range of motion  Point tenderness  No swelling, no erythema of skin  Sensation intact   Skin:     General: Skin is warm and dry  Capillary Refill: Capillary refill takes less than 2 seconds  Neurological:      General: No focal deficit present  Mental Status: He is alert     Psychiatric:         Mood and Affect: Mood normal          Behavior: Behavior normal          Additional Data:     Labs:  Results from last 7 days   Lab Units 01/20/23  0654   WBC Thousand/uL 11 30*   HEMOGLOBIN g/dL 10 4*   HEMATOCRIT % 32 5*   PLATELETS Thousands/uL 294   NEUTROS PCT % 75   LYMPHS PCT % 14   MONOS PCT % 11   EOS PCT % 0     Results from last 7 days   Lab Units 01/20/23  0654   SODIUM mmol/L 136   POTASSIUM mmol/L 4 0   CHLORIDE mmol/L 105   CO2 mmol/L 22   BUN mg/dL 6   CREATININE mg/dL 0 71   ANION GAP mmol/L 9   CALCIUM mg/dL 8 3*   ALBUMIN g/dL 3 2*   TOTAL BILIRUBIN mg/dL 0 27   ALK PHOS U/L 69   ALT U/L 28   AST U/L 16   GLUCOSE RANDOM mg/dL 138     Results from last 7 days   Lab Units 01/19/23  0445   INR  1 06             Results from last 7 days   Lab Units 01/20/23  0908 01/18/23  1926   LACTIC ACID mmol/L  --  1 8   PROCALCITONIN ng/ml 2 29* 5 24*       Lines/Drains:  Invasive Devices     Peripheral Intravenous Line  Duration           Peripheral IV 01/18/23 Right Antecubital 1 day                Recent Cultures (last 7 days):   Results from last 7 days   Lab Units 01/18/23  1926   BLOOD CULTURE  Staphylococcus aureus*   GRAM STAIN RESULT  Gram positive cocci in clusters*  Gram positive cocci in clusters*       Last 24 Hours Medication List:   Current Facility-Administered Medications   Medication Dose Route Frequency Provider Last Rate   • acetaminophen  650 mg Oral Q6H Abby Voss MD     • buprenorphine-naloxone  8 mg Sublingual Daily Adore Livingston MD     • [START ON 1/21/2023] buprenorphine-naloxone  8 mg Sublingual BID Adore Livingston MD     • cloNIDine  0 1 mg Oral [facility] Adore Livingston MD     • cyclobenzaprine  10 mg Oral TID PRN Shelia Vergara MD     • enoxaparin  40 mg Subcutaneous Daily Shelia Vergara MD     • gabapentin  300 mg Oral BID Shelia Vergara MD     • ketorolac  15 mg Intravenous Q6H PRN Adore Livingston MD     • lidocaine  1 patch Topical Daily Adore Livingston MD     • nicotine  14 mg Transdermal Daily Shelia Vergara MD     • ondansetron  4 mg Intravenous Q6H PRN Shelia Vergara MD     • QUEtiapine  25 mg Oral HS Adore Livingston MD     • temazepam  15 mg Oral HS PRN Shelia Vergara MD     • vancomycin  1,750 mg Intravenous Q12H Geryl Karan Jeffy dunes, DO 1,750 mg (01/20/23 0930)        Today, Patient Was Seen By: Adore Livingston MD    **Please Note: This note may have been constructed using a voice recognition system  **

## 2023-01-20 NOTE — PHYSICAL THERAPY NOTE
Physical Therapy Cancellation Note    PT order received  Chart review performed  At this time, PT evaluation cancelled as patient currently pending spinal MRI  PT will follow and evaluate as appropriate         01/20/23 7914   Note Type   Note type Cancelled Session   Cancel Reasons Medical status         Perla Ramirez, PT

## 2023-01-20 NOTE — PLAN OF CARE
Problem: PAIN - ADULT  Goal: Verbalizes/displays adequate comfort level or baseline comfort level  Description: Interventions:  - Encourage patient to monitor pain and request assistance  - Assess pain using appropriate pain scale  - Administer analgesics based on type and severity of pain and evaluate response  - Implement non-pharmacological measures as appropriate and evaluate response  - Consider cultural and social influences on pain and pain management  - Notify physician/advanced practitioner if interventions unsuccessful or patient reports new pain  Outcome: Progressing     Problem: INFECTION - ADULT  Goal: Absence or prevention of progression during hospitalization  Description: INTERVENTIONS:  - Assess and monitor for signs and symptoms of infection  - Monitor lab/diagnostic results  - Monitor all insertion sites, i e  indwelling lines, tubes, and drains  - Monitor endotracheal if appropriate and nasal secretions for changes in amount and color  - Paradox appropriate cooling/warming therapies per order  - Administer medications as ordered  - Instruct and encourage patient and family to use good hand hygiene technique  - Identify and instruct in appropriate isolation precautions for identified infection/condition  Outcome: Progressing  Goal: Absence of fever/infection during neutropenic period  Description: INTERVENTIONS:  - Monitor WBC    Outcome: Progressing     Problem: MUSCULOSKELETAL - ADULT  Goal: Maintain or return mobility to safest level of function  Description: INTERVENTIONS:  - Assess patient's ability to carry out ADLs; assess patient's baseline for ADL function and identify physical deficits which impact ability to perform ADLs (bathing, care of mouth/teeth, toileting, grooming, dressing, etc )  - Assess/evaluate cause of self-care deficits   - Assess range of motion  - Assess patient's mobility  - Assess patient's need for assistive devices and provide as appropriate  - Encourage maximum independence but intervene and supervise when necessary  - Involve family in performance of ADLs  - Assess for home care needs following discharge   - Consider OT consult to assist with ADL evaluation and planning for discharge  - Provide patient education as appropriate  Outcome: Progressing  Goal: Maintain proper alignment of affected body part  Description: INTERVENTIONS:  - Support, maintain and protect limb and body alignment  - Provide patient/ family with appropriate education  Outcome: Progressing     Problem: NEUROSENSORY - ADULT  Goal: Achieves stable or improved neurological status  Description: INTERVENTIONS  - Monitor and report changes in neurological status  - Monitor vital signs such as temperature, blood pressure, glucose, and any other labs ordered   - Initiate measures to prevent increased intracranial pressure  - Monitor for seizure activity and implement precautions if appropriate      Outcome: Progressing  Goal: Achieves maximal functionality and self care  Description: INTERVENTIONS  - Monitor swallowing and airway patency with patient fatigue and changes in neurological status  - Encourage and assist patient to increase activity and self care     - Encourage visually impaired, hearing impaired and aphasic patients to use assistive/communication devices  Outcome: Progressing

## 2023-01-21 LAB
HIV 1+2 AB+HIV1 P24 AG SERPL QL IA: NORMAL
HIV 2 AB SERPL QL IA: NORMAL
HIV1 AB SERPL QL IA: NORMAL
HIV1 P24 AG SERPL QL IA: NORMAL

## 2023-01-21 RX ADMIN — ACETAMINOPHEN 650 MG: 325 TABLET, FILM COATED ORAL at 23:10

## 2023-01-21 RX ADMIN — BUPRENORPHINE AND NALOXONE 8 MG: 8; 2 FILM BUCCAL; SUBLINGUAL at 21:05

## 2023-01-21 RX ADMIN — ACETAMINOPHEN 650 MG: 325 TABLET, FILM COATED ORAL at 17:54

## 2023-01-21 RX ADMIN — QUETIAPINE FUMARATE 25 MG: 25 TABLET ORAL at 21:04

## 2023-01-21 RX ADMIN — LIDOCAINE 1 PATCH: 140 PATCH CUTANEOUS at 12:05

## 2023-01-21 RX ADMIN — ACETAMINOPHEN 650 MG: 325 TABLET, FILM COATED ORAL at 12:01

## 2023-01-21 RX ADMIN — CLONIDINE HYDROCHLORIDE 0.1 MG: 0.1 TABLET ORAL at 13:04

## 2023-01-21 RX ADMIN — GABAPENTIN 300 MG: 300 CAPSULE ORAL at 17:56

## 2023-01-21 RX ADMIN — CLONIDINE HYDROCHLORIDE 0.1 MG: 0.1 TABLET ORAL at 21:04

## 2023-01-21 RX ADMIN — CYCLOBENZAPRINE 10 MG: 10 TABLET, FILM COATED ORAL at 20:08

## 2023-01-21 RX ADMIN — KETOROLAC TROMETHAMINE 15 MG: 30 INJECTION, SOLUTION INTRAMUSCULAR; INTRAVENOUS at 06:30

## 2023-01-21 RX ADMIN — KETOROLAC TROMETHAMINE 15 MG: 30 INJECTION, SOLUTION INTRAMUSCULAR; INTRAVENOUS at 19:14

## 2023-01-21 RX ADMIN — CLONIDINE HYDROCHLORIDE 0.1 MG: 0.1 TABLET ORAL at 06:30

## 2023-01-21 RX ADMIN — GABAPENTIN 300 MG: 300 CAPSULE ORAL at 12:01

## 2023-01-21 RX ADMIN — CYCLOBENZAPRINE 10 MG: 10 TABLET, FILM COATED ORAL at 09:27

## 2023-01-21 RX ADMIN — KETOROLAC TROMETHAMINE 15 MG: 30 INJECTION, SOLUTION INTRAMUSCULAR; INTRAVENOUS at 00:26

## 2023-01-21 RX ADMIN — CEFAZOLIN SODIUM 2000 MG: 2 SOLUTION INTRAVENOUS at 21:04

## 2023-01-21 RX ADMIN — NICOTINE 14 MG: 14 PATCH, EXTENDED RELEASE TRANSDERMAL at 09:16

## 2023-01-21 RX ADMIN — KETOROLAC TROMETHAMINE 15 MG: 30 INJECTION, SOLUTION INTRAMUSCULAR; INTRAVENOUS at 12:57

## 2023-01-21 RX ADMIN — ACETAMINOPHEN 650 MG: 325 TABLET, FILM COATED ORAL at 00:26

## 2023-01-21 RX ADMIN — BUPRENORPHINE AND NALOXONE 8 MG: 8; 2 FILM BUCCAL; SUBLINGUAL at 09:17

## 2023-01-21 RX ADMIN — CEFAZOLIN SODIUM 2000 MG: 2 SOLUTION INTRAVENOUS at 13:00

## 2023-01-21 RX ADMIN — ACETAMINOPHEN 650 MG: 325 TABLET, FILM COATED ORAL at 06:30

## 2023-01-21 RX ADMIN — CEFAZOLIN SODIUM 2000 MG: 2 SOLUTION INTRAVENOUS at 06:31

## 2023-01-21 RX ADMIN — TEMAZEPAM 15 MG: 15 CAPSULE ORAL at 21:13

## 2023-01-21 NOTE — NURSING NOTE
Attempted to draw am labs  Pt questioning why he has labs ordered for today since her had labs done yesterday  Informed pt that while in the hospital daily labs as usually ordered  Pt requesting labs not be done at this time    Pt wants to talk to doctor this later this morning about ordered labs and to verify that labs are necessary

## 2023-01-21 NOTE — ASSESSMENT & PLAN NOTE
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

## 2023-01-21 NOTE — ASSESSMENT & PLAN NOTE
H/o right sided back pain with sciatica and hip pain  CT 1/16/23 shows:  1  Mild multilevel disc degeneration  2  Grade 1 retrolisthesis of L5 on S1    3  Disc bulge with small herniation at L5-S1  Disc bulging from L2-3 through   L4-5    4  No evidence of periostitis along the endplates to suggest advanced   osteomyelitis on CT imaging  If there is clinical concern for underlying   infection, MRI would be a more sensitive modality to assess for early changes of   discitis osteomyelitis      Pain rated 9/10  Patient unable to ambulate without cane  Plan:  Scheduled Tylenol 650mg Q6  PRN Toradol for moderate/severe pain  Lidocaine patch  Follow-up MRI of lumbar and sacroiliac joints

## 2023-01-21 NOTE — PROGRESS NOTES
Patient refused Lovenox injection, also refused Lidocaine patch at this time - will ask for it if needed

## 2023-01-21 NOTE — ASSESSMENT & PLAN NOTE
Most recent labs 3/7/2022  Positive Hep A  Positive Hep B surface antibody  Positive Hep C antibody  Not taking hepatitis meds    ID recs: ordered Hep C PCR    Plan:  F/u Hep C PCR

## 2023-01-21 NOTE — ASSESSMENT & PLAN NOTE
Continue IV Ancef pending repeat cultures, patient will require transesophageal echocardiogram, MRI of the lumbar spine and SI joints      ID input appreciated    Today is day 2 of IV Ancef, day 4 of antibiotic therapy

## 2023-01-21 NOTE — PROGRESS NOTES
REQUIRED DOCUMENTATION:      1  This service was provided via Telemedicine  2  Provider located at [address]  3  TeleMed provider: Chad Orellana MD  4  Identify all parties in room with patient during tele consult:  patient  5  After connecting through [employer], patient was identified by name and date of birth and assistant checked wristband  Patient was then informed that this was a Telemedicine visit and that the exam was being conducted confidentially over secure lines  My office door was closed  No one else was in the room  Patient acknowledged consent and understanding of privacy and security of the Telemedicine visit, and gave us permission to have the assistant stay in the room in order to assist with the history and to conduct the exam   I informed the patient that I have reviewed their record in Epic and presented the opportunity for them to ask any questions regarding the visit today  The patient agreed to participate  Progress Note - Infectious Disease   Donna Le 41 y o  male MRN: [id number]  Unit/Bed#: MS [facility] Encounter: [id number]      Impression/Plan:    1  Staph aureus bacteremia  1/16, 1/18 blood cultures positive from [facility], growing staph aureus  Likely MSSA based on BCID  Admission blood cultures here now growing staph aureus  Likely due to IVDU  In the setting of staph aureus bacteremia and change in character of chronic lower back pain, consider osteomyelitis/discitis  CT lumbar spine at [facility] showed DJD but no evidence of OM  TTE shows possible small vegetation of the tip and the anterior leaflet of the MV  Patient is afebrile, with mild leukocytosis today  He is hemodynamically stable    -follow up repeat blood cultures              -stop IV Vancomycin   -start IV Cefazolin 2g q8hr              -recommend CECILIO              -check MRI L spine and SI joints with contrast to evaluate for OM              -monitor WBC count, fever curve     2  Lower back pain    In setting of staph aureus bacteremia, need to consider seeding of the spine as above  CT lumbar spine shows DJD, no obvious osseous destruction  CRP/ESR elevated              -check MRI L spine/SI joints              -pain management per primary     3  Opiate use disorder with IVDU  Patient initiated on Suboxone  Recently used fentanyl prior to admission  He has a history of hepatitis C status post treatment  There is documentation of a history of HIV, but he has several prior negative tests in the chart, last in 2022               -screen for HIV given ongoing drug use; if negative please remove this diagnosis from the chart              -suboxone, management of withdrawal per primary     4  History of Hepatitis C  Reports treatment with Barbra Ream in the summer 2022 but no documentation of SVR  -check Hepatitis C PCR     Discussed the above management plan in detail with the primary service and patient  ID will see again 23, please call with questions  I spent 35 minutes in evaluation of the patient of which 20 minutes was in counseling/coordination of care    Antibiotics:  Vancomycin day 3    Subjective:  Patient has no fever, chills, sweats  Unable to tolerate MRI due to severe lower back pain  No other new symptoms  Objective:  Vitals:  Temp:  [97 7 °F (36 5 °C)] 97 7 °F (36 5 °C)  HR:  [66] 66  Resp:  [18] 18  BP: (113-117)/(62-70) 113/70  SpO2:  [95 %-98 %] 98 %  Temp (24hrs), Av 7 °F (36 5 °C), Min:97 7 °F (36 5 °C), Max:97 7 °F (36 5 °C)  Current: Temperature: 97 7 °F (36 5 °C)    Physical Exam:     Documented physical exam has been primarily done by the patient's nurse and/or the primary service due to limited examination abilities on telemedicine     General Appearance:  Alert, interactive, nontoxic, no acute distress  Throat: Oropharynx moist without lesions      Lungs:   Clear to auscultation bilaterally; no wheezes, rhonchi or rales; respirations unlabored   Heart:  RRR; no murmur, rub or gallop   Abdomen:   Soft, non-tender, non-distended, positive bowel sounds  Extremities: No clubbing, cyanosis or edema   Skin: No new rashes or lesions  No draining wounds noted  Labs:    All pertinent labs and imaging studies were personally reviewed  Results from last 7 days   Lab Units 01/20/23  0654 01/19/23  0445 01/18/23 1926   WBC Thousand/uL 11 30* 12 66* 11 19*   HEMOGLOBIN g/dL 10 4* 11 4* 11 8*   PLATELETS Thousands/uL 294 307 331     Results from last 7 days   Lab Units 01/20/23  0654 01/19/23  0445 01/18/23  1926   SODIUM mmol/L 136 137 135   POTASSIUM mmol/L 4 0 4 0 4 6   CHLORIDE mmol/L 105 102 100   CO2 mmol/L 22 26 28   BUN mg/dL 6 12 18   CREATININE mg/dL 0 71 0 81 0 79   EGFR ml/min/1 73sq m 119 113 114   CALCIUM mg/dL 8 3* 8 5 8 8   AST U/L 16 33 59*   ALT U/L 28 38 44   ALK PHOS U/L 69 65 61     Results from last 7 days   Lab Units 01/20/23  0908 01/18/23 1926   PROCALCITONIN ng/ml 2 29* 5 24*                   Micro:  Results from last 7 days   Lab Units 01/18/23 1926   BLOOD CULTURE  Staphylococcus aureus*  Staphylococcus aureus*   GRAM STAIN RESULT  Gram positive cocci in clusters*  Gram positive cocci in clusters*       Imaging:  Pertinent imaging in PACS personally reviewed

## 2023-01-21 NOTE — PLAN OF CARE
Problem: PAIN - ADULT  Goal: Verbalizes/displays adequate comfort level or baseline comfort level  Description: Interventions:  - Encourage patient to monitor pain and request assistance  - Assess pain using appropriate pain scale  - Administer analgesics based on type and severity of pain and evaluate response  - Implement non-pharmacological measures as appropriate and evaluate response  - Consider cultural and social influences on pain and pain management  - Notify physician/advanced practitioner if interventions unsuccessful or patient reports new pain  Outcome: Progressing     Problem: INFECTION - ADULT  Goal: Absence or prevention of progression during hospitalization  Description: INTERVENTIONS:  - Assess and monitor for signs and symptoms of infection  - Monitor lab/diagnostic results  - Monitor all insertion sites, i e  indwelling lines, tubes, and drains  - Monitor endotracheal if appropriate and nasal secretions for changes in amount and color  - [place] appropriate cooling/warming therapies per order  - Administer medications as ordered  - Instruct and encourage patient and family to use good hand hygiene technique  - Identify and instruct in appropriate isolation precautions for identified infection/condition  Outcome: Progressing  Goal: Absence of fever/infection during neutropenic period  Description: INTERVENTIONS:  - Monitor WBC    Outcome: Progressing     Problem: MUSCULOSKELETAL - ADULT  Goal: Maintain or return mobility to safest level of function  Description: INTERVENTIONS:  - Assess patient's ability to carry out ADLs; assess patient's baseline for ADL function and identify physical deficits which impact ability to perform ADLs (bathing, care of mouth/teeth, toileting, grooming, dressing, etc )  - Assess/evaluate cause of self-care deficits   - Assess range of motion  - Assess patient's mobility  - Assess patient's need for assistive devices and provide as appropriate  - Encourage maximum independence but intervene and supervise when necessary  - Involve family in performance of ADLs  - Assess for home care needs following discharge   - Consider OT consult to assist with ADL evaluation and planning for discharge  - Provide patient education as appropriate  Outcome: Progressing  Goal: Maintain proper alignment of affected body part  Description: INTERVENTIONS:  - Support, maintain and protect limb and body alignment  - Provide patient/ family with appropriate education  Outcome: Progressing     Problem: NEUROSENSORY - ADULT  Goal: Achieves stable or improved neurological status  Description: INTERVENTIONS  - Monitor and report changes in neurological status  - Monitor vital signs such as temperature, blood pressure, glucose, and any other labs ordered   - Initiate measures to prevent increased intracranial pressure  - Monitor for seizure activity and implement precautions if appropriate      Outcome: Progressing  Goal: Achieves maximal functionality and self care  Description: INTERVENTIONS  - Monitor swallowing and airway patency with patient fatigue and changes in neurological status  - Encourage and assist patient to increase activity and self care     - Encourage visually impaired, hearing impaired and aphasic patients to use assistive/communication devices  Outcome: Progressing     Problem: MOBILITY - ADULT  Goal: Maintain or return to baseline ADL function  Description: INTERVENTIONS:  -  Assess patient's ability to carry out ADLs; assess patient's baseline for ADL function and identify physical deficits which impact ability to perform ADLs (bathing, care of mouth/teeth, toileting, grooming, dressing, etc )  - Assess/evaluate cause of self-care deficits   - Assess range of motion  - Assess patient's mobility; develop plan if impaired  - Assess patient's need for assistive devices and provide as appropriate  - Encourage maximum independence but intervene and supervise when necessary  - Involve family in performance of ADLs  - Assess for home care needs following discharge   - Consider OT consult to assist with ADL evaluation and planning for discharge  - Provide patient education as appropriate  Outcome: Progressing  Goal: Maintains/Returns to pre admission functional level  Description: INTERVENTIONS:  - Perform BMAT or MOVE assessment daily    - Set and communicate daily mobility goal to care team and patient/family/caregiver  - Collaborate with rehabilitation services on mobility goals if consulted  - Perform Range of Motion 3 times a day  - Reposition patient every 3 hours    - Dangle patient 3 times a day  - Stand patient 3 times a day  - Ambulate patient 3 times a day  - Out of bed to chair 3 times a day   - Out of bed for meals 3 times a day  - Out of bed for toileting  - Record patient progress and toleration of activity level   Outcome: Progressing

## 2023-01-21 NOTE — ASSESSMENT & PLAN NOTE
HIV antibody negative on 3/8/2022  Not taking HIV meds    ID recs: ordered repeat HIV labs    Plan:  F/u HIV labs

## 2023-01-21 NOTE — PLAN OF CARE
Problem: PAIN - ADULT  Goal: Verbalizes/displays adequate comfort level or baseline comfort level  Description: Interventions:  - Encourage patient to monitor pain and request assistance  - Assess pain using appropriate pain scale  - Administer analgesics based on type and severity of pain and evaluate response  - Implement non-pharmacological measures as appropriate and evaluate response  - Consider cultural and social influences on pain and pain management  - Notify physician/advanced practitioner if interventions unsuccessful or patient reports new pain  Outcome: Progressing     Problem: INFECTION - ADULT  Goal: Absence or prevention of progression during hospitalization  Description: INTERVENTIONS:  - Assess and monitor for signs and symptoms of infection  - Monitor lab/diagnostic results  - Monitor all insertion sites, i e  indwelling lines, tubes, and drains  - Monitor endotracheal if appropriate and nasal secretions for changes in amount and color  - [place] appropriate cooling/warming therapies per order  - Administer medications as ordered  - Instruct and encourage patient and family to use good hand hygiene technique  - Identify and instruct in appropriate isolation precautions for identified infection/condition  Outcome: Progressing  Goal: Absence of fever/infection during neutropenic period  Description: INTERVENTIONS:  - Monitor WBC    Outcome: Progressing     Problem: MUSCULOSKELETAL - ADULT  Goal: Maintain or return mobility to safest level of function  Description: INTERVENTIONS:  - Assess patient's ability to carry out ADLs; assess patient's baseline for ADL function and identify physical deficits which impact ability to perform ADLs (bathing, care of mouth/teeth, toileting, grooming, dressing, etc )  - Assess/evaluate cause of self-care deficits   - Assess range of motion  - Assess patient's mobility  - Assess patient's need for assistive devices and provide as appropriate  - Encourage maximum independence but intervene and supervise when necessary  - Involve family in performance of ADLs  - Assess for home care needs following discharge   - Consider OT consult to assist with ADL evaluation and planning for discharge  - Provide patient education as appropriate  Outcome: Progressing  Goal: Maintain proper alignment of affected body part  Description: INTERVENTIONS:  - Support, maintain and protect limb and body alignment  - Provide patient/ family with appropriate education  Outcome: Progressing     Problem: NEUROSENSORY - ADULT  Goal: Achieves stable or improved neurological status  Description: INTERVENTIONS  - Monitor and report changes in neurological status  - Monitor vital signs such as temperature, blood pressure, glucose, and any other labs ordered   - Initiate measures to prevent increased intracranial pressure  - Monitor for seizure activity and implement precautions if appropriate      Outcome: Progressing  Goal: Achieves maximal functionality and self care  Description: INTERVENTIONS  - Monitor swallowing and airway patency with patient fatigue and changes in neurological status  - Encourage and assist patient to increase activity and self care     - Encourage visually impaired, hearing impaired and aphasic patients to use assistive/communication devices  Outcome: Progressing

## 2023-01-21 NOTE — PROGRESS NOTES
[address] [facility] [address]  Progress Note - Estefanía Meth 1985, 41 y o  male MRN: [id number]  Unit/Bed#: [facility] Encounter: [id number]  Primary Care Provider: Aki Gillespie DO   Date and time admitted to hospital: 1/18/2023  5:14 PM    * Bacteremia due to Staphylococcus aureus  Assessment & Plan  Continue IV Ancef pending repeat cultures, patient will require transesophageal echocardiogram, MRI of the lumbar spine and SI joints  ID input appreciated    Today is day 2 of IV Ancef, day 4 of antibiotic therapy    Tobacco abuse  Assessment & Plan  History of tobacco smoking  Plan:  Nicotine patch  Smoking cessation    Acute right-sided low back pain with right-sided sciatica  Assessment & Plan  H/o right sided back pain with sciatica and hip pain  CT 1/16/23 shows:  1  Mild multilevel disc degeneration  2  Grade 1 retrolisthesis of L5 on S1    3  Disc bulge with small herniation at L5-S1  Disc bulging from L2-3 through   L4-5    4  No evidence of periostitis along the endplates to suggest advanced   osteomyelitis on CT imaging  If there is clinical concern for underlying   infection, MRI would be a more sensitive modality to assess for early changes of   discitis osteomyelitis      Pain rated 9/10  Patient unable to ambulate without cane  Plan:  Scheduled Tylenol 650mg Q6  PRN Toradol for moderate/severe pain  Lidocaine patch  Follow-up MRI of lumbar and sacroiliac joints      Ambulatory dysfunction  Assessment & Plan  Ambulatory dysfunction due to pain  Patient utilizing a cane to ambulate  Pain rated 9 out of 10  Plan:  Schedule Tylenol 650mg Q6  PT/OT    [facility]  Assessment & Plan  HIV antibody negative on 3/8/2022  Not taking HIV meds    ID recs: ordered repeat HIV labs    Plan:  F/u HIV labs      Intravenous drug abuse, continuous ([facility] )  Assessment & Plan  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  Last use - IV,1 bag of fentanyl, at 12pm on 23  Patient has scheduled follow-up with Dr Bree Martino in [place]  Suboxone 8 mg twice daily      Heroin abuse [facility])  Assessment & Plan  See ' IV drug abuse' for plan    Hepatitis C virus  Assessment & Plan  Most recent labs 3/7/2022  Positive Hep A  Positive Hep B surface antibody  Positive Hep C antibody  Not taking hepatitis meds    ID recs: ordered Hep C PCR    Plan:  F/u Hep C PCR        Code Status: Level 1 - Full Code    Subjective:   Patient resting comfortably in bed    Objective:     Vitals:   Temp (24hrs), Av 9 °F (36 6 °C), Min:97 4 °F (36 3 °C), Max:98 4 °F (36 9 °C)    Temp:  [97 4 °F (36 3 °C)-98 4 °F (36 9 °C)] 97 4 °F (36 3 °C)  HR:  [67-69] 67  Resp:  [15-18] 15  BP: (118-121)/(69-75) 121/75  SpO2:  [95 %-96 %] 96 %  Body mass index is 27 67 kg/m²  Input and Output Summary (last 24 hours): Intake/Output Summary (Last 24 hours) at 2023 1101  Last data filed at 2023 5848  Gross per 24 hour   Intake 3260 ml   Output 2300 ml   Net 960 ml       Physical Exam:   Physical Exam  Vitals and nursing note reviewed  Constitutional:       General: He is not in acute distress    Pulmonary:      Effort: Pulmonary effort is normal    Neurological:      Comments: Patient sleeping comfortably at time of my exam, therefore no detailed physical exam performed today          Additional Data:     Labs:  Results from last 7 days   Lab Units 23  0654   WBC Thousand/uL 11 30*   HEMOGLOBIN g/dL 10 4*   HEMATOCRIT % 32 5*   PLATELETS Thousands/uL 294   NEUTROS PCT % 75   LYMPHS PCT % 14   MONOS PCT % 11   EOS PCT % 0     Results from last 7 days   Lab Units 23  0654   SODIUM mmol/L 136   POTASSIUM mmol/L 4 0   CHLORIDE mmol/L 105   CO2 mmol/L 22   BUN mg/dL 6   CREATININE mg/dL 0 71   ANION GAP mmol/L 9   CALCIUM mg/dL 8 3*   ALBUMIN g/dL 3 2*   TOTAL BILIRUBIN mg/dL 0 27   ALK PHOS U/L 69   ALT U/L 28   AST U/L 16   GLUCOSE RANDOM mg/dL 138     Results from last 7 days   Lab Units 01/19/23  0445   INR  1 06             Results from last 7 days   Lab Units 01/20/23  0908 01/18/23  1926   LACTIC ACID mmol/L  --  1 8   PROCALCITONIN ng/ml 2 29* 5 24*       Lines/Drains:  Invasive Devices     Peripheral Intravenous Line  Duration           Peripheral IV 01/18/23 Right Antecubital 2 days                      Imaging: No pertinent imaging reviewed  Recent Cultures (last 7 days):   Results from last 7 days   Lab Units 01/20/23  1306 01/18/23  1926   BLOOD CULTURE  Received in Microbiology Lab  Culture in Progress  Received in Microbiology Lab  Culture in Progress  Staphylococcus aureus*  Staphylococcus aureus*   GRAM STAIN RESULT   --  Gram positive cocci in clusters*  Gram positive cocci in clusters*       Last 24 Hours Medication List:   Current Facility-Administered Medications   Medication Dose Route Frequency Provider Last Rate   • acetaminophen  650 mg Oral Q6H Rafaela Reyes MD     • buprenorphine-naloxone  8 mg Sublingual BID Emerita Youngblood MD     • cefazolin  2,000 mg Intravenous Q8H Theresa Garcia MD Stopped (01/21/23 2424)   • cloNIDine  0 1 mg Oral Q8H Rafaela Reyes MD     • cyclobenzaprine  10 mg Oral TID PRN Bran Sampson MD     • enoxaparin  40 mg Subcutaneous Daily Bran Sampson MD     • gabapentin  300 mg Oral BID Bran Sampson MD     • ketorolac  15 mg Intravenous Q6H PRN Emerita Youngblood MD     • lidocaine  1 patch Topical Daily Emerita Youngblood MD     • nicotine  14 mg Transdermal Daily Bran Sampson MD     • ondansetron  4 mg Intravenous Q6H PRN Bran Sampson MD     • QUEtiapine  25 mg Oral HS Emerita Youngblood MD     • temazepam  15 mg Oral HS PRN Bran Sampson MD          Today, Patient Was Seen By: Lacy Pastor DO    **Please Note: This note may have been constructed using a voice recognition system  **

## 2023-01-22 PROBLEM — F19.10 INTRAVENOUS DRUG ABUSE, CONTINUOUS (HCC): Status: ACTIVE | Noted: 2023-01-18

## 2023-01-22 PROBLEM — B20 HIV (HUMAN IMMUNODEFICIENCY VIRUS INFECTION) (HCC): Status: ACTIVE | Noted: 2023-01-22

## 2023-01-22 PROBLEM — Z21 HIV (HUMAN IMMUNODEFICIENCY VIRUS INFECTION) (HCC): Status: ACTIVE | Noted: 2023-01-22

## 2023-01-22 LAB
BACTERIA BLD CULT: ABNORMAL
BACTERIA BLD CULT: ABNORMAL
GRAM STN SPEC: ABNORMAL
GRAM STN SPEC: ABNORMAL
S AUREUS DNA BLD POS QL NAA+NON-PROBE: DETECTED
VANCOMYCIN SERPL-MCNC: <5 UG/ML (ref 10–20)

## 2023-01-22 RX ORDER — KETOROLAC TROMETHAMINE 30 MG/ML
15 INJECTION, SOLUTION INTRAMUSCULAR; INTRAVENOUS EVERY 6 HOURS PRN
Status: DISPENSED | OUTPATIENT
Start: 2023-01-22 | End: 2023-01-23

## 2023-01-22 RX ORDER — HYDROMORPHONE HCL/PF 1 MG/ML
1 SYRINGE (ML) INJECTION EVERY 6 HOURS PRN
Status: DISCONTINUED | OUTPATIENT
Start: 2023-01-22 | End: 2023-01-23

## 2023-01-22 RX ADMIN — ACETAMINOPHEN 650 MG: 325 TABLET, FILM COATED ORAL at 23:17

## 2023-01-22 RX ADMIN — KETOROLAC TROMETHAMINE 15 MG: 30 INJECTION, SOLUTION INTRAMUSCULAR; INTRAVENOUS at 08:55

## 2023-01-22 RX ADMIN — CLONIDINE HYDROCHLORIDE 0.1 MG: 0.1 TABLET ORAL at 05:26

## 2023-01-22 RX ADMIN — CLONIDINE HYDROCHLORIDE 0.1 MG: 0.1 TABLET ORAL at 21:26

## 2023-01-22 RX ADMIN — CYCLOBENZAPRINE 10 MG: 10 TABLET, FILM COATED ORAL at 17:18

## 2023-01-22 RX ADMIN — KETOROLAC TROMETHAMINE 15 MG: 30 INJECTION, SOLUTION INTRAMUSCULAR at 21:34

## 2023-01-22 RX ADMIN — CYCLOBENZAPRINE 10 MG: 10 TABLET, FILM COATED ORAL at 05:32

## 2023-01-22 RX ADMIN — ACETAMINOPHEN 650 MG: 325 TABLET, FILM COATED ORAL at 12:43

## 2023-01-22 RX ADMIN — BUPRENORPHINE AND NALOXONE 8 MG: 8; 2 FILM BUCCAL; SUBLINGUAL at 08:55

## 2023-01-22 RX ADMIN — CEFAZOLIN SODIUM 2000 MG: 2 SOLUTION INTRAVENOUS at 13:00

## 2023-01-22 RX ADMIN — BUPRENORPHINE AND NALOXONE 8 MG: 8; 2 FILM BUCCAL; SUBLINGUAL at 21:26

## 2023-01-22 RX ADMIN — NICOTINE 14 MG: 14 PATCH, EXTENDED RELEASE TRANSDERMAL at 08:56

## 2023-01-22 RX ADMIN — GABAPENTIN 300 MG: 300 CAPSULE ORAL at 17:08

## 2023-01-22 RX ADMIN — CEFAZOLIN SODIUM 2000 MG: 2 SOLUTION INTRAVENOUS at 05:26

## 2023-01-22 RX ADMIN — GABAPENTIN 300 MG: 300 CAPSULE ORAL at 08:55

## 2023-01-22 RX ADMIN — CLONIDINE HYDROCHLORIDE 0.1 MG: 0.1 TABLET ORAL at 13:00

## 2023-01-22 RX ADMIN — ACETAMINOPHEN 650 MG: 325 TABLET, FILM COATED ORAL at 05:26

## 2023-01-22 RX ADMIN — ACETAMINOPHEN 650 MG: 325 TABLET, FILM COATED ORAL at 17:08

## 2023-01-22 RX ADMIN — CEFAZOLIN SODIUM 2000 MG: 2 SOLUTION INTRAVENOUS at 21:26

## 2023-01-22 RX ADMIN — QUETIAPINE FUMARATE 25 MG: 25 TABLET ORAL at 21:26

## 2023-01-22 RX ADMIN — KETOROLAC TROMETHAMINE 15 MG: 30 INJECTION, SOLUTION INTRAMUSCULAR; INTRAVENOUS at 02:40

## 2023-01-22 RX ADMIN — TEMAZEPAM 15 MG: 15 CAPSULE ORAL at 21:26

## 2023-01-22 RX ADMIN — KETOROLAC TROMETHAMINE 15 MG: 30 INJECTION, SOLUTION INTRAMUSCULAR at 14:54

## 2023-01-22 NOTE — ASSESSMENT & PLAN NOTE
H/o right sided back pain with sciatica and hip pain  CT 1/16/23 shows:  1  Mild multilevel disc degeneration  2  Grade 1 retrolisthesis of L5 on S1    3  Disc bulge with small herniation at L5-S1  Disc bulging from L2-3 through   L4-5    4  No evidence of periostitis along the endplates to suggest advanced   osteomyelitis on CT imaging  If there is clinical concern for underlying   infection, MRI would be a more sensitive modality to assess for early changes of   discitis osteomyelitis      Pain rated 9/10  Patient unable to ambulate without cane  Plan:  Scheduled Tylenol 650mg Q6  As needed Toradol for moderate pain, in order to obtain MRI will give additional IV Dilaudid x24 hours  Patient refused Lidoderm patch  Follow-up MRI of lumbar and sacroiliac joints

## 2023-01-22 NOTE — ASSESSMENT & PLAN NOTE
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  Last use - IV,1 bag of fentanyl, at 12pm on 1/18/23  Patient has scheduled follow-up with Dr Raúl Lion in [place]  Suboxone 8 mg twice daily

## 2023-01-22 NOTE — PLAN OF CARE
Problem: PAIN - ADULT  Goal: Verbalizes/displays adequate comfort level or baseline comfort level  Description: Interventions:  - Encourage patient to monitor pain and request assistance  - Assess pain using appropriate pain scale  - Administer analgesics based on type and severity of pain and evaluate response  - Implement non-pharmacological measures as appropriate and evaluate response  - Consider cultural and social influences on pain and pain management  - Notify physician/advanced practitioner if interventions unsuccessful or patient reports new pain  1/22/2023 0750 by Karol Trent RN  Outcome: Progressing  1/21/2023 1804 by Karol Trent RN  Outcome: Progressing     Problem: INFECTION - ADULT  Goal: Absence or prevention of progression during hospitalization  Description: INTERVENTIONS:  - Assess and monitor for signs and symptoms of infection  - Monitor lab/diagnostic results  - Monitor all insertion sites, i e  indwelling lines, tubes, and drains  - Monitor endotracheal if appropriate and nasal secretions for changes in amount and color  - [place] appropriate cooling/warming therapies per order  - Administer medications as ordered  - Instruct and encourage patient and family to use good hand hygiene technique  - Identify and instruct in appropriate isolation precautions for identified infection/condition  1/22/2023 0750 by Karol Trent RN  Outcome: Progressing  1/21/2023 1804 by Karol Trent RN  Outcome: Progressing  Goal: Absence of fever/infection during neutropenic period  Description: INTERVENTIONS:  - Monitor WBC    1/22/2023 0750 by Karol Trent RN  Outcome: Progressing  1/21/2023 1804 by Karol Trent RN  Outcome: Progressing     Problem: MUSCULOSKELETAL - ADULT  Goal: Maintain or return mobility to safest level of function  Description: INTERVENTIONS:  - Assess patient's ability to carry out ADLs; assess patient's baseline for ADL function and identify physical deficits which impact ability to perform ADLs (bathing, care of mouth/teeth, toileting, grooming, dressing, etc )  - Assess/evaluate cause of self-care deficits   - Assess range of motion  - Assess patient's mobility  - Assess patient's need for assistive devices and provide as appropriate  - Encourage maximum independence but intervene and supervise when necessary  - Involve family in performance of ADLs  - Assess for home care needs following discharge   - Consider OT consult to assist with ADL evaluation and planning for discharge  - Provide patient education as appropriate  1/22/2023 0750 by Rose Negron RN  Outcome: Progressing  1/21/2023 1804 by Rose Negron RN  Outcome: Progressing  Goal: Maintain proper alignment of affected body part  Description: INTERVENTIONS:  - Support, maintain and protect limb and body alignment  - Provide patient/ family with appropriate education  1/22/2023 0750 by Rose Negron RN  Outcome: Progressing  1/21/2023 1804 by Rose Negron RN  Outcome: Progressing     Problem: NEUROSENSORY - ADULT  Goal: Achieves stable or improved neurological status  Description: INTERVENTIONS  - Monitor and report changes in neurological status  - Monitor vital signs such as temperature, blood pressure, glucose, and any other labs ordered   - Initiate measures to prevent increased intracranial pressure  - Monitor for seizure activity and implement precautions if appropriate      1/22/2023 0750 by Rose Negron RN  Outcome: Progressing  1/21/2023 1804 by Rose Negron RN  Outcome: Progressing  Goal: Achieves maximal functionality and self care  Description: INTERVENTIONS  - Monitor swallowing and airway patency with patient fatigue and changes in neurological status  - Encourage and assist patient to increase activity and self care     - Encourage visually impaired, hearing impaired and aphasic patients to use assistive/communication devices  1/22/2023 0750 by Rose Negron RN  Outcome: Progressing  1/21/2023 1804 by Rose Mortimer, RN  Outcome: Progressing     Problem: MOBILITY - ADULT  Goal: Maintain or return to baseline ADL function  Description: INTERVENTIONS:  -  Assess patient's ability to carry out ADLs; assess patient's baseline for ADL function and identify physical deficits which impact ability to perform ADLs (bathing, care of mouth/teeth, toileting, grooming, dressing, etc )  - Assess/evaluate cause of self-care deficits   - Assess range of motion  - Assess patient's mobility; develop plan if impaired  - Assess patient's need for assistive devices and provide as appropriate  - Encourage maximum independence but intervene and supervise when necessary  - Involve family in performance of ADLs  - Assess for home care needs following discharge   - Consider OT consult to assist with ADL evaluation and planning for discharge  - Provide patient education as appropriate  1/22/2023 0750 by Rose Mortimer, RN  Outcome: Progressing  1/21/2023 1804 by Rose Mortimer, RN  Outcome: Progressing  Goal: Maintains/Returns to pre admission functional level  Description: INTERVENTIONS:  - Perform BMAT or MOVE assessment daily    - Set and communicate daily mobility goal to care team and patient/family/caregiver  - Collaborate with rehabilitation services on mobility goals if consulted  - Perform Range of Motion 3 times a day  - Reposition patient every 3 hours    - Dangle patient 3 times a day  - Stand patient 3 times a day  - Ambulate patient 3 times a day  - Out of bed to chair 3 times a day   - Out of bed for meals 3 times a day  - Out of bed for toileting  - Record patient progress and toleration of activity level   1/22/2023 0750 by Rose Mortimer, RN  Outcome: Progressing  1/21/2023 1804 by Rose Mortimer, RN  Outcome: Progressing

## 2023-01-22 NOTE — OCCUPATIONAL THERAPY NOTE
Occupational Therapy Evaluation     Patient Name: Sara Bonds  [id number] Date: 1/22/2023  Problem List  Principal Problem:    Bacteremia due to Staphylococcus aureus  Active Problems:    Hepatitis C virus    Heroin abuse (HCC)    Intravenous drug abuse, continuous (HCC)    HIV (human immunodeficiency virus infection) ([facility] )    Ambulatory dysfunction    Acute right-sided low back pain with right-sided sciatica    Tobacco abuse    Past Medical History  Past Medical History:   Diagnosis Date   • Addiction to drug [facility])    • Anxiety    • Arthritis    • Chronic pain    • Hepatitis    • Hepatitis C virus    • Heroin abuse ([facility] ) 5/2/2018   • HIV (human immunodeficiency virus infection) ([facility] )    • Moderate episode of recurrent major depressive disorder ([facility] ) 2/5/2020   • Tobacco use disorder     last assessed 11/2/15      Past Surgical History  Past Surgical History:   Procedure Laterality Date   • EPIDURAL BLOCK INJECTION Bilateral 4/19/2018    Procedure: T9-10 INTERLAMINAR EPIDURAL STEROID INJECTION;  Surgeon: David Quarles MD;  Location: [facility] MAIN OR;  Service: Pain Management              01/22/23 0905   OT Last Visit   OT Visit Date 01/22/23   Note Type   Note type Evaluation   Pain Assessment   Pain Assessment Tool 0-10   Pain Score 8   Pain Location/Orientation Orientation: Right;Location: Hip   Restrictions/Precautions   Weight Bearing Precautions Per Order No   Other Precautions Multiple lines; Fall Risk;Pain   Home Living   Type of [address] [place] [address] Two level;Bed/bath upstairs;Stairs to enter with rails; Other (Comment)  (40 ESVIN c HR; FOS between floors for bedroom/bathroom access)   Bathroom Equipment Other (Comment)  (no DME)   [facility]   Additional Comments pt reports no device at baseline during mobility, however reports recent use of SPC during mobility with increased back pain   Prior Function   Level of [place] Independent with ADLs; Independent with functional mobility; Independent with IADLS   Lives With Significant other   IADLs Independent with driving   Falls in the last 6 months 0   Vocational On disability   Comments pt has history of IV drug use and actively using prior to this hospital admission   Subjective   Subjective "I can't do anything, I need to lay"   ADL   Where Assessed Edge of bed   LB Dressing Assistance 2  Maximal Assistance   LB Dressing Deficit Don/doff L sock; Don/doff R sock   Additional Comments pt was unable to bring leg to knee to complete LB dressing nor bend forward to complete due to increased back pain   Bed Mobility   Rolling L 4  Minimal assistance   Additional items Assist x 1;Bedrails; Increased time required;Verbal cues   Supine to Sit 4  Minimal assistance   Additional items Bedrails; Increased time required;Assist x 1   Sit to Supine 5  Supervision   Additional items Increased time required;Verbal cues; Bedrails   Additional Comments pt on RA during session; SpO2 WFL with no complaints of SOB; pt's only complaint is of back pain limiting functional performance; pt seated EOB for UB strength and ADL assessment; able to tolerate 5 minutes EOB prior to return to supine due to back pain; pt states 8/10 at start of session and 10/10 at end of session; pt reports he has been sitting EOB for meals; physician requests OT/PT evaluation; pt also educated in log roll technique this session and demonstrates understanding of the same with poor follow through   Transfers   Sit to Stand Unable to assess   Stand to Sit Unable to assess   Additional Comments pt declines functional transfers this session   Functional Mobility   Additional Comments pt declines functional mobility this session   Balance   Static Sitting Fair +   Dynamic Sitting Fair   Activity Tolerance   Activity Tolerance Patient limited by pain; Patient limited by fatigue   RUE Assessment   RUE Assessment WFL   LUE Assessment   LUE Assessment WFL   Hand Function   Gross Motor Coordination Functional   Fine Motor Coordination Functional   Sensation   Light Touch No apparent deficits   Sharp/Dull No apparent deficits   Proprioception   Proprioception No apparent deficits   Vision-Basic Assessment   Current Vision No visual deficits   Vision - Complex Assessment   Ocular Range of Motion Intact   Psychosocial   Psychosocial (WDL) WDL   Cognition   Overall Cognitive Status WFL   Arousal/Participation Alert   Attention Within functional limits   Orientation Level Oriented X4   Memory Within functional limits   Following Commands Follows all commands and directions without difficulty   Assessment   Limitation Decreased ADL status; Decreased UE strength;Decreased Safe judgement during ADL;Decreased endurance;Decreased self-care trans;Decreased high-level ADLs   Assessment Pt is a 41 y o  male seen for OT evaluation s/p admit to [facility] on 1/18/2023 w/ Bacteremia due to Staphylococcus aureus  Comorbidities affecting pt's functional performance at time of assessment include: drug addiction, hep C, chronic pain, HIV, arthritis, MDD, hepatitis, heroin abuse  Personal factors affecting pt at time of IE include:steps to enter environment, limited home support, behavioral pattern, difficulty performing ADLS, difficulty performing IADLS , compliance, decreased initiation and engagement  and health management   Prior to admission, pt was (I) with ADLs and IADLs with no device during mobility  Upon evaluation: Pt requires (S)-min (A) x1 level with bed mobility and declines further mobility 2* the following deficits impacting occupational performance: weakness, decreased strength, decreased balance, decreased tolerance, impaired initiation, impaired problem solving, decreased safety awareness, increased pain and impaired interpersonal skills   Pt to benefit from continued skilled OT tx while in the hospital to address deficits as defined above and maximize level of functional independence w ADL's and functional mobility  Occupational Performance areas to address include: grooming, bathing/shower, toilet hygiene, dressing, functional mobility, community mobility and clothing management  The patient's raw score on the AM-PAC Daily Activity inpatient short form is 19, standardized score is 40 22, greater than 39 4  Patients at this level are likely to benefit from discharge to home  Please refer to the recommendation of the Occupational Therapist for safe discharge planning  Goals   Patient Goals to feel better   Short Term Goal  pt will perform UE strengthening exercises   Long Term Goal #1 pt will demonstrate toilet transfers and hygiene at (I) level   Long Term Goal #2 pt will demonstrate functional transfers and mobility with or without device at mod (I) level   Long Term Goal pt will demonstrate UB/LB bathing and grooming tasks at min (A) level   Plan   Treatment Interventions ADL retraining;Functional transfer training;UE strengthening/ROM; Endurance training;Patient/family training;Equipment evaluation/education; Activityengagement; Compensatory technique education   Goal Expiration Date 02/05/23   OT Frequency 3-5x/wk   Recommendation   OT Discharge Recommendation Home with outpatient rehabilitation   [facility] Daily Activity Inpatient   Lower Body Dressing 2   Bathing 2   Toileting 3   Upper Body Dressing 4   Grooming 4   Eating 4   Daily Activity Raw Score 19   Daily Activity Standardized Score (Calc for Raw Score >=11) 40 22   [facility] Applied Cognition Inpatient   Following a Speech/Presentation 4   Understanding Ordinary Conversation 4   Taking Medications 4   Remembering Where Things Are Placed or Put Away 4   Remembering List of 4-5 Errands 4   Taking Care of Complicated Tasks 4   Applied Cognition Raw Score 24   Applied Cognition Standardized Score 62 21

## 2023-01-22 NOTE — PLAN OF CARE
Problem: PAIN - ADULT  Goal: Verbalizes/displays adequate comfort level or baseline comfort level  Description: Interventions:  - Encourage patient to monitor pain and request assistance  - Assess pain using appropriate pain scale  - Administer analgesics based on type and severity of pain and evaluate response  - Implement non-pharmacological measures as appropriate and evaluate response  - Consider cultural and social influences on pain and pain management  - Notify physician/advanced practitioner if interventions unsuccessful or patient reports new pain  Outcome: Progressing     Problem: INFECTION - ADULT  Goal: Absence or prevention of progression during hospitalization  Description: INTERVENTIONS:  - Assess and monitor for signs and symptoms of infection  - Monitor lab/diagnostic results  - Monitor all insertion sites, i e  indwelling lines, tubes, and drains  - Monitor endotracheal if appropriate and nasal secretions for changes in amount and color  - [place] appropriate cooling/warming therapies per order  - Administer medications as ordered  - Instruct and encourage patient and family to use good hand hygiene technique  - Identify and instruct in appropriate isolation precautions for identified infection/condition  Outcome: Progressing  Goal: Absence of fever/infection during neutropenic period  Description: INTERVENTIONS:  - Monitor WBC    Outcome: Progressing     Problem: MUSCULOSKELETAL - ADULT  Goal: Maintain or return mobility to safest level of function  Description: INTERVENTIONS:  - Assess patient's ability to carry out ADLs; assess patient's baseline for ADL function and identify physical deficits which impact ability to perform ADLs (bathing, care of mouth/teeth, toileting, grooming, dressing, etc )  - Assess/evaluate cause of self-care deficits   - Assess range of motion  - Assess patient's mobility  - Assess patient's need for assistive devices and provide as appropriate  - Encourage maximum independence but intervene and supervise when necessary  - Involve family in performance of ADLs  - Assess for home care needs following discharge   - Consider OT consult to assist with ADL evaluation and planning for discharge  - Provide patient education as appropriate  Outcome: Progressing  Goal: Maintain proper alignment of affected body part  Description: INTERVENTIONS:  - Support, maintain and protect limb and body alignment  - Provide patient/ family with appropriate education  Outcome: Progressing     Problem: NEUROSENSORY - ADULT  Goal: Achieves stable or improved neurological status  Description: INTERVENTIONS  - Monitor and report changes in neurological status  - Monitor vital signs such as temperature, blood pressure, glucose, and any other labs ordered   - Initiate measures to prevent increased intracranial pressure  - Monitor for seizure activity and implement precautions if appropriate      Outcome: Progressing  Goal: Achieves maximal functionality and self care  Description: INTERVENTIONS  - Monitor swallowing and airway patency with patient fatigue and changes in neurological status  - Encourage and assist patient to increase activity and self care     - Encourage visually impaired, hearing impaired and aphasic patients to use assistive/communication devices  Outcome: Progressing     Problem: MOBILITY - ADULT  Goal: Maintain or return to baseline ADL function  Description: INTERVENTIONS:  -  Assess patient's ability to carry out ADLs; assess patient's baseline for ADL function and identify physical deficits which impact ability to perform ADLs (bathing, care of mouth/teeth, toileting, grooming, dressing, etc )  - Assess/evaluate cause of self-care deficits   - Assess range of motion  - Assess patient's mobility; develop plan if impaired  - Assess patient's need for assistive devices and provide as appropriate  - Encourage maximum independence but intervene and supervise when necessary  - Involve family in performance of ADLs  - Assess for home care needs following discharge   - Consider OT consult to assist with ADL evaluation and planning for discharge  - Provide patient education as appropriate  Outcome: Progressing  Goal: Maintains/Returns to pre admission functional level  Description: INTERVENTIONS:  - Perform BMAT or MOVE assessment daily    - Set and communicate daily mobility goal to care team and patient/family/caregiver  - Collaborate with rehabilitation services on mobility goals if consulted  - Perform Range of Motion 3 times a day  - Reposition patient every 2 hours    - Dangle patient 3 times a day  - Stand patient 3 times a day  - Ambulate patient 3 times a day  - Out of bed to chair 3 times a day   - Out of bed for meals 3 times a day  - Out of bed for toileting  - Record patient progress and toleration of activity level   Outcome: Progressing

## 2023-01-22 NOTE — ASSESSMENT & PLAN NOTE
Continue IV Ancef pending repeat cultures, patient will require transesophageal echocardiogram, MRI of the lumbar spine and SI joints  ID input appreciated    Today is day 3 of IV Ancef, day 5 of antibiotic therapy  Patient will receive IV Dilaudid in addition to Suboxone in order to obtain the MRI  Medication interactions discussed directly with pharmacy

## 2023-01-22 NOTE — PROGRESS NOTES
[address]  Progress Note - Eulalio Yarbrough 1985, 41 y o  male MRN: [id number]  Unit/Bed#: 594-57 Encounter: [id number]  Primary Care Provider: Amisha Neff DO   Date and time admitted to hospital: 1/18/2023  5:14 PM    * Bacteremia due to Staphylococcus aureus  Assessment & Plan  Continue IV Ancef pending repeat cultures, patient will require transesophageal echocardiogram, MRI of the lumbar spine and SI joints  ID input appreciated    Today is day 3 of IV Ancef, day 5 of antibiotic therapy  Patient will receive IV Dilaudid in addition to Suboxone in order to obtain the MRI  Medication interactions discussed directly with pharmacy  Intravenous drug abuse, continuous ([facility] [address] )  Assessment & Plan  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  Last use - IV,1 bag of fentanyl, at 12pm on 1/18/23  Patient has scheduled follow-up with Dr Yahaira Pro in [place]  Suboxone 8 mg twice daily      Hepatitis C virus  Assessment & Plan  Most recent labs 3/7/2022  Positive Hep A  Positive Hep B surface antibody  Positive Hep C antibody  Not taking hepatitis meds    ID recs: ordered Hep C PCR    Plan:  F/u Hep C PCR      Tobacco abuse  Assessment & Plan  History of tobacco smoking  Plan:  Nicotine patch  Smoking cessation    Acute right-sided low back pain with right-sided sciatica  Assessment & Plan  H/o right sided back pain with sciatica and hip pain  CT 1/16/23 shows:  1  Mild multilevel disc degeneration  2  Grade 1 retrolisthesis of L5 on S1    3  Disc bulge with small herniation at L5-S1  Disc bulging from L2-3 through   L4-5    4  No evidence of periostitis along the endplates to suggest advanced   osteomyelitis on CT imaging  If there is clinical concern for underlying   infection, MRI would be a more sensitive modality to assess for early changes of   discitis osteomyelitis      Pain rated 9/10  Patient unable to ambulate without cane  Plan:  Scheduled Tylenol 650mg Q6  As needed Toradol for moderate pain, in order to obtain MRI will give additional IV Dilaudid x24 hours  Patient refused Lidoderm patch  Follow-up MRI of lumbar and sacroiliac joints      Ambulatory dysfunction  Assessment & Plan  Ambulatory dysfunction due to pain  Patient utilizing a cane to ambulate  Pain rated 9 out of 10  Plan:  Schedule Tylenol 650mg Q6  PT/OT        Code Status: Level 1 - Full Code    Subjective:   Patient still complaining of intermittent severe right hip pain, radiates into right lateral hip, buttocks    Objective:     Vitals:   Temp (24hrs), Av 9 °F (36 6 °C), Min:97 7 °F (36 5 °C), Max:98 2 °F (36 8 °C)    Temp:  [97 7 °F (36 5 °C)-98 2 °F (36 8 °C)] 98 2 °F (36 8 °C)  HR:  [64-78] 66  Resp:  [17-18] 18  BP: (118-125)/(65-83) 118/75  SpO2:  [96 %-98 %] 96 %  Body mass index is 27 67 kg/m²  Input and Output Summary (last 24 hours): Intake/Output Summary (Last 24 hours) at 2023 1101  Last data filed at 2023 0920  Gross per 24 hour   Intake 1163 33 ml   Output 1300 ml   Net -136 67 ml       Physical Exam:   Physical Exam  Vitals and nursing note reviewed  HENT:      Head: Normocephalic and atraumatic  Right Ear: External ear normal       Left Ear: External ear normal    Cardiovascular:      Rate and Rhythm: Normal rate  Pulses: Normal pulses  Heart sounds: Normal heart sounds  Pulmonary:      Effort: Pulmonary effort is normal       Breath sounds: Normal breath sounds  No rales  Chest:      Chest wall: No tenderness  Musculoskeletal:      Cervical back: Normal range of motion  Skin:     General: Skin is warm  Neurological:      General: No focal deficit present  Mental Status: He is alert and oriented to person, place, and time  Mental status is at baseline  Cranial Nerves: No cranial nerve deficit  Psychiatric:         Mood and Affect: Mood normal          Behavior: Behavior normal          Thought Content:  Thought content normal  Judgment: Judgment normal           Additional Data:     Labs:  Results from last 7 days   Lab Units 01/20/23  0654   WBC Thousand/uL 11 30*   HEMOGLOBIN g/dL 10 4*   HEMATOCRIT % 32 5*   PLATELETS Thousands/uL 294   NEUTROS PCT % 75   LYMPHS PCT % 14   MONOS PCT % 11   EOS PCT % 0     Results from last 7 days   Lab Units 01/20/23  0654   SODIUM mmol/L 136   POTASSIUM mmol/L 4 0   CHLORIDE mmol/L 105   CO2 mmol/L 22   BUN mg/dL 6   CREATININE mg/dL 0 71   ANION GAP mmol/L 9   CALCIUM mg/dL 8 3*   ALBUMIN g/dL 3 2*   TOTAL BILIRUBIN mg/dL 0 27   ALK PHOS U/L 69   ALT U/L 28   AST U/L 16   GLUCOSE RANDOM mg/dL 138     Results from last 7 days   Lab Units 01/19/23  0445   INR  1 06             Results from last 7 days   Lab Units 01/20/23  0908 01/18/23  1926   LACTIC ACID mmol/L  --  1 8   PROCALCITONIN ng/ml 2 29* 5 24*       Lines/Drains:  Invasive Devices     Peripheral Intravenous Line  Duration           Peripheral IV 01/21/23 Left;Ventral (anterior) Forearm <1 day                      Imaging: No pertinent imaging reviewed  Recent Cultures (last 7 days):   Results from last 7 days   Lab Units 01/20/23  1306 01/18/23  1926   BLOOD CULTURE  No Growth at 24 hrs  No Growth at 24 hrs   Staphylococcus aureus*  Staphylococcus aureus*   GRAM STAIN RESULT   --  Gram positive cocci in clusters*  Gram positive cocci in clusters*       Last 24 Hours Medication List:   Current Facility-Administered Medications   Medication Dose Route Frequency Provider Last Rate   • acetaminophen  650 mg Oral Q6H Nikolas Price MD     • buprenorphine-naloxone  8 mg Sublingual BID Hemal Simeon MD     • cefazolin  2,000 mg Intravenous Q8H Richelle Hardy MD 2,000 mg (01/22/23 0526)   • cloNIDine  0 1 mg Oral [facility] Hemal Simeon MD     • cyclobenzaprine  10 mg Oral TID PRN Lolis Ku MD     • enoxaparin  40 mg Subcutaneous Daily Lolis Ku MD     • gabapentin  300 mg Oral BID Lolis Ku MD • HYDROmorphone  1 mg Intravenous Q6H PRN Valente Enciso DO     • ketorolac  15 mg Intravenous Q6H PRN Valente mullins DO     • nicotine  14 mg Transdermal Daily Demario Taylor MD     • ondansetron  4 mg Intravenous Q6H PRN Demario Taylor MD     • QUEtiapine  25 mg Oral HS Maureen Mcardle, MD     • temazepam  15 mg Oral HS PRN Demario Taylor MD          Today, Patient Was Seen By: Heather Butr DO    **Please Note: This note may have been constructed using a voice recognition system  **

## 2023-01-22 NOTE — PLAN OF CARE
Problem: OCCUPATIONAL THERAPY ADULT  Goal: Performs self-care activities at highest level of function for planned discharge setting  See evaluation for individualized goals  Description: Treatment Interventions: ADL retraining, Functional transfer training, UE strengthening/ROM, Endurance training, Patient/family training, Equipment evaluation/education, Activityengagement, Compensatory technique education          See flowsheet documentation for full assessment, interventions and recommendations  Note: Limitation: Decreased ADL status, Decreased UE strength, Decreased Safe judgement during ADL, Decreased endurance, Decreased self-care trans, Decreased high-level ADLs     Assessment: Pt is a 41 y o  male seen for OT evaluation s/p admit to [facility] on 1/18/2023 w/ Bacteremia due to Staphylococcus aureus  Comorbidities affecting pt's functional performance at time of assessment include: drug addiction, hep C, chronic pain, HIV, arthritis, MDD, hepatitis, heroin abuse  Personal factors affecting pt at time of IE include:steps to enter environment, limited home support, behavioral pattern, difficulty performing ADLS, difficulty performing IADLS , compliance, decreased initiation and engagement  and health management   Prior to admission, pt was (I) with ADLs and IADLs with no device during mobility  Upon evaluation: Pt requires (S)-min (A) x1 level with bed mobility and declines further mobility 2* the following deficits impacting occupational performance: weakness, decreased strength, decreased balance, decreased tolerance, impaired initiation, impaired problem solving, decreased safety awareness, increased pain and impaired interpersonal skills  Pt to benefit from continued skilled OT tx while in the hospital to address deficits as defined above and maximize level of functional independence w ADL's and functional mobility   Occupational Performance areas to address include: grooming, bathing/shower, toilet hygiene, dressing, functional mobility, community mobility and clothing management  The patient's raw score on the AM-PAC Daily Activity inpatient short form is 19, standardized score is 40 22, greater than 39 4  Patients at this level are likely to benefit from discharge to home  Please refer to the recommendation of the Occupational Therapist for safe discharge planning       OT Discharge Recommendation: Home with outpatient rehabilitation

## 2023-01-23 ENCOUNTER — APPOINTMENT (INPATIENT)
Dept: MRI IMAGING | Facility: HOSPITAL | Age: 38
End: 2023-01-23

## 2023-01-23 LAB
ALBUMIN SERPL BCP-MCNC: 3 G/DL (ref 3.5–5)
ALP SERPL-CCNC: 54 U/L (ref 34–104)
ALT SERPL W P-5'-P-CCNC: 11 U/L (ref 7–52)
ANION GAP SERPL CALCULATED.3IONS-SCNC: 8 MMOL/L (ref 4–13)
AST SERPL W P-5'-P-CCNC: 12 U/L (ref 13–39)
BASOPHILS # BLD AUTO: 0.03 THOUSANDS/ÂΜL (ref 0–0.1)
BASOPHILS NFR BLD AUTO: 0 % (ref 0–1)
BILIRUB SERPL-MCNC: 0.15 MG/DL (ref 0.2–1)
BUN SERPL-MCNC: 12 MG/DL (ref 5–25)
CALCIUM ALBUM COR SERPL-MCNC: 9.2 MG/DL (ref 8.3–10.1)
CALCIUM SERPL-MCNC: 8.4 MG/DL (ref 8.4–10.2)
CHLORIDE SERPL-SCNC: 103 MMOL/L (ref 96–108)
CO2 SERPL-SCNC: 27 MMOL/L (ref 21–32)
CREAT SERPL-MCNC: 0.6 MG/DL (ref 0.6–1.3)
EOSINOPHIL # BLD AUTO: 0.31 THOUSAND/ÂΜL (ref 0–0.61)
EOSINOPHIL NFR BLD AUTO: 5 % (ref 0–6)
ERYTHROCYTE [DISTWIDTH] IN BLOOD BY AUTOMATED COUNT: 13.4 % (ref 11.6–15.1)
GFR SERPL CREATININE-BSD FRML MDRD: 128 ML/MIN/1.73SQ M
GLUCOSE SERPL-MCNC: 86 MG/DL (ref 65–140)
HCT VFR BLD AUTO: 32 % (ref 36.5–49.3)
HCV RNA SERPL NAA+PROBE-ACNC: NORMAL IU/ML
HGB BLD-MCNC: 10.5 G/DL (ref 12–17)
IMM GRANULOCYTES # BLD AUTO: 0.11 THOUSAND/UL (ref 0–0.2)
IMM GRANULOCYTES NFR BLD AUTO: 2 % (ref 0–2)
INR PPP: 0.92 (ref 0.84–1.19)
LYMPHOCYTES # BLD AUTO: 2.05 THOUSANDS/ÂΜL (ref 0.6–4.47)
LYMPHOCYTES NFR BLD AUTO: 30 % (ref 14–44)
MAGNESIUM SERPL-MCNC: 1.9 MG/DL (ref 1.9–2.7)
MCH RBC QN AUTO: 28.5 PG (ref 26.8–34.3)
MCHC RBC AUTO-ENTMCNC: 32.8 G/DL (ref 31.4–37.4)
MCV RBC AUTO: 87 FL (ref 82–98)
MONOCYTES # BLD AUTO: 0.68 THOUSAND/ÂΜL (ref 0.17–1.22)
MONOCYTES NFR BLD AUTO: 10 % (ref 4–12)
NEUTROPHILS # BLD AUTO: 3.77 THOUSANDS/ÂΜL (ref 1.85–7.62)
NEUTS SEG NFR BLD AUTO: 53 % (ref 43–75)
NRBC BLD AUTO-RTO: 0 /100 WBCS
PHOSPHATE SERPL-MCNC: 4.5 MG/DL (ref 2.7–4.5)
PLATELET # BLD AUTO: 390 THOUSANDS/UL (ref 149–390)
PMV BLD AUTO: 9.1 FL (ref 8.9–12.7)
POTASSIUM SERPL-SCNC: 4 MMOL/L (ref 3.5–5.3)
PROCALCITONIN SERPL-MCNC: 0.33 NG/ML
PROT SERPL-MCNC: 6.6 G/DL (ref 6.4–8.4)
PROTHROMBIN TIME: 12.5 SECONDS (ref 11.6–14.5)
RBC # BLD AUTO: 3.68 MILLION/UL (ref 3.88–5.62)
SODIUM SERPL-SCNC: 138 MMOL/L (ref 135–147)
TEST INFORMATION: NORMAL
WBC # BLD AUTO: 6.95 THOUSAND/UL (ref 4.31–10.16)

## 2023-01-23 RX ORDER — KETOROLAC TROMETHAMINE 30 MG/ML
15 INJECTION, SOLUTION INTRAMUSCULAR; INTRAVENOUS EVERY 6 HOURS PRN
Status: DISCONTINUED | OUTPATIENT
Start: 2023-01-23 | End: 2023-01-23

## 2023-01-23 RX ORDER — BUPRENORPHINE AND NALOXONE 8; 2 MG/1; MG/1
8 FILM, SOLUBLE BUCCAL; SUBLINGUAL 2 TIMES DAILY
Status: DISCONTINUED | OUTPATIENT
Start: 2023-01-23 | End: 2023-01-25

## 2023-01-23 RX ORDER — OXYCODONE HYDROCHLORIDE 5 MG/1
5 TABLET ORAL EVERY 6 HOURS PRN
Status: DISCONTINUED | OUTPATIENT
Start: 2023-01-23 | End: 2023-01-23

## 2023-01-23 RX ORDER — OXYCODONE HYDROCHLORIDE 5 MG/1
5 TABLET ORAL EVERY 6 HOURS PRN
Status: DISCONTINUED | OUTPATIENT
Start: 2023-01-23 | End: 2023-01-25

## 2023-01-23 RX ORDER — KETOROLAC TROMETHAMINE 10 MG/1
10 TABLET, FILM COATED ORAL EVERY 6 HOURS PRN
Status: DISPENSED | OUTPATIENT
Start: 2023-01-23 | End: 2023-02-02

## 2023-01-23 RX ADMIN — BUPRENORPHINE AND NALOXONE 8 MG: 8; 2 FILM BUCCAL; SUBLINGUAL at 08:40

## 2023-01-23 RX ADMIN — KETOROLAC TROMETHAMINE 15 MG: 30 INJECTION, SOLUTION INTRAMUSCULAR at 03:57

## 2023-01-23 RX ADMIN — ACETAMINOPHEN 650 MG: 325 TABLET, FILM COATED ORAL at 11:40

## 2023-01-23 RX ADMIN — CYCLOBENZAPRINE 10 MG: 10 TABLET, FILM COATED ORAL at 05:18

## 2023-01-23 RX ADMIN — CLONIDINE HYDROCHLORIDE 0.1 MG: 0.1 TABLET ORAL at 05:13

## 2023-01-23 RX ADMIN — CEFAZOLIN SODIUM 2000 MG: 2 SOLUTION INTRAVENOUS at 21:25

## 2023-01-23 RX ADMIN — ACETAMINOPHEN 650 MG: 325 TABLET, FILM COATED ORAL at 05:13

## 2023-01-23 RX ADMIN — OXYCODONE HYDROCHLORIDE 5 MG: 5 TABLET ORAL at 13:50

## 2023-01-23 RX ADMIN — KETOROLAC TROMETHAMINE 15 MG: 30 INJECTION, SOLUTION INTRAMUSCULAR; INTRAVENOUS at 12:06

## 2023-01-23 RX ADMIN — QUETIAPINE FUMARATE 25 MG: 25 TABLET ORAL at 21:25

## 2023-01-23 RX ADMIN — CEFAZOLIN SODIUM 2000 MG: 2 SOLUTION INTRAVENOUS at 13:54

## 2023-01-23 RX ADMIN — KETOROLAC TROMETHAMINE 10 MG: 10 TABLET, FILM COATED ORAL at 17:41

## 2023-01-23 RX ADMIN — GABAPENTIN 300 MG: 300 CAPSULE ORAL at 17:31

## 2023-01-23 RX ADMIN — OXYCODONE HYDROCHLORIDE 5 MG: 5 TABLET ORAL at 20:01

## 2023-01-23 RX ADMIN — HYDROMORPHONE HYDROCHLORIDE 1 MG: 1 INJECTION, SOLUTION INTRAMUSCULAR; INTRAVENOUS; SUBCUTANEOUS at 10:27

## 2023-01-23 RX ADMIN — ACETAMINOPHEN 650 MG: 325 TABLET, FILM COATED ORAL at 17:31

## 2023-01-23 RX ADMIN — BUPRENORPHINE AND NALOXONE 8 MG: 8; 2 FILM BUCCAL; SUBLINGUAL at 20:01

## 2023-01-23 RX ADMIN — GABAPENTIN 300 MG: 300 CAPSULE ORAL at 08:40

## 2023-01-23 RX ADMIN — CEFAZOLIN SODIUM 2000 MG: 2 SOLUTION INTRAVENOUS at 05:13

## 2023-01-23 NOTE — ASSESSMENT & PLAN NOTE
H/o right sided back pain with sciatica  Intermittent severe right hip pain, radiates into right lateral hip & buttocks  Pain rated 9/10  Patient unable to ambulate without cane  1/16/23 CT shows:  1  Mild multilevel disc degeneration  2  Grade 1 retrolisthesis of L5 on S1    3  Disc bulge with small herniation at L5-S1  Disc bulging from L2-3 through L4-5    4  No evidence of periostitis along the endplates to suggest advanced osteomyelitis on CT imaging  If there is clinical concern for underlying infection, MRI would be a more sensitive modality to assess for early changes of discitis Osteomyelitis  1/23/23 MRI w/o contrast shows -  No definite MR evidence for discitis osteoma myelitis as clinically questioned  Degenerative changes at L5-S1      Plan:  Scheduled oral Tylenol 650mg Q6  PRN oral Toradol 10mg Q6  PRN oral Oxycodone 5mg Q6  Patient refused Lidoderm patch

## 2023-01-23 NOTE — PHYSICAL THERAPY NOTE
PHYSICAL THERAPY EVALUATION  NAME:  Savita Turcios  DATE: 01/23/23    AGE:   41 y o  Mrn:   [id number]  ADMIT DX:  Bacteremia [R78 81]  Leg pain [M79 606]  Intractable low back pain [M54 59]  Problem List:   Patient Active Problem List   Diagnosis    Anxiety    Chronic midline thoracic back pain    Degeneration of intervertebral disc of thoracic region    Hepatitis C virus    Myofascial pain    Degeneration of thoracic intervertebral disc    Drug dependence ([facility] )    Heroin abuse ([facility] )    Encounter for monitoring Suboxone maintenance therapy    Drug therapy continued    Moderate episode of recurrent major depressive disorder (HCC)    Opiate overdose ([facility] )    Bilateral groin pain    Acute pain of right shoulder    Bacteremia due to Staphylococcus aureus    Intravenous drug abuse, continuous ([facility] )    Ambulatory dysfunction    Acute right-sided low back pain with right-sided sciatica    Tobacco abuse       Past Medical History  Past Medical History:   Diagnosis Date    Addiction to drug [facility])     Anxiety     Arthritis     Chronic pain     Hepatitis     Hepatitis C virus     Heroin abuse ([facility] ) 05/02/2018    Moderate episode of recurrent major depressive disorder ([facility] ) 02/05/2020    Tobacco use disorder     last assessed 11/2/15        Past Surgical History  Past Surgical History:   Procedure Laterality Date    EPIDURAL BLOCK INJECTION Bilateral 4/19/2018    Procedure: T9-10 INTERLAMINAR EPIDURAL STEROID INJECTION;  Surgeon: Ligia Quintero MD;  Location: [facility] MAIN OR;  Service: Pain Management        Length Of Stay: 5  Performed at least 2 patient identifiers during session: Name and Birthday         01/23/23 1320   PT Last Visit   PT Visit Date 01/23/23   Note Type   Note type Evaluation   Pain Assessment   Pain Assessment Tool 0-10   Pain Score 9   Pain Location/Orientation Orientation: Right;Location: Hip;Orientation: Lower; Location: Back   Pain Onset/Description Onset: Ongoing   Patient's Stated Pain Goal No pain Restrictions/Precautions   Weight Bearing Precautions Per Order No   Braces or Orthoses Other (Comment)  (none reported)   Other Precautions Pain;Spinal precautions;Multiple lines   Home Living   Type of [address] Two level; Other (Comment); Bed/bath upstairs;Stairs to enter with rails  (40 ESVIN with handrail  flight of steps to second floor)   Bathroom Shower/Tub Walk-in shower   Bathroom Toilet Standard   Bathroom Equipment Other (Comment)  (none per pt)   [address]   Additional Comments Pt states recently started using [facility] for ambulation due to LB/R hip pain   Prior Function   Level of [place] Independent with ADLs; Independent with functional mobility; Independent with IADLS   Lives With Significant other   IADLs Independent with driving   Falls in the last 6 months 0   General   Family/Caregiver Present No   Cognition   Overall Cognitive Status WFL   Arousal/Participation Alert   Attention Within functional limits   Orientation Level Oriented X4   Memory Within functional limits   Following Commands Follows all commands and directions without difficulty   Comments Pt agreeable to PT evaluation   Subjective   Subjective "I walked into the bathroom yesterday"   RLE Assessment   RLE Assessment X   Strength RLE   R Hip Flexion 3+/5   R Knee Flexion 4/5   R Knee Extension 4/5   R Ankle Dorsiflexion 4/5   R Ankle Plantar Flexion 4/5   LLE Assessment   LLE Assessment X   Strength LLE   L Hip Flexion 3+/5   L Knee Flexion 4/5   L Knee Extension 4/5   L Ankle Dorsiflexion 4/5   L Ankle Plantar Flexion 4/5   Coordination   Sensation X  (radicular symptoms to RLE - none at time of evaluation)   Light Touch   RLE Light Touch Grossly intact   LLE Light Touch Grossly intact   Bed Mobility   Supine to Sit 6  Modified independent   Additional items HOB elevated; Increased time required   Sit to Supine 6  Modified independent   Additional items HOB elevated; Increased time required   Additional Comments BP at start of session 111/74   Transfers   Sit to Stand Unable to assess   Additional Comments Patient declined STS/ambulation stating  "that will aggravate it and I don't want to "   Balance   Static Sitting Good   Dynamic Sitting Fair +   Endurance Deficit   Endurance Deficit Yes   Endurance Deficit Description decreased activity tolerance due to pain   Activity Tolerance   Activity Tolerance Patient limited by pain   Assessment   Prognosis Fair   Problem List Decreased strength;Decreased endurance; Impaired balance;Decreased mobility;Pain   Assessment Pt is 41 y o  male seen for high-complexity PT evaluation on 1/23/2023 s/p admit to [address] on 1/18/2023 w/ Bacteremia due to Staphylococcus aureus  PT was consulted to assess pt's functional mobility and d/c needs  Order placed for PT eval and tx, w/ up and OOB as tolerated order  PTA, pt was living with his significant other in a two story home with 40 ESVIN and flight of steps to second floor  Pt reports independence with ADLs, IADLs, and functional mobility without AD; however, since onset of pain, patient began using SPC  At time of eval, patient reported 9/10 pain to R hip and low back which limited activity tolerance and mobility  Patient performed sup <> sit Rene and refused further mobility including standing/ambulation stating "that will aggravate it and I don't want to," PT educated patient on importance of mobility; however, pt declined  In supine, B knee and ankle strength 4/5 and B hip flexion strength 3+/5 limited by pain  Upon evaluation, pt presenting with impaired functional mobility d/t decreased strength, decreased endurance, decreased mobility, pain and activity intolerance   Pertinent PMHx and current co-morbidities affecting pt's physical performance at time of assessment include: bacteremia due to staphylococcus aureus, intravenous drug abuse, acute right-sided low back pain with right sided sciatica, ambulatory dysfunction  Personal factors affecting pt at time of eval include: lives in 2 story house, stairs to enter home, compliance and unable to perform physical activity  The following objective measures performed on IE also reveal limitations: AM-PAC 6-Clicks: 90/92  Pt's clinical presentation is currently unstable/unpredictable seen in pt's presentation of abnormal lab value(s), 9/10 to R hip and low back pain impacting overall mobility status, ongoing medical assessment and limited mobility and activity tolerance due to pain levels and pt unable to progress mobility to out of bed  Overall, pt's rehab potential and prognosis to return to PLOF is fair as impacted by objective findings, warranting pt to receive further skilled PT interventions to address identified impairments, activity limitation(s), and participation restriction(s)  Goal for patient is to decrease pain levels, increase mobility and activity tolerance  Pt to benefit from continued PT tx to address deficits as defined above and maximize level of functional independent mobility and consistency in order for pt to return to PLOF   (From PT/mobility standpoint, recommendation at time of d/c would be home with outpatient rehabilitation pending progress in order to facilitate return to PLOF )   Goals   Patient Goals to have less pain   STG Expiration Date 02/06/23   Short Term Goal #1 In 14 days: Increase bilateral LE strength 1/2 grade to facilitate independent mobility, Perform all bed mobility tasks independently to decrease caregiver burden, Perform all transfers independently to improve independence, Ambulate > 50 ft  with least restrictive assistive device modified independent w/o LOB and w/ normalized gait pattern 100% of the time and PT to see and establish goals for stairs when appropriate   PT Treatment Day 0   Plan   Treatment/Interventions Functional transfer training; Therapeutic exercise; Endurance training;Patient/family training;Bed mobility;Gait training;Continued evaluation   PT Frequency 3-5x/wk   Recommendation   PT Discharge Recommendation Post acute rehabilitation services  (pending progress)   Equipment Recommended Other (Comment)  (TBD based on pt progress)   AM-PAC Basic Mobility Inpatient   Turning in Flat Bed Without Bedrails 4   Lying on Back to Sitting on Edge of Flat Bed Without Bedrails 4   Moving Bed to Chair 3   Standing Up From Chair Using Arms 3   Walk in Room 2   Climb 3-5 Stairs With Railing 2   Basic Mobility Inpatient Raw Score 18   Basic Mobility Standardized Score 41 05   Highest Level Of Mobility   JH-HLM Goal 6: Walk 10 steps or more   JH-HLM Achieved 3: Sit at edge of bed   End of Consult   Patient Position at End of Consult All needs within reach; Supine   Time In: 1320  Time Out: 1330  Total Evaluation Minutes: Metsa 68, PT

## 2023-01-23 NOTE — ASSESSMENT & PLAN NOTE
Ambulatory dysfunction  Patient ambulatimg with a cane due to 9/10 right hip pain   Seen by OT: standby assist  OT Discharge Recommendation: Home with outpatient rehabilitation  Plan:  Continue PT/OT while admitted

## 2023-01-23 NOTE — ASSESSMENT & PLAN NOTE
Most recent labs 3/7/2022  Positive Hep A  Positive Hep B surface antibody  Positive Hep C antibody  Not taking hepatitis meds    ID recs: ordered Hep C RNA    Plan:  F/u Hep C RNA - active

## 2023-01-23 NOTE — UTILIZATION REVIEW
Continued Stay Review    Date: 1-23-23                       Current Patient Class:  Inpatient  Current Level of Care: med surg    HPI:38 y o  male initially admitted on 1-18-23     Assessment/Plan:       In setting if iv drug use, blood cultures are negative  Continue iv ancef  Cardiology consulted for transesophageal echo tomorrow  He will need 6 weeks of supervised iv antibiotics      Vital Signs:       Date/Time Temp Pulse Resp BP MAP (mmHg) SpO2 O2 Device Patient Position - Orthostatic VS   01/23/23 14:24:58 98 3 °F (36 8 °C) 82 18 118/75 89 98 % -- --   01/23/23 13:27:28 -- -- -- 111/74 86 -- -- --   01/23/23 07:47:25 97 9 °F (36 6 °C) 73 18 108/75 86 97 % None (Room air) --   01/23/23 05:13:01 -- 61 -- 124/68 87 98 % -- Lying           Pertinent Labs/Diagnostic Results:   Results from last 7 days   Lab Units 01/18/23 1926   SARS-COV-2  Negative     Results from last 7 days   Lab Units 01/23/23 0437 01/20/23  0654 01/19/23 0445 01/18/23 1926   WBC Thousand/uL 6 95 11 30* 12 66* 11 19*   HEMOGLOBIN g/dL 10 5* 10 4* 11 4* 11 8*   HEMATOCRIT % 32 0* 32 5* 34 4* 37 0   PLATELETS Thousands/uL 390 294 307 331   NEUTROS ABS Thousands/µL 3 77 8 32* 9 59* 8 69*         Results from last 7 days   Lab Units 01/23/23 0437 01/20/23 0654 01/19/23 0445 01/18/23 1926   SODIUM mmol/L 138 136 137 135   POTASSIUM mmol/L 4 0 4 0 4 0 4 6   CHLORIDE mmol/L 103 105 102 100   CO2 mmol/L 27 22 26 28   ANION GAP mmol/L 8 9 9 7   BUN mg/dL 12 6 12 18   CREATININE mg/dL 0 60 0 71 0 81 0 79   EGFR ml/min/1 73sq m 128 119 113 114   CALCIUM mg/dL 8 4 8 3* 8 5 8 8   MAGNESIUM mg/dL 1 9 1 9 1 8*  --    PHOSPHORUS mg/dL 4 5 2 9 3 0  --      Results from last 7 days   Lab Units 01/23/23 0437 01/20/23  0654 01/19/23  0445 01/18/23  1926   AST U/L 12* 16 33 59*   ALT U/L 11 28 38 44   ALK PHOS U/L 54 69 65 61   TOTAL PROTEIN g/dL 6 6 6 7 7 2 7 5   ALBUMIN g/dL 3 0* 3 2* 3 7 3 8   TOTAL BILIRUBIN mg/dL 0 15* 0 27 0 39 0 33 Results from last 7 days   Lab Units 01/23/23  0437 01/20/23  0654 01/19/23  0445 01/18/23 1926   GLUCOSE RANDOM mg/dL 86 138 104 93       Results from last 7 days   Lab Units 01/23/23  0437 01/19/23  0445 01/18/23  1926   PROTIME seconds 12 5 14 0 13 5   INR  0 92 1 06 1 01   PTT seconds  --   --  30         Results from last 7 days   Lab Units 01/23/23  0437 01/20/23  0908 01/18/23 1926   PROCALCITONIN ng/ml 0 33* 2 29* 5 24*     Results from last 7 days   Lab Units 01/18/23  1926   LACTIC ACID mmol/L 1 8       Results from last 7 days   Lab Units 01/20/23  0908   SED RATE mm/hour 69*       Results from last 7 days   Lab Units 01/18/23 1926   INFLUENZA A PCR  Negative   INFLUENZA B PCR  Negative   RSV PCR  Negative       Results from last 7 days   Lab Units 01/20/23  1306 01/18/23 1926   BLOOD CULTURE  No Growth at 48 hrs  No Growth at 48 hrs  Staphylococcus aureus*  Staphylococcus aureus*   GRAM STAIN RESULT   --  Gram positive cocci in clusters*  Gram positive cocci in clusters*     Scheduled Medications:      acetaminophen, 650 mg, Oral, Q6H HERMAN  buprenorphine-naloxone, 8 mg, Sublingual, BID  cefazolin, 2,000 mg, Intravenous, Q8H  enoxaparin, 40 mg, Subcutaneous, Daily  gabapentin, 300 mg, Oral, BID  nicotine, 14 mg, Transdermal, Daily  QUEtiapine, 25 mg, Oral, HS      Continuous IV Infusions:     PRN Meds:  cyclobenzaprine, 10 mg, Oral, TID PRN  ketorolac, 10 mg, Oral, Q6H PRN  ondansetron, 4 mg, Intravenous, Q6H PRN  oxyCODONE, 5 mg, Oral, Q6H PRN        Discharge Plan: to be determine d  Network Utilization Review Department  ATTENTION: Please call with any questions or concerns to [phone] and carefully listen to the prompts so that you are directed to the right person   All voicemails are confidential   Tam Barber all requests for admission clinical reviews, approved or denied determinations and any other requests to dedicated fax number below belonging to the campus where the patient is receiving treatment   List of dedicated fax numbers for the Facilities:  [address] [place] [address] DENIALS (Administrative/Medical Necessity) [phone]   [address] (Maternity/NICU/Pediatrics) [phone]   [address] [phone]   [address] [phone]   [address] [place] [address] [facility] [phone]   [address] [facility] Brendan Singletary [facility] 134 815 [facility] [phone]

## 2023-01-23 NOTE — ASSESSMENT & PLAN NOTE
Blood cultures positive for staph aureus  History of IV drug abuse  Plan:  Continue IV Ancef (Day 4), day 6 of antibiotic therapy    Repeat blood cultures pending  Cardio consulted; f/u CECILIO  ID input appreciated; cont IV abx, waiting on CECILIO results, will most likely require 6 weeks antibiotics as inpatient

## 2023-01-23 NOTE — ASSESSMENT & PLAN NOTE
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  Last use - IV,1 bag of fentanyl, at 12pm on 1/18/23  Plan:  Patient has scheduled follow-up with Dr Geovany Patel in [place]  Suboxone 8 mg twice daily

## 2023-01-23 NOTE — PLAN OF CARE
Problem: PHYSICAL THERAPY ADULT  Goal: Performs mobility at highest level of function for planned discharge setting  See evaluation for individualized goals  Description: Treatment/Interventions: Functional transfer training, Therapeutic exercise, Endurance training, Patient/family training, Bed mobility, Gait training, Continued evaluation  Equipment Recommended: Other (Comment) (TBD based on pt progress)       See flowsheet documentation for full assessment, interventions and recommendations  Note: Prognosis: Fair  Problem List: Decreased strength, Decreased endurance, Impaired balance, Decreased mobility, Pain  Assessment: Pt is 41 y o  male seen for high-complexity PT evaluation on 1/23/2023 s/p admit to [facility] on 1/18/2023 w/ Bacteremia due to Staphylococcus aureus  PT was consulted to assess pt's functional mobility and d/c needs  Order placed for PT eval and tx, w/ up and OOB as tolerated order  PTA, pt was living with his significant other in a two story home with 40 ESVIN and flight of steps to second floor  Pt reports independence with ADLs, IADLs, and functional mobility without AD; however, since onset of pain, patient began using SPC  At time of eval, patient reported 9/10 pain to R hip and low back which limited activity tolerance and mobility  Patient performed sup <> sit Rene and refused further mobility including standing/ambulation stating "that will aggravate it and I don't want to," PT educated patient on importance of mobility; however, pt declined  In supine, B knee and ankle strength 4/5 and B hip flexion strength 3+/5 limited by pain  Upon evaluation, pt presenting with impaired functional mobility d/t decreased strength, decreased endurance, decreased mobility, pain and activity intolerance   Pertinent PMHx and current co-morbidities affecting pt's physical performance at time of assessment include: bacteremia due to staphylococcus aureus, intravenous drug abuse, acute right-sided low back pain with right sided sciatica, ambulatory dysfunction  Personal factors affecting pt at time of eval include: lives in 2 story house, stairs to enter home, compliance and unable to perform physical activity  The following objective measures performed on IE also reveal limitations: AM-PAC 6-Clicks: 78/29  Pt's clinical presentation is currently unstable/unpredictable seen in pt's presentation of abnormal lab value(s), 9/10 to R hip and low back pain impacting overall mobility status, ongoing medical assessment and limited mobility and activity tolerance due to pain levels and pt unable to progress mobility to out of bed  Overall, pt's rehab potential and prognosis to return to PLOF is fair as impacted by objective findings, warranting pt to receive further skilled PT interventions to address identified impairments, activity limitation(s), and participation restriction(s)  Goal for patient is to decrease pain levels, increase mobility and activity tolerance  Pt to benefit from continued PT tx to address deficits as defined above and maximize level of functional independent mobility and consistency in order for pt to return to PLOF  (From PT/mobility standpoint, recommendation at time of d/c would be home with outpatient rehabilitation pending progress in order to facilitate return to [facility] )        PT Discharge Recommendation: Post acute rehabilitation services (pending progress)    See flowsheet documentation for full assessment     Gaetano Bowden; PT, DPT Initial (On Arrival)

## 2023-01-23 NOTE — PLAN OF CARE
Problem: PAIN - ADULT  Goal: Verbalizes/displays adequate comfort level or baseline comfort level  Description: Interventions:  - Encourage patient to monitor pain and request assistance  - Assess pain using appropriate pain scale  - Administer analgesics based on type and severity of pain and evaluate response  - Implement non-pharmacological measures as appropriate and evaluate response  - Consider cultural and social influences on pain and pain management  - Notify physician/advanced practitioner if interventions unsuccessful or patient reports new pain  Outcome: Progressing     Problem: INFECTION - ADULT  Goal: Absence or prevention of progression during hospitalization  Description: INTERVENTIONS:  - Assess and monitor for signs and symptoms of infection  - Monitor lab/diagnostic results  - Monitor all insertion sites, i e  indwelling lines, tubes, and drains  - Monitor endotracheal if appropriate and nasal secretions for changes in amount and color  - [place] appropriate cooling/warming therapies per order  - Administer medications as ordered  - Instruct and encourage patient and family to use good hand hygiene technique  - Identify and instruct in appropriate isolation precautions for identified infection/condition  Outcome: Progressing  Goal: Absence of fever/infection during neutropenic period  Description: INTERVENTIONS:  - Monitor WBC    Outcome: Progressing     Problem: MUSCULOSKELETAL - ADULT  Goal: Maintain or return mobility to safest level of function  Description: INTERVENTIONS:  - Assess patient's ability to carry out ADLs; assess patient's baseline for ADL function and identify physical deficits which impact ability to perform ADLs (bathing, care of mouth/teeth, toileting, grooming, dressing, etc )  - Assess/evaluate cause of self-care deficits   - Assess range of motion  - Assess patient's mobility  - Assess patient's need for assistive devices and provide as appropriate  - Encourage maximum independence but intervene and supervise when necessary  - Involve family in performance of ADLs  - Assess for home care needs following discharge   - Consider OT consult to assist with ADL evaluation and planning for discharge  - Provide patient education as appropriate  Outcome: Progressing  Goal: Maintain proper alignment of affected body part  Description: INTERVENTIONS:  - Support, maintain and protect limb and body alignment  - Provide patient/ family with appropriate education  Outcome: Progressing     Problem: NEUROSENSORY - ADULT  Goal: Achieves stable or improved neurological status  Description: INTERVENTIONS  - Monitor and report changes in neurological status  - Monitor vital signs such as temperature, blood pressure, glucose, and any other labs ordered   - Initiate measures to prevent increased intracranial pressure  - Monitor for seizure activity and implement precautions if appropriate      Outcome: Progressing  Goal: Achieves maximal functionality and self care  Description: INTERVENTIONS  - Monitor swallowing and airway patency with patient fatigue and changes in neurological status  - Encourage and assist patient to increase activity and self care     - Encourage visually impaired, hearing impaired and aphasic patients to use assistive/communication devices  Outcome: Progressing     Problem: MOBILITY - ADULT  Goal: Maintain or return to baseline ADL function  Description: INTERVENTIONS:  -  Assess patient's ability to carry out ADLs; assess patient's baseline for ADL function and identify physical deficits which impact ability to perform ADLs (bathing, care of mouth/teeth, toileting, grooming, dressing, etc )  - Assess/evaluate cause of self-care deficits   - Assess range of motion  - Assess patient's mobility; develop plan if impaired  - Assess patient's need for assistive devices and provide as appropriate  - Encourage maximum independence but intervene and supervise when necessary  - Involve family in performance of ADLs  - Assess for home care needs following discharge   - Consider OT consult to assist with ADL evaluation and planning for discharge  - Provide patient education as appropriate  Outcome: Progressing  Goal: Maintains/Returns to pre admission functional level  Description: INTERVENTIONS:  - Perform BMAT or MOVE assessment daily    - Set and communicate daily mobility goal to care team and patient/family/caregiver  - Collaborate with rehabilitation services on mobility goals if consulted  - Perform Range of Motion 3 times a day  - Reposition patient every 2 hours    - Dangle patient 3 times a day  - Stand patient 3 times a day  - Ambulate patient 3 times a day  - Out of bed to chair 3 times a day   - Out of bed for meals 3 times a day  - Out of bed for toileting  - Record patient progress and toleration of activity level   Outcome: Progressing

## 2023-01-23 NOTE — CONSULTS
The patient’s Vancomycin therapy has been completed /discontinued  Thank you for this consult;  Pharmacy will sign-off now

## 2023-01-23 NOTE — PLAN OF CARE
Problem: PAIN - ADULT  Goal: Verbalizes/displays adequate comfort level or baseline comfort level  Description: Interventions:  - Encourage patient to monitor pain and request assistance  - Assess pain using appropriate pain scale  - Administer analgesics based on type and severity of pain and evaluate response  - Implement non-pharmacological measures as appropriate and evaluate response  - Consider cultural and social influences on pain and pain management  - Notify physician/advanced practitioner if interventions unsuccessful or patient reports new pain  Outcome: Progressing     Problem: INFECTION - ADULT  Goal: Absence or prevention of progression during hospitalization  Description: INTERVENTIONS:  - Assess and monitor for signs and symptoms of infection  - Monitor lab/diagnostic results  - Monitor all insertion sites, i e  indwelling lines, tubes, and drains  - Monitor endotracheal if appropriate and nasal secretions for changes in amount and color  - Coleman appropriate cooling/warming therapies per order  - Administer medications as ordered  - Instruct and encourage patient and family to use good hand hygiene technique  - Identify and instruct in appropriate isolation precautions for identified infection/condition  Outcome: Progressing  Goal: Absence of fever/infection during neutropenic period  Description: INTERVENTIONS:  - Monitor WBC    Outcome: Progressing     Problem: MUSCULOSKELETAL - ADULT  Goal: Maintain or return mobility to safest level of function  Description: INTERVENTIONS:  - Assess patient's ability to carry out ADLs; assess patient's baseline for ADL function and identify physical deficits which impact ability to perform ADLs (bathing, care of mouth/teeth, toileting, grooming, dressing, etc )  - Assess/evaluate cause of self-care deficits   - Assess range of motion  - Assess patient's mobility  - Assess patient's need for assistive devices and provide as appropriate  - Encourage maximum independence but intervene and supervise when necessary  - Involve family in performance of ADLs  - Assess for home care needs following discharge   - Consider OT consult to assist with ADL evaluation and planning for discharge  - Provide patient education as appropriate  Outcome: Progressing  Goal: Maintain proper alignment of affected body part  Description: INTERVENTIONS:  - Support, maintain and protect limb and body alignment  - Provide patient/ family with appropriate education  Outcome: Progressing     Problem: NEUROSENSORY - ADULT  Goal: Achieves stable or improved neurological status  Description: INTERVENTIONS  - Monitor and report changes in neurological status  - Monitor vital signs such as temperature, blood pressure, glucose, and any other labs ordered   - Initiate measures to prevent increased intracranial pressure  - Monitor for seizure activity and implement precautions if appropriate      Outcome: Progressing  Goal: Achieves maximal functionality and self care  Description: INTERVENTIONS  - Monitor swallowing and airway patency with patient fatigue and changes in neurological status  - Encourage and assist patient to increase activity and self care     - Encourage visually impaired, hearing impaired and aphasic patients to use assistive/communication devices  Outcome: Progressing     Problem: MOBILITY - ADULT  Goal: Maintain or return to baseline ADL function  Description: INTERVENTIONS:  -  Assess patient's ability to carry out ADLs; assess patient's baseline for ADL function and identify physical deficits which impact ability to perform ADLs (bathing, care of mouth/teeth, toileting, grooming, dressing, etc )  - Assess/evaluate cause of self-care deficits   - Assess range of motion  - Assess patient's mobility; develop plan if impaired  - Assess patient's need for assistive devices and provide as appropriate  - Encourage maximum independence but intervene and supervise when necessary  - Involve family in performance of ADLs  - Assess for home care needs following discharge   - Consider OT consult to assist with ADL evaluation and planning for discharge  - Provide patient education as appropriate  Outcome: Progressing  Goal: Maintains/Returns to pre admission functional level  Description: INTERVENTIONS:  - Perform BMAT or MOVE assessment daily    - Set and communicate daily mobility goal to care team and patient/family/caregiver  - Collaborate with rehabilitation services on mobility goals if consulted  - Perform Range of Motion 3 times a day  - Reposition patient every 2 hours    - Dangle patient 3 times a day  - Stand patient 3 times a day  - Ambulate patient 3 times a day  - Out of bed to chair 3 times a day   - Out of bed for meals 3 times a day  - Out of bed for toileting  - Record patient progress and toleration of activity level   Outcome: Progressing

## 2023-01-23 NOTE — PROGRESS NOTES
[address] [facility]  Progress Note - Deep Pang 1985, 41 y o  male MRN: [id number]  Unit/Bed#: 333-75 Encounter: [id number]  Primary Care Provider: Annie Gupta DO   Date and time admitted to hospital: 1/18/2023  5:14 PM    * Bacteremia due to Staphylococcus aureus  Assessment & Plan  Blood cultures positive for staph aureus  History of IV drug abuse  Plan:  Continue IV Ancef (Day 4), day 6 of antibiotic therapy  Repeat blood cultures pending  Cardio consulted; f/u CECILIO  ID input appreciated; cont IV abx, waiting on CECILIO results, will most likely require 6 weeks antibiotics as inpatient    Acute right-sided low back pain with right-sided sciatica  Assessment & Plan  H/o right sided back pain with sciatica  Intermittent severe right hip pain, radiates into right lateral hip & buttocks  Pain rated 9/10  Patient unable to ambulate without cane  1/16/23 CT shows:  1  Mild multilevel disc degeneration  2  Grade 1 retrolisthesis of L5 on S1    3  Disc bulge with small herniation at L5-S1  Disc bulging from L2-3 through L4-5    4  No evidence of periostitis along the endplates to suggest advanced osteomyelitis on CT imaging  If there is clinical concern for underlying infection, MRI would be a more sensitive modality to assess for early changes of discitis Osteomyelitis  1/23/23 MRI w/o contrast shows -  No definite MR evidence for discitis osteoma myelitis as clinically questioned  Degenerative changes at L5-S1      Plan:  Scheduled oral Tylenol 650mg Q6  PRN oral Toradol 10mg Q6  PRN oral Oxycodone 5mg Q6  Patient refused Lidoderm patch      Intravenous drug abuse, continuous ([facility] [address] )  Assessment & Plan  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  Last use - IV,1 bag of fentanyl, at 12pm on 1/18/23  Plan:  Patient has scheduled follow-up with Dr Familia Hernandez in [place]  Suboxone 8 mg twice daily      Tobacco abuse  Assessment & Plan  History of tobacco smoking  Plan:  Nicotine patch  Smoking cessation    Ambulatory dysfunction  Assessment & Plan  Ambulatory dysfunction  Patient ambulatimg with a cane due to 9/10 right hip pain   Seen by OT: standby assist  OT Discharge Recommendation: Home with outpatient rehabilitation  Plan:  Continue PT/OT while admitted    Hepatitis C virus  Assessment & Plan  Most recent labs 3/7/2022  Positive Hep A  Positive Hep B surface antibody  Positive Hep C antibody  Not taking hepatitis meds    ID recs: ordered Hep C RNA    Plan:  F/u Hep C RNA - active            VTE Pharmacologic Prophylaxis: VTE Score: 1 Patient receiving Lovenox due to immobility    Patient Centered Rounds: I performed bedside rounds with nursing staff today  Discussions with Specialists or Other Care Team Provider: ID      Time Spent for Care: 30 minutes  More than 50% of total time spent on counseling and coordination of care as described above  Current Length of Stay: 5 day(s)  Current Patient Status: Inpatient   Certification Statement: The patient will continue to require additional inpatient hospital stay due to IV antibiotics  Discharge Plan: Dependent on recs from ID    Code Status: Level 1 - Full Code    Subjective:   Patient seen at bedside this morning, laying in bed uncomfortably  No overnight events, patient sleeping okay  Patient still complaining of intermittent right-sided hip pain  Objective:     Vitals:   Temp (24hrs), Av 4 °F (36 9 °C), Min:97 9 °F (36 6 °C), Max:99 °F (37 2 °C)    Temp:  [97 9 °F (36 6 °C)-99 °F (37 2 °C)] 97 9 °F (36 6 °C)  HR:  [61-73] 73  Resp:  [16-18] 18  BP: (108-129)/(68-81) 111/74  SpO2:  [97 %-98 %] 97 %  Body mass index is 27 67 kg/m²  Input and Output Summary (last 24 hours): Intake/Output Summary (Last 24 hours) at 2023 1349  Last data filed at 2023 1340  Gross per 24 hour   Intake 1560 ml   Output 1525 ml   Net 35 ml       Physical Exam:   Physical Exam  Vitals and nursing note reviewed     Constitutional: General: He is not in acute distress  Appearance: He is well-developed  HENT:      Head: Normocephalic and atraumatic  Right Ear: External ear normal       Left Ear: External ear normal       Nose: No rhinorrhea  Mouth/Throat:      Mouth: Mucous membranes are moist    Eyes:      General:         Right eye: No discharge  Left eye: No discharge  Conjunctiva/sclera: Conjunctivae normal    Cardiovascular:      Rate and Rhythm: Normal rate and regular rhythm  Heart sounds: No murmur heard  Pulmonary:      Effort: Pulmonary effort is normal  No respiratory distress  Breath sounds: Normal breath sounds  Abdominal:      Palpations: Abdomen is soft  Tenderness: There is no abdominal tenderness  Musculoskeletal:         General: Tenderness present  No swelling  Cervical back: Normal range of motion and neck supple  Comments: Right hip:  No erythema skin, no swelling  Point tenderness  Sensation intact  Decreased range of motion  Unable to ambulate without cane   Skin:     General: Skin is warm and dry  Capillary Refill: Capillary refill takes less than 2 seconds  Neurological:      Mental Status: He is alert  Mental status is at baseline     Psychiatric:         Mood and Affect: Mood normal          Additional Data:     Labs:  Results from last 7 days   Lab Units 01/23/23  0437   WBC Thousand/uL 6 95   HEMOGLOBIN g/dL 10 5*   HEMATOCRIT % 32 0*   PLATELETS Thousands/uL 390   NEUTROS PCT % 53   LYMPHS PCT % 30   MONOS PCT % 10   EOS PCT % 5     Results from last 7 days   Lab Units 01/23/23  0437   SODIUM mmol/L 138   POTASSIUM mmol/L 4 0   CHLORIDE mmol/L 103   CO2 mmol/L 27   BUN mg/dL 12   CREATININE mg/dL 0 60   ANION GAP mmol/L 8   CALCIUM mg/dL 8 4   ALBUMIN g/dL 3 0*   TOTAL BILIRUBIN mg/dL 0 15*   ALK PHOS U/L 54   ALT U/L 11   AST U/L 12*   GLUCOSE RANDOM mg/dL 86     Results from last 7 days   Lab Units 01/23/23  0437   INR  0 92             Results from last 7 days   Lab Units 01/23/23  0437 01/20/23  0908 01/18/23  1926   LACTIC ACID mmol/L  --   --  1 8   PROCALCITONIN ng/ml 0 33* 2 29* 5 24*       Lines/Drains:  Invasive Devices     Peripheral Intravenous Line  Duration           Peripheral IV 01/21/23 Left;Ventral (anterior) Forearm 1 day                      Imaging: Reviewed radiology reports from this admission including: MRI spine    Recent Cultures (last 7 days):   Results from last 7 days   Lab Units 01/20/23  1306 01/18/23  1926   BLOOD CULTURE  No Growth at 48 hrs  No Growth at 48 hrs  Staphylococcus aureus*  Staphylococcus aureus*   GRAM STAIN RESULT   --  Gram positive cocci in clusters*  Gram positive cocci in clusters*       Last 24 Hours Medication List:   Current Facility-Administered Medications   Medication Dose Route Frequency Provider Last Rate   • acetaminophen  650 mg Oral Q6H Rex Spears MD     • buprenorphine-naloxone  8 mg Sublingual BID Billy Santo MD     • cefazolin  2,000 mg Intravenous Q8H Dhiraj Davis MD 2,000 mg (01/23/23 0513)   • cyclobenzaprine  10 mg Oral TID PRN Jaquan Cleaning MD     • enoxaparin  40 mg Subcutaneous Daily Jaquan Cleaning MD     • gabapentin  300 mg Oral BID Jaquan Cleaning MD     • ketorolac  10 mg Oral Q6H PRN Billy Santo MD     • nicotine  14 mg Transdermal Daily Jaquan Cleaning MD     • ondansetron  4 mg Intravenous Q6H PRN Jaquan Cleaning MD     • oxyCODONE  5 mg Oral Q6H PRN Billy Santo MD     • QUEtiapine  25 mg Oral HS Billy Santo MD          Today, Patient Was Seen By: Billy Santo MD    **Please Note: This note may have been constructed using a voice recognition system  **

## 2023-01-24 ENCOUNTER — APPOINTMENT (INPATIENT)
Dept: CT IMAGING | Facility: HOSPITAL | Age: 38
End: 2023-01-24

## 2023-01-24 LAB
ALBUMIN SERPL BCP-MCNC: 3.3 G/DL (ref 3.5–5)
ALP SERPL-CCNC: 59 U/L (ref 34–104)
ALT SERPL W P-5'-P-CCNC: 9 U/L (ref 7–52)
ANION GAP SERPL CALCULATED.3IONS-SCNC: 9 MMOL/L (ref 4–13)
AST SERPL W P-5'-P-CCNC: 13 U/L (ref 13–39)
BASOPHILS # BLD AUTO: 0.03 THOUSANDS/ÂΜL (ref 0–0.1)
BASOPHILS NFR BLD AUTO: 0 % (ref 0–1)
BILIRUB SERPL-MCNC: 0.17 MG/DL (ref 0.2–1)
BUN SERPL-MCNC: 16 MG/DL (ref 5–25)
CALCIUM ALBUM COR SERPL-MCNC: 9.5 MG/DL (ref 8.3–10.1)
CALCIUM SERPL-MCNC: 8.9 MG/DL (ref 8.4–10.2)
CHLORIDE SERPL-SCNC: 101 MMOL/L (ref 96–108)
CO2 SERPL-SCNC: 28 MMOL/L (ref 21–32)
CREAT SERPL-MCNC: 0.67 MG/DL (ref 0.6–1.3)
EOSINOPHIL # BLD AUTO: 0.31 THOUSAND/ÂΜL (ref 0–0.61)
EOSINOPHIL NFR BLD AUTO: 4 % (ref 0–6)
ERYTHROCYTE [DISTWIDTH] IN BLOOD BY AUTOMATED COUNT: 13.4 % (ref 11.6–15.1)
GFR SERPL CREATININE-BSD FRML MDRD: 122 ML/MIN/1.73SQ M
GLUCOSE SERPL-MCNC: 90 MG/DL (ref 65–140)
HCT VFR BLD AUTO: 34.4 % (ref 36.5–49.3)
HGB BLD-MCNC: 11.3 G/DL (ref 12–17)
IMM GRANULOCYTES # BLD AUTO: 0.14 THOUSAND/UL (ref 0–0.2)
IMM GRANULOCYTES NFR BLD AUTO: 2 % (ref 0–2)
LYMPHOCYTES # BLD AUTO: 1.45 THOUSANDS/ÂΜL (ref 0.6–4.47)
LYMPHOCYTES NFR BLD AUTO: 19 % (ref 14–44)
MAGNESIUM SERPL-MCNC: 2.1 MG/DL (ref 1.9–2.7)
MCH RBC QN AUTO: 28.8 PG (ref 26.8–34.3)
MCHC RBC AUTO-ENTMCNC: 32.8 G/DL (ref 31.4–37.4)
MCV RBC AUTO: 88 FL (ref 82–98)
MONOCYTES # BLD AUTO: 0.72 THOUSAND/ÂΜL (ref 0.17–1.22)
MONOCYTES NFR BLD AUTO: 10 % (ref 4–12)
NEUTROPHILS # BLD AUTO: 4.92 THOUSANDS/ÂΜL (ref 1.85–7.62)
NEUTS SEG NFR BLD AUTO: 65 % (ref 43–75)
NRBC BLD AUTO-RTO: 0 /100 WBCS
PLATELET # BLD AUTO: 424 THOUSANDS/UL (ref 149–390)
PMV BLD AUTO: 8.7 FL (ref 8.9–12.7)
POTASSIUM SERPL-SCNC: 4.2 MMOL/L (ref 3.5–5.3)
PROCALCITONIN SERPL-MCNC: 0.17 NG/ML
PROT SERPL-MCNC: 7.1 G/DL (ref 6.4–8.4)
RBC # BLD AUTO: 3.92 MILLION/UL (ref 3.88–5.62)
SODIUM SERPL-SCNC: 138 MMOL/L (ref 135–147)
WBC # BLD AUTO: 7.57 THOUSAND/UL (ref 4.31–10.16)

## 2023-01-24 RX ORDER — LIDOCAINE 50 MG/G
1 PATCH TOPICAL DAILY
Status: DISCONTINUED | OUTPATIENT
Start: 2023-01-24 | End: 2023-01-28

## 2023-01-24 RX ORDER — FAMOTIDINE 20 MG/1
20 TABLET, FILM COATED ORAL 2 TIMES DAILY
Status: DISCONTINUED | OUTPATIENT
Start: 2023-01-24 | End: 2023-02-17 | Stop reason: HOSPADM

## 2023-01-24 RX ORDER — GABAPENTIN 300 MG/1
300 CAPSULE ORAL 3 TIMES DAILY
Status: DISCONTINUED | OUTPATIENT
Start: 2023-01-24 | End: 2023-02-10

## 2023-01-24 RX ORDER — CYCLOBENZAPRINE HCL 10 MG
10 TABLET ORAL 3 TIMES DAILY
Status: DISCONTINUED | OUTPATIENT
Start: 2023-01-24 | End: 2023-01-30

## 2023-01-24 RX ORDER — QUETIAPINE FUMARATE 25 MG/1
50 TABLET, FILM COATED ORAL
Status: DISCONTINUED | OUTPATIENT
Start: 2023-01-24 | End: 2023-01-26

## 2023-01-24 RX ADMIN — GABAPENTIN 300 MG: 300 CAPSULE ORAL at 16:08

## 2023-01-24 RX ADMIN — IOHEXOL 50 ML: 350 INJECTION, SOLUTION INTRAVENOUS at 11:16

## 2023-01-24 RX ADMIN — OXYCODONE HYDROCHLORIDE 5 MG: 5 TABLET ORAL at 22:28

## 2023-01-24 RX ADMIN — MORPHINE SULFATE 2 MG: 2 INJECTION, SOLUTION INTRAMUSCULAR; INTRAVENOUS at 10:45

## 2023-01-24 RX ADMIN — KETOROLAC TROMETHAMINE 10 MG: 10 TABLET, FILM COATED ORAL at 05:40

## 2023-01-24 RX ADMIN — CYCLOBENZAPRINE 10 MG: 10 TABLET, FILM COATED ORAL at 11:58

## 2023-01-24 RX ADMIN — CEFAZOLIN SODIUM 2000 MG: 2 SOLUTION INTRAVENOUS at 14:40

## 2023-01-24 RX ADMIN — BUPRENORPHINE AND NALOXONE 8 MG: 8; 2 FILM BUCCAL; SUBLINGUAL at 08:34

## 2023-01-24 RX ADMIN — QUETIAPINE FUMARATE 50 MG: 25 TABLET ORAL at 21:32

## 2023-01-24 RX ADMIN — BUPRENORPHINE AND NALOXONE 8 MG: 8; 2 FILM BUCCAL; SUBLINGUAL at 21:32

## 2023-01-24 RX ADMIN — GABAPENTIN 300 MG: 300 CAPSULE ORAL at 21:32

## 2023-01-24 RX ADMIN — ACETAMINOPHEN 650 MG: 325 TABLET, FILM COATED ORAL at 11:58

## 2023-01-24 RX ADMIN — OXYCODONE HYDROCHLORIDE 5 MG: 5 TABLET ORAL at 10:03

## 2023-01-24 RX ADMIN — FAMOTIDINE 20 MG: 20 TABLET ORAL at 17:10

## 2023-01-24 RX ADMIN — CYCLOBENZAPRINE 10 MG: 10 TABLET, FILM COATED ORAL at 16:08

## 2023-01-24 RX ADMIN — OXYCODONE HYDROCHLORIDE 5 MG: 5 TABLET ORAL at 16:09

## 2023-01-24 RX ADMIN — OXYCODONE HYDROCHLORIDE 5 MG: 5 TABLET ORAL at 03:57

## 2023-01-24 RX ADMIN — FAMOTIDINE 20 MG: 20 TABLET ORAL at 06:14

## 2023-01-24 RX ADMIN — CEFAZOLIN SODIUM 2000 MG: 2 SOLUTION INTRAVENOUS at 05:35

## 2023-01-24 RX ADMIN — ACETAMINOPHEN 650 MG: 325 TABLET, FILM COATED ORAL at 17:10

## 2023-01-24 RX ADMIN — CEFAZOLIN SODIUM 2000 MG: 2 SOLUTION INTRAVENOUS at 21:32

## 2023-01-24 RX ADMIN — KETOROLAC TROMETHAMINE 10 MG: 10 TABLET, FILM COATED ORAL at 19:32

## 2023-01-24 RX ADMIN — ACETAMINOPHEN 650 MG: 325 TABLET, FILM COATED ORAL at 00:24

## 2023-01-24 RX ADMIN — CYCLOBENZAPRINE 10 MG: 10 TABLET, FILM COATED ORAL at 21:32

## 2023-01-24 RX ADMIN — KETOROLAC TROMETHAMINE 10 MG: 10 TABLET, FILM COATED ORAL at 13:06

## 2023-01-24 RX ADMIN — GABAPENTIN 300 MG: 300 CAPSULE ORAL at 08:34

## 2023-01-24 NOTE — ASSESSMENT & PLAN NOTE
H/o right sided back pain with sciatica  Intermittent severe right hip pain, radiates into right lateral hip & buttocks  Pain rated 9/10  Patient unable to ambulate without cane  1/16/23 CT shows:  1  Mild multilevel disc degeneration  2  Grade 1 retrolisthesis of L5 on S1    3  Disc bulge with small herniation at L5-S1  Disc bulging from L2-3 through L4-5    4  No evidence of periostitis along the endplates to suggest advanced osteomyelitis on CT imaging  If there is clinical concern for underlying infection, MRI would be a more sensitive modality to assess for early changes of discitis Osteomyelitis  1/23/23 MRI w/o contrast shows -  No definite MR evidence for discitis osteoma myelitis as clinically questioned   Degenerative changes at L5-S1     1/24/23 ID recommending further imaging CT with contrast of abdomen, pelvis, right hip    Plan:  Oral pain med regimen:  Scheduled Tylenol 650 mg Q6   PRN Torodol 10mg Q6   PRN Oxy 5 Q6   Gabapentin 300mg TID  Flexeril 10mg TID  PRN Lidocaine patch, voltaren gel  Follow-up CT right hip, abdomen, pelvis

## 2023-01-24 NOTE — CASE MANAGEMENT
Case Management Discharge Planning Note    Patient name Carloz Moreno  Location [facility] [id number] MRN [id number]  : 1985 Date 2023       Current Admission Date: 2023  Current Admission Diagnosis:Bacteremia due to Staphylococcus aureus   Patient Active Problem List    Diagnosis Date Noted   • Bacteremia due to Staphylococcus aureus 2023   • Intravenous drug abuse, continuous ([facility] ) 2023   • Ambulatory dysfunction 2023   • Acute right-sided low back pain with right-sided sciatica 2023   • Tobacco abuse 2023   • Acute pain of right shoulder 2022   • Bilateral groin pain 2022   • Opiate overdose ([facility] ) 2021   • Moderate episode of recurrent major depressive disorder ([facility] [address] ) 2020   • Drug dependence ([facility] ) 2018   • Heroin abuse ([facility] ) 2018   • Encounter for monitoring Suboxone maintenance therapy 2018   • Drug therapy continued 2018   • Degeneration of thoracic intervertebral disc 2018   • Degeneration of intervertebral disc of thoracic region 2017   • Myofascial pain 2017   • Chronic midline thoracic back pain 2016   • Anxiety 2015   • Hepatitis C virus 2015      LOS (days): 6  Geometric Mean LOS (GMLOS) (days): 2 80  Days to GMLOS:-2 7     OBJECTIVE:  Risk of Unplanned Readmission Score: 12 34         Current admission status: Inpatient   Preferred Pharmacy:   [address] [place] [id number]  Phone: [phone] Fax: [phone]    Primary Care Provider: Antonette Cardoso DO    Primary Insurance: [address] [place] [address]  Secondary Insurance:     DISCHARGE DETAILS:  Pt is going to need IV ancef for 6 weeks   Pt is unable to go home with PICC line with history Of IV drug abuse  [place] referral sent to facilities to see if anyone is willing to take patient

## 2023-01-24 NOTE — ASSESSMENT & PLAN NOTE
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  Last use - IV,1 bag of fentanyl, at 12pm on 1/18/23  Plan:  Patient has scheduled follow-up with Dr Rtia Reyes in [place]  Suboxone 8 mg twice daily

## 2023-01-24 NOTE — PHYSICAL THERAPY NOTE
Physical Therapy Cancellation Note               01/24/23 1126   PT Last Visit   PT Visit Date 01/24/23   Note Type   Note Type Cancelled Session  (Pt states" I'm not walking until they figure out whats wrong with me")

## 2023-01-24 NOTE — PROGRESS NOTES
Progress Note - Infectious Disease   Shay Trujillo 41 y o  male MRN: [id number]  Unit/Bed#: 280-73 Encounter: [id number]      Impression/Plan:    1  MSSA bacteremia  1/16, 1/18 blood cultures positive from [facility] growing MSSA  Admission blood cultures here 1/18 also growing MSSA  Likely due to IVDU  Patient also has right sacral/hip pain which he states is due to a fall; in setting of staph aureus bacteremia need to rule out metastatic foci of infection as a cause of his pain  CT lumbar spine at [facility] showed DJD but no evidence of OM  MRI lumbar spine here does not show any signs of discitis/osteomyelitis, although he could only tolerate noncontrast study due to pain  TTE shows possible small vegetation of the tip and the anterior leaflet of the MV  Patient is afebrile, leukocytosis resolved  He is hemodynamically stable  Repeat blood cultures here 1/20 no growth after he was started on appropriate antibiotics   -Continue IV Cefazolin 2g q8hr              -Follow-up CECILIO              -Okay for PICC placement   -Given ongoing hip pain and inability to tolerate MRI, recommend CT A/P and right hip with contrast   -will require a 4-6 week course IV antibiotics, patient is not candidate for home antibiotics due to IVDU              -monitor WBC count, fever curve     2  Lower back/right hip pain  Occurred after a fall per the patient  CT lumbar spine shows DJD, no obvious osseous destruction  CRP/ESR elevated but he also has a bloodstream infection  MRI L spine non con does not show any evidence of OM/discitis  Patient continues to have right hip pain              -imaging as above              -pain management per primary     3  Opiate use disorder with IVDU  Patient initiated on Suboxone  Recently used fentanyl prior to admission  He has a history of hepatitis C status post treatment  There is documentation of a history of HIV, but he has several prior negative tests in the chart, last in March 2022   HIV negative 23              -continue suboxone, management of withdrawal per primary     4  History of Hepatitis C  Reports treatment with [facility] in the summer  but no documentation of SVR  HCV PCR negative here confirming 12 week SVR              -outpatient follow up     Discussed the above management plan in detail with the primary service and patient  ID will follow  Antibiotics:  Cefazolin day 5  Day 5 from blood culture clearance    Subjective: The patient continues to endorse right-sided lower back/sacral and hip pain  There are no obvious abnormalities on physical exam in this area  He reports that pain is unchanged with admission and he is having difficulty walking  No pain in the upper back  Denies fever or chills  Objective:  Vitals:  Temp:  [97 8 °F (36 6 °C)-98 3 °F (36 8 °C)] 97 8 °F (36 6 °C)  HR:  [63-74] 63  Resp:  [17] 17  BP: (101-118)/(60-70) 118/70  SpO2:  [93 %-98 %] 93 %  Temp (24hrs), Av 1 °F (36 7 °C), Min:97 8 °F (36 6 °C), Max:98 3 °F (36 8 °C)  Current: Temperature: 97 8 °F (36 6 °C)    Physical Exam:     General Appearance:  Alert, interactive, nontoxic, no acute distress  Throat: Oropharynx moist without lesions  Lungs:   Clear to auscultation bilaterally; no wheezes, rhonchi or rales; respirations unlabored   Heart:  RRR; no murmur, rub or gallop   Abdomen:   Soft, non-tender, non-distended, positive bowel sounds  Extremities: No clubbing, cyanosis or edema   Skin: No new rashes or lesions  Tenderness over right lower back and sacral area       Labs:    All pertinent labs and imaging studies were personally reviewed  Results from last 7 days   Lab Units 23  0642 23  0437 23  0654   WBC Thousand/uL 7 57 6 95 11 30*   HEMOGLOBIN g/dL 11 3* 10 5* 10 4*   PLATELETS Thousands/uL 424* 390 294     Results from last 7 days   Lab Units 23  0642 23  0437 23  0654   SODIUM mmol/L 138 138 136   POTASSIUM mmol/L 4 2 4 0 4 0   CHLORIDE mmol/L 101 103 105   CO2 mmol/L 28 27 22   BUN mg/dL 16 12 6   CREATININE mg/dL 0 67 0 60 0 71   EGFR ml/min/1 73sq m 122 128 119   CALCIUM mg/dL 8 9 8 4 8 3*   AST U/L 13 12* 16   ALT U/L 9 11 28   ALK PHOS U/L 59 54 69     Results from last 7 days   Lab Units 01/24/23  0642 01/23/23  0437 01/20/23  0908 01/18/23  1926   PROCALCITONIN ng/ml 0 17 0 33* 2 29* 5 24*                   Micro:  Results from last 7 days   Lab Units 01/20/23  1306 01/18/23  1926   BLOOD CULTURE  No Growth at 72 hrs  No Growth at 72 hrs   Staphylococcus aureus*  Staphylococcus aureus*   GRAM STAIN RESULT   --  Gram positive cocci in clusters*  Gram positive cocci in clusters*       Imaging:  Pertinent imaging in PACS personally reviewed

## 2023-01-24 NOTE — NURSING NOTE
Patient was scheduled for PICC placement with IR 1/24/2023  Patient requested one time dose of Morphine prior to placement  TT to Dr Hannah Rush and order placed  Review of chart states PICC placement will not be done until Friday per note from IR  Morphine order non administered

## 2023-01-24 NOTE — PLAN OF CARE
Problem: PAIN - ADULT  Goal: Verbalizes/displays adequate comfort level or baseline comfort level  Description: Interventions:  - Encourage patient to monitor pain and request assistance  - Assess pain using appropriate pain scale  - Administer analgesics based on type and severity of pain and evaluate response  - Implement non-pharmacological measures as appropriate and evaluate response  - Consider cultural and social influences on pain and pain management  - Notify physician/advanced practitioner if interventions unsuccessful or patient reports new pain  Outcome: Progressing     Problem: INFECTION - ADULT  Goal: Absence or prevention of progression during hospitalization  Description: INTERVENTIONS:  - Assess and monitor for signs and symptoms of infection  - Monitor lab/diagnostic results  - Monitor all insertion sites, i e  indwelling lines, tubes, and drains  - Monitor endotracheal if appropriate and nasal secretions for changes in amount and color  - [place] appropriate cooling/warming therapies per order  - Administer medications as ordered  - Instruct and encourage patient and family to use good hand hygiene technique  - Identify and instruct in appropriate isolation precautions for identified infection/condition  Outcome: Progressing  Goal: Absence of fever/infection during neutropenic period  Description: INTERVENTIONS:  - Monitor WBC    Outcome: Progressing     Problem: MUSCULOSKELETAL - ADULT  Goal: Maintain or return mobility to safest level of function  Description: INTERVENTIONS:  - Assess patient's ability to carry out ADLs; assess patient's baseline for ADL function and identify physical deficits which impact ability to perform ADLs (bathing, care of mouth/teeth, toileting, grooming, dressing, etc )  - Assess/evaluate cause of self-care deficits   - Assess range of motion  - Assess patient's mobility  - Assess patient's need for assistive devices and provide as appropriate  - Encourage maximum independence but intervene and supervise when necessary  - Involve family in performance of ADLs  - Assess for home care needs following discharge   - Consider OT consult to assist with ADL evaluation and planning for discharge  - Provide patient education as appropriate  Outcome: Progressing  Goal: Maintain proper alignment of affected body part  Description: INTERVENTIONS:  - Support, maintain and protect limb and body alignment  - Provide patient/ family with appropriate education  Outcome: Progressing     Problem: NEUROSENSORY - ADULT  Goal: Achieves stable or improved neurological status  Description: INTERVENTIONS  - Monitor and report changes in neurological status  - Monitor vital signs such as temperature, blood pressure, glucose, and any other labs ordered   - Initiate measures to prevent increased intracranial pressure  - Monitor for seizure activity and implement precautions if appropriate      Outcome: Progressing  Goal: Achieves maximal functionality and self care  Description: INTERVENTIONS  - Monitor swallowing and airway patency with patient fatigue and changes in neurological status  - Encourage and assist patient to increase activity and self care     - Encourage visually impaired, hearing impaired and aphasic patients to use assistive/communication devices  Outcome: Progressing     Problem: MOBILITY - ADULT  Goal: Maintain or return to baseline ADL function  Description: INTERVENTIONS:  -  Assess patient's ability to carry out ADLs; assess patient's baseline for ADL function and identify physical deficits which impact ability to perform ADLs (bathing, care of mouth/teeth, toileting, grooming, dressing, etc )  - Assess/evaluate cause of self-care deficits   - Assess range of motion  - Assess patient's mobility; develop plan if impaired  - Assess patient's need for assistive devices and provide as appropriate  - Encourage maximum independence but intervene and supervise when necessary  - Involve family in performance of ADLs  - Assess for home care needs following discharge   - Consider OT consult to assist with ADL evaluation and planning for discharge  - Provide patient education as appropriate  Outcome: Progressing  Goal: Maintains/Returns to pre admission functional level  Description: INTERVENTIONS:  - Perform BMAT or MOVE assessment daily    - Set and communicate daily mobility goal to care team and patient/family/caregiver  - Collaborate with rehabilitation services on mobility goals if consulted  - Perform Range of Motion 3 times a day  - Reposition patient every 2 hours    - Dangle patient 3 times a day  - Stand patient 3 times a day  - Ambulate patient 3 times a day  - Out of bed to chair 3 times a day   - Out of bed for meals 3 times a day  - Out of bed for toileting  - Record patient progress and toleration of activity level   Outcome: Progressing

## 2023-01-24 NOTE — ASSESSMENT & PLAN NOTE
Blood cultures positive for staph aureus  History of IV drug abuse  Plan:  Continue IV Ancef Day 5 (total days of IV antibiotics 7)  PICC placement 1/24  CECILIO 1/24  ID input appreciated: Continue IV Ancef, place PICC line for anticipated 6 weeks antibiotics   order additional imaging of right hip

## 2023-01-24 NOTE — PROGRESS NOTES
Plan was for PICC today  Ran out of time  Apparently staying in the hospital, so we will plan for PICC on Friday  If this does not work for any reason, let me know

## 2023-01-24 NOTE — PLAN OF CARE
Problem: PAIN - ADULT  Goal: Verbalizes/displays adequate comfort level or baseline comfort level  Description: Interventions:  - Encourage patient to monitor pain and request assistance  - Assess pain using appropriate pain scale  - Administer analgesics based on type and severity of pain and evaluate response  - Implement non-pharmacological measures as appropriate and evaluate response  - Consider cultural and social influences on pain and pain management  - Notify physician/advanced practitioner if interventions unsuccessful or patient reports new pain  Outcome: Progressing     Problem: INFECTION - ADULT  Goal: Absence or prevention of progression during hospitalization  Description: INTERVENTIONS:  - Assess and monitor for signs and symptoms of infection  - Monitor lab/diagnostic results  - Monitor all insertion sites, i e  indwelling lines, tubes, and drains  - Monitor endotracheal if appropriate and nasal secretions for changes in amount and color  - [place] appropriate cooling/warming therapies per order  - Administer medications as ordered  - Instruct and encourage patient and family to use good hand hygiene technique  - Identify and instruct in appropriate isolation precautions for identified infection/condition  Outcome: Progressing  Goal: Absence of fever/infection during neutropenic period  Description: INTERVENTIONS:  - Monitor WBC    Outcome: Progressing     Problem: MUSCULOSKELETAL - ADULT  Goal: Maintain or return mobility to safest level of function  Description: INTERVENTIONS:  - Assess patient's ability to carry out ADLs; assess patient's baseline for ADL function and identify physical deficits which impact ability to perform ADLs (bathing, care of mouth/teeth, toileting, grooming, dressing, etc )  - Assess/evaluate cause of self-care deficits   - Assess range of motion  - Assess patient's mobility  - Assess patient's need for assistive devices and provide as appropriate  - Encourage maximum independence but intervene and supervise when necessary  - Involve family in performance of ADLs  - Assess for home care needs following discharge   - Consider OT consult to assist with ADL evaluation and planning for discharge  - Provide patient education as appropriate  Outcome: Progressing  Goal: Maintain proper alignment of affected body part  Description: INTERVENTIONS:  - Support, maintain and protect limb and body alignment  - Provide patient/ family with appropriate education  Outcome: Progressing     Problem: NEUROSENSORY - ADULT  Goal: Achieves stable or improved neurological status  Description: INTERVENTIONS  - Monitor and report changes in neurological status  - Monitor vital signs such as temperature, blood pressure, glucose, and any other labs ordered   - Initiate measures to prevent increased intracranial pressure  - Monitor for seizure activity and implement precautions if appropriate      Outcome: Progressing  Goal: Achieves maximal functionality and self care  Description: INTERVENTIONS  - Monitor swallowing and airway patency with patient fatigue and changes in neurological status  - Encourage and assist patient to increase activity and self care     - Encourage visually impaired, hearing impaired and aphasic patients to use assistive/communication devices  Outcome: Progressing     Problem: MOBILITY - ADULT  Goal: Maintain or return to baseline ADL function  Description: INTERVENTIONS:  -  Assess patient's ability to carry out ADLs; assess patient's baseline for ADL function and identify physical deficits which impact ability to perform ADLs (bathing, care of mouth/teeth, toileting, grooming, dressing, etc )  - Assess/evaluate cause of self-care deficits   - Assess range of motion  - Assess patient's mobility; develop plan if impaired  - Assess patient's need for assistive devices and provide as appropriate  - Encourage maximum independence but intervene and supervise when necessary  - Involve family in performance of ADLs  - Assess for home care needs following discharge   - Consider OT consult to assist with ADL evaluation and planning for discharge  - Provide patient education as appropriate  Outcome: Progressing  Goal: Maintains/Returns to pre admission functional level  Description: INTERVENTIONS:  - Perform BMAT or MOVE assessment daily    - Set and communicate daily mobility goal to care team and patient/family/caregiver  - Collaborate with rehabilitation services on mobility goals if consulted  - Perform Range of Motion 3 times a day  - Reposition patient every 2 hours    - Dangle patient 3 times a day  - Stand patient 3 times a day  - Ambulate patient 3 times a day  - Out of bed to chair 3 times a day   - Out of bed for meals 3 times a day  - Out of bed for toileting  - Record patient progress and toleration of activity level   Outcome: Progressing

## 2023-01-24 NOTE — PROGRESS NOTES
[address]  Progress Note - Roldan Pimentel 1985, 41 y o  male MRN: [id number]  Unit/Bed#: 936-97 Encounter: [id number]  Primary Care Provider: William Baumgarten, DO   Date and time admitted to hospital: 1/18/2023  5:14 PM    * Bacteremia due to Staphylococcus aureus  Assessment & Plan  Blood cultures positive for staph aureus  History of IV drug abuse  Plan:  Continue IV Ancef Day 5 (total days of IV antibiotics 7)  PICC placement 1/24  CECILIO 1/24  ID input appreciated: Continue IV Ancef, place PICC line for anticipated 6 weeks antibiotics  order additional imaging of right hip    Acute right-sided low back pain with right-sided sciatica  Assessment & Plan  H/o right sided back pain with sciatica  Intermittent severe right hip pain, radiates into right lateral hip & buttocks  Pain rated 9/10  Patient unable to ambulate without cane  1/16/23 CT shows:  1  Mild multilevel disc degeneration  2  Grade 1 retrolisthesis of L5 on S1    3  Disc bulge with small herniation at L5-S1  Disc bulging from L2-3 through L4-5    4  No evidence of periostitis along the endplates to suggest advanced osteomyelitis on CT imaging  If there is clinical concern for underlying infection, MRI would be a more sensitive modality to assess for early changes of discitis Osteomyelitis  1/23/23 MRI w/o contrast shows -  No definite MR evidence for discitis osteoma myelitis as clinically questioned   Degenerative changes at L5-S1     1/24/23 ID recommending further imaging CT with contrast of abdomen, pelvis, right hip    Plan:  Oral pain med regimen:  Scheduled Tylenol 650 mg Q6   PRN Torodol 10mg Q6   PRN Oxy 5 Q6   Gabapentin 300mg TID  Flexeril 10mg TID  PRN Lidocaine patch, voltaren gel  Follow-up CT right hip, abdomen, pelvis      Intravenous drug abuse, continuous ([facility] [address] )  Assessment & Plan  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  Last use - IV,1 bag of fentanyl, at 12pm on 1/18/23  Plan:  Patient has scheduled follow-up with Dr Mary Cobian in [place]  Suboxone 8 mg twice daily      Tobacco abuse  Assessment & Plan  History of tobacco smoking  Plan:  Nicotine patch  Smoking cessation    Ambulatory dysfunction  Assessment & Plan  Ambulatory dysfunction  Patient ambulatimg with a cane due to 9/10 right hip pain   Seen by OT: jorge assist  OT Discharge Recommendation: Home with outpatient rehabilitation  Plan:  Continue PT/OT while admitted    Hepatitis C virus  Assessment & Plan  Most recent labs 3/7/2022  Positive Hep A  Positive Hep B surface antibody  Positive Hep C antibody  Not taking hepatitis meds    ID recs: ordered Hep C RNA    Plan:  F/u Hep C RNA - active            VTE Pharmacologic Prophylaxis: VTE Score: 1 Receiving Lovenox due to immobility    Patient Centered Rounds: I performed bedside rounds with nursing staff today  Discussions with Specialists or Other Care Team Provider: ID      Time Spent for Care: 30 minutes  More than 50% of total time spent on counseling and coordination of care as described above  Current Length of Stay: 6 day(s)  Current Patient Status: Inpatient   Certification Statement: The patient will continue to require additional inpatient hospital stay due to IV antibiotics  Discharge Plan: 4 to 6 weeks of IV antibiotics    Code Status: Level 1 - Full Code    Subjective:   Patient seen lying in bed comfortably this morning  No overnight events, sleeping and eating okay  Hours yesterday  Continues to refuse PT/OT  Still complaining of right-sided shooting hip pain  Objective:     Vitals:   Temp (24hrs), Av 1 °F (36 7 °C), Min:97 8 °F (36 6 °C), Max:98 3 °F (36 8 °C)    Temp:  [97 8 °F (36 6 °C)-98 3 °F (36 8 °C)] 97 8 °F (36 6 °C)  HR:  [63-82] 63  Resp:  [17-18] 17  BP: (101-118)/(60-75) 118/70  SpO2:  [93 %-98 %] 93 %  Body mass index is 27 67 kg/m²  Input and Output Summary (last 24 hours):      Intake/Output Summary (Last 24 hours) at 2023 [id number] [facility] filed at 1/24/2023 0810  Gross per 24 hour   Intake 1270 ml   Output 1025 ml   Net 245 ml       Physical Exam:   Physical Exam  Vitals and nursing note reviewed  Constitutional:       General: He is not in acute distress  Appearance: He is well-developed  He is obese  HENT:      Head: Normocephalic and atraumatic  Right Ear: External ear normal       Left Ear: External ear normal       Nose: No rhinorrhea  Mouth/Throat:      Pharynx: No posterior oropharyngeal erythema  Comments: Poor dentition  Eyes:      General:         Right eye: No discharge  Left eye: No discharge  Conjunctiva/sclera: Conjunctivae normal    Cardiovascular:      Rate and Rhythm: Normal rate and regular rhythm  Pulses: Normal pulses  Heart sounds: No murmur heard  Pulmonary:      Effort: Pulmonary effort is normal    Abdominal:      Palpations: Abdomen is soft  Tenderness: There is no abdominal tenderness  Musculoskeletal:         General: No swelling  Cervical back: Neck supple  Comments: Right hip;  Decreased range of motion  No erythema, no swelling  Sensation intact   Skin:     General: Skin is warm and dry  Capillary Refill: Capillary refill takes less than 2 seconds  Neurological:      Mental Status: He is alert  Mental status is at baseline     Psychiatric:         Mood and Affect: Mood normal          Behavior: Behavior normal          Additional Data:     Labs:  Results from last 7 days   Lab Units 01/24/23  0642   WBC Thousand/uL 7 57   HEMOGLOBIN g/dL 11 3*   HEMATOCRIT % 34 4*   PLATELETS Thousands/uL 424*   NEUTROS PCT % 65   LYMPHS PCT % 19   MONOS PCT % 10   EOS PCT % 4     Results from last 7 days   Lab Units 01/24/23  0642   SODIUM mmol/L 138   POTASSIUM mmol/L 4 2   CHLORIDE mmol/L 101   CO2 mmol/L 28   BUN mg/dL 16   CREATININE mg/dL 0 67   ANION GAP mmol/L 9   CALCIUM mg/dL 8 9   ALBUMIN g/dL 3 3*   TOTAL BILIRUBIN mg/dL 0 17*   ALK PHOS U/L 59   ALT U/L 9   AST U/L 13   GLUCOSE RANDOM mg/dL 90     Results from last 7 days   Lab Units 01/23/23  0437   INR  0 92             Results from last 7 days   Lab Units 01/24/23  0642 01/23/23  0437 01/20/23  0908 01/18/23  1926   LACTIC ACID mmol/L  --   --   --  1 8   PROCALCITONIN ng/ml 0 17 0 33* 2 29* 5 24*       Lines/Drains:  Invasive Devices     Peripheral Intravenous Line  Duration           Peripheral IV 01/21/23 Left;Ventral (anterior) Forearm 2 days                      Imaging: Reviewed radiology reports from this admission including: MRI spine    Recent Cultures (last 7 days):   Results from last 7 days   Lab Units 01/20/23  1306 01/18/23  1926   BLOOD CULTURE  No Growth at 72 hrs  No Growth at 72 hrs   Staphylococcus aureus*  Staphylococcus aureus*   GRAM STAIN RESULT   --  Gram positive cocci in clusters*  Gram positive cocci in clusters*       Last 24 Hours Medication List:   Current Facility-Administered Medications   Medication Dose Route Frequency Provider Last Rate   • acetaminophen  650 mg Oral Q6H Montse Sanches MD     • buprenorphine-naloxone  8 mg Sublingual BID Collins Rodriguez MD     • cefazolin  2,000 mg Intravenous Q8H Anusha Orellana MD 2,000 mg (01/24/23 0535)   • cyclobenzaprine  10 mg Oral TID Collins Rodriguez MD     • Diclofenac Sodium  2 g Topical 4x Daily Collins Rodriguez MD     • enoxaparin  40 mg Subcutaneous Daily Manny Villanueva MD     • famotidine  20 mg Oral BID Janes Cooper PA-C     • gabapentin  300 mg Oral TID Collins Rodriguez MD     • ketorolac  10 mg Oral Q6H PRN Collins Rodriguez MD     • lidocaine  1 patch Topical Daily Collins Rodriguez MD     • nicotine  14 mg Transdermal Daily Manny Villanueva MD     • ondansetron  4 mg Intravenous Q6H PRN Manny Villanueva MD     • oxyCODONE  5 mg Oral Q6H PRN Collins Rodriguez MD     • QUEtiapine  50 mg Oral HS Marce Parks MD          Today, Patient Was Seen By: Collins Rodriguez MD    **Please Note: This note may have been constructed using a voice recognition system  **

## 2023-01-25 ENCOUNTER — APPOINTMENT (OUTPATIENT)
Dept: NON INVASIVE DIAGNOSTICS | Facility: HOSPITAL | Age: 38
End: 2023-01-25
Attending: INTERNAL MEDICINE

## 2023-01-25 ENCOUNTER — ANESTHESIA EVENT (INPATIENT)
Dept: NON INVASIVE DIAGNOSTICS | Facility: HOSPITAL | Age: 38
End: 2023-01-25

## 2023-01-25 ENCOUNTER — ANESTHESIA (INPATIENT)
Dept: NON INVASIVE DIAGNOSTICS | Facility: HOSPITAL | Age: 38
End: 2023-01-25

## 2023-01-25 LAB
BACTERIA BLD CULT: NORMAL
BACTERIA BLD CULT: NORMAL
SL CV LV EF: 60

## 2023-01-25 PROCEDURE — B245ZZ4 ULTRASONOGRAPHY OF LEFT HEART, TRANSESOPHAGEAL: ICD-10-PCS | Performed by: INTERNAL MEDICINE

## 2023-01-25 RX ORDER — NALOXONE HYDROCHLORIDE 0.4 MG/ML
0.1 INJECTION, SOLUTION INTRAMUSCULAR; INTRAVENOUS; SUBCUTANEOUS AS NEEDED
Status: DISCONTINUED | OUTPATIENT
Start: 2023-01-25 | End: 2023-02-17 | Stop reason: HOSPADM

## 2023-01-25 RX ORDER — SODIUM CHLORIDE, SODIUM LACTATE, POTASSIUM CHLORIDE, CALCIUM CHLORIDE 600; 310; 30; 20 MG/100ML; MG/100ML; MG/100ML; MG/100ML
20 INJECTION, SOLUTION INTRAVENOUS CONTINUOUS
Status: CANCELLED | OUTPATIENT
Start: 2023-01-25

## 2023-01-25 RX ORDER — METHADONE HYDROCHLORIDE 10 MG/1
10 TABLET ORAL EVERY 12 HOURS SCHEDULED
Status: DISCONTINUED | OUTPATIENT
Start: 2023-01-26 | End: 2023-01-25

## 2023-01-25 RX ORDER — KETOROLAC TROMETHAMINE 30 MG/ML
15 INJECTION, SOLUTION INTRAMUSCULAR; INTRAVENOUS ONCE
Status: COMPLETED | OUTPATIENT
Start: 2023-01-25 | End: 2023-01-25

## 2023-01-25 RX ORDER — BUPRENORPHINE AND NALOXONE 8; 2 MG/1; MG/1
8 FILM, SOLUBLE BUCCAL; SUBLINGUAL 2 TIMES DAILY
Status: DISCONTINUED | OUTPATIENT
Start: 2023-01-25 | End: 2023-01-25

## 2023-01-25 RX ORDER — MORPHINE SULFATE 15 MG/1
15 TABLET ORAL EVERY 4 HOURS PRN
Status: DISCONTINUED | OUTPATIENT
Start: 2023-01-25 | End: 2023-01-25

## 2023-01-25 RX ORDER — PROPOFOL 10 MG/ML
INJECTION, EMULSION INTRAVENOUS AS NEEDED
Status: DISCONTINUED | OUTPATIENT
Start: 2023-01-25 | End: 2023-01-25

## 2023-01-25 RX ORDER — MORPHINE SULFATE 15 MG/1
30 TABLET ORAL EVERY 6 HOURS PRN
Status: DISCONTINUED | OUTPATIENT
Start: 2023-01-25 | End: 2023-01-27

## 2023-01-25 RX ORDER — MORPHINE SULFATE 10 MG/ML
8 INJECTION, SOLUTION INTRAMUSCULAR; INTRAVENOUS EVERY 6 HOURS PRN
Status: DISCONTINUED | OUTPATIENT
Start: 2023-01-25 | End: 2023-02-13

## 2023-01-25 RX ORDER — BUPRENORPHINE AND NALOXONE 2; .5 MG/1; MG/1
4 FILM, SOLUBLE BUCCAL; SUBLINGUAL 4 TIMES DAILY
Status: DISCONTINUED | OUTPATIENT
Start: 2023-01-25 | End: 2023-02-17 | Stop reason: HOSPADM

## 2023-01-25 RX ORDER — SODIUM CHLORIDE, SODIUM LACTATE, POTASSIUM CHLORIDE, CALCIUM CHLORIDE 600; 310; 30; 20 MG/100ML; MG/100ML; MG/100ML; MG/100ML
INJECTION, SOLUTION INTRAVENOUS CONTINUOUS PRN
Status: DISCONTINUED | OUTPATIENT
Start: 2023-01-25 | End: 2023-01-25

## 2023-01-25 RX ORDER — HYDROMORPHONE HCL/PF 1 MG/ML
1 SYRINGE (ML) INJECTION ONCE
Status: COMPLETED | OUTPATIENT
Start: 2023-01-25 | End: 2023-01-25

## 2023-01-25 RX ORDER — ONDANSETRON 4 MG/1
4 TABLET, ORALLY DISINTEGRATING ORAL EVERY 6 HOURS PRN
Status: DISCONTINUED | OUTPATIENT
Start: 2023-01-25 | End: 2023-02-17 | Stop reason: HOSPADM

## 2023-01-25 RX ADMIN — ACETAMINOPHEN 650 MG: 325 TABLET, FILM COATED ORAL at 11:13

## 2023-01-25 RX ADMIN — GABAPENTIN 300 MG: 300 CAPSULE ORAL at 15:27

## 2023-01-25 RX ADMIN — ACETAMINOPHEN 650 MG: 325 TABLET, FILM COATED ORAL at 00:26

## 2023-01-25 RX ADMIN — PROPOFOL 100 MG: 10 INJECTION, EMULSION INTRAVENOUS at 08:00

## 2023-01-25 RX ADMIN — PROPOFOL 50 MG: 10 INJECTION, EMULSION INTRAVENOUS at 08:04

## 2023-01-25 RX ADMIN — GABAPENTIN 300 MG: 300 CAPSULE ORAL at 09:14

## 2023-01-25 RX ADMIN — OXYCODONE HYDROCHLORIDE 5 MG: 5 TABLET ORAL at 09:15

## 2023-01-25 RX ADMIN — NICOTINE 14 MG: 14 PATCH, EXTENDED RELEASE TRANSDERMAL at 09:13

## 2023-01-25 RX ADMIN — KETOROLAC TROMETHAMINE 10 MG: 10 TABLET, FILM COATED ORAL at 05:15

## 2023-01-25 RX ADMIN — FAMOTIDINE 20 MG: 20 TABLET ORAL at 09:14

## 2023-01-25 RX ADMIN — CEFAZOLIN SODIUM 2000 MG: 2 SOLUTION INTRAVENOUS at 21:12

## 2023-01-25 RX ADMIN — BUPRENORPHINE AND NALOXONE 8 MG: 8; 2 FILM BUCCAL; SUBLINGUAL at 09:14

## 2023-01-25 RX ADMIN — CEFAZOLIN SODIUM 2000 MG: 2 SOLUTION INTRAVENOUS at 05:15

## 2023-01-25 RX ADMIN — BUPRENORPHINE AND NALOXONE 4 MG: 2; .5 FILM BUCCAL; SUBLINGUAL at 21:11

## 2023-01-25 RX ADMIN — CEFAZOLIN SODIUM 2000 MG: 2 SOLUTION INTRAVENOUS at 14:35

## 2023-01-25 RX ADMIN — CYCLOBENZAPRINE 10 MG: 10 TABLET, FILM COATED ORAL at 21:12

## 2023-01-25 RX ADMIN — PROPOFOL 50 MG: 10 INJECTION, EMULSION INTRAVENOUS at 08:09

## 2023-01-25 RX ADMIN — ACETAMINOPHEN 650 MG: 325 TABLET, FILM COATED ORAL at 05:15

## 2023-01-25 RX ADMIN — OXYCODONE HYDROCHLORIDE 5 MG: 5 TABLET ORAL at 15:28

## 2023-01-25 RX ADMIN — MORPHINE SULFATE 30 MG: 15 TABLET ORAL at 18:41

## 2023-01-25 RX ADMIN — LIDOCAINE HYDROCHLORIDE 80 MG: 20 INJECTION, SOLUTION INTRAVENOUS at 08:04

## 2023-01-25 RX ADMIN — KETOROLAC TROMETHAMINE 10 MG: 10 TABLET, FILM COATED ORAL at 21:11

## 2023-01-25 RX ADMIN — BUPRENORPHINE AND NALOXONE 4 MG: 2; .5 FILM BUCCAL; SUBLINGUAL at 17:54

## 2023-01-25 RX ADMIN — SODIUM CHLORIDE, SODIUM LACTATE, POTASSIUM CHLORIDE, AND CALCIUM CHLORIDE: .6; .31; .03; .02 INJECTION, SOLUTION INTRAVENOUS at 07:50

## 2023-01-25 RX ADMIN — FAMOTIDINE 20 MG: 20 TABLET ORAL at 17:54

## 2023-01-25 RX ADMIN — ACETAMINOPHEN 650 MG: 325 TABLET, FILM COATED ORAL at 17:54

## 2023-01-25 RX ADMIN — CYCLOBENZAPRINE 10 MG: 10 TABLET, FILM COATED ORAL at 15:27

## 2023-01-25 RX ADMIN — GABAPENTIN 300 MG: 300 CAPSULE ORAL at 21:12

## 2023-01-25 RX ADMIN — QUETIAPINE FUMARATE 50 MG: 25 TABLET ORAL at 21:11

## 2023-01-25 RX ADMIN — HYDROMORPHONE HYDROCHLORIDE 1 MG: 1 INJECTION, SOLUTION INTRAMUSCULAR; INTRAVENOUS; SUBCUTANEOUS at 11:13

## 2023-01-25 RX ADMIN — CYCLOBENZAPRINE 10 MG: 10 TABLET, FILM COATED ORAL at 09:14

## 2023-01-25 RX ADMIN — KETOROLAC TROMETHAMINE 15 MG: 30 INJECTION, SOLUTION INTRAMUSCULAR; INTRAVENOUS at 07:30

## 2023-01-25 RX ADMIN — PROPOFOL 100 MG: 10 INJECTION, EMULSION INTRAVENOUS at 08:02

## 2023-01-25 NOTE — PLAN OF CARE
Problem: PAIN - ADULT  Goal: Verbalizes/displays adequate comfort level or baseline comfort level  Description: Interventions:  - Encourage patient to monitor pain and request assistance  - Assess pain using appropriate pain scale  - Administer analgesics based on type and severity of pain and evaluate response  - Implement non-pharmacological measures as appropriate and evaluate response  - Consider cultural and social influences on pain and pain management  - Notify physician/advanced practitioner if interventions unsuccessful or patient reports new pain  Outcome: Progressing     Problem: INFECTION - ADULT  Goal: Absence or prevention of progression during hospitalization  Description: INTERVENTIONS:  - Assess and monitor for signs and symptoms of infection  - Monitor lab/diagnostic results  - Monitor all insertion sites, i e  indwelling lines, tubes, and drains  - Monitor endotracheal if appropriate and nasal secretions for changes in amount and color  - [place] appropriate cooling/warming therapies per order  - Administer medications as ordered  - Instruct and encourage patient and family to use good hand hygiene technique  - Identify and instruct in appropriate isolation precautions for identified infection/condition  Outcome: Progressing  Goal: Absence of fever/infection during neutropenic period  Description: INTERVENTIONS:  - Monitor WBC    Outcome: Progressing     Problem: MUSCULOSKELETAL - ADULT  Goal: Maintain or return mobility to safest level of function  Description: INTERVENTIONS:  - Assess patient's ability to carry out ADLs; assess patient's baseline for ADL function and identify physical deficits which impact ability to perform ADLs (bathing, care of mouth/teeth, toileting, grooming, dressing, etc )  - Assess/evaluate cause of self-care deficits   - Assess range of motion  - Assess patient's mobility  - Assess patient's need for assistive devices and provide as appropriate  - Encourage maximum independence but intervene and supervise when necessary  - Involve family in performance of ADLs  - Assess for home care needs following discharge   - Consider OT consult to assist with ADL evaluation and planning for discharge  - Provide patient education as appropriate  Outcome: Progressing  Goal: Maintain proper alignment of affected body part  Description: INTERVENTIONS:  - Support, maintain and protect limb and body alignment  - Provide patient/ family with appropriate education  Outcome: Progressing     Problem: NEUROSENSORY - ADULT  Goal: Achieves stable or improved neurological status  Description: INTERVENTIONS  - Monitor and report changes in neurological status  - Monitor vital signs such as temperature, blood pressure, glucose, and any other labs ordered   - Initiate measures to prevent increased intracranial pressure  - Monitor for seizure activity and implement precautions if appropriate      Outcome: Progressing  Goal: Achieves maximal functionality and self care  Description: INTERVENTIONS  - Monitor swallowing and airway patency with patient fatigue and changes in neurological status  - Encourage and assist patient to increase activity and self care     - Encourage visually impaired, hearing impaired and aphasic patients to use assistive/communication devices  Outcome: Progressing     Problem: MOBILITY - ADULT  Goal: Maintain or return to baseline ADL function  Description: INTERVENTIONS:  -  Assess patient's ability to carry out ADLs; assess patient's baseline for ADL function and identify physical deficits which impact ability to perform ADLs (bathing, care of mouth/teeth, toileting, grooming, dressing, etc )  - Assess/evaluate cause of self-care deficits   - Assess range of motion  - Assess patient's mobility; develop plan if impaired  - Assess patient's need for assistive devices and provide as appropriate  - Encourage maximum independence but intervene and supervise when necessary  - Involve family in performance of ADLs  - Assess for home care needs following discharge   - Consider OT consult to assist with ADL evaluation and planning for discharge  - Provide patient education as appropriate  Outcome: Progressing  Goal: Maintains/Returns to pre admission functional level  Description: INTERVENTIONS:  - Perform BMAT or MOVE assessment daily    - Set and communicate daily mobility goal to care team and patient/family/caregiver  - Collaborate with rehabilitation services on mobility goals if consulted  - Perform Range of Motion 3 times a day  - Reposition patient every 2 hours    - Dangle patient 3 times a day  - Stand patient 3 times a day  - Ambulate patient 3 times a day  - Out of bed to chair 3 times a day   - Out of bed for meals 3 times a day  - Out of bed for toileting  - Record patient progress and toleration of activity level   Outcome: Progressing

## 2023-01-25 NOTE — ANESTHESIA PREPROCEDURE EVALUATION
Procedure:  CECILIO OR/GI ([place] ONLY)    Relevant Problems   CARDIO   (+) Chronic midline thoracic back pain      GI/HEPATIC   (+) Hepatitis C virus      MUSCULOSKELETAL   (+) Acute right-sided low back pain with right-sided sciatica   (+) Chronic midline thoracic back pain   (+) Degeneration of intervertebral disc of thoracic region   (+) Degeneration of thoracic intervertebral disc      NEURO/PSYCH   (+) Anxiety   (+) Chronic midline thoracic back pain   (+) Moderate episode of recurrent major depressive disorder (HCC)      Other   (+) Drug dependence (HCC)   (+) Intravenous drug abuse, continuous ([facility] )     Addiction to drug [facility])      • Anxiety     • Arthritis     • Chronic pain     • Hepatitis     • Hepatitis C virus     • Heroin abuse ([facility] ) 5/2/2018   • HIV (human immunodeficiency virus infection) ([place] [address] )     • Moderate episode of recurrent major depressive disorder ([facility] ) 2/5/2020   • Tobacco use disorder       last assessed 11/2/15         Left Ventricle: Left ventricular cavity size is normal  Wall thickness    is increased  There is mild concentric hypertrophy  The left ventricular    ejection fraction is 65%  Systolic function is normal  Wall motion is    normal  Diastolic function is normal    •  Left Atrium: The atrium is mildly dilated  •  Mitral Valve: Cannot exclude a small vegetation of the tip and the    anterior leaflet of the MV   No MR is seen  The mitral valve has normal    structure and normal function  •  Tricuspid Valve: The estimated right ventricular systolic pressure is   48 24 mmHg  Physical Exam    Airway    Mallampati score: II  TM Distance: >3 FB  Neck ROM: full     Dental       Cardiovascular  Cardiovascular exam normal    Pulmonary  Pulmonary exam normal     Other Findings        Anesthesia Plan  ASA Score- 3     Anesthesia Type- IV sedation with anesthesia with ASA Monitors           Additional Monitors:   Airway Plan:           Plan Factors-Exercise tolerance (METS): >4 METS     Chart reviewed  EKG reviewed  Imaging results reviewed  Existing labs reviewed  Patient summary reviewed  Induction- intravenous  Postoperative Plan-     Informed Consent- Anesthetic plan and risks discussed with patient  I personally reviewed this patient with the CRNA  Discussed and agreed on the Anesthesia Plan with the GONZALEZ Herbert

## 2023-01-25 NOTE — PLAN OF CARE
Problem: PAIN - ADULT  Goal: Verbalizes/displays adequate comfort level or baseline comfort level  Description: Interventions:  - Encourage patient to monitor pain and request assistance  - Assess pain using appropriate pain scale  - Administer analgesics based on type and severity of pain and evaluate response  - Implement non-pharmacological measures as appropriate and evaluate response  - Consider cultural and social influences on pain and pain management  - Notify physician/advanced practitioner if interventions unsuccessful or patient reports new pain  Outcome: Progressing     Problem: INFECTION - ADULT  Goal: Absence or prevention of progression during hospitalization  Description: INTERVENTIONS:  - Assess and monitor for signs and symptoms of infection  - Monitor lab/diagnostic results  - Monitor all insertion sites, i e  indwelling lines, tubes, and drains  - Monitor endotracheal if appropriate and nasal secretions for changes in amount and color  - [place] appropriate cooling/warming therapies per order  - Administer medications as ordered  - Instruct and encourage patient and family to use good hand hygiene technique  - Identify and instruct in appropriate isolation precautions for identified infection/condition  Outcome: Progressing  Goal: Absence of fever/infection during neutropenic period  Description: INTERVENTIONS:  - Monitor WBC    Outcome: Progressing     Problem: MUSCULOSKELETAL - ADULT  Goal: Maintain or return mobility to safest level of function  Description: INTERVENTIONS:  - Assess patient's ability to carry out ADLs; assess patient's baseline for ADL function and identify physical deficits which impact ability to perform ADLs (bathing, care of mouth/teeth, toileting, grooming, dressing, etc )  - Assess/evaluate cause of self-care deficits   - Assess range of motion  - Assess patient's mobility  - Assess patient's need for assistive devices and provide as appropriate  - Encourage maximum independence but intervene and supervise when necessary  - Involve family in performance of ADLs  - Assess for home care needs following discharge   - Consider OT consult to assist with ADL evaluation and planning for discharge  - Provide patient education as appropriate  Outcome: Progressing  Goal: Maintain proper alignment of affected body part  Description: INTERVENTIONS:  - Support, maintain and protect limb and body alignment  - Provide patient/ family with appropriate education  Outcome: Progressing     Problem: NEUROSENSORY - ADULT  Goal: Achieves stable or improved neurological status  Description: INTERVENTIONS  - Monitor and report changes in neurological status  - Monitor vital signs such as temperature, blood pressure, glucose, and any other labs ordered   - Initiate measures to prevent increased intracranial pressure  - Monitor for seizure activity and implement precautions if appropriate      Outcome: Progressing  Goal: Achieves maximal functionality and self care  Description: INTERVENTIONS  - Monitor swallowing and airway patency with patient fatigue and changes in neurological status  - Encourage and assist patient to increase activity and self care     - Encourage visually impaired, hearing impaired and aphasic patients to use assistive/communication devices  Outcome: Progressing     Problem: MOBILITY - ADULT  Goal: Maintain or return to baseline ADL function  Description: INTERVENTIONS:  -  Assess patient's ability to carry out ADLs; assess patient's baseline for ADL function and identify physical deficits which impact ability to perform ADLs (bathing, care of mouth/teeth, toileting, grooming, dressing, etc )  - Assess/evaluate cause of self-care deficits   - Assess range of motion  - Assess patient's mobility; develop plan if impaired  - Assess patient's need for assistive devices and provide as appropriate  - Encourage maximum independence but intervene and supervise when necessary  - Involve family in performance of ADLs  - Assess for home care needs following discharge   - Consider OT consult to assist with ADL evaluation and planning for discharge  - Provide patient education as appropriate  Outcome: Progressing  Goal: Maintains/Returns to pre admission functional level  Description: INTERVENTIONS:  - Perform BMAT or MOVE assessment daily    - Set and communicate daily mobility goal to care team and patient/family/caregiver  - Collaborate with rehabilitation services on mobility goals if consulted  - Perform Range of Motion 3 times a day  - Reposition patient every 2 hours    - Dangle patient 3 times a day  - Stand patient 3 times a day  - Ambulate patient 3 times a day  - Out of bed to chair 3 times a day   - Out of bed for meals 3 times a day  - Out of bed for toileting  - Record patient progress and toleration of activity level   Outcome: Progressing

## 2023-01-25 NOTE — ASSESSMENT & PLAN NOTE
Ambulatory dysfunction  Patient ambulatimg with a cane   Seen by OT: standby assist  OT Discharge Recommendation: Home with outpatient rehabilitation  Plan:  Continue PT/OT while admitted

## 2023-01-25 NOTE — QUICK NOTE
Patient continues to complain of 10/10 pain despite negative imaging and appearing quite comfortable on exam    I discussed his pain control regimen, and specifically suboxone with toxicology,   The plan will be to suboxone 4mg Qid   Add morphine 30mg PO q6 PRN along with IV 8mg IV morphine for breakthrough pain

## 2023-01-25 NOTE — CONSULTS
INTERPROFESSIONAL (PHONE) [facility] Toxicology  Jade Tse 41 y o  male MRN: [id number]  Unit/Bed#: 959-74 Encounter: [id number]      Reason for Consult / Principal Problem: opioid dependence   Inpatient consult to Toxicology  Consult performed by: Jose Shepherd DO  Consult ordered by: Magdi Mcdaniel MD        01/25/23      ASSESSMENT:  41year-old male with opioid use disorder on maintenance therapy with acute pain    RECOMMENDATIONS:  Please continue supportive care and further evaluation and treatment of bacteremia and hip pain, while managing pain  Please continue his suboxone  To assist in pain management, Subxone 8mg bid can be switched to 4mg bid as there is short lived analgesia  However, additional opioid medications will need to be increased in dose as the buprenorphine partial agonist properties will block opioid effect  Would utilized at least morphine 8mg Q4-6 hours and increase from there per response  Please maintain continuous pulse oximetry and PRN naloxone  May consider further discussion with pain management  For further questions, please contact the medical  on call via [employer] or throughl the [employer]  Service or Patient ULTRA Testing  Please see additional teaching note below:    Hx and PE limited by the dynamics of a phone consultation  I have not personally interviewed or evaluated the patient, but only advised based on the information provided to me  Primary provider is responsible for all clinical decisions  Pertinent history, physical exam and clinical findings and course discussed: Jade Tse is a 41y o  year old male who presents with acute hip pain and bacteremia, on maintenance suboxone  Review of systems and physical exam not performed by me      Historical Information   Past Medical History:   Diagnosis Date   • Addiction to drug [facility])    • Anxiety    • Arthritis    • Chronic pain    • Hepatitis    • Hepatitis C virus • Heroin abuse ([facility] ) 05/02/2018   • Moderate episode of recurrent major depressive disorder ([facility] ) 02/05/2020   • Tobacco use disorder     last assessed 11/2/15      Past Surgical History:   Procedure Laterality Date   • EPIDURAL BLOCK INJECTION Bilateral 4/19/2018    Procedure: T9-10 INTERLAMINAR EPIDURAL STEROID INJECTION;  Surgeon: Gage Cabrera MD;  Location: [facility] MAIN OR;  Service: Pain Management      Social History   Social History     Substance and Sexual Activity   Alcohol Use Yes    Comment: occasional / social per allscript      Social History     Substance and Sexual Activity   Drug Use Not Currently   • Types: Heroin, Prescription, Marijuana    Comment: past opiate abuser / intravenous drug use per allscript                    Social History     Tobacco Use   Smoking Status Every Day   • Packs/day: 1 00   • Types: Cigarettes   Smokeless Tobacco Former   Tobacco Comments    current every day smoker per allscript      Family History   Problem Relation Age of Onset   • No Known Problems Father    • Hepatitis Family         Prior to Admission medications    Medication Sig Start Date End Date Taking?  Authorizing Provider   acetaminophen (TYLENOL) 500 mg tablet Take 500 mg by mouth every 6 (six) hours as needed 1/16/23 1/26/23 Yes Historical Provider, MD   celecoxib (CeleBREX) 100 mg capsule Take 1 capsule (100 mg total) by mouth 2 (two) times a day 12/5/22  Yes Amisha Neff DO   gabapentin (NEURONTIN) 300 mg capsule Take 300 mg by mouth 2 (two) times a day 1/16/23 1/16/24 Yes Historical Provider, MD   ibuprofen (MOTRIN) 600 mg tablet take 1 tablet by mouth every 6 hours if needed for MILD PAIN ( PAIN SCALE 1-3 ) 1/16/23  Yes Historical Provider, MD   methocarbamol (Robaxin-750) 750 mg tablet Take 1 tablet (750 mg total) by mouth every 6 (six) hours as needed for muscle spasms for up to 20 doses 6/3/22  Yes Adonis Caceres MD   predniSONE 10 mg tablet 4 tablets daily for 3 days, then 3 tablets daily for 3 days, then 2 tablets daily for 3 days, then 1 tablet daily for 3 days 6/7/22  Yes Shantel Leos DO   pregabalin (LYRICA) 300 MG capsule Take 1 capsule (300 mg total) by mouth 2 (two) times a day 12/26/22  Yes Shantel Leos DO   QUEtiapine (SEROquel) 100 mg tablet Take 1 tablet (100 mg total) by mouth daily at bedtime 12/29/22  Yes Shantel Leos DO   celecoxib (CeleBREX) 200 mg capsule Take 1 capsule (200 mg total) by mouth 2 (two) times a day for 5 days 6/3/22 6/8/22  Nic Wellington MD   Diclofenac Sodium (VOLTAREN) 1 % Apply 2 g topically 4 (four) times a day for 5 days  Patient not taking: Reported on 6/7/2022 6/5/22 6/10/22  Chema Guevara MD   EPINEPHrine (EPIPEN) 0 3 mg/0 3 mL SOAJ Inject 0 3 mL (0 3 mg total) into a muscle once for 1 dose 9/17/20 9/17/20  Shantel Leos DO   famotidine (PEPCID) 40 MG tablet Take 1 tablet (40 mg total) by mouth in the morning for 10 doses  Patient not taking: Reported on 6/7/2022 6/3/22 6/13/22  Nic Wellington MD       Current Facility-Administered Medications   Medication Dose Route Frequency   • acetaminophen (TYLENOL) tablet 650 mg  650 mg Oral Q6H [address]   • buprenorphine-naloxone (Suboxone) film 4 mg  4 mg Sublingual 4x Daily   • ceFAZolin (ANCEF) IVPB (premix in dextrose) 2,000 mg 50 mL  2,000 mg Intravenous Q8H   • cyclobenzaprine (FLEXERIL) tablet 10 mg  10 mg Oral TID   • Diclofenac Sodium (VOLTAREN) 1 % topical gel 2 g  2 g Topical 4x Daily   • enoxaparin (LOVENOX) subcutaneous injection 40 mg  40 mg Subcutaneous Daily   • famotidine (PEPCID) tablet 20 mg  20 mg Oral BID   • gabapentin (NEURONTIN) capsule 300 mg  300 mg Oral TID   • ketorolac (TORADOL) tablet 10 mg  10 mg Oral Q6H PRN   • lidocaine (LIDODERM) 5 % patch 1 patch  1 patch Topical Daily   • morphine (MSIR) IR tablet 30 mg  30 mg Oral Q6H PRN   • morphine injection 2 mg  2 mg Intravenous Once   • morphine injection 8 mg  8 mg Intravenous Q6H PRN   • naloxone (NARCAN) injection 0 1 mg  0 1 mg Intravenous PRN   • nicotine (NICODERM CQ) 14 mg/24hr TD 24 hr patch 14 mg  14 mg Transdermal Daily   • ondansetron (ZOFRAN-ODT) dispersible tablet 4 mg  4 mg Oral Q6H PRN   • QUEtiapine (SEROquel) tablet 50 mg  50 mg Oral HS       Allergies   Allergen Reactions   • Clindamycin/Lincomycin Hives       Objective       Intake/Output Summary (Last 24 hours) at 1/25/2023 1731  Last data filed at 1/25/2023 0816  Gross per 24 hour   Intake 400 ml   Output 600 ml   Net -200 ml       Invasive Devices:   Peripheral IV 01/21/23 Left;Ventral (anterior) Forearm (Active)   Site Assessment [facility] 01/25/23 0855   Dressing Type Transparent 01/25/23 0855   Line Status Saline locked 01/25/23 0855   Dressing Status Clean;Dry; Intact 01/25/23 0855       Peripheral IV 01/25/23 Right Hand (Active)   Site Assessment [facility] 01/25/23 0855   Dressing Type Transparent 01/25/23 0855   Line Status Infusing 01/25/23 0855   Dressing Status Clean;Dry; Intact 01/25/23 0855       Vitals   Vitals:    01/25/23 0840 01/25/23 0845 01/25/23 0855 01/25/23 1519   BP: 97/53 99/56 102/59 128/77   TempSrc:       Pulse:  60 59    Resp: 18 13 15 16   Patient Position - Orthostatic VS:       Temp:               Lab Results: I have personally reviewed pertinent reports        Labs:    Results from last 7 days   Lab Units 01/24/23  0642   WBC Thousand/uL 7 57   HEMOGLOBIN g/dL 11 3*   HEMATOCRIT % 34 4*   PLATELETS Thousands/uL 424*   NEUTROS PCT % 65   LYMPHS PCT % 19   MONOS PCT % 10      Results from last 7 days   Lab Units 01/24/23  0642 01/23/23  0437 01/20/23  0654 01/19/23  0445   SODIUM mmol/L 138 138 136 137   POTASSIUM mmol/L 4 2 4 0 4 0 4 0   CHLORIDE mmol/L 101 103 105 102   CO2 mmol/L 28 27 22 26   BUN mg/dL 16 12 6 12   CREATININE mg/dL 0 67 0 60 0 71 0 81   CALCIUM mg/dL 8 9 8 4 8 3* 8 5   ALK PHOS U/L 59 54 69 65   ALT U/L 9 11 28 38   AST U/L 13 12* 16 33   MAGNESIUM mg/dL 2 1 1 9 1 9 1 8*   PHOSPHORUS mg/dL  -- 4 5 2 9 3 0      Results from last 7 days   Lab Units 01/23/23  0437 01/19/23  0445 01/18/23  1926   INR  0 92 1 06 1 01   PTT seconds  --   --  30     Results from last 7 days   Lab Units 01/18/23 1926   LACTIC ACID mmol/L 1 8     No results found for: TROPONINI          Invalid input(s): EXTPREGUR      Imaging Studies: I have personally reviewed pertinent reports  Counseling / Coordination of Care  Total time spent today 35 minutes  This was a phone consultation

## 2023-01-25 NOTE — PROGRESS NOTES
[address]  Progress Note - Nova Mack 1985, 41 y o  male MRN: [id number]  Unit/Bed#: 346-14 Encounter: [id number]  Primary Care Provider: Aleksandra Oden DO   Date and time admitted to hospital: 1/18/2023  5:14 PM    Acute right-sided low back pain with right-sided sciatica  Assessment & Plan  H/o right sided back pain with sciatica  Intermittent severe right hip pain, radiates into right lateral hip & buttocks  Pain rated 9/10  Patient unable to ambulate without cane  1/16/23 CT shows:  1  Mild multilevel disc degeneration  2  Grade 1 retrolisthesis of L5 on S1    3  Disc bulge with small herniation at L5-S1  Disc bulging from L2-3 through L4-5    4  No evidence of periostitis along the endplates to suggest advanced osteomyelitis on CT imaging  If there is clinical concern for underlying infection, MRI would be a more sensitive modality to assess for early changes of discitis Osteomyelitis  1/23/23 MRI w/o contrast shows -  No definite MR evidence for discitis osteoma myelitis as clinically questioned  Degenerative changes at L5-S1     1/24/23 ID recommending further imaging CT with contrast of abdomen, pelvis, right hip, reviewed imaging, no evidence of osteomyelitis    Plan:  Oral pain med regimen:  Scheduled Tylenol 650 mg Q6   PRN Torodol 10mg Q6   PRN Oxy 5 Q6   Gabapentin 300mg TID  Flexeril 10mg TID  PRN Lidocaine patch, voltaren gel        * Bacteremia due to Staphylococcus aureus  Assessment & Plan  Blood cultures positive for staph aureus, repeat blood cultures negative x 72 hours   History of IV drug abuse  Plan:  Continue IV Ancef Day 5 (total days of IV antibiotics 7)  PICC placement 1/246  CECILIO negative for vegetation  ID input appreciated: Continue IV Ancef, place PICC line for anticipated 6 weeks antibiotics         Ambulatory dysfunction  Assessment & Plan  Ambulatory dysfunction  Patient ambulatimg with a cane   Seen by OT: standby assist  OT Discharge Recommendation: Home with outpatient rehabilitation  Plan:  Continue PT/OT while admitted        Progress Note - Derrek Kerns 41 y o  male MRN: [id number]    Unit/Bed#: 535-65 Encounter: [id number]        Subjective:   Patient seen and examined, s/p CECILIO  Offers complaints of Right hip pain worst than usual     Objective:     Vitals:   Vitals:    01/25/23 0855   BP: 102/59   Pulse: 59   Resp: 15   Temp:    SpO2: 99%     Body mass index is 27 67 kg/m²      Intake/Output Summary (Last 24 hours) at 1/25/2023 1109  Last data filed at 1/25/2023 0816  Gross per 24 hour   Intake 940 ml   Output 600 ml   Net 340 ml       Physical Exam:   /59   Pulse 59   Temp (!) 97 3 °F (36 3 °C)   Resp 15   Ht 6' (1 829 m)   Wt 92 5 kg (204 lb)   SpO2 99%   BMI 27 67 kg/m²   General appearance: alert and oriented, in no acute distress  Head: Normocephalic, without obvious abnormality, atraumatic  Lungs: clear to auscultation bilaterally  Heart: regular rate and rhythm, S1, S2 normal, no murmur, click, rub or gallop  Abdomen: soft, non-tender; bowel sounds normal; no masses,  no organomegaly  Extremities: moderate point tenderness overlying R hip,   Pulses: 2+ and symmetric  Neurologic: Grossly normal     Invasive Devices     Peripheral Intravenous Line  Duration           Peripheral IV 01/21/23 Left;Ventral (anterior) Forearm 3 days    Peripheral IV 01/25/23 Right Hand <1 day                Results from last 7 days   Lab Units 01/24/23  0642 01/23/23  0437 01/20/23  0654   WBC Thousand/uL 7 57 6 95 11 30*   HEMOGLOBIN g/dL 11 3* 10 5* 10 4*   HEMATOCRIT % 34 4* 32 0* 32 5*   PLATELETS Thousands/uL 424* 390 294       Results from last 7 days   Lab Units 01/24/23  0642 01/23/23  0437 01/20/23  0654   POTASSIUM mmol/L 4 2 4 0 4 0   CHLORIDE mmol/L 101 103 105   CO2 mmol/L 28 27 22   BUN mg/dL 16 12 6   CREATININE mg/dL 0 67 0 60 0 71   CALCIUM mg/dL 8 9 8 4 8 3*   ALK PHOS U/L 59 54 69   ALT U/L 9 11 28   AST U/L 13 12* 16 Medication Administration - last 24 hours from 01/24/2023 1109 to 01/25/2023 1109       Date/Time Order Dose Route Action Action by     01/25/2023 0913 EST nicotine (NICODERM CQ) 14 mg/24hr TD 24 hr patch 14 mg 14 mg Transdermal Medication Applied Kayleen Brady, KIRAN     01/25/2023 0913 EST enoxaparin (LOVENOX) subcutaneous injection 40 mg 40 mg Subcutaneous Not Given Kayleen Brady, KIRAN     01/25/2023 0515 EST acetaminophen (TYLENOL) tablet 650 mg 650 mg Oral Given [facility] [place] [place]     01/25/2023 0026 EST acetaminophen (TYLENOL) tablet 650 mg 650 mg Oral Given [facility] [place]     01/24/2023 1710 EST acetaminophen (TYLENOL) tablet 650 mg 650 mg Oral Given Iavn Francois LPN     57/10/8539 9084 EST acetaminophen (TYLENOL) tablet 650 mg 650 mg Oral Given [facility], JULIANA     41/33/0832 8547 EST ceFAZolin (ANCEF) IVPB (premix in dextrose) 2,000 mg 50 mL 2,000 mg Intravenous New Bag [facility] [place] [place]     01/24/2023 2132 EST ceFAZolin (ANCEF) IVPB (premix in dextrose) 2,000 mg 50 mL 2,000 mg Intravenous New Bag Ross Pascual     01/24/2023 1440 EST ceFAZolin (ANCEF) IVPB (premix in dextrose) 2,000 mg 50 mL 2,000 mg Intravenous [facility] [place], [place]     06/56/1018 6927 EST buprenorphine-naloxone (Suboxone) film 8 mg 8 mg Sublingual Given Kayleen Brady RN     01/24/2023 2132 EST buprenorphine-naloxone (Suboxone) film 8 mg 8 mg Sublingual Given [facility] [place]     01/25/2023 0515 EST ketorolac (TORADOL) tablet 10 mg 10 mg Oral Given [facility] [place]     01/24/2023 1932 EST ketorolac (TORADOL) tablet 10 mg 10 mg Oral Given [facility] [place]     01/24/2023 1306 EST ketorolac (TORADOL) tablet 10 mg 10 mg Oral Given [facility] JULIANA     41/46/9249 0282 EST oxyCODONE (ROXICODONE) IR tablet 5 mg 5 mg Oral Given Kayleen Brady RN     01/24/2023 2228 EST oxyCODONE (ROXICODONE) IR tablet 5 mg 5 mg Oral Given Lindsay Hwang     01/24/2023 1609 EST oxyCODONE (ROXICODONE) IR tablet 5 mg 5 mg Oral Given Alejandra Brush LPN     31/01/9018 0914 EST famotidine (PEPCID) tablet 20 mg 20 mg Oral Given Steve Smithquinten, RN     01/24/2023 1710 EST famotidine (PEPCID) tablet 20 mg 20 mg Oral Given Charlee Schrader, LPN     35/22/5367 9676 EST QUEtiapine (SEROquel) tablet 50 mg 50 mg Oral Given Froylan Talmoon     01/25/2023 0914 EST gabapentin (NEURONTIN) capsule 300 mg 300 mg Oral Given Steve Smithquinten, RN     01/24/2023 2132 EST gabapentin (NEURONTIN) capsule 300 mg 300 mg Oral Given Froylan Talmoon     01/24/2023 1608 EST gabapentin (NEURONTIN) capsule 300 mg 300 mg Oral Given Charlee Schrader, LPN     08/39/9917 9339 EST cyclobenzaprine (FLEXERIL) tablet 10 mg 10 mg Oral Given Steve Smithquinten, RN     01/24/2023 2132 EST cyclobenzaprine (FLEXERIL) tablet 10 mg 10 mg Oral Given Froylan Talmoon     01/24/2023 1608 EST cyclobenzaprine (FLEXERIL) tablet 10 mg 10 mg Oral Given Ivan Cowandarrion, LPN     37/64/0912 6764 EST cyclobenzaprine (FLEXERIL) tablet 10 mg 10 mg Oral Given Yaquelin Hilton, LPN     86/81/1622 9737 EST lidocaine (LIDODERM) 5 % patch 1 patch 1 patch Topical Not Given Kennewick Paget, RN     01/24/2023 1158 EST lidocaine (LIDODERM) 5 % patch 1 patch 1 patch Topical Not Given Yaquelin Hilton, LPN     34/70/9446 3983 EST Diclofenac Sodium (VOLTAREN) 1 % topical gel 2 g 2 g Topical Not Given Kennewick Paget, RN     01/25/2023 0920 EST Diclofenac Sodium (VOLTAREN) 1 % topical gel 2 g 2 g Topical Not Given Steve Paget, RN     01/24/2023 2138 EST Diclofenac Sodium (VOLTAREN) 1 % topical gel 2 g 2 g Topical Not Given Froylan Talmoon     01/24/2023 1711 EST Diclofenac Sodium (VOLTAREN) 1 % topical gel 2 g 2 g Topical Refused Charlee Schrader, LPN     89/23/3349 8400 EST Diclofenac Sodium (VOLTAREN) 1 % topical gel 2 g 2 g Topical Refused [facility]ld[facility] Lida, LYNDSAY     57/99/3662 0345 EST iohexol (OMNIPAQUE) 350 MG/ML injection (SINGLE-DOSE) 50 mL 50 mL Intravenous Given Paco Patterson     01/24/2023 1116 EST iohexol (OMNIPAQUE) 350 MG/ML injection (SINGLE-DOSE) 50 mL 50 mL Intravenous Given Taffy Yachats     01/24/2023 1829 EST morphine injection 2 mg 2 mg Intravenous Not Given Ren Flower LPN     21/74/0051 7456 EST ketorolac (TORADOL) injection 15 mg 15 mg Intravenous Given Shannan Gill RN            Lab, Imaging and other studies: I have personally reviewed pertinent reports      VTE Pharmacologic Prophylaxis: Enoxaparin (Lovenox)  VTE Mechanical Prophylaxis: sequential compression device     Rachel Arias MD  1/25/2023,11:09 AM

## 2023-01-25 NOTE — CASE MANAGEMENT
Case Management Assessment    Patient name Sara Bonds  Location [id number] MRN [id number]  : 1985 Date 2023       Current Admission Date: 2023  Current Admission Diagnosis:Bacteremia due to Staphylococcus aureus   Patient Active Problem List    Diagnosis Date Noted   • Bacteremia due to Staphylococcus aureus 2023   • Intravenous drug abuse, continuous ([facility] [address] ) 2023   • Ambulatory dysfunction 2023   • Acute right-sided low back pain with right-sided sciatica 2023   • Tobacco abuse 2023   • Acute pain of right shoulder 2022   • Bilateral groin pain 2022   • Opiate overdose ([facility] [address] ) 2021   • Moderate episode of recurrent major depressive disorder ([facility] [address] ) 2020   • Drug dependence ([facility] [address] ) 2018   • Heroin abuse ([facility] [address] ) 2018   • Encounter for monitoring Suboxone maintenance therapy 2018   • Drug therapy continued 2018   • Degeneration of thoracic intervertebral disc 2018   • Degeneration of intervertebral disc of thoracic region 2017   • Myofascial pain 2017   • Chronic midline thoracic back pain 2016   • Anxiety 2015   • Hepatitis C virus 2015      LOS (days): 7  Geometric Mean LOS (GMLOS) (days): 2 80  Days to GMLOS:-3 9     OBJECTIVE:    Risk of Unplanned Readmission Score: 13 38         Current admission status: Inpatient       Preferred Pharmacy:   [address] [place] [id number]  Phone: [phone] Fax: [phone]    Primary Care Provider: Manuela Weiner DO    Primary Insurance: Ravi Tolentino  Secondary Insurance:     ASSESSMENT:  Jennifer 26 Proxies    There are no active Health Care Proxies on file  No Skilled nursing facility is accepting the patient    I extended the bed search to 30 miles

## 2023-01-25 NOTE — PROGRESS NOTES
REQUIRED DOCUMENTATION:      1  This service was provided via Telemedicine  2  Provider located at [facility]  3  TeleMed provider: Chad Orellana MD  4  Identify all parties in room with patient during tele consult:  Patient only  5  After connecting through [employer], patient was identified by name and date of birth and assistant checked wristband  Patient was then informed that this was a Telemedicine visit and that the exam was being conducted confidentially over secure lines  My office door was closed  No one else was in the room  Patient acknowledged consent and understanding of privacy and security of the Telemedicine visit, and gave us permission to have the assistant stay in the room in order to assist with the history and to conduct the exam   I informed the patient that I have reviewed their record in Epic and presented the opportunity for them to ask any questions regarding the visit today  The patient agreed to participate  Progress Note - Infectious Disease   Donna Le 41 y o  male MRN: [id number]  Unit/Bed#: 001-75 Encounter: [id number]      Impression/Plan:    1  MSSA bacteremia  1/16, 1/18 blood cultures positive from [facility] growing MSSA  Admission blood cultures here 1/18 also growing MSSA  Likely due to IVDU  Patient also has right sacral/hip pain which he states is due to a fall; in setting of staph aureus bacteremia need to rule out metastatic foci of infection as a cause of his pain  CT lumbar spine at [facility] showed DJD but no evidence of OM  MRI lumbar spine here does not show any signs of discitis/osteomyelitis, although he could only tolerate noncontrast study due to pain  TTE cannot rule out small vegetation of the tip of the anterior leaflet of the MV but ECCILIO today showed no valvular vegetation  CT right lower extremity and CT A/P with contrast did not show any joint or osseous abnormalities, or other evidence of infection   Patient is afebrile, leukocytosis resolved  He is hemodynamically stable  Repeat blood cultures here 1/20 no growth after he was started on appropriate dosing of antibiotics   -Continue IV Cefazolin 2g q8hr              -Okay for PICC placement, will be performed Friday   -recommend a 4 week total course of IV antibiotics, through 2/16/23   -if patient is not willing to stay in hospital and adhere to standard of care IV antibiotic therapy, there is data for switch to PO antibiotics in PW[facility] after at least 10-14 days IV therapy (Outcomes of Partial Oral Antibiotic Treatment for Complicated Staphylococcus Aureus Bacteremia in [facility] ([facility] com)  However, recommendation is full 4 weeks IV therapy at this time   -patient is not candidate for home antibiotics due to IVDU              -monitor WBC count, fever curve     2  Lower back/right hip pain  Occurred after a fall per the patient  CT lumbar spine shows DJD, no obvious osseous destruction  CRP/ESR elevated but he also has a bloodstream infection  MRI L spine non con does not show any evidence of OM/discitis  CT A/P and right leg with contrast does not show any infectious abnormalities  Patient continues to have right hip pain but sounds more like sciatica today              -imaging as above              -pain management per primary     3  Opiate use disorder with IVDU  Patient initiated on Suboxone  Recently used fentanyl prior to admission  He has a history of hepatitis C status post treatment  There is documentation of a history of HIV, but he has several prior negative tests in the chart, last in March 2022  HIV negative 1/20/23              -continue suboxone, management of withdrawal per primary     4  History of Hepatitis C  Reports treatment with Lilyan Jose L in the summer 2022 but no documentation of SVR   HCV PCR negative here confirming 12 week SVR              -outpatient follow up     Discussed the above management plan in detail with the primary service and patient  ID will follow  I spent 35 minutes in evaluation of the patient of which 20 minutes was in counseling/coordination of care    Antibiotics:  Cefazolin day 6  Day 6 from blood culture clearance    Subjective: The patient states he continues to have right buttock and hip pain, with pain radiating down his leg  Patient is having difficulty ambulating and feels that his pain regimen is not adequate  He denies fever, chills, chest pain, shortness of breath  Objective:  Vitals:  Temp:  [97 3 °F (36 3 °C)] 97 3 °F (36 3 °C)  HR:  [59-73] 59  Resp:  [13-18] 15  BP: ()/(46-71) 102/59  SpO2:  [95 %-100 %] 99 %  Temp (24hrs), Av 3 °F (36 3 °C), Min:97 3 °F (36 3 °C), Max:97 3 °F (36 3 °C)  Current: Temperature: (!) 97 3 °F (36 3 °C)    Physical Exam:     Documented physical exam has been primarily done by the patient's nurse and/or the primary service due to limited examination abilities on telemedicine    General Appearance:  Alert, interactive, nontoxic, no acute distress  Throat: Oropharynx moist without lesions  Lungs:   Clear to auscultation bilaterally; no wheezes, rhonchi or rales; respirations unlabored   Heart:  RRR; no murmur, rub or gallop   Abdomen:   Soft, non-tender, non-distended, positive bowel sounds  Extremities: No clubbing, cyanosis or edema   Skin: No new rashes or lesions  Tenderness over right lower back and sacral area       Labs:    All pertinent labs and imaging studies were personally reviewed  Results from last 7 days   Lab Units 23  0642 23  0654   WBC Thousand/uL 7 57 6 95 11 30*   HEMOGLOBIN g/dL 11 3* 10 5* 10 4*   PLATELETS Thousands/uL 424* 390 294     Results from last 7 days   Lab Units 23  0642 23  0437 23  0654   SODIUM mmol/L 138 138 136   POTASSIUM mmol/L 4 2 4 0 4 0   CHLORIDE mmol/L 101 103 105   CO2 mmol/L 28 27 22   BUN mg/dL 16 12 6   CREATININE mg/dL 0 67 0 60 0 71 EGFR ml/min/1 73sq m 122 128 119   CALCIUM mg/dL 8 9 8 4 8 3*   AST U/L 13 12* 16   ALT U/L 9 11 28   ALK PHOS U/L 59 54 69     Results from last 7 days   Lab Units 01/24/23  0642 01/23/23  0437 01/20/23  0908 01/18/23  1926   PROCALCITONIN ng/ml 0 17 0 33* 2 29* 5 24*                   Micro:  Results from last 7 days   Lab Units 01/20/23  1306 01/18/23  1926   BLOOD CULTURE  No Growth After 4 Days  No Growth After 4 Days   Staphylococcus aureus*  Staphylococcus aureus*   GRAM STAIN RESULT   --  Gram positive cocci in clusters*  Gram positive cocci in clusters*       Imaging:  Pertinent imaging in PACS personally reviewed

## 2023-01-25 NOTE — ASSESSMENT & PLAN NOTE
Blood cultures positive for staph aureus  History of IV drug abuse  Plan:  Continue IV Ancef Day 5 (total days of IV antibiotics 7)  PICC placement 1/246  CECILIO 1/24  ID input appreciated: Continue IV Ancef, place PICC line for anticipated 6 weeks antibiotics

## 2023-01-25 NOTE — PERIOPERATIVE NURSING NOTE
Patient received to OR  Questions answered consents completed  Patient arrived with necklace on  Unable to remove states "I dont want it cut" risks explained to patient  Waiver for refusal of removal of jewelry signed by patient

## 2023-01-25 NOTE — PHYSICAL THERAPY NOTE
PHYSICAL THERAPY NOTE          Patient Name: Eulalio Yarbrough  [id number]'V Date: 1/25/2023 01/25/23 1343   PT Last Visit   PT Visit Date 01/25/23   Note Type   Note Type Treatment   Restrictions/Precautions   Weight Bearing Precautions Per Order No   Other Precautions   (Fall Risk; Pain)   General   Chart Reviewed Yes   Response to Previous Treatment Patient with no complaints from previous session  Family/Caregiver Present No   Cognition   Overall Cognitive Status WFL   Arousal/Participation Alert   Following Commands Follows all commands and directions without difficulty   Subjective   Subjective Requesting crutches for use in room  c/o RLRE pain   Bed Mobility   Supine to Sit 6  Modified independent   Additional items HOB elevated; Bedrails; Increased time required   Additional Comments Increased time to transition to EOB   Transfers   Sit to Stand 6  Modified independent   Additional items Bedrails   Stand to Sit 5  Supervision   Stand pivot 6  Modified independent   Ambulation/Elevation   Gait pattern R Foot drag;Decreased foot clearance;Decreased R stance; Antalgic; Excessively slow   Gait Assistance 5  Supervision   Assistive Device Crutches  (trialed crutches and RW)   Distance 25' x 2   Balance   Static Sitting Good   Dynamic Sitting Good   Static Standing Fair   Dynamic Standing Fair   Ambulatory Fair   Endurance Deficit   Endurance Deficit Yes   Activity Tolerance   Activity Tolerance Patient limited by pain   Assessment   Prognosis Good   Problem List   (Decreased strength; Decreased endurance; Impaired balance; Decreased mobility; Pain)   Assessment Pt  seen for PT treatment session this date with interventions consisting of  bed mobility, transfers and  gait training w/ emphasis on improving pt's ability to ambulate  Pt  Requiring initial cues for sequence and safety  In comparison to previous session, Pt   With improvements in activity tolerance  Trailed RW and  Crutches with no LOB  Left crutches and RW for pt to utilize in room  Advised and provided socks to wear for safety  Pt is in need of continued activity in PT to improve strength balance endurance mobility transfers and ambulation with return to maximize LOF  From PT/mobility standpoint, recommendation at time of d/c would be OPPT in order to promote return to PLOF and independence  The patient's AM-[facility] Basic Mobility Inpatient Short Form Raw Score is 20  A Raw score of greater than 16 suggests the patient may benefit from discharge to home  Please also refer to physical therapy recommendation for safe DC planning  Goals   STG Expiration Date 02/06/23   PT Treatment Day 1   Plan   Treatment/Interventions   (Functional transfer training; Therapeutic exercise; Endurance training; Patient/family training; Bed mobility; Gait training)   Progress Slow progress, decreased activity tolerance   PT Frequency 3-5x/wk   Recommendation   PT Discharge Recommendation Home with outpatient rehabilitation   AM-PAC Basic Mobility Inpatient   Turning in Flat Bed Without Bedrails 3   Lying on Back to Sitting on Edge of Flat Bed Without Bedrails 3   Moving Bed to Chair 4   Standing Up From Chair Using Arms 4   Walk in Room 4   Climb 3-5 Stairs With Railing 2   Basic Mobility Inpatient Raw Score 20   Basic Mobility Standardized Score 43 99   Highest Level Of Mobility   JH-HLM Goal 6: Walk 10 steps or more   JH-HLM Achieved 7: Walk 25 feet or more   Education   Education Provided Mobility training   Patient Demonstrates verbal understanding   End of Consult   Patient Position at End of Consult Seated edge of bed; All needs within reach   End of Consult Comments discussed POC with PT

## 2023-01-25 NOTE — ASSESSMENT & PLAN NOTE
H/o right sided back pain with sciatica  Intermittent severe right hip pain, radiates into right lateral hip & buttocks  Pain rated 9/10  Patient unable to ambulate without cane  1/16/23 CT shows:  1  Mild multilevel disc degeneration  2  Grade 1 retrolisthesis of L5 on S1    3  Disc bulge with small herniation at L5-S1  Disc bulging from L2-3 through L4-5    4  No evidence of periostitis along the endplates to suggest advanced osteomyelitis on CT imaging  If there is clinical concern for underlying infection, MRI would be a more sensitive modality to assess for early changes of discitis Osteomyelitis  1/23/23 MRI w/o contrast shows -  No definite MR evidence for discitis osteoma myelitis as clinically questioned   Degenerative changes at L5-S1     1/24/23 ID recommending further imaging CT with contrast of abdomen, pelvis, right hip, reviewed imaging, no evidence of osteomyelitis    Plan:  Oral pain med regimen:  Scheduled Tylenol 650 mg Q6   PRN Torodol 10mg Q6   PRN Oxy 5 Q6   Gabapentin 300mg TID  Flexeril 10mg TID  PRN Lidocaine patch, voltaren gel

## 2023-01-25 NOTE — ANESTHESIA POSTPROCEDURE EVALUATION
Post-Op Assessment Note    CV Status:  Stable  Pain Score: 0    Pain management: adequate     Mental Status:  Alert and awake   Hydration Status:  Euvolemic   PONV Controlled:  Controlled   Airway Patency:  Patent      Post Op Vitals Reviewed: Yes      Staff: CRNA         No notable events documented      BP   108/56   Temp   97 8   Pulse 73 (01/25/23 0819)   Resp 16 (01/25/23 0819)    SpO2 97 % (01/25/23 0819)

## 2023-01-25 NOTE — PROCEDURES
CECILIO  : Benitez  Indication: Bacteremia  Sedation: Propofol  Complications: None    Easy intubation 1 attempt no blood on probe at completion    Findings: No evidence of valvular vegetations  Full echo report to follow  Patient tolerated the procedure well

## 2023-01-26 RX ORDER — QUETIAPINE FUMARATE 100 MG/1
100 TABLET, FILM COATED ORAL
Status: DISCONTINUED | OUTPATIENT
Start: 2023-01-26 | End: 2023-02-11

## 2023-01-26 RX ADMIN — QUETIAPINE FUMARATE 100 MG: 100 TABLET ORAL at 22:00

## 2023-01-26 RX ADMIN — KETOROLAC TROMETHAMINE 10 MG: 10 TABLET, FILM COATED ORAL at 16:29

## 2023-01-26 RX ADMIN — BUPRENORPHINE AND NALOXONE 4 MG: 2; .5 FILM BUCCAL; SUBLINGUAL at 12:53

## 2023-01-26 RX ADMIN — MORPHINE SULFATE 8 MG: 10 INJECTION INTRAVENOUS at 08:22

## 2023-01-26 RX ADMIN — CEFAZOLIN SODIUM 2000 MG: 2 SOLUTION INTRAVENOUS at 05:04

## 2023-01-26 RX ADMIN — CYCLOBENZAPRINE 10 MG: 10 TABLET, FILM COATED ORAL at 08:23

## 2023-01-26 RX ADMIN — MORPHINE SULFATE 30 MG: 15 TABLET ORAL at 23:50

## 2023-01-26 RX ADMIN — ACETAMINOPHEN 650 MG: 325 TABLET, FILM COATED ORAL at 00:23

## 2023-01-26 RX ADMIN — ACETAMINOPHEN 650 MG: 325 TABLET, FILM COATED ORAL at 05:03

## 2023-01-26 RX ADMIN — FAMOTIDINE 20 MG: 20 TABLET ORAL at 08:23

## 2023-01-26 RX ADMIN — MORPHINE SULFATE 8 MG: 10 INJECTION INTRAVENOUS at 20:41

## 2023-01-26 RX ADMIN — GABAPENTIN 300 MG: 300 CAPSULE ORAL at 08:23

## 2023-01-26 RX ADMIN — ACETAMINOPHEN 650 MG: 325 TABLET, FILM COATED ORAL at 12:53

## 2023-01-26 RX ADMIN — FAMOTIDINE 20 MG: 20 TABLET ORAL at 17:39

## 2023-01-26 RX ADMIN — CYCLOBENZAPRINE 10 MG: 10 TABLET, FILM COATED ORAL at 22:00

## 2023-01-26 RX ADMIN — MORPHINE SULFATE 8 MG: 10 INJECTION INTRAVENOUS at 14:30

## 2023-01-26 RX ADMIN — CEFAZOLIN SODIUM 2000 MG: 2 SOLUTION INTRAVENOUS at 22:00

## 2023-01-26 RX ADMIN — BUPRENORPHINE AND NALOXONE 4 MG: 2; .5 FILM BUCCAL; SUBLINGUAL at 17:39

## 2023-01-26 RX ADMIN — KETOROLAC TROMETHAMINE 10 MG: 10 TABLET, FILM COATED ORAL at 22:46

## 2023-01-26 RX ADMIN — ACETAMINOPHEN 650 MG: 325 TABLET, FILM COATED ORAL at 23:50

## 2023-01-26 RX ADMIN — GABAPENTIN 300 MG: 300 CAPSULE ORAL at 16:29

## 2023-01-26 RX ADMIN — CEFAZOLIN SODIUM 2000 MG: 2 SOLUTION INTRAVENOUS at 14:30

## 2023-01-26 RX ADMIN — BUPRENORPHINE AND NALOXONE 4 MG: 2; .5 FILM BUCCAL; SUBLINGUAL at 08:22

## 2023-01-26 RX ADMIN — CYCLOBENZAPRINE 10 MG: 10 TABLET, FILM COATED ORAL at 16:29

## 2023-01-26 RX ADMIN — NICOTINE 14 MG: 14 PATCH, EXTENDED RELEASE TRANSDERMAL at 08:24

## 2023-01-26 RX ADMIN — MORPHINE SULFATE 30 MG: 15 TABLET ORAL at 17:39

## 2023-01-26 RX ADMIN — MORPHINE SULFATE 30 MG: 15 TABLET ORAL at 11:35

## 2023-01-26 RX ADMIN — ACETAMINOPHEN 650 MG: 325 TABLET, FILM COATED ORAL at 17:39

## 2023-01-26 RX ADMIN — MORPHINE SULFATE 30 MG: 15 TABLET ORAL at 05:03

## 2023-01-26 RX ADMIN — GABAPENTIN 300 MG: 300 CAPSULE ORAL at 22:00

## 2023-01-26 RX ADMIN — KETOROLAC TROMETHAMINE 10 MG: 10 TABLET, FILM COATED ORAL at 09:49

## 2023-01-26 NOTE — CASE MANAGEMENT
Case Management Discharge Planning Note    Patient name Estefanía Mota  Location [facility] [address] [id number] MRN [id number]  : 1985 Date 2023       Current Admission Date: 2023  Current Admission Diagnosis:Bacteremia due to Staphylococcus aureus   Patient Active Problem List    Diagnosis Date Noted   • Bacteremia due to Staphylococcus aureus 2023   • Intravenous drug abuse, continuous ([facility] ) 2023   • Ambulatory dysfunction 2023   • Acute right-sided low back pain with right-sided sciatica 2023   • Tobacco abuse 2023   • Acute pain of right shoulder 2022   • Bilateral groin pain 2022   • Opiate overdose ([facility] ) 2021   • Moderate episode of recurrent major depressive disorder ([facility] ) 2020   • Drug dependence ([facility] ) 2018   • [facility] 2018   • Encounter for monitoring Suboxone maintenance therapy 2018   • Drug therapy continued 2018   • Degeneration of thoracic intervertebral disc 2018   • Degeneration of intervertebral disc of thoracic region 2017   • Myofascial pain 2017   • Chronic midline thoracic back pain 2016   • Anxiety 2015   • Hepatitis C virus 2015      LOS (days): 8  Geometric Mean LOS (GMLOS) (days): 2 80  Days to GMLOS:-4 7     OBJECTIVE:  Risk of Unplanned Readmission Score: 12 22         Current admission status: Inpatient   Preferred Pharmacy:   [address] [place] [id number]  Phone: [phone] Fax: [phone]    Primary Care Provider: Aki Gillespie DO    Primary Insurance: [employer]ahaira Pencil  Secondary Insurance:     DISCHARGE DETAILS:    Three facilities : [facility], [address] [place] [address] are still looking if they can accept paper  I messaged the facilities to see if they are able to accept the patient

## 2023-01-26 NOTE — ASSESSMENT & PLAN NOTE
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

## 2023-01-26 NOTE — ASSESSMENT & PLAN NOTE
H/o right sided back pain with sciatica  Intermittent severe right hip pain, radiates into right lateral hip & buttocks  Pain rated 9/10  Patient unable to ambulate without cane  1/16/23 CT shows:  1  Mild multilevel disc degeneration  2  Grade 1 retrolisthesis of L5 on S1    3  Disc bulge with small herniation at L5-S1  Disc bulging from L2-3 through L4-5    4  No evidence of periostitis along the endplates to suggest advanced osteomyelitis on CT imaging  If there is clinical concern for underlying infection, MRI would be a more sensitive modality to assess for early changes of discitis Osteomyelitis  1/23/23 MRI w/o contrast shows -  No definite MR evidence for discitis osteoma myelitis as clinically questioned   Degenerative changes at L5-S1     1/24/23 ID recommending further imaging CT with contrast of abdomen, pelvis, right hip, reviewed imaging, no evidence of osteomyelitis    1/26/23 patient continues to complain of 10 out of 10 back pain, discussed with Ortho, Ortho will evaluate patient tomorrow [1/27]    Plan:  Oral pain med regimen:  Scheduled Tylenol 650mg Q6   PRN Torodol 10mg Q6   Morphine 30mg PO every 6 as needed  Morphine 8mg IV prn for breakthrough pain  Gabapentin 300mg TID  Flexeril 10mg TID  PRN Lidocaine patch, voltaren gel  Ortho Consult

## 2023-01-26 NOTE — CASE MANAGEMENT
Case Management Discharge Planning Note    Patient name Derrek Kerns  Location [facility] [address] [id number] MRN [id number]  : 1985 Date 2023       Current Admission Date: 2023  Current Admission Diagnosis:Bacteremia due to Staphylococcus aureus   Patient Active Problem List    Diagnosis Date Noted   • Bacteremia due to Staphylococcus aureus 2023   • Intravenous drug abuse, continuous ([facility] ) 2023   • Ambulatory dysfunction 2023   • Acute right-sided low back pain with right-sided sciatica 2023   • Tobacco abuse 2023   • Acute pain of right shoulder 2022   • Bilateral groin pain 2022   • Opiate overdose ([facility] ) 2021   • Moderate episode of recurrent major depressive disorder ([facility] ) 2020   • Drug dependence ([facility] ) 2018   • Heroin abuse ([facility] ) 2018   • Encounter for monitoring Suboxone maintenance therapy 2018   • Drug therapy continued 2018   • Degeneration of thoracic intervertebral disc 2018   • Degeneration of intervertebral disc of thoracic region 2017   • Myofascial pain 2017   • Chronic midline thoracic back pain 2016   • Anxiety 2015   • Hepatitis C virus 2015      LOS (days): 8  Geometric Mean LOS (GMLOS) (days): 2 80  Days to GMLOS:-5     OBJECTIVE:  Risk of Unplanned Readmission Score: 12 12         Current admission status: Inpatient   Preferred Pharmacy:   [address] [place] [address] [address] [facility], [address] [facility] [address] [address]  Phone: [phone] Fax: [phone]    Primary Care Provider: Jolene Peguero DO    Primary Insurance: [facility]  Secondary Insurance:     DISCHARGE DETAILS:  [address] is unable to accept the patient

## 2023-01-26 NOTE — ASSESSMENT & PLAN NOTE
Most recent labs 3/7/2022  Positive Hep A  Positive Hep B surface antibody  Positive Hep C antibody  Not taking hepatitis meds    ID recs: Hep C RNA negative    Plan:  Follow-up with ID as outpatient

## 2023-01-26 NOTE — CASE MANAGEMENT
Case Management Discharge Planning Note    Patient name Patsy Geronimo  Location [facility] [id number] MRN [id number]  : 1985 Date 2023       Current Admission Date: 2023  Current Admission Diagnosis:Bacteremia due to Staphylococcus aureus   Patient Active Problem List    Diagnosis Date Noted   • Bacteremia due to Staphylococcus aureus 2023   • Intravenous drug abuse, continuous ([facility] ) 2023   • Ambulatory dysfunction 2023   • Acute right-sided low back pain with right-sided sciatica 2023   • Tobacco abuse 2023   • Acute pain of right shoulder 2022   • Bilateral groin pain 2022   • Opiate overdose ([facility] [facility] ) 2021   • Moderate episode of recurrent major depressive disorder ([facility] ) 2020   • Drug dependence ([facility] [facility] ) 2018   • [facility] 2018   • Encounter for monitoring Suboxone maintenance therapy 2018   • Drug therapy continued 2018   • Degeneration of thoracic intervertebral disc 2018   • Degeneration of intervertebral disc of thoracic region 2017   • Myofascial pain 2017   • Chronic midline thoracic back pain 2016   • Anxiety 2015   • Hepatitis C virus 2015      LOS (days): 8  Geometric Mean LOS (GMLOS) (days): 2 80  Days to GMLOS:-4 9     OBJECTIVE:  Risk of Unplanned Readmission Score: 12 12         Current admission status: Inpatient   Preferred Pharmacy:   [address] [place] [address]  Phone: [phone] Fax: [phone]    Primary Care Provider: Marilin Farrar DO    Primary Insurance: Francheska Mireles  Secondary Insurance:     DISCHARGE DETAILS:     I notified patient that [facility] [place] might be able to accept the patient  I verified with patient that he is not taking any meds for HIV  I notified [facility] of same  Pt said that he does not want to go to [place] because it is too far away   The only other facility that still has not turned patient down was [facility] in [facility]  Pt said he would like to go to [facility] because his mom lives in [place] [address] to see if they are able to accept the patient

## 2023-01-26 NOTE — ASSESSMENT & PLAN NOTE
Ambulatory dysfunction  Patient ambulatimg with a cane   Seen by OT: standby assist  OT Discharge Recommendation: Home with outpatient rehabilitation    1/26 PT/OT reports improved activity    Plan:  Continue PT/OT while admitted

## 2023-01-26 NOTE — PLAN OF CARE
Problem: PAIN - ADULT  Goal: Verbalizes/displays adequate comfort level or baseline comfort level  Description: Interventions:  - Encourage patient to monitor pain and request assistance  - Assess pain using appropriate pain scale  - Administer analgesics based on type and severity of pain and evaluate response  - Implement non-pharmacological measures as appropriate and evaluate response  - Consider cultural and social influences on pain and pain management  - Notify physician/advanced practitioner if interventions unsuccessful or patient reports new pain  Outcome: Progressing     Problem: INFECTION - ADULT  Goal: Absence or prevention of progression during hospitalization  Description: INTERVENTIONS:  - Assess and monitor for signs and symptoms of infection  - Monitor lab/diagnostic results  - Monitor all insertion sites, i e  indwelling lines, tubes, and drains  - Monitor endotracheal if appropriate and nasal secretions for changes in amount and color  - [place] appropriate cooling/warming therapies per order  - Administer medications as ordered  - Instruct and encourage patient and family to use good hand hygiene technique  - Identify and instruct in appropriate isolation precautions for identified infection/condition  Outcome: Progressing  Goal: Absence of fever/infection during neutropenic period  Description: INTERVENTIONS:  - Monitor WBC    Outcome: Progressing     Problem: MUSCULOSKELETAL - ADULT  Goal: Maintain or return mobility to safest level of function  Description: INTERVENTIONS:  - Assess patient's ability to carry out ADLs; assess patient's baseline for ADL function and identify physical deficits which impact ability to perform ADLs (bathing, care of mouth/teeth, toileting, grooming, dressing, etc )  - Assess/evaluate cause of self-care deficits   - Assess range of motion  - Assess patient's mobility  - Assess patient's need for assistive devices and provide as appropriate  - Encourage maximum independence but intervene and supervise when necessary  - Involve family in performance of ADLs  - Assess for home care needs following discharge   - Consider OT consult to assist with ADL evaluation and planning for discharge  - Provide patient education as appropriate  Outcome: Progressing  Goal: Maintain proper alignment of affected body part  Description: INTERVENTIONS:  - Support, maintain and protect limb and body alignment  - Provide patient/ family with appropriate education  Outcome: Progressing     Problem: NEUROSENSORY - ADULT  Goal: Achieves stable or improved neurological status  Description: INTERVENTIONS  - Monitor and report changes in neurological status  - Monitor vital signs such as temperature, blood pressure, glucose, and any other labs ordered   - Initiate measures to prevent increased intracranial pressure  - Monitor for seizure activity and implement precautions if appropriate      Outcome: Progressing  Goal: Achieves maximal functionality and self care  Description: INTERVENTIONS  - Monitor swallowing and airway patency with patient fatigue and changes in neurological status  - Encourage and assist patient to increase activity and self care     - Encourage visually impaired, hearing impaired and aphasic patients to use assistive/communication devices  Outcome: Progressing     Problem: MOBILITY - ADULT  Goal: Maintain or return to baseline ADL function  Description: INTERVENTIONS:  -  Assess patient's ability to carry out ADLs; assess patient's baseline for ADL function and identify physical deficits which impact ability to perform ADLs (bathing, care of mouth/teeth, toileting, grooming, dressing, etc )  - Assess/evaluate cause of self-care deficits   - Assess range of motion  - Assess patient's mobility; develop plan if impaired  - Assess patient's need for assistive devices and provide as appropriate  - Encourage maximum independence but intervene and supervise when necessary  - Involve family in performance of ADLs  - Assess for home care needs following discharge   - Consider OT consult to assist with ADL evaluation and planning for discharge  - Provide patient education as appropriate  Outcome: Progressing  Goal: Maintains/Returns to pre admission functional level  Description: INTERVENTIONS:  - Perform BMAT or MOVE assessment daily    - Set and communicate daily mobility goal to care team and patient/family/caregiver  - Collaborate with rehabilitation services on mobility goals if consulted  - Perform Range of Motion 3 times a day  - Reposition patient every 2 hours    - Dangle patient 3 times a day  - Stand patient 3 times a day  - Ambulate patient 3 times a day  - Out of bed to chair 3 times a day   - Out of bed for meals 3 times a day  - Out of bed for toileting  - Record patient progress and toleration of activity level   Outcome: Progressing

## 2023-01-26 NOTE — PROGRESS NOTES
[address]  Progress Note - Cm Esquivel 1985, 41 y o  male MRN: [id number]  Unit/Bed#: 070-71 Encounter: [id number]  Primary Care Provider: Lauren Diallo,    Date and time admitted to hospital: 1/18/2023  5:14 PM    * Bacteremia due to Staphylococcus aureus  Assessment & Plan  Blood cultures positive for staph aureus on 1/16/23  Negative blood cultures on 1/20/23  History of IV drug abuse  CECILIO negative for vegetations  CT negative for osteomyelitis    1/26 - ID recommending IV Ancef 2g Q8 ending 2/16/2023, PICC line scheduled for 1/27, patient not able to return home with PICC line in place due to IV drug abuse, social work is looking for placement  Plan:  Continue IV Ancef Day 7 (total days of IV antibiotics 9)  PICC placement scheduled      Acute right-sided low back pain with right-sided sciatica  Assessment & Plan  H/o right sided back pain with sciatica  Intermittent severe right hip pain, radiates into right lateral hip & buttocks  Pain rated 9/10  Patient unable to ambulate without cane  1/16/23 CT shows:  1  Mild multilevel disc degeneration  2  Grade 1 retrolisthesis of L5 on S1    3  Disc bulge with small herniation at L5-S1  Disc bulging from L2-3 through L4-5    4  No evidence of periostitis along the endplates to suggest advanced osteomyelitis on CT imaging  If there is clinical concern for underlying infection, MRI would be a more sensitive modality to assess for early changes of discitis Osteomyelitis  1/23/23 MRI w/o contrast shows -  No definite MR evidence for discitis osteoma myelitis as clinically questioned   Degenerative changes at L5-S1     1/24/23 ID recommending further imaging CT with contrast of abdomen, pelvis, right hip, reviewed imaging, no evidence of osteomyelitis    1/26/23 patient continues to complain of 10 out of 10 back pain, discussed with Ortho, Ortho will evaluate patient tomorrow [1/27]    Plan:  Oral pain med regimen:  Scheduled Tylenol 650mg Q6   PRN Torodol 10mg Q6   Morphine 30mg PO every 6 as needed  Morphine 8mg IV prn for breakthrough pain  Gabapentin 300mg TID  Flexeril 10mg TID  PRN Lidocaine patch, voltaren gel  Ortho Consult      Intravenous drug abuse, continuous ([address] )  Assessment & Plan  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  Last use - IV,1 bag of fentanyl, at 12pm on 1/18/23  Patient started on Suboxone upon admission, maintenance dose 16 mg daily    Plan:  Patient has scheduled follow-up with Dr Claudio Gillette in [place]  Suboxone 4mg QID   Seroquel 100 mg at night for sleep  Zofran as needed for nausea    Tobacco abuse  Assessment & Plan  History of tobacco smoking  Plan:  Nicotine patch  Smoking cessation    Ambulatory dysfunction  Assessment & Plan  Ambulatory dysfunction  Patient ambulatimg with a cane   Seen by OT: standby assist  OT Discharge Recommendation: Home with outpatient rehabilitation    1/26 PT/OT reports improved activity    Plan:  Continue PT/OT while admitted    Hepatitis C virus  Assessment & Plan  Most recent labs 3/7/2022  Positive Hep A  Positive Hep B surface antibody  Positive Hep C antibody  Not taking hepatitis meds    ID recs: Hep C RNA negative    Plan:  Follow-up with ID as outpatient          VTE Pharmacologic Prophylaxis: VTE Score: 1 Anticoagulated on Lovenox due to immobility    Patient Centered Rounds: I performed bedside rounds with nursing staff today  Discussions with Specialists or Other Care Team Provider: ID, Ortho      Time Spent for Care: 30 minutes  More than 50% of total time spent on counseling and coordination of care as described above      Current Length of Stay: 8 day(s)  Current Patient Status: Inpatient   Certification Statement: The patient will continue to require additional inpatient hospital stay due to 4 weeks of IV antibiotics  Discharge Plan: once placement is found for IV antibiotics    Code Status: Level 1 - Full Code    Subjective:   Patient seen at bedside this morning, laying in bed comfortably  No acute distress  Patient still complaining of severe right hip shooting pain  Adjustments were made to pain regimen and will reevaluate tomorrow morning  Patient also complaining of not sleeping well, he typically takes 100 of Seroquel and he has been receiving 50 Seroquel while admitted  Otherwise no new complaints  Objective:     Vitals:   Temp (24hrs), Av 8 °F (36 6 °C), Min:97 8 °F (36 6 °C), Max:97 8 °F (36 6 °C)    Temp:  [97 8 °F (36 6 °C)] 97 8 °F (36 6 °C)  HR:  [86] 86  Resp:  [16-17] 17  BP: (122-131)/(70-77) 122/70  SpO2:  [95 %] 95 %  Body mass index is 27 67 kg/m²  Input and Output Summary (last 24 hours): Intake/Output Summary (Last 24 hours) at 2023 1215  Last data filed at 2023 6988  Gross per 24 hour   Intake 540 ml   Output 500 ml   Net 40 ml       Physical Exam:   Physical Exam  Vitals and nursing note reviewed  Constitutional:       General: He is not in acute distress  Appearance: He is well-developed  HENT:      Head: Normocephalic and atraumatic  Right Ear: External ear normal       Left Ear: External ear normal       Nose: No rhinorrhea  Mouth/Throat:      Mouth: Mucous membranes are moist    Eyes:      General:         Right eye: No discharge  Left eye: No discharge  Cardiovascular:      Rate and Rhythm: Normal rate and regular rhythm  Pulses: Normal pulses  Pulmonary:      Effort: Pulmonary effort is normal  No respiratory distress  Abdominal:      Palpations: Abdomen is soft  Tenderness: There is no abdominal tenderness  Musculoskeletal:         General: No swelling  Cervical back: Neck supple  Comments: Low back pain:  No erythema no swelling  Decreased range of motion  Sensation intact  Point tenderness   Skin:     General: Skin is warm and dry  Capillary Refill: Capillary refill takes less than 2 seconds     Neurological:      Mental Status: He is alert  Mental status is at baseline  Psychiatric:         Mood and Affect: Mood normal          Additional Data:     Labs:  Results from last 7 days   Lab Units 01/24/23  0642   WBC Thousand/uL 7 57   HEMOGLOBIN g/dL 11 3*   HEMATOCRIT % 34 4*   PLATELETS Thousands/uL 424*   NEUTROS PCT % 65   LYMPHS PCT % 19   MONOS PCT % 10   EOS PCT % 4     Results from last 7 days   Lab Units 01/24/23  0642   SODIUM mmol/L 138   POTASSIUM mmol/L 4 2   CHLORIDE mmol/L 101   CO2 mmol/L 28   BUN mg/dL 16   CREATININE mg/dL 0 67   ANION GAP mmol/L 9   CALCIUM mg/dL 8 9   ALBUMIN g/dL 3 3*   TOTAL BILIRUBIN mg/dL 0 17*   ALK PHOS U/L 59   ALT U/L 9   AST U/L 13   GLUCOSE RANDOM mg/dL 90     Results from last 7 days   Lab Units 01/23/23  0437   INR  0 92             Results from last 7 days   Lab Units 01/24/23  0642 01/23/23  0437 01/20/23  0908   PROCALCITONIN ng/ml 0 17 0 33* 2 29*       Lines/Drains:  Invasive Devices     Peripheral Intravenous Line  Duration           Peripheral IV 01/25/23 Right Hand 1 day                      Imaging: Reviewed radiology reports from this admission including: MRI spine    Recent Cultures (last 7 days):   Results from last 7 days   Lab Units 01/20/23  1306   BLOOD CULTURE  No Growth After 5 Days  No Growth After 5 Days         Last 24 Hours Medication List:   Current Facility-Administered Medications   Medication Dose Route Frequency Provider Last Rate   • acetaminophen  650 mg Oral Q6H [facility] Amparo Tineo MD     • buprenorphine-naloxone  4 mg Sublingual 4x Daily Tyra Blum MD     • cefazolin  2,000 mg Intravenous Q8H Alban Patten MD 2,000 mg (01/26/23 0504)   • cyclobenzaprine  10 mg Oral TID Amparo Tineo MD     • Diclofenac Sodium  2 g Topical PRN Amparo Tineo MD     • enoxaparin  40 mg Subcutaneous Daily Carman Sacks, MD     • famotidine  20 mg Oral BID Deacon Spencer PA-C     • gabapentin  300 mg Oral TID Amparo Tineo MD     • ketorolac  10 mg Oral Q6H PRN Renny Severance, MD     • lidocaine  1 patch Topical Daily Renny Severance, MD     • morphine  30 mg Oral Q6H PRN Tyra Sharma MD     • morphine injection  8 mg Intravenous Q6H PRN Tyra Sharma MD     • naloxone  0 1 mg Intravenous PRN Yuly Puri MD     • nicotine  14 mg Transdermal Daily Desean Ernandez MD     • ondansetron  4 mg Oral Q6H PRN Tyra Sharma MD     • QUEtiapine  100 mg Oral HS Renny Severance, MD          Today, Patient Was Seen By: Renny Severance, MD    **Please Note: This note may have been constructed using a voice recognition system  **

## 2023-01-26 NOTE — PLAN OF CARE
Problem: PAIN - ADULT  Goal: Verbalizes/displays adequate comfort level or baseline comfort level  Description: Interventions:  - Encourage patient to monitor pain and request assistance  - Assess pain using appropriate pain scale (0-10 pain scale)  - Administer analgesics based on type and severity of pain and evaluate response  - Implement non-pharmacological measures as appropriate and evaluate response  - Consider cultural and social influences on pain and pain management  - Notify physician/advanced practitioner if interventions unsuccessful or patient reports new pain  Outcome: Progressing     Problem: INFECTION - ADULT  Goal: Absence or prevention of progression during hospitalization  Description: INTERVENTIONS:  - Assess and monitor for signs and symptoms of infection  - Monitor lab/diagnostic results  - Monitor all insertion sites, i e  indwelling lines  - Administer medications as ordered  - Instruct and encourage patient and family to use good hand hygiene technique  Outcome: Progressing     Problem: MUSCULOSKELETAL - ADULT  Goal: Maintain or return mobility to safest level of function  Description: INTERVENTIONS:  - Assess patient's ability to carry out ADLs; (independent)  - Assess/evaluate cause of self-care deficits (limited movement, pain, crutches/walker)  - Assess range of motion  - Assess patient's mobility (modified independent)  - Assess patient's need for assistive devices and provide as appropriate  - Encourage maximum independence but intervene and supervise when necessary  - Involve family in performance of ADLs  - Assess for home care needs following discharge   - Consider OT consult to assist with ADL evaluation and planning for discharge  - Provide patient education as appropriate  Outcome: Progressing  Goal: Maintain proper alignment of affected body part  Description: INTERVENTIONS:  - Support, maintain and protect limb and body alignment  - Provide patient/ family with appropriate education  Outcome: Progressing     Problem: MOBILITY - ADULT  Goal: Maintain or return to baseline ADL function  Description: INTERVENTIONS:  -  Assess patient's ability to carry out ADLs; (independent)  - Assess/evaluate cause of self-care deficits (limited movement, pain, crutches/walker)  - Assess range of motion  - Assess patient's mobility; (modified independent)  - Assess patient's need for assistive devices and provide as appropriate  - Encourage maximum independence but intervene and supervise when necessary  - Involve family in performance of ADLs  - Assess for home care needs following discharge   - Consider OT consult to assist with ADL evaluation and planning for discharge  - Provide patient education as appropriate  Outcome: Progressing  Goal: Maintains/Returns to pre admission functional level  Description: INTERVENTIONS:  - Perform BMAT or MOVE assessment daily    - Set and communicate daily mobility goal to care team and patient/family/caregiver     - Collaborate with rehabilitation services on mobility goals if consulted  - Ambulate patient 3 times a day  - Out of bed to chair 3 times a day   - Out of bed for meals 3 times a day  - Out of bed for toileting  - Record patient progress and toleration of activity level   Outcome: Progressing     Problem: DISCHARGE PLANNING  Goal: Discharge to home or other facility with appropriate resources  Description: INTERVENTIONS:  - Identify barriers to discharge w/patient and caregiver  - Arrange for needed discharge resources and transportation as appropriate  - Identify discharge learning needs (meds, wound care, etc )  - Refer to Case Management Department for coordinating discharge planning if the patient needs post-hospital services based on physician/advanced practitioner order or complex needs related to functional status, cognitive ability, or social support system  Outcome: Progressing     Problem: Knowledge Deficit  Goal: Patient/family/caregiver demonstrates understanding of disease process, treatment plan, medications, and discharge instructions  Description: Complete learning assessment and assess knowledge base  Interventions:  - Provide teaching at level of understanding  - Provide teaching via preferred learning methods  Outcome: Progressing     Problem: NEUROSENSORY - ADULT  Goal: Achieves stable or improved neurological status  Description: INTERVENTIONS  - Monitor and report changes in neurological status  - Monitor vital signs such as temperature, blood pressure, glucose, and any other labs ordered   - Initiate measures to prevent increased intracranial pressure  - Monitor for seizure activity and implement precautions if appropriate      Outcome: Completed  Goal: Achieves maximal functionality and self care  Description: INTERVENTIONS  - Monitor swallowing and airway patency with patient fatigue and changes in neurological status  - Encourage and assist patient to increase activity and self care     - Encourage visually impaired, hearing impaired and aphasic patients to use assistive/communication devices  Outcome: Completed

## 2023-01-27 ENCOUNTER — APPOINTMENT (INPATIENT)
Dept: INTERVENTIONAL RADIOLOGY/VASCULAR | Facility: HOSPITAL | Age: 38
End: 2023-01-27
Attending: FAMILY MEDICINE

## 2023-01-27 PROCEDURE — 02HV33Z INSERTION OF INFUSION DEVICE INTO SUPERIOR VENA CAVA, PERCUTANEOUS APPROACH: ICD-10-PCS | Performed by: RADIOLOGY

## 2023-01-27 RX ORDER — MORPHINE SULFATE 15 MG/1
45 TABLET ORAL EVERY 6 HOURS PRN
Status: DISCONTINUED | OUTPATIENT
Start: 2023-01-27 | End: 2023-01-30

## 2023-01-27 RX ORDER — HYDROMORPHONE HCL/PF 1 MG/ML
1 SYRINGE (ML) INJECTION ONCE
Status: COMPLETED | OUTPATIENT
Start: 2023-01-27 | End: 2023-01-27

## 2023-01-27 RX ADMIN — ACETAMINOPHEN 650 MG: 325 TABLET, FILM COATED ORAL at 17:33

## 2023-01-27 RX ADMIN — MORPHINE SULFATE 8 MG: 10 INJECTION INTRAVENOUS at 11:23

## 2023-01-27 RX ADMIN — NICOTINE 14 MG: 14 PATCH, EXTENDED RELEASE TRANSDERMAL at 08:46

## 2023-01-27 RX ADMIN — GABAPENTIN 300 MG: 300 CAPSULE ORAL at 21:15

## 2023-01-27 RX ADMIN — CEFAZOLIN SODIUM 2000 MG: 2 SOLUTION INTRAVENOUS at 05:40

## 2023-01-27 RX ADMIN — GABAPENTIN 300 MG: 300 CAPSULE ORAL at 08:45

## 2023-01-27 RX ADMIN — CYCLOBENZAPRINE 10 MG: 10 TABLET, FILM COATED ORAL at 08:45

## 2023-01-27 RX ADMIN — KETOROLAC TROMETHAMINE 10 MG: 10 TABLET, FILM COATED ORAL at 14:18

## 2023-01-27 RX ADMIN — MORPHINE SULFATE 30 MG: 15 TABLET ORAL at 13:17

## 2023-01-27 RX ADMIN — BUPRENORPHINE AND NALOXONE 4 MG: 2; .5 FILM BUCCAL; SUBLINGUAL at 21:15

## 2023-01-27 RX ADMIN — CEFAZOLIN SODIUM 2000 MG: 2 SOLUTION INTRAVENOUS at 13:16

## 2023-01-27 RX ADMIN — GABAPENTIN 300 MG: 300 CAPSULE ORAL at 17:33

## 2023-01-27 RX ADMIN — HYDROMORPHONE HYDROCHLORIDE 1 MG: 1 INJECTION, SOLUTION INTRAMUSCULAR; INTRAVENOUS; SUBCUTANEOUS at 16:39

## 2023-01-27 RX ADMIN — QUETIAPINE FUMARATE 100 MG: 100 TABLET ORAL at 21:15

## 2023-01-27 RX ADMIN — MORPHINE SULFATE 30 MG: 15 TABLET ORAL at 06:36

## 2023-01-27 RX ADMIN — MORPHINE SULFATE 8 MG: 10 INJECTION INTRAVENOUS at 17:33

## 2023-01-27 RX ADMIN — BUPRENORPHINE AND NALOXONE 4 MG: 2; .5 FILM BUCCAL; SUBLINGUAL at 17:34

## 2023-01-27 RX ADMIN — CYCLOBENZAPRINE 10 MG: 10 TABLET, FILM COATED ORAL at 21:15

## 2023-01-27 RX ADMIN — FAMOTIDINE 20 MG: 20 TABLET ORAL at 17:33

## 2023-01-27 RX ADMIN — MORPHINE SULFATE 45 MG: 15 TABLET ORAL at 20:01

## 2023-01-27 RX ADMIN — ACETAMINOPHEN 650 MG: 325 TABLET, FILM COATED ORAL at 05:40

## 2023-01-27 RX ADMIN — CEFAZOLIN SODIUM 2000 MG: 2 SOLUTION INTRAVENOUS at 21:13

## 2023-01-27 RX ADMIN — CYCLOBENZAPRINE 10 MG: 10 TABLET, FILM COATED ORAL at 17:34

## 2023-01-27 RX ADMIN — KETOROLAC TROMETHAMINE 10 MG: 10 TABLET, FILM COATED ORAL at 07:31

## 2023-01-27 RX ADMIN — KETOROLAC TROMETHAMINE 10 MG: 10 TABLET, FILM COATED ORAL at 21:28

## 2023-01-27 RX ADMIN — ACETAMINOPHEN 650 MG: 325 TABLET, FILM COATED ORAL at 11:23

## 2023-01-27 RX ADMIN — BUPRENORPHINE AND NALOXONE 4 MG: 2; .5 FILM BUCCAL; SUBLINGUAL at 08:47

## 2023-01-27 RX ADMIN — BUPRENORPHINE AND NALOXONE 4 MG: 2; .5 FILM BUCCAL; SUBLINGUAL at 13:17

## 2023-01-27 RX ADMIN — MORPHINE SULFATE 8 MG: 10 INJECTION INTRAVENOUS at 02:36

## 2023-01-27 RX ADMIN — MORPHINE SULFATE 8 MG: 10 INJECTION INTRAVENOUS at 23:38

## 2023-01-27 RX ADMIN — FAMOTIDINE 20 MG: 20 TABLET ORAL at 08:45

## 2023-01-27 NOTE — ASSESSMENT & PLAN NOTE
Blood cultures positive for staph aureus on 1/16/23  Negative blood cultures on 1/20/23  History of IV drug abuse  CECILIO negative for vegetations  CT negative for osteomyelitis    1/26 - ID recommending IV Ancef 2g Q8 ending 2/16/2023, PICC line placment, patient not able to return home with PICC line in place due to IV drug abuse, social work is looking for placement  1/27 - PICC scheduled for 5 PM    Plan:  Continue IV Ancef Day 8 (total days of IV antibiotics 10) ending Feb 16, 2023

## 2023-01-27 NOTE — ASSESSMENT & PLAN NOTE
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  Last use - IV,1 bag of fentanyl, at 12pm on 1/18/23  Patient started on Suboxone upon admission, maintenance dose 16 mg daily    Plan:  Patient has scheduled follow-up with Dr Adam German in [place]  Suboxone 4mg QID   Seroquel 100 mg at night for sleep  Zofran as needed for nausea

## 2023-01-27 NOTE — PLAN OF CARE
Problem: PAIN - ADULT  Goal: Verbalizes/displays adequate comfort level or baseline comfort level  Description: Interventions:  - Encourage patient to monitor pain and request assistance  - Assess pain using appropriate pain scale (0-10 pain scale)  - Administer analgesics based on type and severity of pain and evaluate response  - Implement non-pharmacological measures as appropriate and evaluate response  - Consider cultural and social influences on pain and pain management  - Notify physician/advanced practitioner if interventions unsuccessful or patient reports new pain  Outcome: Progressing     Problem: INFECTION - ADULT  Goal: Absence or prevention of progression during hospitalization  Description: INTERVENTIONS:  - Assess and monitor for signs and symptoms of infection  - Monitor lab/diagnostic results  - Monitor all insertion sites, i e  indwelling lines  - Administer medications as ordered  - Instruct and encourage patient and family to use good hand hygiene technique  Outcome: Progressing     Problem: MUSCULOSKELETAL - ADULT  Goal: Maintain or return mobility to safest level of function  Description: INTERVENTIONS:  - Assess patient's ability to carry out ADLs; (independent)  - Assess/evaluate cause of self-care deficits (limited movement, pain, crutches/walker)  - Assess range of motion  - Assess patient's mobility (modified independent)  - Assess patient's need for assistive devices and provide as appropriate  - Encourage maximum independence but intervene and supervise when necessary  - Involve family in performance of ADLs  - Assess for home care needs following discharge   - Consider OT consult to assist with ADL evaluation and planning for discharge  - Provide patient education as appropriate  Outcome: Progressing  Goal: Maintain proper alignment of affected body part  Description: INTERVENTIONS:  - Support, maintain and protect limb and body alignment  - Provide patient/ family with appropriate education  Outcome: Progressing     Problem: MOBILITY - ADULT  Goal: Maintain or return to baseline ADL function  Description: INTERVENTIONS:  -  Assess patient's ability to carry out ADLs; (independent)  - Assess/evaluate cause of self-care deficits (limited movement, pain, crutches/walker)  - Assess range of motion  - Assess patient's mobility; (modified independent)  - Assess patient's need for assistive devices and provide as appropriate  - Encourage maximum independence but intervene and supervise when necessary  - Involve family in performance of ADLs  - Assess for home care needs following discharge   - Consider OT consult to assist with ADL evaluation and planning for discharge  - Provide patient education as appropriate  Outcome: Progressing  Goal: Maintains/Returns to pre admission functional level  Description: INTERVENTIONS:  - Perform BMAT or MOVE assessment daily    - Set and communicate daily mobility goal to care team and patient/family/caregiver     - Collaborate with rehabilitation services on mobility goals if consulted  - Ambulate patient 3 times a day  - Out of bed to chair 3 times a day   - Out of bed for meals 3 times a day  - Out of bed for toileting  - Record patient progress and toleration of activity level   Outcome: Progressing     Problem: DISCHARGE PLANNING  Goal: Discharge to home or other facility with appropriate resources  Description: INTERVENTIONS:  - Identify barriers to discharge w/patient and caregiver  - Arrange for needed discharge resources and transportation as appropriate  - Identify discharge learning needs (meds, wound care, etc )  - Refer to Case Management Department for coordinating discharge planning if the patient needs post-hospital services based on physician/advanced practitioner order or complex needs related to functional status, cognitive ability, or social support system  Outcome: Progressing     Problem: Knowledge Deficit  Goal: Patient/family/caregiver demonstrates understanding of disease process, treatment plan, medications, and discharge instructions  Description: Complete learning assessment and assess knowledge base    Interventions:  - Provide teaching at level of understanding  - Provide teaching via preferred learning methods  Outcome: Progressing

## 2023-01-27 NOTE — ASSESSMENT & PLAN NOTE
H/o right sided back pain with sciatica  Intermittent severe right hip pain, radiates into right lateral hip & buttocks  Pain rated 9/10  Patient unable to ambulate without cane  1/16/23 CT shows:  1  Mild multilevel disc degeneration  2  Grade 1 retrolisthesis of L5 on S1    3  Disc bulge with small herniation at L5-S1  Disc bulging from L2-3 through L4-5    4  No evidence of periostitis along the endplates to suggest advanced osteomyelitis on CT imaging  If there is clinical concern for underlying infection, MRI would be a more sensitive modality to assess for early changes of discitis Osteomyelitis  1/23/23 MRI w/o contrast shows -  No definite MR evidence for discitis osteoma myelitis as clinically questioned   Degenerative changes at L5-S1     1/24/23 ID recommending further imaging CT with contrast of abdomen, pelvis, right hip, reviewed imaging, no evidence of osteomyelitis    1/26/23 patient continues to complain of 10 out of 10 back pain, discussed with Ortho, they recommended neurosurgery, neurosurgery consult placed    Plan:  Oral pain med regimen:  Scheduled Tylenol 650mg Q6   PRN Torodol 10mg Q6   Morphine 30mg PO every 6 as needed  Morphine 8mg IV prn for breakthrough pain  Gabapentin 300mg TID  Flexeril 10mg TID  PRN Lidocaine patch, voltaren gel  Neurosurgery consult

## 2023-01-27 NOTE — PROGRESS NOTES
[address]  Progress Note - Shay Trujillo 1985, 41 y o  male MRN: [id number]  Unit/Bed#: 097-73 Encounter: [id number]  Primary Care Provider: Chantale Elaine DO   Date and time admitted to hospital: 1/18/2023  5:14 PM    * Bacteremia due to Staphylococcus aureus  Assessment & Plan  Blood cultures positive for staph aureus on 1/16/23  Negative blood cultures on 1/20/23  History of IV drug abuse  CECILIO negative for vegetations  CT negative for osteomyelitis    1/26 - ID recommending IV Ancef 2g Q8 ending 2/16/2023, PICC line placment, patient not able to return home with PICC line in place due to IV drug abuse, social work is looking for placement  1/27 - PICC scheduled for 5 PM    Plan:  Continue IV Ancef Day 8 (total days of IV antibiotics 10) ending Feb 16, 2023  Acute right-sided low back pain with right-sided sciatica  Assessment & Plan  H/o right sided back pain with sciatica  Intermittent severe right hip pain, radiates into right lateral hip & buttocks  Pain rated 9/10  Patient unable to ambulate without cane  1/16/23 CT shows:  1  Mild multilevel disc degeneration  2  Grade 1 retrolisthesis of L5 on S1    3  Disc bulge with small herniation at L5-S1  Disc bulging from L2-3 through L4-5    4  No evidence of periostitis along the endplates to suggest advanced osteomyelitis on CT imaging  If there is clinical concern for underlying infection, MRI would be a more sensitive modality to assess for early changes of discitis Osteomyelitis  1/23/23 MRI w/o contrast shows -  No definite MR evidence for discitis osteoma myelitis as clinically questioned   Degenerative changes at L5-S1     1/24/23 ID recommending further imaging CT with contrast of abdomen, pelvis, right hip, reviewed imaging, no evidence of osteomyelitis    1/26/23 patient continues to complain of 10 out of 10 back pain, discussed with Ortho, they recommended neurosurgery, neurosurgery consult placed    Plan:  Oral pain med regimen:  Scheduled Tylenol 650mg Q6   PRN Torodol 10mg Q6   Morphine 30mg PO every 6 as needed  Morphine 8mg IV prn for breakthrough pain  Gabapentin 300mg TID  Flexeril 10mg TID  PRN Lidocaine patch, voltaren gel  Neurosurgery consult      Intravenous drug abuse, continuous ([address] )  Assessment & Plan  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  Last use - IV,1 bag of fentanyl, at 12pm on 1/18/23  Patient started on Suboxone upon admission, maintenance dose 16 mg daily    Plan:  Patient has scheduled follow-up with Dr Yahaira Pro in [place]  Suboxone 4mg QID   Seroquel 100 mg at night for sleep  Zofran as needed for nausea    Tobacco abuse  Assessment & Plan  History of tobacco smoking  Plan:  Nicotine patch  Smoking cessation    Ambulatory dysfunction  Assessment & Plan  Ambulatory dysfunction  Patient ambulatimg with a cane   Seen by OT: standby assist  OT Discharge Recommendation: Home with outpatient rehabilitation    1/26 PT/OT reports improved activity    Plan:  Continue PT/OT while admitted    Hepatitis C virus  Assessment & Plan  Most recent labs 3/7/2022  Positive Hep A  Positive Hep B surface antibody  Positive Hep C antibody  Not taking hepatitis meds    ID recs: Hep C RNA negative    Plan:  Follow-up with ID as outpatient        VTE Pharmacologic Prophylaxis: VTE Score: 1 Patient receiving anticoagulation due to immobility    Patient Centered Rounds: I performed bedside rounds with nursing staff today  Discussions with Specialists or Other Care Team Provider: Ortho    Time Spent for Care: 30 minutes  More than 50% of total time spent on counseling and coordination of care as described above      Current Length of Stay: 9 day(s)  Current Patient Status: Inpatient   Certification Statement: The patient will continue to require additional inpatient hospital stay due to 4 weeks of IV antibiotics  Discharge Plan: Inpatient 4 weeks of antibiotics    Code Status: Level 1 - Full Code    Subjective:   Patient seen sleeping in bed this morning, laying in bed comfortably  No acute distress  Objective:     Vitals:   Temp (24hrs), Av 3 °F (36 8 °C), Min:98 °F (36 7 °C), Max:98 5 °F (36 9 °C)    Temp:  [98 °F (36 7 °C)-98 5 °F (36 9 °C)] 98 3 °F (36 8 °C)  HR:  [52-77] 74  Resp:  [19-20] 20  BP: (111-133)/(65-80) 111/65  SpO2:  [93 %-95 %] 95 %  Body mass index is 27 67 kg/m²  Input and Output Summary (last 24 hours): Intake/Output Summary (Last 24 hours) at 2023 1409  Last data filed at 2023 1250  Gross per 24 hour   Intake 960 ml   Output 1700 ml   Net -740 ml       Physical Exam: Unchanged from yesterday    Additional Data:     Labs:  Results from last 7 days   Lab Units 23  0642   WBC Thousand/uL 7 57   HEMOGLOBIN g/dL 11 3*   HEMATOCRIT % 34 4*   PLATELETS Thousands/uL 424*   NEUTROS PCT % 65   LYMPHS PCT % 19   MONOS PCT % 10   EOS PCT % 4     Results from last 7 days   Lab Units 23  0642   SODIUM mmol/L 138   POTASSIUM mmol/L 4 2   CHLORIDE mmol/L 101   CO2 mmol/L 28   BUN mg/dL 16   CREATININE mg/dL 0 67   ANION GAP mmol/L 9   CALCIUM mg/dL 8 9   ALBUMIN g/dL 3 3*   TOTAL BILIRUBIN mg/dL 0 17*   ALK PHOS U/L 59   ALT U/L 9   AST U/L 13   GLUCOSE RANDOM mg/dL 90     Results from last 7 days   Lab Units 23  0437   INR  0 92             Results from last 7 days   Lab Units 23  0642 23  0437   PROCALCITONIN ng/ml 0 17 0 33*       Lines/Drains:  Invasive Devices     Peripheral Intravenous Line  Duration           Peripheral IV 23 Distal;Dorsal (posterior); Right Forearm <1 day                      Imaging: Reviewed radiology reports from this admission including: MRI spine    Recent Cultures (last 7 days):         Last 24 Hours Medication List:   Current Facility-Administered Medications   Medication Dose Route Frequency Provider Last Rate   • acetaminophen  650 mg Oral Q6H Amparo Bliss MD     • buprenorphine-naloxone  4 mg Sublingual 4x Daily Tyra Huertas MD     • cefazolin  2,000 mg Intravenous Q8H Luke Henley MD 2,000 mg (01/27/23 1316)   • cyclobenzaprine  10 mg Oral TID Serjio Andujar MD     • Diclofenac Sodium  2 g Topical PRN Serjio Andujar MD     • enoxaparin  40 mg Subcutaneous Daily Lynette Mcgrath MD     • famotidine  20 mg Oral BID Bj Harris PA-C     • gabapentin  300 mg Oral TID Serjio Andujar MD     • HYDROmorphone  1 mg Intravenous Once Adis Araya MD     • ketorolac  10 mg Oral Q6H PRN Serjio Andujar MD     • lidocaine  1 patch Topical Daily Serjio Andujar MD     • morphine  45 mg Oral Q6H PRN Adis Araya MD     • morphine injection  8 mg Intravenous Q6H PRN Tyra Huertas MD     • naloxone  0 1 mg Intravenous PRN Adis Araya MD     • nicotine  14 mg Transdermal Daily Lynette Mcgrath MD     • ondansetron  4 mg Oral Q6H PRN Tyra Huertas MD     • QUEtiapine  100 mg Oral HS Serjio Andujar MD          Today, Patient Was Seen By: Serjio Andujar MD    **Please Note: This note may have been constructed using a voice recognition system  **

## 2023-01-27 NOTE — TELEMEDICINE
e-Consult (IPC)     Inpatient consult to Neurosurgery  Consult performed by: Sandra Galarza PA-C  Consult ordered by: Mark Bowser MD           [facility]  Roldan Pimentel 41 y o  male MRN: [id number]  Unit/Bed#: 505-93 Encounter: [id number]    Reason for Consult    Per provider report, patient with history of IVDA, HIV, hep C, tobacco abuse who presented originally on 1/16 and then again on 1/18 with severe back pain and RLE pain with associated gait instability  He was noted to have BC positive for MSSA and reported to the ED for admission  He continues to have right sided back pain with RLE pain for which NSX was consulted  Available past medical history,social history, surgical history, medication list, drug allergies and review of systems were reviewed  /65   Pulse 74   Temp 98 3 °F (36 8 °C) (Oral)   Resp 20   Ht 6' (1 829 m)   Wt 92 5 kg (204 lb)   SpO2 95%   BMI 27 67 kg/m²      Imaging personally reviewed  · MRI lumbar spine wo contrast 1/23/2023: Motion degraded examination  No definite MR evidence for discitis osteoma myelitis as clinically questioned  Degenerative changes at L5-S1 as described above  Assessment and Recommendations  1  Imaging reviewed, contrasted study not completed however no overt signs of infectious process  Otherwise very mild degenerative changes noted without significant compression  No surgical etiology to explain his current back and leg complaints  2  Continue with conservative management, multimodal pain regimen and therapy  Pain control will be difficult given history of active IVDA and suboxone use  3  No need for transfer or intervention  4  Signed off, please call with questions or concerns    All questions answered  Provider is in agreement with the course of action  5-10 minutes, >50% of the total time devoted to medical consultative verbal/EMR discussion between providers  Written report will be generated in the EMR

## 2023-01-27 NOTE — PROGRESS NOTES
Progress Note - Infectious Disease   Cm Esquivel 41 y o  male MRN: [id number]  Unit/Bed#: 626-36 Encounter: [id number]      Impression/Plan:    1  MSSA bacteremia  1/16, 1/18 blood cultures positive from [facility] growing MSSA  Admission blood cultures here 1/18 also growing MSSA  Likely due to IVDU  Patient also has right sacral/hip pain which he states is due to a fall; in setting of staph aureus bacteremia need to rule out metastatic foci of infection as a cause of his pain  CT lumbar spine at [facility] showed DJD but no evidence of OM  MRI lumbar spine here does not show any signs of discitis/osteomyelitis, although he could only tolerate noncontrast study due to pain  TTE cannot rule out small vegetation of the tip of the anterior leaflet of the MV but CECILIO today showed no valvular vegetation  CT right lower extremity and CT A/P with contrast did not show any joint or osseous abnormalities, or other evidence of infection  Patient is afebrile, leukocytosis resolved  He is hemodynamically stable  Repeat blood cultures here 1/20 no growth after he was started on appropriate dosing of antibiotics   -Continue IV Cefazolin 2g q8hr              -Okay for PICC placement, will be performed today   -recommend a 4 week total course of IV antibiotics, through 2/16/23   -if patient is not willing to stay in hospital and adhere to standard of care IV antibiotic therapy, there is data for switch to PO antibiotics in [facility] after at least 10-14 days IV therapy (Outcomes of Partial Oral Antibiotic Treatment for Complicated Staphylococcus Aureus Bacteremia in [facility] ([facility] com)  However, recommendation is full 4 weeks IV therapy at this time   -patient is not candidate for home antibiotics due to IVDU  Case management is looking into rehab placement               -monitor WBC count, fever curve     2  Lower back/right hip pain  Occurred after a fall per the patient   CT lumbar spine shows DJD, no obvious osseous destruction  CRP/ESR elevated but he also has a bloodstream infection  MRI L spine non con does not show any evidence of OM/discitis  CT A/P and right leg with contrast does not show any infectious abnormalities  Patient continues to have right hip pain but sounds more like sciatica               -imaging as above              -pain management per primary and toxicology     3  Opiate use disorder with IVDU  Patient initiated on Suboxone  Recently used fentanyl prior to admission  He has a history of hepatitis C status post treatment  There is documentation of a history of HIV, but he has several prior negative tests in the chart, last in 2022  HIV negative 23              -continue suboxone, management of withdrawal per primary     4  History of Hepatitis C  Reports treatment with Alban Sotelo in the summer 2022 but no documentation of SVR  HCV PCR negative here confirming 12 week SVR              -outpatient follow up     Discussed the above management plan in detail with the primary service and patient  ID will see again 2023  Please call with questions  Antibiotics:  Cefazolin day 8  Day 8 from blood culture clearance    Subjective: The patient reports ongoing right lower back and hip pain with shooting pain down his leg  Pain is unchanged after toxicology consult and further interventions made  He wishes to switch off Suboxone to methadone due to his pain  Otherwise, he has no new symptoms  Denies fever, chills, chest pain  Objective:  Vitals:  Temp:  [97 1 °F (36 2 °C)-98 5 °F (36 9 °C)] 97 1 °F (36 2 °C)  HR:  [52-90] 90  Resp:  [18-20] 18  BP: (111-136)/(65-79) 136/79  SpO2:  [93 %-100 %] 100 %  Temp (24hrs), Av 8 °F (36 6 °C), Min:97 1 °F (36 2 °C), Max:98 5 °F (36 9 °C)  Current: Temperature: (!) 97 1 °F (36 2 °C)    Physical Exam:       General Appearance:  Alert, interactive, nontoxic, no acute distress     Throat: Oropharynx moist without lesions  Lungs:   Clear to auscultation bilaterally; no wheezes, rhonchi or rales; respirations unlabored   Heart:  RRR; no murmur, rub or gallop   Abdomen:   Soft, non-tender, non-distended, positive bowel sounds  Extremities: No clubbing, cyanosis or edema   Skin: No new rashes or lesions  Tenderness over right lower back and sacral area       Labs:    All pertinent labs and imaging studies were personally reviewed  Results from last 7 days   Lab Units 01/24/23  0642 01/23/23  0437   WBC Thousand/uL 7 57 6 95   HEMOGLOBIN g/dL 11 3* 10 5*   PLATELETS Thousands/uL 424* 390     Results from last 7 days   Lab Units 01/24/23  0642 01/23/23  0437   SODIUM mmol/L 138 138   POTASSIUM mmol/L 4 2 4 0   CHLORIDE mmol/L 101 103   CO2 mmol/L 28 27   BUN mg/dL 16 12   CREATININE mg/dL 0 67 0 60   EGFR ml/min/1 73sq m 122 128   CALCIUM mg/dL 8 9 8 4   AST U/L 13 12*   ALT U/L 9 11   ALK PHOS U/L 59 54     Results from last 7 days   Lab Units 01/24/23  0642 01/23/23  0437   PROCALCITONIN ng/ml 0 17 0 33*       Imaging:  Pertinent imaging in PACS personally reviewed

## 2023-01-28 RX ORDER — DOCUSATE SODIUM 100 MG/1
100 CAPSULE, LIQUID FILLED ORAL 2 TIMES DAILY
Status: DISCONTINUED | OUTPATIENT
Start: 2023-01-28 | End: 2023-02-10

## 2023-01-28 RX ORDER — DEXAMETHASONE 4 MG/1
4 TABLET ORAL EVERY 6 HOURS SCHEDULED
Status: DISCONTINUED | OUTPATIENT
Start: 2023-01-28 | End: 2023-02-08

## 2023-01-28 RX ADMIN — CYCLOBENZAPRINE 10 MG: 10 TABLET, FILM COATED ORAL at 09:38

## 2023-01-28 RX ADMIN — GABAPENTIN 300 MG: 300 CAPSULE ORAL at 15:55

## 2023-01-28 RX ADMIN — KETOROLAC TROMETHAMINE 10 MG: 10 TABLET, FILM COATED ORAL at 15:55

## 2023-01-28 RX ADMIN — ACETAMINOPHEN 650 MG: 325 TABLET, FILM COATED ORAL at 18:17

## 2023-01-28 RX ADMIN — CEFAZOLIN SODIUM 2000 MG: 2 SOLUTION INTRAVENOUS at 21:59

## 2023-01-28 RX ADMIN — FAMOTIDINE 20 MG: 20 TABLET ORAL at 09:38

## 2023-01-28 RX ADMIN — CEFAZOLIN SODIUM 2000 MG: 2 SOLUTION INTRAVENOUS at 05:20

## 2023-01-28 RX ADMIN — CYCLOBENZAPRINE 10 MG: 10 TABLET, FILM COATED ORAL at 21:59

## 2023-01-28 RX ADMIN — DEXAMETHASONE 4 MG: 4 TABLET ORAL at 11:55

## 2023-01-28 RX ADMIN — DEXAMETHASONE 4 MG: 4 TABLET ORAL at 18:17

## 2023-01-28 RX ADMIN — MORPHINE SULFATE 8 MG: 10 INJECTION INTRAVENOUS at 13:34

## 2023-01-28 RX ADMIN — BUPRENORPHINE AND NALOXONE 4 MG: 2; .5 FILM BUCCAL; SUBLINGUAL at 21:59

## 2023-01-28 RX ADMIN — BUPRENORPHINE AND NALOXONE 4 MG: 2; .5 FILM BUCCAL; SUBLINGUAL at 13:33

## 2023-01-28 RX ADMIN — CYCLOBENZAPRINE 10 MG: 10 TABLET, FILM COATED ORAL at 15:55

## 2023-01-28 RX ADMIN — ACETAMINOPHEN 650 MG: 325 TABLET, FILM COATED ORAL at 05:20

## 2023-01-28 RX ADMIN — DOCUSATE SODIUM 100 MG: 100 CAPSULE, LIQUID FILLED ORAL at 11:55

## 2023-01-28 RX ADMIN — ACETAMINOPHEN 650 MG: 325 TABLET, FILM COATED ORAL at 11:55

## 2023-01-28 RX ADMIN — NICOTINE 14 MG: 14 PATCH, EXTENDED RELEASE TRANSDERMAL at 09:40

## 2023-01-28 RX ADMIN — MORPHINE SULFATE 45 MG: 15 TABLET ORAL at 11:55

## 2023-01-28 RX ADMIN — GABAPENTIN 300 MG: 300 CAPSULE ORAL at 21:59

## 2023-01-28 RX ADMIN — KETOROLAC TROMETHAMINE 10 MG: 10 TABLET, FILM COATED ORAL at 09:42

## 2023-01-28 RX ADMIN — MORPHINE SULFATE 45 MG: 15 TABLET ORAL at 18:17

## 2023-01-28 RX ADMIN — BUPRENORPHINE AND NALOXONE 4 MG: 2; .5 FILM BUCCAL; SUBLINGUAL at 18:17

## 2023-01-28 RX ADMIN — CEFAZOLIN SODIUM 2000 MG: 2 SOLUTION INTRAVENOUS at 14:13

## 2023-01-28 RX ADMIN — GABAPENTIN 300 MG: 300 CAPSULE ORAL at 09:38

## 2023-01-28 RX ADMIN — QUETIAPINE FUMARATE 100 MG: 100 TABLET ORAL at 21:59

## 2023-01-28 RX ADMIN — MORPHINE SULFATE 45 MG: 15 TABLET ORAL at 05:35

## 2023-01-28 RX ADMIN — KETOROLAC TROMETHAMINE 10 MG: 10 TABLET, FILM COATED ORAL at 22:11

## 2023-01-28 RX ADMIN — ACETAMINOPHEN 650 MG: 325 TABLET, FILM COATED ORAL at 00:18

## 2023-01-28 RX ADMIN — BUPRENORPHINE AND NALOXONE 4 MG: 2; .5 FILM BUCCAL; SUBLINGUAL at 09:42

## 2023-01-28 RX ADMIN — FAMOTIDINE 20 MG: 20 TABLET ORAL at 18:17

## 2023-01-28 RX ADMIN — MORPHINE SULFATE 8 MG: 10 INJECTION INTRAVENOUS at 19:58

## 2023-01-28 RX ADMIN — MORPHINE SULFATE 8 MG: 10 INJECTION INTRAVENOUS at 07:16

## 2023-01-28 NOTE — PROGRESS NOTES
[address]  Progress Note - Nilda Naik 1985, 41 y o  male MRN: [id number]  Unit/Bed#: 600-24 Encounter: [id number]  Primary Care Provider: Jose Carlos Hernandez DO   Date and time admitted to hospital: 1/18/2023  5:14 PM    * Bacteremia due to Staphylococcus aureus  Assessment & Plan  Blood cultures positive for staph aureus on 1/16/23  Negative blood cultures on 1/20/23  History of IV drug abuse  CECILIO negative for vegetations  CT negative for osteomyelitis    ID recommendations: IV Ancef 2g Q8 ending 2/16/2023, PICC line placed, patient not able to return home with PICC line in place due to IV drug abuse  1/28 - PICC line placed successfully on 1/27  Case management unable to find placement  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  Plan:  Continue IV Ancef 2g daily (ending Feb 16, 2023)  Acute right-sided low back pain with right-sided sciatica  Assessment & Plan  H/o right sided back pain with sciatica  Intermittent severe right hip pain, radiates into right lateral hip & buttocks  Pain rated 9/10  Patient unable to ambulate without cane  1/16/23 CT shows:  1  Mild multilevel disc degeneration  2  Grade 1 retrolisthesis of L5 on S1    3  Disc bulge with small herniation at L5-S1  Disc bulging from L2-3 through L4-5    4  No evidence of periostitis along the endplates to suggest advanced osteomyelitis on CT imaging  If there is clinical concern for underlying infection, MRI would be a more sensitive modality to assess for early changes of discitis Osteomyelitis  1/23/23 MRI w/o contrast shows -  No definite MR evidence for discitis osteoma myelitis as clinically questioned  Degenerative changes at L5-S1     1/24/23 ID recommending further imaging CT with contrast of abdomen, pelvis, right hip, reviewed imaging, no evidence of osteomyelitis    1/28/23 patient continues to complain of 10 out of 10 back pain    Discussed case with neurosurgery; no transfer or surgical intervention at this time  Manage pain acutely  May pursue nerve block as outpatient  Add oral Decadron 4 mg every 6 to pain regimen  Note; gabapentin will take 2 to 3 weeks to reach full effect  Plan:  Oral pain med regimen:  Scheduled Tylenol 650mg Q6   PRN Torodol 10mg Q6   Morphine 45mg PO every 6 as needed  Morphine 8mg IV prn for breakthrough pain  Gabapentin 300mg TID  Flexeril 10mg TID  Decadron oral 4 mg every 6      Intravenous drug abuse, continuous (HCC)  Assessment & Plan  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  Last use - IV,1 bag of fentanyl, at 12pm on 1/18/23  Patient started on Suboxone upon admission, maintenance dose 16 mg daily    Plan:  Patient has scheduled follow-up with Dr Geovany Patel in [place]  Suboxone 4mg QID   Seroquel 100 mg at night for sleep  Zofran as needed for nausea    Tobacco abuse  Assessment & Plan  History of tobacco smoking  Plan:  Nicotine patch  Smoking cessation    Ambulatory dysfunction  Assessment & Plan  Ambulatory dysfunction  Patient ambulatimg with a cane   Seen by OT: standby assist  OT Discharge Recommendation: Home with outpatient rehabilitation    1/26 PT/OT reports improved activity, using walker    Plan:  Continue PT/OT while admitted    Hepatitis C virus  Assessment & Plan  Most recent labs 3/7/2022  Positive Hep A  Positive Hep B surface antibody  Positive Hep C antibody  Not taking hepatitis meds    ID recs: Hep C RNA negative    Plan:  Follow-up with ID as outpatient          VTE Pharmacologic Prophylaxis: VTE Score: 1 Low Risk (Score 0-2) - Encourage Ambulation  Patient Centered Rounds: I performed bedside rounds with nursing staff today  Discussions with Specialists or Other Care Team Provider: Neurosurgery    Education and Discussions with Family / Patient: Attempted to update  (Jeffery Renee) via phone  Unable to contact  Time Spent for Care: 30 minutes   More than 50% of total time spent on counseling and coordination of care as described above  Current Length of Stay: 10 day(s)  Current Patient Status: Inpatient   Certification Statement: The patient will continue to require additional inpatient hospital stay due to 4 weeks of IV antibiotics  Discharge Plan: Discharge 2023    Code Status: Level 1 - Full Code    Subjective:   Patient seen at bedside this morning, laying in bed comfortably  Patient states that his pain is a little bit better  Not always 10 out of 10  Worse with movement and walking  Patient is refusing Lovenox due to injection delivery  Encourage patient to use compression boots  Adding new bowel regimen  Patient mostly concerned for his back pain associated with right shooting pain  Spoke with neurosurgery and we are adding Decadron to his pain regimen  Objective:     Vitals:   Temp (24hrs), Av 2 °F (36 2 °C), Min:97 1 °F (36 2 °C), Max:97 4 °F (36 3 °C)    Temp:  [97 1 °F (36 2 °C)-97 4 °F (36 3 °C)] 97 4 °F (36 3 °C)  HR:  [73-90] 77  Resp:  [16-20] 19  BP: (112-136)/(71-79) 112/71  SpO2:  [96 %-100 %] 97 %  Body mass index is 27 67 kg/m²  Input and Output Summary (last 24 hours): Intake/Output Summary (Last 24 hours) at 2023 1204  Last data filed at 2023 1250  Gross per 24 hour   Intake 120 ml   Output --   Net 120 ml       Physical Exam:   Physical Exam  Vitals and nursing note reviewed  Constitutional:       General: He is not in acute distress  Appearance: He is well-developed  HENT:      Head: Normocephalic and atraumatic  Right Ear: External ear normal       Left Ear: External ear normal       Nose: No rhinorrhea  Mouth/Throat:      Mouth: Mucous membranes are moist    Eyes:      Conjunctiva/sclera: Conjunctivae normal    Cardiovascular:      Rate and Rhythm: Normal rate and regular rhythm  Heart sounds: No murmur heard  Pulmonary:      Effort: Pulmonary effort is normal  No respiratory distress  Abdominal:      Palpations: Abdomen is soft  Tenderness: There is no abdominal tenderness  Musculoskeletal:         General: Tenderness present  No swelling  Cervical back: Neck supple  Comments: Decreased range of motion  Point tenderness  No erythema no swelling  Sensation intact   Skin:     General: Skin is warm and dry  Neurological:      General: No focal deficit present  Mental Status: He is alert  Mental status is at baseline  Psychiatric:         Mood and Affect: Mood normal          Behavior: Behavior normal          Additional Data:     Labs:  Results from last 7 days   Lab Units 01/24/23  0642   WBC Thousand/uL 7 57   HEMOGLOBIN g/dL 11 3*   HEMATOCRIT % 34 4*   PLATELETS Thousands/uL 424*   NEUTROS PCT % 65   LYMPHS PCT % 19   MONOS PCT % 10   EOS PCT % 4     Results from last 7 days   Lab Units 01/24/23  0642   SODIUM mmol/L 138   POTASSIUM mmol/L 4 2   CHLORIDE mmol/L 101   CO2 mmol/L 28   BUN mg/dL 16   CREATININE mg/dL 0 67   ANION GAP mmol/L 9   CALCIUM mg/dL 8 9   ALBUMIN g/dL 3 3*   TOTAL BILIRUBIN mg/dL 0 17*   ALK PHOS U/L 59   ALT U/L 9   AST U/L 13   GLUCOSE RANDOM mg/dL 90     Results from last 7 days   Lab Units 01/23/23  0437   INR  0 92             Results from last 7 days   Lab Units 01/24/23  0642 01/23/23  0437   PROCALCITONIN ng/ml 0 17 0 33*       Lines/Drains:  Invasive Devices     Peripherally Inserted Central Catheter Line  Duration           PICC Line 01/27/23 Right Other (Comment) <1 day                Central Line:  Goal for removal: Port accessed  Will de-access as appropriate             Recent Cultures (last 7 days):         Last 24 Hours Medication List:   Current Facility-Administered Medications   Medication Dose Route Frequency Provider Last Rate   • acetaminophen  650 mg Oral Q6H Sanna Chang MD     • buprenorphine-naloxone  4 mg Sublingual 4x Daily Tyra Galeano MD     • cefazolin  2,000 mg Intravenous Chiqui Carbone MD 2,000 mg (01/28/23 0520)   • cyclobenzaprine  10 mg Oral TID Keren Frank MD     • dexamethasone  4 mg Oral Q6H [address] Keren Frank MD     • docusate sodium  100 mg Oral BID Keren Frank MD     • enoxaparin  40 mg Subcutaneous Daily Savita Catherine MD     • famotidine  20 mg Oral BID Adelaide Peralta PA-C     • gabapentin  300 mg Oral TID Keren Frank MD     • ketorolac  10 mg Oral Q6H PRN Keren Frank MD     • morphine  45 mg Oral Q6H PRN Tyra Nogueira MD     • morphine injection  8 mg Intravenous Q6H PRN Tyra Nogueira MD     • naloxone  0 1 mg Intravenous PRN Vaibhav Lake MD     • nicotine  14 mg Transdermal Daily Savita Catherine MD     • ondansetron  4 mg Oral Q6H PRN Tyra Nogueira MD     • QUEtiapine  100 mg Oral HS Keren Frank MD          Today, Patient Was Seen By: Keren Frank MD    **Please Note: This note may have been constructed using a voice recognition system  **

## 2023-01-28 NOTE — ASSESSMENT & PLAN NOTE
H/o right sided back pain with sciatica  Intermittent severe right hip pain, radiates into right lateral hip & buttocks  Pain rated 9/10  Patient unable to ambulate without cane  1/16/23 CT shows:  1  Mild multilevel disc degeneration  2  Grade 1 retrolisthesis of L5 on S1    3  Disc bulge with small herniation at L5-S1  Disc bulging from L2-3 through L4-5    4  No evidence of periostitis along the endplates to suggest advanced osteomyelitis on CT imaging  If there is clinical concern for underlying infection, MRI would be a more sensitive modality to assess for early changes of discitis Osteomyelitis  1/23/23 MRI w/o contrast shows -  No definite MR evidence for discitis osteoma myelitis as clinically questioned  Degenerative changes at L5-S1     1/24/23 ID recommending further imaging CT with contrast of abdomen, pelvis, right hip, reviewed imaging, no evidence of osteomyelitis    1/28/23 patient continues to complain of 10 out of 10 back pain  Discussed case with neurosurgery; no transfer or surgical intervention at this time  Manage pain acutely  May pursue nerve block as outpatient  Add oral Decadron 4 mg every 6 to pain regimen  Note; gabapentin will take 2 to 3 weeks to reach full effect      Plan:  Oral pain med regimen:  Scheduled Tylenol 650mg Q6   PRN Torodol 10mg Q6   Morphine 45mg PO every 6 as needed  Morphine 8mg IV prn for breakthrough pain  Gabapentin 300mg TID  Flexeril 10mg TID  Decadron oral 4 mg every 6

## 2023-01-28 NOTE — ASSESSMENT & PLAN NOTE
Ambulatory dysfunction  Patient ambulatimg with a cane   Seen by OT: standby assist  OT Discharge Recommendation: Home with outpatient rehabilitation    1/26 PT/OT reports improved activity, using walker    Plan:  Continue PT/OT while admitted

## 2023-01-28 NOTE — ASSESSMENT & PLAN NOTE
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  Last use - IV,1 bag of fentanyl, at 12pm on 1/18/23  Patient started on Suboxone upon admission, maintenance dose 16 mg daily    Plan:  Patient has scheduled follow-up with Dr Tapan Brewer in [place]  Suboxone 4mg QID   Seroquel 100 mg at night for sleep  Zofran as needed for nausea

## 2023-01-28 NOTE — ASSESSMENT & PLAN NOTE
Blood cultures positive for staph aureus on 1/16/23  Negative blood cultures on 1/20/23  History of IV drug abuse  CECILIO negative for vegetations  CT negative for osteomyelitis    ID recommendations: IV Ancef 2g Q8 ending 2/16/2023, PICC line placed, patient not able to return home with PICC line in place due to IV drug abuse  1/28 - PICC line placed successfully on 1/27  Case management unable to find placement  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  Plan:  Continue IV Ancef 2g daily (ending Feb 16, 2023)

## 2023-01-28 NOTE — PLAN OF CARE
Problem: PAIN - ADULT  Goal: Verbalizes/displays adequate comfort level or baseline comfort level  Description: Interventions:  - Encourage patient to monitor pain and request assistance  - Assess pain using appropriate pain scale (0-10 pain scale)  - Administer analgesics based on type and severity of pain and evaluate response  - Implement non-pharmacological measures as appropriate and evaluate response  - Consider cultural and social influences on pain and pain management  - Notify physician/advanced practitioner if interventions unsuccessful or patient reports new pain  Outcome: Progressing     Problem: INFECTION - ADULT  Goal: Absence or prevention of progression during hospitalization  Description: INTERVENTIONS:  - Assess and monitor for signs and symptoms of infection  - Monitor lab/diagnostic results  - Monitor all insertion sites, i e  indwelling lines  - Administer medications as ordered  - Instruct and encourage patient and family to use good hand hygiene technique  Outcome: Progressing     Problem: DISCHARGE PLANNING  Goal: Discharge to home or other facility with appropriate resources  Description: INTERVENTIONS:  - Identify barriers to discharge w/patient and caregiver  - Arrange for needed discharge resources and transportation as appropriate  - Identify discharge learning needs (meds, wound care, etc )  - Refer to Case Management Department for coordinating discharge planning if the patient needs post-hospital services based on physician/advanced practitioner order or complex needs related to functional status, cognitive ability, or social support system  Outcome: Progressing     Problem: Knowledge Deficit  Goal: Patient/family/caregiver demonstrates understanding of disease process, treatment plan, medications, and discharge instructions  Description: Complete learning assessment and assess knowledge base    Interventions:  - Provide teaching at level of understanding  - Provide teaching via preferred learning methods  Outcome: Progressing     Problem: MUSCULOSKELETAL - ADULT  Goal: Maintain or return mobility to safest level of function  Description: INTERVENTIONS:  - Assess patient's ability to carry out ADLs; (independent)  - Assess/evaluate cause of self-care deficits (limited movement, pain, crutches/walker)  - Assess range of motion  - Assess patient's mobility (modified independent)  - Assess patient's need for assistive devices and provide as appropriate  - Encourage maximum independence but intervene and supervise when necessary  - Involve family in performance of ADLs  - Assess for home care needs following discharge   - Consider OT consult to assist with ADL evaluation and planning for discharge  - Provide patient education as appropriate  Outcome: Progressing  Goal: Maintain proper alignment of affected body part  Description: INTERVENTIONS:  - Support, maintain and protect limb and body alignment  - Provide patient/ family with appropriate education  Outcome: Progressing     Problem: MOBILITY - ADULT  Goal: Maintain or return to baseline ADL function  Description: INTERVENTIONS:  -  Assess patient's ability to carry out ADLs; (independent)  - Assess/evaluate cause of self-care deficits (limited movement, pain, crutches/walker)  - Assess range of motion  - Assess patient's mobility; (modified independent)  - Assess patient's need for assistive devices and provide as appropriate  - Encourage maximum independence but intervene and supervise when necessary  - Involve family in performance of ADLs  - Assess for home care needs following discharge   - Consider OT consult to assist with ADL evaluation and planning for discharge  - Provide patient education as appropriate  Outcome: Progressing  Goal: Maintains/Returns to pre admission functional level  Description: INTERVENTIONS:  - Perform BMAT or MOVE assessment daily    - Set and communicate daily mobility goal to care team and patient/family/caregiver     - Collaborate with rehabilitation services on mobility goals if consulted  - Ambulate patient 3 times a day  - Out of bed to chair 3 times a day   - Out of bed for meals 3 times a day  - Out of bed for toileting  - Record patient progress and toleration of activity level   Outcome: Progressing

## 2023-01-29 LAB
ALBUMIN SERPL BCP-MCNC: 3.6 G/DL (ref 3.5–5)
ALP SERPL-CCNC: 76 U/L (ref 34–104)
ALT SERPL W P-5'-P-CCNC: 17 U/L (ref 7–52)
ANION GAP SERPL CALCULATED.3IONS-SCNC: 9 MMOL/L (ref 4–13)
AST SERPL W P-5'-P-CCNC: 15 U/L (ref 13–39)
BASOPHILS # BLD AUTO: 0.02 THOUSANDS/ÂΜL (ref 0–0.1)
BASOPHILS NFR BLD AUTO: 0 % (ref 0–1)
BILIRUB SERPL-MCNC: 0.15 MG/DL (ref 0.2–1)
BUN SERPL-MCNC: 18 MG/DL (ref 5–25)
CALCIUM SERPL-MCNC: 9 MG/DL (ref 8.4–10.2)
CHLORIDE SERPL-SCNC: 100 MMOL/L (ref 96–108)
CO2 SERPL-SCNC: 26 MMOL/L (ref 21–32)
CREAT SERPL-MCNC: 0.69 MG/DL (ref 0.6–1.3)
EOSINOPHIL # BLD AUTO: 0 THOUSAND/ÂΜL (ref 0–0.61)
EOSINOPHIL NFR BLD AUTO: 0 % (ref 0–6)
ERYTHROCYTE [DISTWIDTH] IN BLOOD BY AUTOMATED COUNT: 13.2 % (ref 11.6–15.1)
GFR SERPL CREATININE-BSD FRML MDRD: 121 ML/MIN/1.73SQ M
GLUCOSE SERPL-MCNC: 188 MG/DL (ref 65–140)
HCT VFR BLD AUTO: 32.2 % (ref 36.5–49.3)
HGB BLD-MCNC: 10.7 G/DL (ref 12–17)
IMM GRANULOCYTES # BLD AUTO: 0.13 THOUSAND/UL (ref 0–0.2)
IMM GRANULOCYTES NFR BLD AUTO: 1 % (ref 0–2)
LYMPHOCYTES # BLD AUTO: 1.01 THOUSANDS/ÂΜL (ref 0.6–4.47)
LYMPHOCYTES NFR BLD AUTO: 8 % (ref 14–44)
MAGNESIUM SERPL-MCNC: 1.7 MG/DL (ref 1.9–2.7)
MCH RBC QN AUTO: 29.2 PG (ref 26.8–34.3)
MCHC RBC AUTO-ENTMCNC: 33.2 G/DL (ref 31.4–37.4)
MCV RBC AUTO: 88 FL (ref 82–98)
MONOCYTES # BLD AUTO: 0.19 THOUSAND/ÂΜL (ref 0.17–1.22)
MONOCYTES NFR BLD AUTO: 2 % (ref 4–12)
NEUTROPHILS # BLD AUTO: 10.69 THOUSANDS/ÂΜL (ref 1.85–7.62)
NEUTS SEG NFR BLD AUTO: 89 % (ref 43–75)
NRBC BLD AUTO-RTO: 0 /100 WBCS
PHOSPHATE SERPL-MCNC: 3.1 MG/DL (ref 2.7–4.5)
PLATELET # BLD AUTO: 452 THOUSANDS/UL (ref 149–390)
PMV BLD AUTO: 8.4 FL (ref 8.9–12.7)
POTASSIUM SERPL-SCNC: 4 MMOL/L (ref 3.5–5.3)
PROT SERPL-MCNC: 7.4 G/DL (ref 6.4–8.4)
RBC # BLD AUTO: 3.67 MILLION/UL (ref 3.88–5.62)
SODIUM SERPL-SCNC: 135 MMOL/L (ref 135–147)
WBC # BLD AUTO: 12.04 THOUSAND/UL (ref 4.31–10.16)

## 2023-01-29 RX ADMIN — GABAPENTIN 300 MG: 300 CAPSULE ORAL at 21:57

## 2023-01-29 RX ADMIN — MORPHINE SULFATE 8 MG: 10 INJECTION INTRAVENOUS at 14:22

## 2023-01-29 RX ADMIN — BUPRENORPHINE AND NALOXONE 4 MG: 2; .5 FILM BUCCAL; SUBLINGUAL at 17:46

## 2023-01-29 RX ADMIN — ACETAMINOPHEN 650 MG: 325 TABLET, FILM COATED ORAL at 17:45

## 2023-01-29 RX ADMIN — MORPHINE SULFATE 8 MG: 10 INJECTION INTRAVENOUS at 07:53

## 2023-01-29 RX ADMIN — GABAPENTIN 300 MG: 300 CAPSULE ORAL at 17:45

## 2023-01-29 RX ADMIN — DEXAMETHASONE 4 MG: 4 TABLET ORAL at 17:45

## 2023-01-29 RX ADMIN — KETOROLAC TROMETHAMINE 10 MG: 10 TABLET, FILM COATED ORAL at 18:25

## 2023-01-29 RX ADMIN — FAMOTIDINE 20 MG: 20 TABLET ORAL at 17:45

## 2023-01-29 RX ADMIN — NICOTINE 14 MG: 14 PATCH, EXTENDED RELEASE TRANSDERMAL at 09:39

## 2023-01-29 RX ADMIN — KETOROLAC TROMETHAMINE 10 MG: 10 TABLET, FILM COATED ORAL at 12:15

## 2023-01-29 RX ADMIN — CEFAZOLIN SODIUM 2000 MG: 2 SOLUTION INTRAVENOUS at 21:57

## 2023-01-29 RX ADMIN — MORPHINE SULFATE 45 MG: 15 TABLET ORAL at 09:48

## 2023-01-29 RX ADMIN — DEXAMETHASONE 4 MG: 4 TABLET ORAL at 12:15

## 2023-01-29 RX ADMIN — CEFAZOLIN SODIUM 2000 MG: 2 SOLUTION INTRAVENOUS at 06:16

## 2023-01-29 RX ADMIN — ACETAMINOPHEN 650 MG: 325 TABLET, FILM COATED ORAL at 12:15

## 2023-01-29 RX ADMIN — CEFAZOLIN SODIUM 2000 MG: 2 SOLUTION INTRAVENOUS at 14:23

## 2023-01-29 RX ADMIN — DEXAMETHASONE 4 MG: 4 TABLET ORAL at 06:16

## 2023-01-29 RX ADMIN — DEXAMETHASONE 4 MG: 4 TABLET ORAL at 00:15

## 2023-01-29 RX ADMIN — FAMOTIDINE 20 MG: 20 TABLET ORAL at 09:38

## 2023-01-29 RX ADMIN — BUPRENORPHINE AND NALOXONE 4 MG: 2; .5 FILM BUCCAL; SUBLINGUAL at 21:56

## 2023-01-29 RX ADMIN — CYCLOBENZAPRINE 10 MG: 10 TABLET, FILM COATED ORAL at 09:39

## 2023-01-29 RX ADMIN — MORPHINE SULFATE 45 MG: 15 TABLET ORAL at 22:41

## 2023-01-29 RX ADMIN — BUPRENORPHINE AND NALOXONE 4 MG: 2; .5 FILM BUCCAL; SUBLINGUAL at 13:27

## 2023-01-29 RX ADMIN — GABAPENTIN 300 MG: 300 CAPSULE ORAL at 09:38

## 2023-01-29 RX ADMIN — ACETAMINOPHEN 650 MG: 325 TABLET, FILM COATED ORAL at 06:16

## 2023-01-29 RX ADMIN — MORPHINE SULFATE 45 MG: 15 TABLET ORAL at 01:23

## 2023-01-29 RX ADMIN — BUPRENORPHINE AND NALOXONE 4 MG: 2; .5 FILM BUCCAL; SUBLINGUAL at 09:42

## 2023-01-29 RX ADMIN — MORPHINE SULFATE 8 MG: 10 INJECTION INTRAVENOUS at 20:38

## 2023-01-29 RX ADMIN — CYCLOBENZAPRINE 10 MG: 10 TABLET, FILM COATED ORAL at 18:26

## 2023-01-29 RX ADMIN — QUETIAPINE FUMARATE 100 MG: 100 TABLET ORAL at 21:57

## 2023-01-29 RX ADMIN — MORPHINE SULFATE 45 MG: 15 TABLET ORAL at 16:14

## 2023-01-29 RX ADMIN — CYCLOBENZAPRINE 10 MG: 10 TABLET, FILM COATED ORAL at 21:56

## 2023-01-29 RX ADMIN — ACETAMINOPHEN 650 MG: 325 TABLET, FILM COATED ORAL at 00:15

## 2023-01-29 NOTE — PROGRESS NOTES
[address] [facility] [address]  Progress Note - Srinivas Putnam 1985, 41 y o  male MRN: [id number]  Unit/Bed#: 306-03 Encounter: [id number]  Primary Care Provider: Marisol Peters DO   Date and time admitted to hospital: 1/18/2023  5:14 PM    Acute right-sided low back pain with right-sided sciatica  Assessment & Plan  H/o right sided back pain with sciatica  Intermittent severe right hip pain, radiates into right lateral hip & buttocks  Pain rated 9/10  Patient unable to ambulate without cane  1/16/23 CT shows:  1  Mild multilevel disc degeneration  2  Grade 1 retrolisthesis of L5 on S1    3  Disc bulge with small herniation at L5-S1  Disc bulging from L2-3 through L4-5    4  No evidence of periostitis along the endplates to suggest advanced osteomyelitis on CT imaging  If there is clinical concern for underlying infection, MRI would be a more sensitive modality to assess for early changes of discitis Osteomyelitis  1/23/23 MRI w/o contrast shows -  No definite MR evidence for discitis osteoma myelitis as clinically questioned  Degenerative changes at L5-S1     1/24/23 ID recommending further imaging CT with contrast of abdomen, pelvis, right hip, reviewed imaging, no evidence of osteomyelitis    1/28/23 patient continues to complain of 10 out of 10 back pain  Discussed case with neurosurgery; no transfer or surgical intervention at this time  Manage pain acutely  May pursue nerve block as outpatient  Add oral Decadron 4 mg every 6 to pain regimen  Note; gabapentin will take 2 to 3 weeks to reach full effect      Plan:  Oral pain med regimen:  Scheduled Tylenol 650mg Q6   PRN Torodol 10mg Q6   Morphine 45mg PO every 6 as needed  Morphine 8mg IV prn for breakthrough pain  Gabapentin 300mg TID  Flexeril 10mg TID  Decadron oral 4 mg every 6      * Bacteremia due to Staphylococcus aureus  Assessment & Plan  Blood cultures positive for staph aureus on 1/16/23  Negative blood cultures on 1/20/23  History of IV drug abuse  CECILIO negative for vegetations  CT negative for osteomyelitis    ID recommendations: IV Ancef 2g Q8 ending 2/16/2023, PICC line placed, patient not able to return home with PICC line in place due to IV drug abuse  1/28 - PICC line placed successfully on 1/27  Case management unable to find placement  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  Plan:  Continue IV Ancef 2g daily (ending Feb 16, 2023)  Ambulatory dysfunction  Assessment & Plan  Ambulatory dysfunction  Patient ambulatimg with a cane   Seen by OT: standby assist  OT Discharge Recommendation: Home with outpatient rehabilitation    1/26 PT/OT reports improved activity, using walker    Plan:  Continue PT/OT while admitted    Intravenous drug abuse, continuous ([facility] [address] )  Assessment & Plan  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  Last use - IV,1 bag of fentanyl, at 12pm on 1/18/23  Patient started on Suboxone upon admission, maintenance dose 16 mg daily    Plan:  Patient has scheduled follow-up with Dr Bree Martino in [place]  Suboxone 4mg QID   Seroquel 100 mg at night for sleep  Zofran as needed for nausea        Progress Note - Destinee Garcia 41 y o  male MRN: [id number]    Unit/Bed#: 491-62 Encounter: [id number]        Subjective:   Patient seen and examined  He is requesting additional pain medications , either increasing mg or frequency     Objective:     Vitals:   Vitals:    01/29/23 0747   BP: 137/87   Pulse: 91   Resp: 18   Temp: 97 8 °F (36 6 °C)   SpO2: 96%     Body mass index is 27 67 kg/m²      Intake/Output Summary (Last 24 hours) at 1/29/2023 1131  Last data filed at 1/29/2023 0908  Gross per 24 hour   Intake 1020 ml   Output 1550 ml   Net -530 ml       Physical Exam:   /87   Pulse 91   Temp 97 8 °F (36 6 °C)   Resp 18   Ht 6' (1 829 m)   Wt 92 5 kg (204 lb)   SpO2 96%   BMI 27 67 kg/m²   General appearance: alert and oriented, in no acute distress  Lungs: clear to auscultation bilaterally  Heart: regular rate and rhythm, S1, S2 normal, no murmur, click, rub or gallop  Abdomen: soft, non-tender; bowel sounds normal; no masses,  no organomegaly  Extremities: extremities normal, warm and well-perfused; no cyanosis, clubbing, or edema  Neurologic: Grossly normal     Invasive Devices     Peripherally Inserted Central Catheter Line  Duration           PICC Line 01/27/23 Right Other (Comment) 1 day                Results from last 7 days   Lab Units 01/29/23  0736 01/24/23  0642 01/23/23  0437   WBC Thousand/uL 12 04* 7 57 6 95   HEMOGLOBIN g/dL 10 7* 11 3* 10 5*   HEMATOCRIT % 32 2* 34 4* 32 0*   PLATELETS Thousands/uL 452* 424* 390       Results from last 7 days   Lab Units 01/29/23  0736 01/24/23  0642 01/23/23  0437   POTASSIUM mmol/L 4 0 4 2 4 0   CHLORIDE mmol/L 100 101 103   CO2 mmol/L 26 28 27   BUN mg/dL 18 16 12   CREATININE mg/dL 0 69 0 67 0 60   CALCIUM mg/dL 9 0 8 9 8 4   ALK PHOS U/L 76 59 54   ALT U/L 17 9 11   AST U/L 15 13 12*       Medication Administration - last 24 hours from 01/28/2023 1131 to 01/29/2023 1131       Date/Time Order Dose Route Action Action by     01/29/2023 0939 EST nicotine (NICODERM CQ) 14 mg/24hr TD 24 hr patch 14 mg 14 mg Transdermal Medication [employer], RN     01/29/2023 0850 EST nicotine (NICODERM CQ) 14 mg/24hr TD 24 hr patch 14 mg 14 mg Transdermal Patch Removed Charles Damon RN     01/29/2023 1333 EST enoxaparin (LOVENOX) subcutaneous injection 40 mg 40 mg Subcutaneous Refused Charles Damon RN     01/29/2023 0912 EST acetaminophen (TYLENOL) tablet 650 mg 650 mg Oral Given Lovely Seymour LPN     98/35/0501 4741 EST acetaminophen (TYLENOL) tablet 650 mg 650 mg Oral Given Lovely Seymour LPN     22/69/3506 9954 EST acetaminophen (TYLENOL) tablet 650 mg 650 mg Oral Given Charles Damon RN     01/28/2023 1155 EST acetaminophen (TYLENOL) tablet 650 mg 650 mg Oral Given Charles Damon RN     01/29/2023 6864 EST ceFAZolin (ANCEF) IVPB (premix in dextrose) 2,000 mg 50 mL 2,000 mg Intravenous [address] Laney Moan, LPN     25/29/9665 1717 EST ceFAZolin (ANCEF) IVPB (premix in dextrose) 2,000 mg 50 mL 2,000 mg Intravenous [place] Laney Givens, LPN     59/76/1132 3787 EST ceFAZolin (ANCEF) IVPB (premix in dextrose) 2,000 mg 50 mL 2,000 mg Intravenous [address] [facility], [address] [place] [address]     01/28/2023 2211 EST ketorolac (TORADOL) tablet 10 mg 10 mg Oral Given Laney Moan, LPN     40/29/2397 6910 EST ketorolac (TORADOL) tablet 10 mg 10 mg Oral Given Elder Killer, RN     01/29/2023 0338 EST famotidine (PEPCID) tablet 20 mg 20 mg Oral Given Rome Killer, RN     01/28/2023 1817 EST famotidine (PEPCID) tablet 20 mg 20 mg Oral Given Rome Killer, RN     01/29/2023 4832 EST gabapentin (NEURONTIN) capsule 300 mg 300 mg Oral Given Rome Killer, RN     01/28/2023 2159 EST gabapentin (NEURONTIN) capsule 300 mg 300 mg Oral Given Laney Moan, LPN     21/25/8063 1861 EST gabapentin (NEURONTIN) capsule 300 mg 300 mg Oral Given Rome Killer, RN     01/29/2023 4593 EST cyclobenzaprine (FLEXERIL) tablet 10 mg 10 mg Oral Given Rome Killer, RN     01/28/2023 2159 EST cyclobenzaprine (FLEXERIL) tablet 10 mg 10 mg Oral Given Laney Moan, LPN     01/22/8634 8525 EST cyclobenzaprine (FLEXERIL) tablet 10 mg 10 mg Oral Given Rome Killer, RN     01/29/2023 1259 EST morphine injection 8 mg 8 mg Intravenous Given Rome Killer, RN     01/28/2023 1958 EST morphine injection 8 mg 8 mg Intravenous Given Yessenian Alvarado, RN     01/28/2023 1334 EST morphine injection 8 mg 8 mg Intravenous Given Rome Killer, RN     01/29/2023 5796 EST buprenorphine-naloxone (Suboxone) film 4 mg 4 mg Sublingual Given Elder Pastrana RN     01/28/2023 2159 EST buprenorphine-naloxone (Suboxone) film 4 mg 4 mg Sublingual Given Laney Givens LPN     48/58/2914 0425 EST buprenorphine-naloxone (Suboxone) film 4 mg 4 mg Sublingual Given Elder Pastrana RN 01/28/2023 1333 EST buprenorphine-naloxone (Suboxone) film 4 mg 4 mg Sublingual Given Salome Poet, RN     01/28/2023 2159 EST QUEtiapine (SEROquel) tablet 100 mg 100 mg Oral Given Cinderella Chough, LPN     32/28/0064 2847 EST morphine (MSIR) IR tablet 45 mg 45 mg Oral Given Salome Poet, RN     01/29/2023 0123 EST morphine (MSIR) IR tablet 45 mg 45 mg Oral Given Cinderella Chough, LPN     45/76/0325 7093 EST morphine (MSIR) IR tablet 45 mg 45 mg Oral Given Salome Poet, RN     01/28/2023 1155 EST morphine (MSIR) IR tablet 45 mg 45 mg Oral Given Salome Poet, RN     01/29/2023 0107 EST dexamethasone (DECADRON) tablet 4 mg 4 mg Oral Given Cinderella Chough, LPN     05/12/5547 7500 EST dexamethasone (DECADRON) tablet 4 mg 4 mg Oral Given Cinderella Chough, LPN     57/82/9609 4475 EST dexamethasone (DECADRON) tablet 4 mg 4 mg Oral Given Salome Poet, RN     01/28/2023 1155 EST dexamethasone (DECADRON) tablet 4 mg 4 mg Oral Given Salome Poet, RN     01/29/2023 3246 EST docusate sodium (COLACE) capsule 100 mg 100 mg Oral Kim, RN     01/28/2023 1817 EST docusate sodium (COLACE) capsule 100 mg 100 mg Oral Refused Salome Poet, RN     01/28/2023 1155 EST docusate sodium (COLACE) capsule 100 mg 100 mg Oral Given Salome Poet, RN            Lab, Imaging and other studies: I have personally reviewed pertinent reports      VTE Pharmacologic Prophylaxis: Enoxaparin (Lovenox)  VTE Mechanical Prophylaxis: sequential compression device     Perla Porras MD  1/29/2023,11:31 AM

## 2023-01-29 NOTE — ASSESSMENT & PLAN NOTE
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  Last use - IV,1 bag of fentanyl, at 12pm on 1/18/23  Patient started on Suboxone upon admission, maintenance dose 16 mg daily    Plan:  Patient has scheduled follow-up with Dr Craig Leon in [place]  Suboxone 4mg QID   Seroquel 100 mg at night for sleep  Zofran as needed for nausea

## 2023-01-30 PROBLEM — E83.42 HYPOMAGNESEMIA: Status: ACTIVE | Noted: 2023-01-30

## 2023-01-30 RX ORDER — MORPHINE SULFATE 15 MG/1
60 TABLET ORAL EVERY 6 HOURS PRN
Status: DISCONTINUED | OUTPATIENT
Start: 2023-01-30 | End: 2023-02-07

## 2023-01-30 RX ORDER — TIZANIDINE 4 MG/1
4 TABLET ORAL EVERY 8 HOURS PRN
Status: DISCONTINUED | OUTPATIENT
Start: 2023-01-30 | End: 2023-02-09

## 2023-01-30 RX ADMIN — ACETAMINOPHEN 650 MG: 325 TABLET, FILM COATED ORAL at 12:51

## 2023-01-30 RX ADMIN — TIZANIDINE 4 MG: 4 TABLET ORAL at 23:31

## 2023-01-30 RX ADMIN — CEFAZOLIN SODIUM 2000 MG: 2 SOLUTION INTRAVENOUS at 23:20

## 2023-01-30 RX ADMIN — BUPRENORPHINE AND NALOXONE 4 MG: 2; .5 FILM BUCCAL; SUBLINGUAL at 17:07

## 2023-01-30 RX ADMIN — DEXAMETHASONE 4 MG: 4 TABLET ORAL at 23:19

## 2023-01-30 RX ADMIN — CYCLOBENZAPRINE 10 MG: 10 TABLET, FILM COATED ORAL at 08:51

## 2023-01-30 RX ADMIN — QUETIAPINE FUMARATE 100 MG: 100 TABLET ORAL at 23:20

## 2023-01-30 RX ADMIN — TIZANIDINE 4 MG: 4 TABLET ORAL at 14:40

## 2023-01-30 RX ADMIN — DEXAMETHASONE 4 MG: 4 TABLET ORAL at 05:57

## 2023-01-30 RX ADMIN — ACETAMINOPHEN 650 MG: 325 TABLET, FILM COATED ORAL at 17:07

## 2023-01-30 RX ADMIN — MORPHINE SULFATE 8 MG: 10 INJECTION INTRAVENOUS at 08:50

## 2023-01-30 RX ADMIN — MORPHINE SULFATE 60 MG: 15 TABLET ORAL at 13:22

## 2023-01-30 RX ADMIN — KETOROLAC TROMETHAMINE 10 MG: 10 TABLET, FILM COATED ORAL at 23:19

## 2023-01-30 RX ADMIN — GABAPENTIN 300 MG: 300 CAPSULE ORAL at 23:19

## 2023-01-30 RX ADMIN — CEFAZOLIN SODIUM 2000 MG: 2 SOLUTION INTRAVENOUS at 14:40

## 2023-01-30 RX ADMIN — KETOROLAC TROMETHAMINE 10 MG: 10 TABLET, FILM COATED ORAL at 10:58

## 2023-01-30 RX ADMIN — DEXAMETHASONE 4 MG: 4 TABLET ORAL at 00:17

## 2023-01-30 RX ADMIN — MORPHINE SULFATE 8 MG: 10 INJECTION INTRAVENOUS at 21:04

## 2023-01-30 RX ADMIN — GABAPENTIN 300 MG: 300 CAPSULE ORAL at 17:07

## 2023-01-30 RX ADMIN — NICOTINE 14 MG: 14 PATCH, EXTENDED RELEASE TRANSDERMAL at 08:52

## 2023-01-30 RX ADMIN — BUPRENORPHINE AND NALOXONE 4 MG: 2; .5 FILM BUCCAL; SUBLINGUAL at 23:20

## 2023-01-30 RX ADMIN — CEFAZOLIN SODIUM 2000 MG: 2 SOLUTION INTRAVENOUS at 05:57

## 2023-01-30 RX ADMIN — MORPHINE SULFATE 8 MG: 10 INJECTION INTRAVENOUS at 14:49

## 2023-01-30 RX ADMIN — KETOROLAC TROMETHAMINE 10 MG: 10 TABLET, FILM COATED ORAL at 17:07

## 2023-01-30 RX ADMIN — MORPHINE SULFATE 60 MG: 15 TABLET ORAL at 19:59

## 2023-01-30 RX ADMIN — DEXAMETHASONE 4 MG: 4 TABLET ORAL at 12:51

## 2023-01-30 RX ADMIN — KETOROLAC TROMETHAMINE 10 MG: 10 TABLET, FILM COATED ORAL at 00:46

## 2023-01-30 RX ADMIN — MORPHINE SULFATE 45 MG: 15 TABLET ORAL at 06:40

## 2023-01-30 RX ADMIN — FAMOTIDINE 20 MG: 20 TABLET ORAL at 08:51

## 2023-01-30 RX ADMIN — BUPRENORPHINE AND NALOXONE 4 MG: 2; .5 FILM BUCCAL; SUBLINGUAL at 12:51

## 2023-01-30 RX ADMIN — ACETAMINOPHEN 650 MG: 325 TABLET, FILM COATED ORAL at 00:17

## 2023-01-30 RX ADMIN — DEXAMETHASONE 4 MG: 4 TABLET ORAL at 17:07

## 2023-01-30 RX ADMIN — GABAPENTIN 300 MG: 300 CAPSULE ORAL at 08:51

## 2023-01-30 RX ADMIN — BUPRENORPHINE AND NALOXONE 4 MG: 2; .5 FILM BUCCAL; SUBLINGUAL at 08:51

## 2023-01-30 RX ADMIN — ACETAMINOPHEN 650 MG: 325 TABLET, FILM COATED ORAL at 05:57

## 2023-01-30 RX ADMIN — ACETAMINOPHEN 650 MG: 325 TABLET, FILM COATED ORAL at 23:19

## 2023-01-30 NOTE — UTILIZATION REVIEW
Continued Stay Review    Date: 1-28-23                          Current Patient Class:  Inpatient  Current Level of Care: med surg     HPI:38 y o  male initially admitted on 1- 18-23     Assessment/Plan:     Patient with staph bacteremia treated with iv ancef q8 hr through 2-16-23   Picc placed 1-27  Positive hepatitis A/B/C   Patient reports acute right sided back and right hip pain with sciatic nerve pain  Taking mso4 45 mg po q6 hr as needed with gabapentin, flexeril, po decadron   working on placement as patient needs  supervised iv antibiotics due to current  iv drug abuse ( last use 1-18-23)  Continue suboxone  Vital Signs:     Date/Time Temp Pulse Resp BP MAP (mmHg) SpO2 O2 Device   01/28/23 22:03:26 -- 90 16 134/86 102 95 % --   01/28/23 2020 -- -- -- -- -- 97 % None (Room air)   01/28/23 06:59:34 97 4 °F (36 3 °C)   Abnormal  77 19 112/71 85 97 %          Pertinent Labs/Diagnostic Results:       1/23/23 MRI w/o contrast shows -  No definite MR evidence for discitis osteoma myelitis as clinically questioned   Degenerative changes at L5-S1      1/24/23 ID recommending further imaging CT with contrast of abdomen, pelvis, right hip, reviewed imaging, no evidence of osteomyelitis      Results from last 7 days   Lab Units 01/29/23  0736 01/24/23  0642   WBC Thousand/uL 12 04* 7 57   HEMOGLOBIN g/dL 10 7* 11 3*   HEMATOCRIT % 32 2* 34 4*   PLATELETS Thousands/uL 452* 424*   NEUTROS ABS Thousands/µL 10 69* 4 92         Results from last 7 days   Lab Units 01/29/23  0736 01/24/23  0642   SODIUM mmol/L 135 138   POTASSIUM mmol/L 4 0 4 2   CHLORIDE mmol/L 100 101   CO2 mmol/L 26 28   ANION GAP mmol/L 9 9   BUN mg/dL 18 16   CREATININE mg/dL 0 69 0 67   EGFR ml/min/1 73sq m 121 122   CALCIUM mg/dL 9 0 8 9   MAGNESIUM mg/dL 1 7* 2 1   PHOSPHORUS mg/dL 3 1  --      Results from last 7 days   Lab Units 01/29/23  0736 01/24/23  0642   AST U/L 15 13   ALT U/L 17 9   ALK PHOS U/L 76 59   TOTAL PROTEIN g/dL 7 4 7 1   ALBUMIN g/dL 3 6 3 3*   TOTAL BILIRUBIN mg/dL 0 15* 0 17*         Results from last 7 days   Lab Units 01/29/23  0736 01/24/23  0642   GLUCOSE RANDOM mg/dL 188* 90         Results from last 7 days   Lab Units 01/24/23  0642   PROCALCITONIN ng/ml 0 17       Scheduled Medications:    acetaminophen, 650 mg, Oral, Q6H HERMAN  buprenorphine-naloxone, 4 mg, Sublingual, 4x Daily  cefazolin, 2,000 mg, Intravenous, Q8H  dexamethasone, 4 mg, Oral, Q6H HERMAN  docusate sodium, 100 mg, Oral, BID  enoxaparin, 40 mg, Subcutaneous, Daily  famotidine, 20 mg, Oral, BID  gabapentin, 300 mg, Oral, TID  nicotine, 14 mg, Transdermal, Daily  QUEtiapine, 100 mg, Oral, HS      Continuous IV Infusions:     PRN Meds:  ketorolac, 10 mg, Oral, Q6H PRN  morphine, 60 mg, Oral, Q6H PRN  morphine injection, 8 mg, Intravenous, Q6H PRN  naloxone, 0 1 mg, Intravenous, PRN  ondansetron, 4 mg, Oral, Q6H PRN  tiZANidine, 4 mg, Oral, Q8H PRN        Discharge Plan: to be determined     Network Utilization Review Department  ATTENTION: Please call with any questions or concerns to [phone] and carefully listen to the prompts so that you are directed to the right person  All voicemails are confidential   [facility] all requests for admission clinical reviews, approved or denied determinations and any other requests to dedicated fax number below belonging to the campus where the patient is receiving treatment   List of dedicated fax numbers for the Facilities:  [address] DENIALS (Administrative/Medical Necessity) [phone]   [address] [place] [address] (Maternity/NICU/Pediatrics) Arlene Dacosta 172 [phone]   [facility]  [phone]   [address] [place] [address] 310 [facility] [place] [address] [phone]

## 2023-01-30 NOTE — PROGRESS NOTES
[address]  Progress Note - Simon Ventura 1985, 41 y o  male MRN: [id number]  Unit/Bed#: 506-72 Encounter: [id number]  Primary Care Provider: Santo Reyes DO   Date and time admitted to hospital: 1/18/2023  5:14 PM    * Bacteremia due to Staphylococcus aureus  Assessment & Plan  Blood cultures positive for staph aureus on 1/16/23  Negative blood cultures on 1/20/23  History of IV drug abuse  CECILIO negative for vegetations  CT negative for osteomyelitis    ID recommendations: IV Ancef 2g Q8 ending 2/16/2023, PICC line placed, patient not able to return home with PICC line in place due to IV drug abuse  1/28 - PICC line placed successfully on 1/27  Case management unable to find placement  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  Plan:  Continue IV Ancef 2g daily (ending Feb 16, 2023)        Acute right-sided low back pain with right-sided sciatica  Assessment & Plan  H/o right sided back pain with sciatica  Intermittent severe right hip pain, radiates into right lateral hip & buttocks  Pain rated 9/10  Patient unable to ambulate without cane  1/16/23 CT shows:  1  Mild multilevel disc degeneration  2  Grade 1 retrolisthesis of L5 on S1    3  Disc bulge with small herniation at L5-S1  Disc bulging from L2-3 through L4-5    4  No evidence of periostitis along the endplates to suggest advanced osteomyelitis on CT imaging  If there is clinical concern for underlying infection, MRI would be a more sensitive modality to assess for early changes of discitis Osteomyelitis  1/23/23 MRI w/o contrast shows -  No definite MR evidence for discitis osteoma myelitis as clinically questioned  Degenerative changes at L5-S1     1/24/23 ID recommending further imaging CT with contrast of abdomen, pelvis, right hip, reviewed imaging, no evidence of osteomyelitis    1/28/23 Discussed case with neurosurgery; no transfer or surgical intervention at this time    Manage pain acutely  May pursue nerve block as outpatient  Add oral Decadron 4 mg every 6 to pain regimen  Note; gabapentin will take 2 to 3 weeks to reach full effect  1/30/23 clinically improving, 8/10 compared to 9/10  Patient states steroids have helped the best     Plan:  Oral pain med regimen:  Scheduled Tylenol 650mg Q6   PRN Torodol 10mg Q6   Morphine 45mg PO every 6 as needed  Morphine 8mg IV prn for breakthrough pain  Gabapentin 300mg TID  Flexeril 10mg TID  Decadron oral 4 mg every 6      Intravenous drug abuse, continuous ([facility] [address] )  Assessment & Plan  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  Last use - IV,1 bag of fentanyl, at 12pm on 1/18/23  Patient started on Suboxone upon admission, maintenance dose 16 mg daily    Plan:  Patient has scheduled follow-up with Dr Kelly Jeong in [place]  Suboxone 4mg QID   Seroquel 100 mg at night for sleep  Zofran as needed for nausea    Tobacco abuse  Assessment & Plan  History of tobacco smoking  Plan:  Nicotine patch  Smoking cessation    Ambulatory dysfunction  Assessment & Plan  Ambulatory dysfunction  Patient ambulatimg with a cane   Seen by OT: standby assist  OT Discharge Recommendation: Home with outpatient rehabilitation    1/26 PT/OT reports improved activity, using walker    Plan:  Continue PT/OT while admitted    Hepatitis C virus  Assessment & Plan  Most recent labs 3/7/2022  Positive Hep A  Positive Hep B surface antibody  Positive Hep C antibody  Not taking hepatitis meds    ID recs: Hep C RNA negative    Plan:  Follow-up with ID as outpatient            VTE Pharmacologic Prophylaxis: VTE Score: 1 Low Risk (Score 0-2) - Encourage Ambulation  Patient refusing Lovenox due to injection  Apply SCD boots    Patient Centered Rounds: I performed bedside rounds with nursing staff today  Discussions with Specialists or Other Care Team Provider: neurosuergy        Time Spent for Care: 30 minutes   More than 50% of total time spent on counseling and coordination of care as described above  Current Length of Stay: 12 day(s)  Current Patient Status: Inpatient   Certification Statement: The patient will continue to require additional inpatient hospital stay due to 4 weeks of IV antibiotics  Discharge Plan: 23    Code Status: Level 1 - Full Code    Subjective:   Patient seen at bedside this morning laying in bed comfortably  Patient states that steroids have helped his pain decreased from 9 out of 10 to 8 out of 10  Blood patient still experiencing pain especially when he gets up to go to the bathroom  Had bowel movement yesterday  Objective:     Vitals:   Temp (24hrs), Av 8 °F (36 6 °C), Min:97 8 °F (36 6 °C), Max:97 8 °F (36 6 °C)    Temp:  [97 8 °F (36 6 °C)] 97 8 °F (36 6 °C)  HR:  [78-84] 78  Resp:  [16-19] 19  BP: (122-143)/(75-90) 122/75  SpO2:  [94 %-96 %] 94 %  Body mass index is 27 67 kg/m²  Input and Output Summary (last 24 hours): Intake/Output Summary (Last 24 hours) at 2023 1017  Last data filed at 2023 0619  Gross per 24 hour   Intake 600 ml   Output 300 ml   Net 300 ml       Physical Exam:   Physical Exam  Vitals and nursing note reviewed  Constitutional:       General: He is not in acute distress  Appearance: He is well-developed  HENT:      Head: Normocephalic and atraumatic  Right Ear: External ear normal       Left Ear: External ear normal       Nose: No rhinorrhea  Mouth/Throat:      Mouth: Mucous membranes are moist    Eyes:      Conjunctiva/sclera: Conjunctivae normal    Cardiovascular:      Rate and Rhythm: Normal rate and regular rhythm  Pulses: Normal pulses  Pulmonary:      Effort: Pulmonary effort is normal  No respiratory distress  Abdominal:      Palpations: Abdomen is soft  Tenderness: There is no abdominal tenderness  Musculoskeletal:         General: No swelling  Arms:       Cervical back: Neck supple  Skin:     General: Skin is warm and dry        Capillary Refill: Capillary refill takes less than 2 seconds  Neurological:      Mental Status: He is alert  Mental status is at baseline     Psychiatric:         Mood and Affect: Mood normal       Comments: Slightly irritable       Additional Data:     Labs:  Results from last 7 days   Lab Units 01/29/23  0736   WBC Thousand/uL 12 04*   HEMOGLOBIN g/dL 10 7*   HEMATOCRIT % 32 2*   PLATELETS Thousands/uL 452*   NEUTROS PCT % 89*   LYMPHS PCT % 8*   MONOS PCT % 2*   EOS PCT % 0     Results from last 7 days   Lab Units 01/29/23  0736   SODIUM mmol/L 135   POTASSIUM mmol/L 4 0   CHLORIDE mmol/L 100   CO2 mmol/L 26   BUN mg/dL 18   CREATININE mg/dL 0 69   ANION GAP mmol/L 9   CALCIUM mg/dL 9 0   ALBUMIN g/dL 3 6   TOTAL BILIRUBIN mg/dL 0 15*   ALK PHOS U/L 76   ALT U/L 17   AST U/L 15   GLUCOSE RANDOM mg/dL 188*                 Results from last 7 days   Lab Units 01/24/23  0642   PROCALCITONIN ng/ml 0 17       Lines/Drains:  Invasive Devices     Peripherally Inserted Central Catheter Line  Duration           PICC Line 01/27/23 Right Other (Comment) 2 days                Central Line:  Goal for removal: 2/17/23             Imaging: Reviewed radiology reports from this admission including: MRI spine    Recent Cultures (last 7 days):         Last 24 Hours Medication List:   Current Facility-Administered Medications   Medication Dose Route Frequency Provider Last Rate   • acetaminophen  650 mg Oral Q6H [facility] Leilani Connell MD     • buprenorphine-naloxone  4 mg Sublingual 4x Daily Tyra Sepulveda MD     • cefazolin  2,000 mg Intravenous Q8H Ashley Villanueva MD 2,000 mg (01/30/23 0557)   • cyclobenzaprine  10 mg Oral TID Leilani Connell MD     • dexamethasone  4 mg Oral Q6H [facility] Leilani Connell MD     • docusate sodium  100 mg Oral BID Leilani Connell MD     • enoxaparin  40 mg Subcutaneous Daily Caryl Medeiros MD     • famotidine  20 mg Oral BID Ligia Faulkner PA-C     • gabapentin  300 mg Oral TID Leilani Connell MD     • ketorolac  10 mg Oral Q6H PRN Anita Forrest MD     • morphine  45 mg Oral Q6H PRN Tyra Riggins MD     • morphine injection  8 mg Intravenous Q6H PRN Tyra Riggins MD     • naloxone  0 1 mg Intravenous PRN Ramona Pedroza MD     • nicotine  14 mg Transdermal Daily Joan Olivas MD     • ondansetron  4 mg Oral Q6H PRN Tyra Riggins MD     • QUEtiapine  100 mg Oral HS Anita Forrest MD          Today, Patient Was Seen By: Anita Forrest MD    **Please Note: This note may have been constructed using a voice recognition system  **

## 2023-01-30 NOTE — CASE MANAGEMENT
Case Management Discharge Planning Note    Patient name Binaca Trammell  Location [address] [id number] MRN [id number]  : 1985 Date 2023       Current Admission Date: 2023  Current Admission Diagnosis:Bacteremia due to Staphylococcus aureus   Patient Active Problem List    Diagnosis Date Noted   • Hypomagnesemia 2023   • Bacteremia due to Staphylococcus aureus 2023   • Intravenous drug abuse, continuous ([facility] [address] ) 2023   • Ambulatory dysfunction 2023   • Acute right-sided low back pain with right-sided sciatica 2023   • Tobacco abuse 2023   • Acute pain of right shoulder 2022   • Bilateral groin pain 2022   • Opiate overdose ([facility] [address] ) 2021   • Moderate episode of recurrent major depressive disorder ([facility] [address] ) 2020   • Drug dependence ([facility] [address] ) 2018   • Heroin abuse ([facility] [address] ) 2018   • Encounter for monitoring Suboxone maintenance therapy 2018   • Drug therapy continued 2018   • Degeneration of thoracic intervertebral disc 2018   • Degeneration of intervertebral disc of thoracic region 2017   • Myofascial pain 2017   • Chronic midline thoracic back pain 2016   • Anxiety 2015   • Hepatitis C virus 2015      LOS (days): 12  Geometric Mean LOS (GMLOS) (days): 2 80  Days to GMLOS:-9     OBJECTIVE:  Risk of Unplanned Readmission Score: 14 42         Current admission status: Inpatient   Preferred Pharmacy:   [address] [place] [address] [address] [facility], [address] [facility] [address] [facility] [address] [facility] [address]  Phone: [phone] Fax: [phone]    Primary Care Provider: Flavia Calvin DO    Primary Insurance: [address] [place] [address]  Secondary Insurance:     DISCHARGE DETAILS:  No facility with in 30 miles is able to accept the patient  Physician is going to check with ID doctor to see if patient would be appropriate for [facility] therapy instead

## 2023-01-30 NOTE — ASSESSMENT & PLAN NOTE
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  Last use - IV,1 bag of fentanyl, at 12pm on 1/18/23  Patient started on Suboxone upon admission, maintenance dose 16 mg daily    Plan:  Patient has scheduled follow-up with Dr Autumn Lacy in [place]  Suboxone 4mg QID   Seroquel 100 mg at night for sleep  Zofran as needed for nausea

## 2023-01-30 NOTE — ASSESSMENT & PLAN NOTE
H/o right sided back pain with sciatica  Intermittent severe right hip pain, radiates into right lateral hip & buttocks  Pain rated 9/10  Patient unable to ambulate without cane  1/16/23 CT shows:  1  Mild multilevel disc degeneration  2  Grade 1 retrolisthesis of L5 on S1    3  Disc bulge with small herniation at L5-S1  Disc bulging from L2-3 through L4-5    4  No evidence of periostitis along the endplates to suggest advanced osteomyelitis on CT imaging  If there is clinical concern for underlying infection, MRI would be a more sensitive modality to assess for early changes of discitis Osteomyelitis  1/23/23 MRI w/o contrast shows -  No definite MR evidence for discitis osteoma myelitis as clinically questioned  Degenerative changes at L5-S1     1/24/23 ID recommending further imaging CT with contrast of abdomen, pelvis, right hip, reviewed imaging, no evidence of osteomyelitis    1/28/23 Discussed case with neurosurgery; no transfer or surgical intervention at this time  Manage pain acutely  May pursue nerve block as outpatient  Add oral Decadron 4 mg every 6 to pain regimen  Note; gabapentin will take 2 to 3 weeks to reach full effect  1/30/23 clinically improving, 8/10 compared to 9/10  Patient states steroids have helped the best  Had BM      Plan:  Oral pain med regimen:  Scheduled Tylenol 650mg Q6   PRN Torodol 10mg Q6   Morphine 45mg PO every 6 as needed  Morphine 8mg IV prn for breakthrough pain  Gabapentin 300mg TID  Flexeril 10mg TID  Decadron oral 4 mg every 6

## 2023-01-31 ENCOUNTER — APPOINTMENT (INPATIENT)
Dept: RADIOLOGY | Facility: HOSPITAL | Age: 38
End: 2023-01-31

## 2023-01-31 ENCOUNTER — APPOINTMENT (INPATIENT)
Dept: ULTRASOUND IMAGING | Facility: HOSPITAL | Age: 38
End: 2023-01-31

## 2023-01-31 PROBLEM — M25.561 ACUTE PAIN OF RIGHT KNEE: Status: ACTIVE | Noted: 2023-01-31

## 2023-01-31 RX ADMIN — CEFAZOLIN SODIUM 2000 MG: 2 SOLUTION INTRAVENOUS at 05:56

## 2023-01-31 RX ADMIN — ACETAMINOPHEN 650 MG: 325 TABLET, FILM COATED ORAL at 17:03

## 2023-01-31 RX ADMIN — MORPHINE SULFATE 60 MG: 15 TABLET ORAL at 21:58

## 2023-01-31 RX ADMIN — CEFAZOLIN SODIUM 2000 MG: 2 SOLUTION INTRAVENOUS at 13:41

## 2023-01-31 RX ADMIN — DEXAMETHASONE 4 MG: 4 TABLET ORAL at 05:56

## 2023-01-31 RX ADMIN — BUPRENORPHINE AND NALOXONE 4 MG: 2; .5 FILM BUCCAL; SUBLINGUAL at 17:03

## 2023-01-31 RX ADMIN — CEFAZOLIN SODIUM 2000 MG: 2 SOLUTION INTRAVENOUS at 21:58

## 2023-01-31 RX ADMIN — MORPHINE SULFATE 60 MG: 15 TABLET ORAL at 03:05

## 2023-01-31 RX ADMIN — MORPHINE SULFATE 8 MG: 10 INJECTION INTRAVENOUS at 18:30

## 2023-01-31 RX ADMIN — MORPHINE SULFATE 60 MG: 15 TABLET ORAL at 15:47

## 2023-01-31 RX ADMIN — FAMOTIDINE 20 MG: 20 TABLET ORAL at 17:03

## 2023-01-31 RX ADMIN — MORPHINE SULFATE 60 MG: 15 TABLET ORAL at 09:25

## 2023-01-31 RX ADMIN — DEXAMETHASONE 4 MG: 4 TABLET ORAL at 11:32

## 2023-01-31 RX ADMIN — BUPRENORPHINE AND NALOXONE 4 MG: 2; .5 FILM BUCCAL; SUBLINGUAL at 08:18

## 2023-01-31 RX ADMIN — ACETAMINOPHEN 650 MG: 325 TABLET, FILM COATED ORAL at 23:00

## 2023-01-31 RX ADMIN — DEXAMETHASONE 4 MG: 4 TABLET ORAL at 17:03

## 2023-01-31 RX ADMIN — MORPHINE SULFATE 8 MG: 10 INJECTION INTRAVENOUS at 06:05

## 2023-01-31 RX ADMIN — FAMOTIDINE 20 MG: 20 TABLET ORAL at 08:13

## 2023-01-31 RX ADMIN — GABAPENTIN 300 MG: 300 CAPSULE ORAL at 15:47

## 2023-01-31 RX ADMIN — GABAPENTIN 300 MG: 300 CAPSULE ORAL at 20:41

## 2023-01-31 RX ADMIN — ACETAMINOPHEN 650 MG: 325 TABLET, FILM COATED ORAL at 05:56

## 2023-01-31 RX ADMIN — NICOTINE 14 MG: 14 PATCH, EXTENDED RELEASE TRANSDERMAL at 08:15

## 2023-01-31 RX ADMIN — KETOROLAC TROMETHAMINE 10 MG: 10 TABLET, FILM COATED ORAL at 08:14

## 2023-01-31 RX ADMIN — BUPRENORPHINE AND NALOXONE 4 MG: 2; .5 FILM BUCCAL; SUBLINGUAL at 21:59

## 2023-01-31 RX ADMIN — QUETIAPINE FUMARATE 100 MG: 100 TABLET ORAL at 21:59

## 2023-01-31 RX ADMIN — MORPHINE SULFATE 8 MG: 10 INJECTION INTRAVENOUS at 12:24

## 2023-01-31 RX ADMIN — DEXAMETHASONE 4 MG: 4 TABLET ORAL at 23:00

## 2023-01-31 RX ADMIN — GABAPENTIN 300 MG: 300 CAPSULE ORAL at 08:13

## 2023-01-31 RX ADMIN — ACETAMINOPHEN 650 MG: 325 TABLET, FILM COATED ORAL at 11:32

## 2023-01-31 RX ADMIN — KETOROLAC TROMETHAMINE 10 MG: 10 TABLET, FILM COATED ORAL at 20:41

## 2023-01-31 RX ADMIN — BUPRENORPHINE AND NALOXONE 4 MG: 2; .5 FILM BUCCAL; SUBLINGUAL at 11:32

## 2023-01-31 RX ADMIN — KETOROLAC TROMETHAMINE 10 MG: 10 TABLET, FILM COATED ORAL at 14:18

## 2023-01-31 NOTE — PROGRESS NOTES
[facility]  Progress Note - Siddhartha Tobar 1985, 41 y o  male MRN: [id number]  Unit/Bed#: 608-12 Encounter: [id number]  Primary Care Provider: Yousuf Garcia DO   Date and time admitted to hospital: 1/18/2023  5:14 PM    * Bacteremia due to Staphylococcus aureus  Assessment & Plan  Blood cultures positive for staph aureus on 1/16/23  Negative blood cultures on 1/20/23  History of IV drug abuse  CECILIO negative for vegetations  CT negative for osteomyelitis    ID recommendations: IV Ancef 2g Q8 ending 2/16/2023, PICC line placed, patient not able to return home with PICC line in place due to IV drug abuse  1/28 - PICC line placed successfully on 1/27  Case management unable to find placement  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  Plan:  Continue IV Ancef 2g daily (ending Feb 16, 2023)        Acute right-sided low back pain with right-sided sciatica  Assessment & Plan  H/o right sided back pain with sciatica  Intermittent severe right hip pain, radiates into right lateral hip & buttocks  Pain rated 9/10  Patient unable to ambulate without cane  1/16/23 CT shows:  1  Mild multilevel disc degeneration  2  Grade 1 retrolisthesis of L5 on S1    3  Disc bulge with small herniation at L5-S1  Disc bulging from L2-3 through L4-5    4  No evidence of periostitis along the endplates to suggest advanced osteomyelitis on CT imaging  If there is clinical concern for underlying infection, MRI would be a more sensitive modality to assess for early changes of discitis Osteomyelitis  1/23/23 MRI w/o contrast shows -  No definite MR evidence for discitis osteoma myelitis as clinically questioned  Degenerative changes at L5-S1     1/24/23 ID recommending further imaging CT with contrast of abdomen, pelvis, right hip, reviewed imaging, no evidence of osteomyelitis    1/28/23 Discussed case with neurosurgery; no transfer or surgical intervention at this time    Manage pain acutely  May pursue nerve block as outpatient  Appointment with Neurosurgery to discuss treatment options upon discharge  Add oral Decadron 4 mg every 6 to pain regimen  Note; gabapentin will take 2 to 3 weeks to reach full effect  1/31/23 clinically improving, 8/10 pain, see up out of bed walking  Patient states steroids have helped the best  Had BM  Plan:  Oral pain med regimen:  Scheduled Tylenol 650mg Q6   PRN Torodol 10mg Q6   Increased Morphine from 45mg to 60mg PO every 6 as needed  Morphine 8mg IV prn for breakthrough pain  Gabapentin 300mg TID  Changed Flexeril 10mg to Trizinidine 4mg TID  Decadron oral 4 mg every 6      Intravenous drug abuse, continuous (HCC)  Assessment & Plan  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  Last use - IV,1 bag of fentanyl, at 12pm on 1/18/23  Patient started on Suboxone upon admission, maintenance dose 16 mg daily    Plan:  Patient has scheduled follow-up with Dr Clementina Caceres in [place]  Pt discharge planned for Friday 2/17/23, will need suboxone script for days between discharge and seeing Josue Ordoñez  Suboxone 4mg QID   Seroquel 100 mg at night for sleep  Zofran as needed for nausea    Acute pain of right knee  Assessment & Plan  Pain of right knee after fall 2 weeks ago  Plan:  X-ray of right knee  Continue current pain regimen  PT/OT    Ambulatory dysfunction  Assessment & Plan  Ambulatory dysfunction  Patient ambulatimg with a cane   Seen by OT: standby assist  OT Discharge Recommendation: Home with outpatient rehabilitation    1/26 PT/OT reports improved activity, using walker    Plan:  Continue PT/OT while admitted          VTE Pharmacologic Prophylaxis: VTE Score: 1 Low Risk (Score 0-2) - Encourage Ambulation  Patient Centered Rounds: I performed bedside rounds with nursing staff today  Discussions with Specialists or Other Care Team Provider: ID      Time Spent for Care: 30 minutes   More than 50% of total time spent on counseling and coordination of care as described above  Current Length of Stay: 13 day(s)  Current Patient Status: Inpatient   Certification Statement: The patient will continue to require additional inpatient hospital stay due to IV antibiotics  Discharge Plan: 2023    Code Status: Level 1 - Full Code    Subjective:   Patient seen at bedside this morning, laying in bed comfortably  Patient was seen up and out of bed walking with walker  Patient reports that pain is only present when he moves, no pain when laying still  Patient complaining of right knee pain, worse when he walks on it  Objective:     Vitals:   Temp (24hrs), Av 8 °F (36 6 °C), Min:97 8 °F (36 6 °C), Max:97 8 °F (36 6 °C)    Temp:  [97 8 °F (36 6 °C)] 97 8 °F (36 6 °C)  HR:  [60-62] 60  Resp:  [18-19] 18  BP: (138-144)/(89) 144/89  SpO2:  [94 %-95 %] 95 %  Body mass index is 27 67 kg/m²  Input and Output Summary (last 24 hours): Intake/Output Summary (Last 24 hours) at 2023 1205  Last data filed at 2023 0546  Gross per 24 hour   Intake 720 ml   Output 1775 ml   Net -1055 ml       Physical Exam:   Physical Exam  Vitals and nursing note reviewed  Constitutional:       General: He is not in acute distress  Appearance: He is well-developed  HENT:      Head: Normocephalic and atraumatic  Right Ear: External ear normal       Left Ear: External ear normal       Nose: Nose normal       Mouth/Throat:      Mouth: Mucous membranes are moist    Eyes:      Conjunctiva/sclera: Conjunctivae normal    Cardiovascular:      Rate and Rhythm: Normal rate and regular rhythm  Pulses: Normal pulses  Pulmonary:      Effort: Pulmonary effort is normal  No respiratory distress  Abdominal:      Palpations: Abdomen is soft  Tenderness: There is no abdominal tenderness  Musculoskeletal:         General: No swelling  Arms:       Cervical back: Neck supple  Legs:    Skin:     General: Skin is warm and dry        Capillary Refill: Capillary refill takes less than 2 seconds  Neurological:      Mental Status: He is alert     Psychiatric:         Mood and Affect: Mood normal        Additional Data:     Labs:  Results from last 7 days   Lab Units 01/29/23  0736   WBC Thousand/uL 12 04*   HEMOGLOBIN g/dL 10 7*   HEMATOCRIT % 32 2*   PLATELETS Thousands/uL 452*   NEUTROS PCT % 89*   LYMPHS PCT % 8*   MONOS PCT % 2*   EOS PCT % 0     Results from last 7 days   Lab Units 01/29/23  0736   SODIUM mmol/L 135   POTASSIUM mmol/L 4 0   CHLORIDE mmol/L 100   CO2 mmol/L 26   BUN mg/dL 18   CREATININE mg/dL 0 69   ANION GAP mmol/L 9   CALCIUM mg/dL 9 0   ALBUMIN g/dL 3 6   TOTAL BILIRUBIN mg/dL 0 15*   ALK PHOS U/L 76   ALT U/L 17   AST U/L 15   GLUCOSE RANDOM mg/dL 188*                       Lines/Drains:  Invasive Devices     Peripherally Inserted Central Catheter Line  Duration           PICC Line 01/27/23 Right Other (Comment) 3 days                Central Line:  Goal for removal: 2 16 23             Imaging: Reviewed radiology reports from this admission including: abdominal/pelvic CT    Recent Cultures (last 7 days):         Last 24 Hours Medication List:   Current Facility-Administered Medications   Medication Dose Route Frequency Provider Last Rate   • acetaminophen  650 mg Oral Q6H [address] Serjio Andujar MD     • buprenorphine-naloxone  4 mg Sublingual 4x Daily Tyra Huertas MD     • cefazolin  2,000 mg Intravenous Q8H Luke Henley MD 2,000 mg (01/31/23 0556)   • dexamethasone  4 mg Oral Q6H [address] Serjio Andujar MD     • docusate sodium  100 mg Oral BID Serjio Andujar MD     • enoxaparin  40 mg Subcutaneous Daily Lynette Mcgrath MD     • famotidine  20 mg Oral BID Bj Harris PA-C     • gabapentin  300 mg Oral TID Serjio Andujar MD     • ketorolac  10 mg Oral Q6H PRN Serjio Andujar MD     • morphine  60 mg Oral Q6H PRN Serjio Andujar MD     • morphine injection  8 mg Intravenous Q6H PRN Adis Araya MD • naloxone  0 1 mg Intravenous PRN Marce Parks MD     • nicotine  14 mg Transdermal Daily Manny Villanueva MD     • ondansetron  4 mg Oral Q6H PRN Tyra Sahni MD     • QUEtiapine  100 mg Oral HS Collins Rodriguez MD     • tiZANidine  4 mg Oral Q8H PRN Collins Rodriguez MD          Today, Patient Was Seen By: Collins Rodriguez MD    **Please Note: This note may have been constructed using a voice recognition system  **

## 2023-01-31 NOTE — PROGRESS NOTES
REQUIRED DOCUMENTATION:      1  This service was provided via Telemedicine  2  Provider located at [address] [facility]  3  TeleMed provider: Abel May MD  4  Identify all parties in room with patient during tele consult:  Patient only  5  After connecting through [employer], patient was identified by name and date of birth and assistant checked wristband  Patient was then informed that this was a Telemedicine visit and that the exam was being conducted confidentially over secure lines  My office door was closed  No one else was in the room  Patient acknowledged consent and understanding of privacy and security of the Telemedicine visit, and gave us permission to have the assistant stay in the room in order to assist with the history and to conduct the exam   I informed the patient that I have reviewed their record in Epic and presented the opportunity for them to ask any questions regarding the visit today  The patient agreed to participate  Progress Note - Infectious Disease   Derrek Kerns 41 y o  male MRN: [id number]  Unit/Bed#: 030-13 Encounter: [id number]      Impression/Plan:    1  MSSA bacteremia  1/16, 1/18 blood cultures positive from [facility] growing MSSA  Admission blood cultures here 1/18 also growing MSSA  Likely due to IVDU  Patient also has right sacral/hip pain which he states is due to a fall; in setting of staph aureus bacteremia need to rule out metastatic foci of infection as a cause of his pain  CT lumbar spine at [facility] showed DJD but no evidence of OM  MRI lumbar spine here does not show any signs of discitis/osteomyelitis, although he could only tolerate noncontrast study due to pain  TTE cannot rule out small vegetation of the tip of the anterior leaflet of the MV but CECILIO showed no valvular vegetation  CT right lower extremity and CT A/P with contrast did not show any joint or osseous abnormalities, or other evidence of infection  Patient is afebrile, hemodynamically stable  Repeat blood cultures here 1/20 no growth after he was started on appropriate dosing of antibiotics  PICC placed    -Continue IV Cefazolin 2g q8hr   -recommend a 4 week total course of IV antibiotics, through 2/16/23   -if patient is not willing to stay in hospital and adhere to standard of care IV antibiotic therapy, there is data for switch to PO antibiotics in [facility] after at least 10-14 days IV therapy (Outcomes of Partial Oral Antibiotic Treatment for Complicated Staphylococcus Aureus Bacteremia in [facility])  However, recommendation is full 4 weeks IV therapy at this time   -patient is not candidate for home antibiotics due to IVDU              -monitor WBC count, fever curve     2  Lower back/right hip pain  Occurred after a fall per the patient  CT lumbar spine shows DJD, no obvious osseous destruction  CRP/ESR elevated but he also has a bloodstream infection  MRI L spine non con does not show any evidence of OM/discitis  CT A/P and right leg with contrast does not show any infectious abnormalities  Patient continues to have right hip pain and now right knee pain  Noted to have small superficial cystic structure in the thoracic region of the back, does not appear infectious per primary team              -follow up right knee xray, soft tissue U/S of back              -pain management per primary and toxicology     3  Opiate use disorder with IVDU  Patient initiated on Suboxone  Recently used fentanyl prior to admission  He has a history of hepatitis C status post treatment  There is documentation of a history of HIV, but he has several prior negative tests in the chart, last in March 2022  HIV negative 1/20/23              -continue suboxone, management of withdrawal per primary     4  History of Hepatitis C  Reports treatment with [facility] in the summer 2022 but no documentation of SVR   HCV PCR negative here confirming 12 week SVR -outpatient follow up     Discussed the above management plan in detail with the primary service and patient  ID will follow  I spent 35 minutes in evaluation of the patient of which 20 minutes was in counseling/coordination of care    Antibiotics:  Cefazolin day 12  Day 12 from blood culture clearance    Subjective: The patient reports that right hip pain is slightly better with adjustment in pain regimen and steroids  He was noted to have a new small superficial cystic structure on the back and reports worsening right knee pain over the past several days  Denies fever, chills  Tolerating IV Cefazolin  Objective:  Vitals:  Temp:  [97 8 °F (36 6 °C)] 97 8 °F (36 6 °C)  HR:  [60-62] 60  Resp:  [18-19] 18  BP: (138-144)/(89) 144/89  SpO2:  [94 %-95 %] 95 %  Temp (24hrs), Av 8 °F (36 6 °C), Min:97 8 °F (36 6 °C), Max:97 8 °F (36 6 °C)  Current: Temperature: 97 8 °F (36 6 °C)    Physical Exam:     Documented physical exam has been primarily done by the patient's nurse and/or the primary service due to limited examination abilities on telemedicine    General Appearance:  Alert, interactive, nontoxic, no acute distress  Throat: Oropharynx moist without lesions  Lungs:   Clear to auscultation bilaterally; no wheezes, rhonchi or rales; respirations unlabored   Heart:  RRR; no murmur, rub or gallop   Abdomen:   Soft, non-tender, non-distended, positive bowel sounds  Extremities: No clubbing, cyanosis or edema   Skin: No new rashes or lesions  Tenderness over right lower back and sacral area       Labs:    All pertinent labs and imaging studies were personally reviewed  Results from last 7 days   Lab Units 23  0736   WBC Thousand/uL 12 04*   HEMOGLOBIN g/dL 10 7*   PLATELETS Thousands/uL 452*     Results from last 7 days   Lab Units 23  0736   SODIUM mmol/L 135   POTASSIUM mmol/L 4 0   CHLORIDE mmol/L 100   CO2 mmol/L 26   BUN mg/dL 18   CREATININE mg/dL 0 69   EGFR ml/min/1 73sq m 121 CALCIUM mg/dL 9 0   AST U/L 15   ALT U/L 17   ALK PHOS U/L 76           Imaging:  Pertinent imaging in PACS personally reviewed

## 2023-01-31 NOTE — ASSESSMENT & PLAN NOTE
H/o right sided back pain with sciatica  Intermittent severe right hip pain, radiates into right lateral hip & buttocks  Pain rated 9/10  Patient unable to ambulate without cane  1/16/23 CT shows:  1  Mild multilevel disc degeneration  2  Grade 1 retrolisthesis of L5 on S1    3  Disc bulge with small herniation at L5-S1  Disc bulging from L2-3 through L4-5    4  No evidence of periostitis along the endplates to suggest advanced osteomyelitis on CT imaging  If there is clinical concern for underlying infection, MRI would be a more sensitive modality to assess for early changes of discitis Osteomyelitis  1/23/23 MRI w/o contrast shows -  No definite MR evidence for discitis osteoma myelitis as clinically questioned  Degenerative changes at L5-S1     1/24/23 ID recommending further imaging CT with contrast of abdomen, pelvis, right hip, reviewed imaging, no evidence of osteomyelitis    1/28/23 Discussed case with neurosurgery; no transfer or surgical intervention at this time  Manage pain acutely  May pursue nerve block as outpatient  Appointment with Neurosurgery to discuss treatment options upon discharge  Add oral Decadron 4 mg every 6 to pain regimen  Note; gabapentin will take 2 to 3 weeks to reach full effect  1/31/23 clinically improving, 8/10 pain, see up out of bed walking  Patient states steroids have helped the best  Had BM      Plan:  Oral pain med regimen:  Scheduled Tylenol 650mg Q6   PRN Torodol 10mg Q6   Increased Morphine from 45mg to 60mg PO every 6 as needed  Morphine 8mg IV prn for breakthrough pain  Gabapentin 300mg TID  Changed Flexeril 10mg to Trizinidine 4mg TID  Decadron oral 4 mg every 6

## 2023-01-31 NOTE — CASE MANAGEMENT
Case Management Discharge Planning Note    Patient name Nova Mack  Location [facility] [id number] MRN [id number]  : 1985 Date 2023       Current Admission Date: 2023  Current Admission Diagnosis:Bacteremia due to Staphylococcus aureus   Patient Active Problem List    Diagnosis Date Noted   • Acute pain of right knee 2023   • Hypomagnesemia 2023   • Bacteremia due to Staphylococcus aureus 2023   • Intravenous drug abuse, continuous ([facility] ) 2023   • Ambulatory dysfunction 2023   • Acute right-sided low back pain with right-sided sciatica 2023   • Tobacco abuse 2023   • Acute pain of right shoulder 2022   • Bilateral groin pain 2022   • Opiate overdose ([facility] ) 2021   • Moderate episode of recurrent major depressive disorder ([facility] ) 2020   • Drug dependence ([facility] ) 2018   • Heroin abuse ([facility] ) 2018   • Encounter for monitoring Suboxone maintenance therapy 2018   • Drug therapy continued 2018   • Degeneration of thoracic intervertebral disc 2018   • Degeneration of intervertebral disc of thoracic region 2017   • Myofascial pain 2017   • Chronic midline thoracic back pain 2016   • Anxiety 2015   • Hepatitis C virus 2015      LOS (days): 13  Geometric Mean LOS (GMLOS) (days): 2 80  Days to GMLOS:-9 9     OBJECTIVE:  Risk of Unplanned Readmission Score: 14 64         Current admission status: Inpatient   Preferred Pharmacy:   [facility] [address] [address]  Phone: [phone] Fax: [phone]    Primary Care Provider: Aleksandra Oden DO    Primary Insurance: Jose Barkley  Secondary Insurance:     DISCHARGE DETAILS:          No skilled nursing facility is able to accept the patient   During discharge planning meeting the physician wanted to check and see if patient is appropriated for [facility] therapy   I checked with Infection control doctor and patient is not appropriate for [facility]  I notified physician of same

## 2023-01-31 NOTE — ASSESSMENT & PLAN NOTE
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  Last use - IV,1 bag of fentanyl, at 12pm on 1/18/23  Patient started on Suboxone upon admission, maintenance dose 16 mg daily    Plan:  Patient has scheduled follow-up with Dr Donald Haley in [place]  Pt discharge planned for Friday 2/17/23, will need suboxone script for days between discharge and seeing Kevin Mendez    Suboxone 4mg QID   Seroquel 100 mg at night for sleep  Zofran as needed for nausea

## 2023-01-31 NOTE — NURSING NOTE
Patient was noted to have a vape pen sitting on his bedding upon entering the room, Charge RN notified of same, Vape pen removed from patient and put in lock box on wall

## 2023-01-31 NOTE — PLAN OF CARE
Problem: PAIN - ADULT  Goal: Verbalizes/displays adequate comfort level or baseline comfort level  Description: Interventions:  - Encourage patient to monitor pain and request assistance  - Assess pain using appropriate pain scale (0-10 pain scale)  - Administer analgesics based on type and severity of pain and evaluate response  - Implement non-pharmacological measures as appropriate and evaluate response  - Consider cultural and social influences on pain and pain management  - Notify physician/advanced practitioner if interventions unsuccessful or patient reports new pain  Outcome: Progressing     Problem: INFECTION - ADULT  Goal: Absence or prevention of progression during hospitalization  Description: INTERVENTIONS:  - Assess and monitor for signs and symptoms of infection  - Monitor lab/diagnostic results  - Monitor all insertion sites, i e  indwelling lines  - Administer medications as ordered  - Instruct and encourage patient and family to use good hand hygiene technique  Outcome: Progressing

## 2023-01-31 NOTE — ASSESSMENT & PLAN NOTE
Pain of right knee after fall 2 weeks ago  Plan:  X-ray of right knee  Continue current pain regimen  PT/OT

## 2023-02-01 LAB
ALBUMIN SERPL BCP-MCNC: 3.4 G/DL (ref 3.5–5)
ALP SERPL-CCNC: 55 U/L (ref 34–104)
ALT SERPL W P-5'-P-CCNC: 24 U/L (ref 7–52)
ANION GAP SERPL CALCULATED.3IONS-SCNC: 9 MMOL/L (ref 4–13)
AST SERPL W P-5'-P-CCNC: 18 U/L (ref 13–39)
BASOPHILS # BLD AUTO: 0.01 THOUSANDS/ÂΜL (ref 0–0.1)
BASOPHILS NFR BLD AUTO: 0 % (ref 0–1)
BILIRUB SERPL-MCNC: 0.2 MG/DL (ref 0.2–1)
BUN SERPL-MCNC: 22 MG/DL (ref 5–25)
CALCIUM ALBUM COR SERPL-MCNC: 8.9 MG/DL (ref 8.3–10.1)
CALCIUM SERPL-MCNC: 8.4 MG/DL (ref 8.4–10.2)
CHLORIDE SERPL-SCNC: 103 MMOL/L (ref 96–108)
CO2 SERPL-SCNC: 26 MMOL/L (ref 21–32)
CREAT SERPL-MCNC: 0.62 MG/DL (ref 0.6–1.3)
EOSINOPHIL # BLD AUTO: 0 THOUSAND/ÂΜL (ref 0–0.61)
EOSINOPHIL NFR BLD AUTO: 0 % (ref 0–6)
ERYTHROCYTE [DISTWIDTH] IN BLOOD BY AUTOMATED COUNT: 13.8 % (ref 11.6–15.1)
GFR SERPL CREATININE-BSD FRML MDRD: 126 ML/MIN/1.73SQ M
GLUCOSE SERPL-MCNC: 150 MG/DL (ref 65–140)
HCT VFR BLD AUTO: 32.6 % (ref 36.5–49.3)
HGB BLD-MCNC: 10.6 G/DL (ref 12–17)
IMM GRANULOCYTES # BLD AUTO: 0.28 THOUSAND/UL (ref 0–0.2)
IMM GRANULOCYTES NFR BLD AUTO: 2 % (ref 0–2)
LYMPHOCYTES # BLD AUTO: 1.52 THOUSANDS/ÂΜL (ref 0.6–4.47)
LYMPHOCYTES NFR BLD AUTO: 11 % (ref 14–44)
MCH RBC QN AUTO: 29.1 PG (ref 26.8–34.3)
MCHC RBC AUTO-ENTMCNC: 32.5 G/DL (ref 31.4–37.4)
MCV RBC AUTO: 90 FL (ref 82–98)
MONOCYTES # BLD AUTO: 0.83 THOUSAND/ÂΜL (ref 0.17–1.22)
MONOCYTES NFR BLD AUTO: 6 % (ref 4–12)
NEUTROPHILS # BLD AUTO: 11.9 THOUSANDS/ÂΜL (ref 1.85–7.62)
NEUTS SEG NFR BLD AUTO: 81 % (ref 43–75)
NRBC BLD AUTO-RTO: 0 /100 WBCS
PLATELET # BLD AUTO: 492 THOUSANDS/UL (ref 149–390)
PMV BLD AUTO: 9.3 FL (ref 8.9–12.7)
POTASSIUM SERPL-SCNC: 3.9 MMOL/L (ref 3.5–5.3)
PROT SERPL-MCNC: 6.6 G/DL (ref 6.4–8.4)
RBC # BLD AUTO: 3.64 MILLION/UL (ref 3.88–5.62)
SODIUM SERPL-SCNC: 138 MMOL/L (ref 135–147)
WBC # BLD AUTO: 14.54 THOUSAND/UL (ref 4.31–10.16)

## 2023-02-01 RX ADMIN — BUPRENORPHINE AND NALOXONE 4 MG: 2; .5 FILM BUCCAL; SUBLINGUAL at 22:39

## 2023-02-01 RX ADMIN — DEXAMETHASONE 4 MG: 4 TABLET ORAL at 17:16

## 2023-02-01 RX ADMIN — GABAPENTIN 300 MG: 300 CAPSULE ORAL at 08:58

## 2023-02-01 RX ADMIN — MORPHINE SULFATE 60 MG: 15 TABLET ORAL at 22:39

## 2023-02-01 RX ADMIN — CEFAZOLIN SODIUM 2000 MG: 2 SOLUTION INTRAVENOUS at 05:05

## 2023-02-01 RX ADMIN — BUPRENORPHINE AND NALOXONE 4 MG: 2; .5 FILM BUCCAL; SUBLINGUAL at 17:16

## 2023-02-01 RX ADMIN — MORPHINE SULFATE 60 MG: 15 TABLET ORAL at 10:11

## 2023-02-01 RX ADMIN — MORPHINE SULFATE 8 MG: 10 INJECTION INTRAVENOUS at 19:25

## 2023-02-01 RX ADMIN — DEXAMETHASONE 4 MG: 4 TABLET ORAL at 23:20

## 2023-02-01 RX ADMIN — FAMOTIDINE 20 MG: 20 TABLET ORAL at 08:58

## 2023-02-01 RX ADMIN — ACETAMINOPHEN 650 MG: 325 TABLET, FILM COATED ORAL at 05:03

## 2023-02-01 RX ADMIN — CEFAZOLIN SODIUM 2000 MG: 2 SOLUTION INTRAVENOUS at 14:15

## 2023-02-01 RX ADMIN — BUPRENORPHINE AND NALOXONE 4 MG: 2; .5 FILM BUCCAL; SUBLINGUAL at 13:09

## 2023-02-01 RX ADMIN — ACETAMINOPHEN 650 MG: 325 TABLET, FILM COATED ORAL at 23:20

## 2023-02-01 RX ADMIN — DEXAMETHASONE 4 MG: 4 TABLET ORAL at 13:09

## 2023-02-01 RX ADMIN — FAMOTIDINE 20 MG: 20 TABLET ORAL at 17:16

## 2023-02-01 RX ADMIN — GABAPENTIN 300 MG: 300 CAPSULE ORAL at 17:15

## 2023-02-01 RX ADMIN — CEFAZOLIN SODIUM 2000 MG: 2 SOLUTION INTRAVENOUS at 22:39

## 2023-02-01 RX ADMIN — MORPHINE SULFATE 8 MG: 10 INJECTION INTRAVENOUS at 06:57

## 2023-02-01 RX ADMIN — KETOROLAC TROMETHAMINE 10 MG: 10 TABLET, FILM COATED ORAL at 15:29

## 2023-02-01 RX ADMIN — ACETAMINOPHEN 650 MG: 325 TABLET, FILM COATED ORAL at 17:16

## 2023-02-01 RX ADMIN — ACETAMINOPHEN 650 MG: 325 TABLET, FILM COATED ORAL at 13:09

## 2023-02-01 RX ADMIN — DEXAMETHASONE 4 MG: 4 TABLET ORAL at 05:03

## 2023-02-01 RX ADMIN — KETOROLAC TROMETHAMINE 10 MG: 10 TABLET, FILM COATED ORAL at 21:49

## 2023-02-01 RX ADMIN — KETOROLAC TROMETHAMINE 10 MG: 10 TABLET, FILM COATED ORAL at 08:58

## 2023-02-01 RX ADMIN — GABAPENTIN 300 MG: 300 CAPSULE ORAL at 22:39

## 2023-02-01 RX ADMIN — MORPHINE SULFATE 8 MG: 10 INJECTION INTRAVENOUS at 13:09

## 2023-02-01 RX ADMIN — MORPHINE SULFATE 60 MG: 15 TABLET ORAL at 16:18

## 2023-02-01 RX ADMIN — BUPRENORPHINE AND NALOXONE 4 MG: 2; .5 FILM BUCCAL; SUBLINGUAL at 08:58

## 2023-02-01 RX ADMIN — MORPHINE SULFATE 60 MG: 15 TABLET ORAL at 04:08

## 2023-02-01 RX ADMIN — QUETIAPINE FUMARATE 100 MG: 100 TABLET ORAL at 22:39

## 2023-02-01 NOTE — OCCUPATIONAL THERAPY NOTE
Occupational Therapy Cancellation Note     02/01/23 1045   OT Last Visit   OT Visit Date 02/01/23   Note Type   Note Type Cancelled Session   Cancel Reasons Refusal     Pt stated " I will not move around today, I will do it tomorrow  I walked around yesterday and I am in too much pain today"  Therapist educated patient on importance of mobility and aiding in pain relief  Pt continued to adamantly refuse therapy services  Will continue to follow-up as able and appropriate      Isabela Yuan MS, OTR/L

## 2023-02-01 NOTE — PROGRESS NOTES
[facility] [facility]  Progress Note - Derrek Kerns 1985, 41 y o  male MRN: [id number]  Unit/Bed#: 990-49 Encounter: [id number]  Primary Care Provider: Jolene Peguero DO   Date and time admitted to hospital: 1/18/2023  5:14 PM    * Bacteremia due to methicillin susceptible Staphylococcus aureus (MSSA)  Assessment & Plan  Blood cultures positive for MSSA on 1/16/23  Negative repeat blood cultures on 1/20/23  History of IV drug abuse  CECILIO negative for vegetations  No evidence of localized infection on imaging    ID recommendations: IV Ancef 2g Q8 ending 2/16/2023, PICC line placed, patient not able to return home with PICC line in place due to IV drug abuse  PICC line placed successfully on 1/27  Case management unable to find placement  Patient will stay as [facility] until 2/16/2023  Intravenous drug abuse, continuous ([facility] [address] )  Assessment & Plan  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  Last use - IV,1 bag of fentanyl, at 12pm on 1/18/23  Patient started on Suboxone upon admission, maintenance dose 16 mg daily    Plan:  · Patient has scheduled follow-up with Dr Raúl Lion in [place]  Pt discharge planned for Friday 2/17/23 - he will need to obtain a prescription for Suboxone from his doctor as we will be unable to provide a prescription for Suboxone from him  · Suboxone 4mg QID   · Seroquel 100 mg at night for sleep  · Zofran as needed for nausea    Acute right-sided low back pain with right-sided sciatica  Assessment & Plan  H/o right sided back pain with sciatica  Intermittent severe right hip pain, radiates into right lateral hip & buttocks  Patient unable to ambulate without cane  1/16/23 CT shows:  1  Mild multilevel disc degeneration  2  Grade 1 retrolisthesis of L5 on S1    3  Disc bulge with small herniation at L5-S1  Disc bulging from L2-3 through L4-5    4  No evidence of periostitis along the endplates to suggest advanced osteomyelitis on CT imaging   If there is clinical concern for underlying infection, MRI would be a more sensitive modality to assess for early changes of discitis Osteomyelitis  1/23/23 MRI w/o contrast shows -  No definite MR evidence for discitis osteoma myelitis as clinically questioned  Degenerative changes at L5-S1     1/24/23 CT with contrast of abdomen, pelvis, right hip, no evidence of infection/acute findings    1/28/23 Discussed case with neurosurgery; no transfer or surgical intervention at this time  Manage pain acutely  May pursue nerve block as outpatient  Appointment with Neurosurgery to discuss treatment options upon discharge  Added oral Decadron 4 mg every 6 to pain regimen  Note; gabapentin will take 2 to 3 weeks to reach full effect  Plan:  Scheduled Tylenol 650mg Q6H   PRN Toradol 10mg Q6H   Morphine 60mg PO every 6H as needed  Morphine 8mg IV prn for breakthrough pain  Gabapentin 300mg TID  Trizinidine 4mg TID  Decadron oral 4 mg every 6H      Acute pain of right knee  Assessment & Plan  Unremarkable exam and no acute findings on x-ray of the knee    Ambulatory dysfunction  Assessment & Plan  Patient continues to decline PT/OT, encourage utilization for continual improvement of ambulatory status  Recommend outpatient PT/OT on discharge        VTE Pharmacologic Prophylaxis: Lovenox     Patient Centered Rounds: I performed bedside rounds with nursing staff today  Discussions with Specialists or Other Care Team Provider: Multidisciplinary meeting    Education and Discussions with Family / Patient: patient    Time Spent for Care: 45 minutes   More than 50% of total time spent on counseling and coordination of care as described above      Current Length of Stay: 14 day(s)  Current Patient Status: Inpatient   Certification Statement: The patient will continue to require additional inpatient hospital stay due to IV antibiotics   Discharge Plan: February 16, 2023     Code Status: Level 1 - Full Code    Subjective: Patient seen and examined  He continues to have musculoskeletal pain as previously mentioned  Otherwise no events  Objective:     Vitals:   Temp (24hrs), Av 8 °F (36 6 °C), Min:97 4 °F (36 3 °C), Max:98 1 °F (36 7 °C)    Temp:  [97 4 °F (36 3 °C)-98 1 °F (36 7 °C)] 97 4 °F (36 3 °C)  HR:  [55-85] 85  Resp:  [19-20] 19  BP: (116-162)/(80-92) 116/86  SpO2:  [94 %-98 %] 98 %  Body mass index is 27 67 kg/m²  Input and Output Summary (last 24 hours): Intake/Output Summary (Last 24 hours) at 2023 1236  Last data filed at 2023 1701  Gross per 24 hour   Intake 240 ml   Output --   Net 240 ml       Physical Exam:   Physical Exam  Constitutional:       General: He is not in acute distress  HENT:      Head: Normocephalic and atraumatic  Nose: No congestion  Eyes:      Conjunctiva/sclera: Conjunctivae normal    Cardiovascular:      Rate and Rhythm: Normal rate and regular rhythm  Heart sounds: No murmur heard  Pulmonary:      Effort: No respiratory distress  Abdominal:      General: There is no distension  Tenderness: There is no abdominal tenderness  There is no guarding  Musculoskeletal:      Right lower leg: No edema  Left lower leg: No edema  Skin:     General: Skin is warm and dry  Neurological:      Mental Status: He is oriented to person, place, and time     Psychiatric:         Mood and Affect: Mood normal          Additional Data:     Labs:  Results from last 7 days   Lab Units 23  0442   WBC Thousand/uL 14 54*   HEMOGLOBIN g/dL 10 6*   HEMATOCRIT % 32 6*   PLATELETS Thousands/uL 492*   NEUTROS PCT % 81*   LYMPHS PCT % 11*   MONOS PCT % 6   EOS PCT % 0     Results from last 7 days   Lab Units 23  0442   SODIUM mmol/L 138   POTASSIUM mmol/L 3 9   CHLORIDE mmol/L 103   CO2 mmol/L 26   BUN mg/dL 22   CREATININE mg/dL 0 62   ANION GAP mmol/L 9   CALCIUM mg/dL 8 4   ALBUMIN g/dL 3 4*   TOTAL BILIRUBIN mg/dL 0 20   ALK PHOS U/L 55   ALT U/L 24   AST U/L 18   GLUCOSE RANDOM mg/dL 150*                       Lines/Drains:  Invasive Devices     Peripherally Inserted Central Catheter Line  Duration           PICC Line 01/27/23 Right Other (Comment) 4 days                Central Line:  Goal for removal: Will discontinue when meds requiring line are completed  Imaging: No pertinent imaging reviewed  Recent Cultures (last 7 days):         Last 24 Hours Medication List:   Current Facility-Administered Medications   Medication Dose Route Frequency Provider Last Rate   • acetaminophen  650 mg Oral Q6H [address] Amparo Tineo MD     • buprenorphine-naloxone  4 mg Sublingual 4x Daily Tyra Blum MD     • cefazolin  2,000 mg Intravenous Q8H Alban Patten MD 2,000 mg (02/01/23 0505)   • dexamethasone  4 mg Oral Q6H [address] Amparo Tineo MD     • docusate sodium  100 mg Oral BID Ampaor Tineo MD     • enoxaparin  40 mg Subcutaneous Daily Carman Sacks, MD     • famotidine  20 mg Oral BID Deacon Spencer PA-C     • gabapentin  300 mg Oral TID Amparo Tineo MD     • ketorolac  10 mg Oral Q6H PRN Amparo Tineo MD     • morphine  60 mg Oral Q6H PRN Amparo Tineo MD     • morphine injection  8 mg Intravenous Q6H PRN Tyra Blum MD     • naloxone  0 1 mg Intravenous PRN Oma Delgado MD     • nicotine  14 mg Transdermal Daily Carman Sacks, MD     • ondansetron  4 mg Oral Q6H PRN Tyra Blum MD     • QUEtiapine  100 mg Oral HS Amparo Tineo MD     • tiZANidine  4 mg Oral Q8H PRN Amparo Tineo MD          Today, Patient Was Seen By: Klaus Saul MD    **Please Note: This note may have been constructed using a voice recognition system  **

## 2023-02-01 NOTE — PROGRESS NOTES
REQUIRED DOCUMENTATION:     1  This service was provided via Telemedicine  2  Provider located at [facility]  3  TeleMed provider: Rafaela Simpson MD   4  Identify all parties in room with patient during tele consult:  Patient, RN  5  After connecting through [employer], patient was identified by name and date of birth and assistant checked wristband  Patient was then informed that this was a Telemedicine visit and that the exam was being conducted confidentially over secure lines  Patient acknowledged consent and understanding of privacy and security of the Telemedicine visit, and gave us permission to have the assistant stay in the room in order to assist with the history and to conduct the exam   I informed the patient that I have reviewed their record in Epic and presented the opportunity for them to ask any questions regarding the visit today  The patient agreed to participate  TeleConsultation - Infectious Disease   Savita Turcios 41 y o  male MRN: [id number]  Unit/Bed#: 023-29 Encounter: [id number]      Impression/Recommendations:  1  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  TTE with possible small MV vegetation but CECILIO is without vegetation  Given underlying IVDU additionally, patient will be treated with a prolonged IV antibiotic course  He is agreeable  Continue high-dose IV cefazolin  Treat x4 weeks total IV antibiotic, through 2/16  Monitor temperature/WBC  2   Low back pain and right hip pain  This was present prior to bacteremia  Consider discitis/vertebral osteomyelitis given staff aureus bacteremia and elevated ESR  However, L-spine MRI without contrast did not show discitis or vertebral osteomyelitis  Patient could not tolerate MRI with contrast   Patient will be getting 4 weeks of IV antibiotic, as in above    If ESR remains elevated after completion of IV antibiotic course, he will likely stay on p o  antibiotic for an extended period of time, until ESR normalizes  IV antibiotic plan as in above  Monitor ESR weekly  If ESR remains elevated at end of IV cefazolin course, will transition to p o  Keflex, to continue until ESR normalizes  Monitor back pain  3   History of HCV infection  HCV PCR negative on admission  4   Active IVDU  Patient is not a candidate for safe home IV antibiotic  Discussed with patient in detail regarding the above plan  I spent 40 minutes in evaluation of the patient of which 20 minutes was in counseling/coordination of care    Antibiotics:  Cefazolin # 13  Day # 13 from clearance of bacteremia    Subjective:  Patient with stable back and hip pain  No leg weakness  Temperature stays down  No chills  He is tolerating antibiotic well  No nausea, vomiting or diarrhea  Objective:  Vitals:  Temp:  [97 4 °F (36 3 °C)-98 1 °F (36 7 °C)] 97 4 °F (36 3 °C)  HR:  [55-85] 85  Resp:  [19-20] 20  BP: (116-162)/() 158/100  SpO2:  [94 %-98 %] 98 %  Temp (24hrs), Av 8 °F (36 6 °C), Min:97 4 °F (36 3 °C), Max:98 1 °F (36 7 °C)  Current: Temperature:  (Pt refused)    Physical Exam:    Physical exam has been primarily done by the patient's nurse and/or the primary service due to limited examination abilities on telemedicine  General: Awake, alert, cooperative, no distress  Neck:  Supple  No mass  No lymphadenopathy  Lungs: Expansion symmetric, no rales, no wheezing, respirations unlabored  Heart:  Regular rate and rhythm, S1 and S2 normal, no murmur  Abdomen: Soft, nondistended, non-tender, bowel sounds active all four quadrants, no masses, no organomegaly  Back:  Stable mild lumbar tenderness  Extremities: No edema  No erythema/warmth  No ulcer  Nontender to palpation  Skin:  No rash  Neuro: Moves all extremities       Invasive Devices     Peripherally Inserted Central Catheter Line  Duration           PICC Line 23 Right Other (Comment) 5 days                Labs studies:   I have personally reviewed pertinent labs  Results from last 7 days   Lab Units 02/01/23  0442 01/29/23  0736   POTASSIUM mmol/L 3 9 4 0   CHLORIDE mmol/L 103 100   CO2 mmol/L 26 26   BUN mg/dL 22 18   CREATININE mg/dL 0 62 0 69   EGFR ml/min/1 73sq m 126 121   CALCIUM mg/dL 8 4 9 0   AST U/L 18 15   ALT U/L 24 17   ALK PHOS U/L 55 76     Results from last 7 days   Lab Units 02/01/23 0442 01/29/23  0736   WBC Thousand/uL 14 54* 12 04*   HEMOGLOBIN g/dL 10 6* 10 7*   PLATELETS Thousands/uL 492* 452*           Imaging Studies:   I have personally reviewed pertinent imaging study reports and images in PACS  EKG, Pathology, and Other Studies:   I have personally reviewed pertinent reports

## 2023-02-01 NOTE — ASSESSMENT & PLAN NOTE
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  Last use - IV,1 bag of fentanyl, at 12pm on 1/18/23  Patient started on Suboxone upon admission, maintenance dose 16 mg daily    Plan:  · Patient has scheduled follow-up with Dr Yahaira Pro in [place]   Pt discharge planned for Friday 2/17/23 - he will need to obtain a prescription for Suboxone from his doctor as we will be unable to provide a prescription for Suboxone from him  · Suboxone 4mg QID   · Seroquel 100 mg at night for sleep  · Zofran as needed for nausea

## 2023-02-01 NOTE — ASSESSMENT & PLAN NOTE
H/o right sided back pain with sciatica  Intermittent severe right hip pain, radiates into right lateral hip & buttocks  Patient unable to ambulate without cane  1/16/23 CT shows:  1  Mild multilevel disc degeneration  2  Grade 1 retrolisthesis of L5 on S1    3  Disc bulge with small herniation at L5-S1  Disc bulging from L2-3 through L4-5    4  No evidence of periostitis along the endplates to suggest advanced osteomyelitis on CT imaging  If there is clinical concern for underlying infection, MRI would be a more sensitive modality to assess for early changes of discitis Osteomyelitis  1/23/23 MRI w/o contrast shows -  No definite MR evidence for discitis osteoma myelitis as clinically questioned  Degenerative changes at L5-S1     1/24/23 CT with contrast of abdomen, pelvis, right hip, no evidence of infection/acute findings    1/28/23 Discussed case with neurosurgery; no transfer or surgical intervention at this time  Manage pain acutely  May pursue nerve block as outpatient  Appointment with Neurosurgery to discuss treatment options upon discharge  Added oral Decadron 4 mg every 6 to pain regimen  Note; gabapentin will take 2 to 3 weeks to reach full effect      Plan:  Scheduled Tylenol 650mg Q6H   PRN Toradol 10mg Q6H   Morphine 60mg PO every 6H as needed  Morphine 8mg IV prn for breakthrough pain  Gabapentin 300mg TID  Trizinidine 4mg TID  Decadron oral 4 mg every 6H

## 2023-02-01 NOTE — ASSESSMENT & PLAN NOTE
Blood cultures positive for MSSA on 1/16/23  Negative repeat blood cultures on 1/20/23  History of IV drug abuse  CECILIO negative for vegetations  No evidence of localized infection on imaging    ID recommendations: IV Ancef 2g Q8 ending 2/16/2023, PICC line placed, patient not able to return home with PICC line in place due to IV drug abuse  PICC line placed successfully on 1/27  Case management unable to find placement  Patient will stay as [facility] inpatient until 2/16/2023

## 2023-02-01 NOTE — ASSESSMENT & PLAN NOTE
Patient continues to decline PT/OT, encourage utilization for continual improvement of ambulatory status  Recommend outpatient PT/OT on discharge

## 2023-02-02 ENCOUNTER — TELEPHONE (OUTPATIENT)
Dept: PAIN MEDICINE | Facility: CLINIC | Age: 38
End: 2023-02-02

## 2023-02-02 RX ORDER — KETOROLAC TROMETHAMINE 10 MG/1
10 TABLET, FILM COATED ORAL EVERY 6 HOURS PRN
Status: DISCONTINUED | OUTPATIENT
Start: 2023-02-02 | End: 2023-02-07

## 2023-02-02 RX ADMIN — BUPRENORPHINE AND NALOXONE 4 MG: 2; .5 FILM BUCCAL; SUBLINGUAL at 18:10

## 2023-02-02 RX ADMIN — MORPHINE SULFATE 8 MG: 10 INJECTION INTRAVENOUS at 20:27

## 2023-02-02 RX ADMIN — MORPHINE SULFATE 8 MG: 10 INJECTION INTRAVENOUS at 08:01

## 2023-02-02 RX ADMIN — FAMOTIDINE 20 MG: 20 TABLET ORAL at 07:15

## 2023-02-02 RX ADMIN — CEFAZOLIN SODIUM 2000 MG: 2 SOLUTION INTRAVENOUS at 14:10

## 2023-02-02 RX ADMIN — MORPHINE SULFATE 60 MG: 15 TABLET ORAL at 18:09

## 2023-02-02 RX ADMIN — QUETIAPINE FUMARATE 100 MG: 100 TABLET ORAL at 22:21

## 2023-02-02 RX ADMIN — FAMOTIDINE 20 MG: 20 TABLET ORAL at 18:09

## 2023-02-02 RX ADMIN — KETOROLAC TROMETHAMINE 10 MG: 10 TABLET, FILM COATED ORAL at 11:25

## 2023-02-02 RX ADMIN — CEFAZOLIN SODIUM 2000 MG: 2 SOLUTION INTRAVENOUS at 05:53

## 2023-02-02 RX ADMIN — KETOROLAC TROMETHAMINE 10 MG: 10 TABLET, FILM COATED ORAL at 16:41

## 2023-02-02 RX ADMIN — ACETAMINOPHEN 650 MG: 325 TABLET, FILM COATED ORAL at 12:16

## 2023-02-02 RX ADMIN — GABAPENTIN 300 MG: 300 CAPSULE ORAL at 18:09

## 2023-02-02 RX ADMIN — BUPRENORPHINE AND NALOXONE 4 MG: 2; .5 FILM BUCCAL; SUBLINGUAL at 22:21

## 2023-02-02 RX ADMIN — MORPHINE SULFATE 8 MG: 10 INJECTION INTRAVENOUS at 14:02

## 2023-02-02 RX ADMIN — MORPHINE SULFATE 60 MG: 15 TABLET ORAL at 12:16

## 2023-02-02 RX ADMIN — GABAPENTIN 300 MG: 300 CAPSULE ORAL at 22:21

## 2023-02-02 RX ADMIN — BUPRENORPHINE AND NALOXONE 4 MG: 2; .5 FILM BUCCAL; SUBLINGUAL at 12:16

## 2023-02-02 RX ADMIN — ACETAMINOPHEN 650 MG: 325 TABLET, FILM COATED ORAL at 18:09

## 2023-02-02 RX ADMIN — ACETAMINOPHEN 650 MG: 325 TABLET, FILM COATED ORAL at 05:53

## 2023-02-02 RX ADMIN — BUPRENORPHINE AND NALOXONE 4 MG: 2; .5 FILM BUCCAL; SUBLINGUAL at 08:02

## 2023-02-02 RX ADMIN — MORPHINE SULFATE 8 MG: 10 INJECTION INTRAVENOUS at 01:51

## 2023-02-02 RX ADMIN — GABAPENTIN 300 MG: 300 CAPSULE ORAL at 08:02

## 2023-02-02 RX ADMIN — KETOROLAC TROMETHAMINE 10 MG: 10 TABLET, FILM COATED ORAL at 22:43

## 2023-02-02 RX ADMIN — DEXAMETHASONE 4 MG: 4 TABLET ORAL at 18:09

## 2023-02-02 RX ADMIN — CEFAZOLIN SODIUM 2000 MG: 2 SOLUTION INTRAVENOUS at 22:21

## 2023-02-02 RX ADMIN — DEXAMETHASONE 4 MG: 4 TABLET ORAL at 05:52

## 2023-02-02 RX ADMIN — DEXAMETHASONE 4 MG: 4 TABLET ORAL at 12:16

## 2023-02-02 RX ADMIN — MORPHINE SULFATE 60 MG: 15 TABLET ORAL at 05:53

## 2023-02-02 NOTE — ASSESSMENT & PLAN NOTE
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  Last use - IV,1 bag of fentanyl, at 12pm on 1/18/23  Patient started on Suboxone upon admission, maintenance dose 16 mg daily    Plan:  · Patient has scheduled follow-up with Dr Familia Hernandez in [place]   Pt discharge planned for Friday 2/17/23 - he will need to obtain a prescription for Suboxone from his doctor as we will be unable to provide a prescription for Suboxone from him  · Suboxone 4mg QID   · Seroquel 100 mg at night for sleep  · Zofran as needed for nausea

## 2023-02-02 NOTE — TELEPHONE ENCOUNTER
Caller:Resident at [place] [id number]     Doctor: Shayla Lui    Reason for call: calling to schedule patient consult with pain mgt made her aware patient was discharged from all [facility]'s practice due to non-compliance

## 2023-02-02 NOTE — ASSESSMENT & PLAN NOTE
Right knee pain since fall 2 weeks ago  Unremarkable exam and no acute findings on x-ray of the knee  Plan:  Continue pain regimen  Continue PT OT

## 2023-02-02 NOTE — ASSESSMENT & PLAN NOTE
Blood cultures positive for MSSA on 1/16/23  Negative repeat blood cultures on 1/20/23  History of IV drug abuse  CECILIO negative for vegetations  No evidence of localized infection on imaging    ID recommendations: IV Ancef 2g Q8 ending 2/16/2023, PICC line placed, patient not able to return home with PICC line in place due to IV drug abuse  PICC line placed successfully on 1/27  Case management unable to find placement  Patient will stay as [facility] until 2/16/2023      Plan:  Continue IV Ancef 2g Q8 (Day 10) ending 2/16/23

## 2023-02-02 NOTE — PLAN OF CARE
Problem: INFECTION - ADULT  Goal: Absence or prevention of progression during hospitalization  Description: INTERVENTIONS:  - Assess and monitor for signs and symptoms of infection  - Monitor lab/diagnostic results  - Monitor all insertion sites, i e  indwelling lines  - Administer medications as ordered  - Instruct and encourage patient and family to use good hand hygiene technique  Outcome: Progressing     Problem: Knowledge Deficit  Goal: Patient/family/caregiver demonstrates understanding of disease process, treatment plan, medications, and discharge instructions  Description: Complete learning assessment and assess knowledge base    Interventions:  - Provide teaching at level of understanding  - Provide teaching via preferred learning methods  Outcome: Progressing     Problem: MUSCULOSKELETAL - ADULT  Goal: Maintain or return mobility to safest level of function  Description: INTERVENTIONS:  - Assess patient's ability to carry out ADLs; (independent)  - Assess/evaluate cause of self-care deficits (limited movement, pain, crutches/walker)  - Assess range of motion  - Assess patient's mobility (modified independent)  - Assess patient's need for assistive devices and provide as appropriate  - Encourage maximum independence but intervene and supervise when necessary  - Involve family in performance of ADLs  - Assess for home care needs following discharge   - Consider OT consult to assist with ADL evaluation and planning for discharge  - Provide patient education as appropriate  Outcome: Progressing  Goal: Maintain proper alignment of affected body part  Description: INTERVENTIONS:  - Support, maintain and protect limb and body alignment  - Provide patient/ family with appropriate education  Outcome: Progressing

## 2023-02-02 NOTE — PROGRESS NOTES
[address]  Progress Note - Roldan Pimentel 1985, 41 y o  male MRN: [id number]  Unit/Bed#: 486-40 Encounter: [id number]  Primary Care Provider: William Baumgarten, DO   Date and time admitted to hospital: 1/18/2023  5:14 PM    * Bacteremia due to methicillin susceptible Staphylococcus aureus (MSSA)  Assessment & Plan  Blood cultures positive for MSSA on 1/16/23  Negative repeat blood cultures on 1/20/23  History of IV drug abuse  CECILIO negative for vegetations  No evidence of localized infection on imaging    ID recommendations: IV Ancef 2g Q8 ending 2/16/2023, PICC line placed, patient not able to return home with PICC line in place due to IV drug abuse  PICC line placed successfully on 1/27  Case management unable to find placement  Patient will stay as [facility] inpatient until 2/16/2023  Plan:  Continue IV Ancef 2g Q8 (Day 10) ending 2/16/23      Acute right-sided low back pain with right-sided sciatica  Assessment & Plan  H/o right sided back pain with sciatica  Intermittent severe right hip pain, radiates into right lateral hip & buttocks  Patient unable to ambulate without cane  1/16/23 CT shows:  1  Mild multilevel disc degeneration  2  Grade 1 retrolisthesis of L5 on S1    3  Disc bulge with small herniation at L5-S1  Disc bulging from L2-3 through L4-5    4  No evidence of periostitis along the endplates to suggest advanced osteomyelitis on CT imaging  If there is clinical concern for underlying infection, MRI would be a more sensitive modality to assess for early changes of discitis Osteomyelitis  1/23/23 MRI w/o contrast shows -  No definite MR evidence for discitis osteoma myelitis as clinically questioned  Degenerative changes at L5-S1     1/24/23 CT with contrast of abdomen, pelvis, right hip, no evidence of infection/acute findings    1/28/23 Discussed case with neurosurgery; no transfer or surgical intervention at this time  Manage pain acutely    May pursue nerve block as outpatient  Appointment with Neurosurgery to discuss treatment options upon discharge  Added oral Decadron 4 mg every 6 to pain regimen  Note; gabapentin will take 2 to 3 weeks to reach full effect  Plan:  Scheduled Tylenol 650mg Q6H   PRN Toradol 10mg Q6H   Morphine 60mg PO every 6H as needed  Morphine 8mg IV prn for breakthrough pain  Gabapentin 300mg TID  Trizinidine 4mg TID  Decadron oral 4 mg every 6H      Intravenous drug abuse, continuous ([facility] [address] )  Assessment & Plan  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  Last use - IV,1 bag of fentanyl, at 12pm on 1/18/23  Patient started on Suboxone upon admission, maintenance dose 16 mg daily    Plan:  · Patient has scheduled follow-up with Dr Jorge Queen in [place]   Pt discharge planned for Friday 2/17/23 - he will need to obtain a prescription for Suboxone from his doctor as we will be unable to provide a prescription for Suboxone from him  · Suboxone 4mg QID   · Seroquel 100 mg at night for sleep  · Zofran as needed for nausea    Acute pain of right knee  Assessment & Plan  Right knee pain since fall 2 weeks ago  Unremarkable exam and no acute findings on x-ray of the knee  Plan:  Continue pain regimen  Continue PT OT    Hypomagnesemia  Assessment & Plan  Magnesium 1 7  Plan:  Replenish as needed  Trend magnesium periodically    Tobacco abuse  Assessment & Plan  History of tobacco smoking  Plan:  Nicotine patch  Smoking cessation    Ambulatory dysfunction  Assessment & Plan  Patient continues to decline PT/OT, encourage utilization for continual improvement of ambulatory status  Recommend outpatient PT/OT on discharge    Hepatitis C virus  Assessment & Plan  Most recent labs 3/7/2022  Positive Hep A  Positive Hep B surface antibody  Positive Hep C antibody  Not taking hepatitis meds    ID recs: Hep C RNA negative    Plan:  Follow-up with ID as outpatient            VTE Pharmacologic Prophylaxis: VTE Score: 2 Low Risk (Score 0-2) - Encourage Ambulation  Patient Centered Rounds: I performed bedside rounds with nursing staff today  Discussions with Specialists or Other Care Team Provider: ID    Education and Discussions with Family / Patient: Patient declined call to   Time Spent for Care: 30 minutes  More than 50% of total time spent on counseling and coordination of care as described above  Current Length of Stay: 15 day(s)  Current Patient Status: Inpatient   Certification Statement: The patient will continue to require additional inpatient hospital stay due to iv abx   Discharge Plan:     Code Status: Level 1 - Full Code    Subjective:   Patient seen at bedside this morning, laying in bed comfortably  No acute distress  Patient states that his back and knee pain have improved but are still present when he moves  The pain is worse when he puts pressure on it  Patient concerned for the lump over his lower right back and lump behind his right knee, imaging have shown no acute issues  Patient ambulating with walker to the bathroom and back  No new complaints  Objective:     Vitals:   Temp (24hrs), Av 8 °F (36 6 °C), Min:97 8 °F (36 6 °C), Max:97 8 °F (36 6 °C)    Temp:  [97 8 °F (36 6 °C)] 97 8 °F (36 6 °C)  HR:  [64-67] 67  Resp:  [18-20] 18  BP: (136-158)/() 136/88  SpO2:  [96 %-98 %] 98 %  Body mass index is 27 67 kg/m²  Input and Output Summary (last 24 hours):   No intake or output data in the 24 hours ending 23 1225    Physical Exam:   Physical Exam  Vitals and nursing note reviewed  Constitutional:       General: He is not in acute distress  Appearance: He is well-developed  HENT:      Head: Normocephalic and atraumatic  Right Ear: External ear normal       Left Ear: External ear normal       Nose: Nose normal       Mouth/Throat:      Mouth: Mucous membranes are moist    Eyes:      General:         Right eye: No discharge  Left eye: No discharge     Cardiovascular: Rate and Rhythm: Normal rate and regular rhythm  Pulses: Normal pulses  Pulmonary:      Effort: Pulmonary effort is normal  No respiratory distress  Abdominal:      Palpations: Abdomen is soft  Musculoskeletal:         General: No swelling  Cervical back: Neck supple  Comments: MSK exam unchanged from previous day:  Mild decreased range of motion     Skin:     General: Skin is warm and dry  Capillary Refill: Capillary refill takes less than 2 seconds  Findings: No erythema or rash  Neurological:      General: No focal deficit present  Mental Status: He is alert  Mental status is at baseline     Psychiatric:         Mood and Affect: Mood normal          Behavior: Behavior normal          Additional Data:     Labs:  Results from last 7 days   Lab Units 02/01/23  0442   WBC Thousand/uL 14 54*   HEMOGLOBIN g/dL 10 6*   HEMATOCRIT % 32 6*   PLATELETS Thousands/uL 492*   NEUTROS PCT % 81*   LYMPHS PCT % 11*   MONOS PCT % 6   EOS PCT % 0     Results from last 7 days   Lab Units 02/01/23  0442   SODIUM mmol/L 138   POTASSIUM mmol/L 3 9   CHLORIDE mmol/L 103   CO2 mmol/L 26   BUN mg/dL 22   CREATININE mg/dL 0 62   ANION GAP mmol/L 9   CALCIUM mg/dL 8 4   ALBUMIN g/dL 3 4*   TOTAL BILIRUBIN mg/dL 0 20   ALK PHOS U/L 55   ALT U/L 24   AST U/L 18   GLUCOSE RANDOM mg/dL 150*                       Lines/Drains:  Invasive Devices     Peripherally Inserted Central Catheter Line  Duration           PICC Line 01/27/23 Right Other (Comment) 5 days                Central Line:  Goal for removal: iv abx 2/16             Imaging: Reviewed radiology reports from this admission including: MRI spine    Recent Cultures (last 7 days):         Last 24 Hours Medication List:   Current Facility-Administered Medications   Medication Dose Route Frequency Provider Last Rate   • acetaminophen  650 mg Oral Q6H [address] Anais Lara MD     • buprenorphine-naloxone  4 mg Sublingual 4x Daily Vijaya Nunes MD • cefazolin  2,000 mg Intravenous Q8H Twin Chaidez MD 2,000 mg (02/02/23 0553)   • dexamethasone  4 mg Oral Q6H [facility] Nicol Owens MD     • docusate sodium  100 mg Oral BID Nicol Owens MD     • enoxaparin  40 mg Subcutaneous Daily Brii Ferrell MD     • famotidine  20 mg Oral BID Leo Jacques PA-C     • gabapentin  300 mg Oral TID Nicol Owens MD     • morphine  60 mg Oral Q6H PRN Nicol Owens MD     • morphine injection  8 mg Intravenous Q6H PRN Tyra Ramirez MD     • naloxone  0 1 mg Intravenous PRN Tyra Ramirez MD     • nicotine  14 mg Transdermal Daily Brii Ferrell MD     • ondansetron  4 mg Oral Q6H PRN Tyra Ramirez MD     • QUEtiapine  100 mg Oral HS Nicol Owens MD     • tiZANidine  4 mg Oral Q8H PRN Nicol Owens MD          Today, Patient Was Seen By: Nicol Owens MD    **Please Note: This note may have been constructed using a voice recognition system  **

## 2023-02-03 LAB
ANISOCYTOSIS BLD QL SMEAR: PRESENT
BASOPHILS # BLD MANUAL: 0 THOUSAND/UL (ref 0–0.1)
BASOPHILS NFR MAR MANUAL: 0 % (ref 0–1)
EOSINOPHIL # BLD MANUAL: 0 THOUSAND/UL (ref 0–0.4)
EOSINOPHIL NFR BLD MANUAL: 0 % (ref 0–6)
ERYTHROCYTE [DISTWIDTH] IN BLOOD BY AUTOMATED COUNT: 13.5 % (ref 11.6–15.1)
HCT VFR BLD AUTO: 33.1 % (ref 36.5–49.3)
HGB BLD-MCNC: 10.8 G/DL (ref 12–17)
HYPERCHROMIA BLD QL SMEAR: PRESENT
LYMPHOCYTES # BLD AUTO: 1.45 THOUSAND/UL (ref 0.6–4.47)
LYMPHOCYTES # BLD AUTO: 10 % (ref 14–44)
MCH RBC QN AUTO: 28.2 PG (ref 26.8–34.3)
MCHC RBC AUTO-ENTMCNC: 32.6 G/DL (ref 31.4–37.4)
MCV RBC AUTO: 86 FL (ref 82–98)
MICROCYTES BLD QL AUTO: PRESENT
MONOCYTES # BLD AUTO: 1.02 THOUSAND/UL (ref 0–1.22)
MONOCYTES NFR BLD: 7 % (ref 4–12)
NEUTROPHILS # BLD MANUAL: 12.04 THOUSAND/UL (ref 1.85–7.62)
NEUTS BAND NFR BLD MANUAL: 1 % (ref 0–8)
NEUTS SEG NFR BLD AUTO: 82 % (ref 43–75)
PLATELET # BLD AUTO: 480 THOUSANDS/UL (ref 149–390)
PLATELET BLD QL SMEAR: ABNORMAL
PMV BLD AUTO: 9.1 FL (ref 8.9–12.7)
RBC # BLD AUTO: 3.83 MILLION/UL (ref 3.88–5.62)
WBC # BLD AUTO: 14.51 THOUSAND/UL (ref 4.31–10.16)

## 2023-02-03 RX ADMIN — DEXAMETHASONE 4 MG: 4 TABLET ORAL at 11:36

## 2023-02-03 RX ADMIN — QUETIAPINE FUMARATE 100 MG: 100 TABLET ORAL at 21:34

## 2023-02-03 RX ADMIN — MORPHINE SULFATE 60 MG: 15 TABLET ORAL at 08:31

## 2023-02-03 RX ADMIN — KETOROLAC TROMETHAMINE 10 MG: 10 TABLET, FILM COATED ORAL at 10:04

## 2023-02-03 RX ADMIN — CEFAZOLIN SODIUM 2000 MG: 2 SOLUTION INTRAVENOUS at 14:37

## 2023-02-03 RX ADMIN — GABAPENTIN 300 MG: 300 CAPSULE ORAL at 16:27

## 2023-02-03 RX ADMIN — ACETAMINOPHEN 650 MG: 325 TABLET, FILM COATED ORAL at 11:36

## 2023-02-03 RX ADMIN — GABAPENTIN 300 MG: 300 CAPSULE ORAL at 08:31

## 2023-02-03 RX ADMIN — GABAPENTIN 300 MG: 300 CAPSULE ORAL at 21:34

## 2023-02-03 RX ADMIN — BUPRENORPHINE AND NALOXONE 4 MG: 2; .5 FILM BUCCAL; SUBLINGUAL at 17:51

## 2023-02-03 RX ADMIN — DEXAMETHASONE 4 MG: 4 TABLET ORAL at 06:22

## 2023-02-03 RX ADMIN — DEXAMETHASONE 4 MG: 4 TABLET ORAL at 00:22

## 2023-02-03 RX ADMIN — CEFAZOLIN SODIUM 2000 MG: 2 SOLUTION INTRAVENOUS at 06:21

## 2023-02-03 RX ADMIN — BUPRENORPHINE AND NALOXONE 4 MG: 2; .5 FILM BUCCAL; SUBLINGUAL at 08:31

## 2023-02-03 RX ADMIN — FAMOTIDINE 20 MG: 20 TABLET ORAL at 17:51

## 2023-02-03 RX ADMIN — ACETAMINOPHEN 650 MG: 325 TABLET, FILM COATED ORAL at 00:22

## 2023-02-03 RX ADMIN — MORPHINE SULFATE 8 MG: 10 INJECTION INTRAVENOUS at 05:29

## 2023-02-03 RX ADMIN — MORPHINE SULFATE 60 MG: 15 TABLET ORAL at 14:37

## 2023-02-03 RX ADMIN — MORPHINE SULFATE 60 MG: 15 TABLET ORAL at 20:33

## 2023-02-03 RX ADMIN — ACETAMINOPHEN 650 MG: 325 TABLET, FILM COATED ORAL at 06:21

## 2023-02-03 RX ADMIN — CEFAZOLIN SODIUM 2000 MG: 2 SOLUTION INTRAVENOUS at 21:34

## 2023-02-03 RX ADMIN — ACETAMINOPHEN 650 MG: 325 TABLET, FILM COATED ORAL at 17:51

## 2023-02-03 RX ADMIN — MORPHINE SULFATE 60 MG: 15 TABLET ORAL at 00:21

## 2023-02-03 RX ADMIN — BUPRENORPHINE AND NALOXONE 4 MG: 2; .5 FILM BUCCAL; SUBLINGUAL at 11:36

## 2023-02-03 RX ADMIN — MORPHINE SULFATE 8 MG: 10 INJECTION INTRAVENOUS at 11:36

## 2023-02-03 RX ADMIN — MORPHINE SULFATE 8 MG: 10 INJECTION INTRAVENOUS at 23:48

## 2023-02-03 RX ADMIN — FAMOTIDINE 20 MG: 20 TABLET ORAL at 08:31

## 2023-02-03 RX ADMIN — DEXAMETHASONE 4 MG: 4 TABLET ORAL at 23:49

## 2023-02-03 RX ADMIN — BUPRENORPHINE AND NALOXONE 4 MG: 2; .5 FILM BUCCAL; SUBLINGUAL at 21:34

## 2023-02-03 RX ADMIN — DEXAMETHASONE 4 MG: 4 TABLET ORAL at 17:51

## 2023-02-03 RX ADMIN — KETOROLAC TROMETHAMINE 10 MG: 10 TABLET, FILM COATED ORAL at 22:43

## 2023-02-03 RX ADMIN — KETOROLAC TROMETHAMINE 10 MG: 10 TABLET, FILM COATED ORAL at 16:27

## 2023-02-03 RX ADMIN — ACETAMINOPHEN 650 MG: 325 TABLET, FILM COATED ORAL at 23:48

## 2023-02-03 RX ADMIN — MORPHINE SULFATE 8 MG: 10 INJECTION INTRAVENOUS at 17:53

## 2023-02-03 NOTE — OCCUPATIONAL THERAPY NOTE
Occupational Therapy Re-Evaluation     Patient Name: Xavi Felix  Today's Date: 2/3/2023  Problem List  Principal Problem:    Bacteremia due to methicillin susceptible Staphylococcus aureus (MSSA)  Active Problems:    Hepatitis C virus    Intravenous drug abuse, continuous ([facility] [address] )    Ambulatory dysfunction    Acute right-sided low back pain with right-sided sciatica    Tobacco abuse    Hypomagnesemia    Acute pain of right knee    Past Medical History  Past Medical History:   Diagnosis Date    Addiction to drug [facility])     Anxiety     Arthritis     Chronic pain     Hepatitis     Hepatitis C virus     Heroin abuse ([facility] [address] ) 05/02/2018    Moderate episode of recurrent major depressive disorder ([facility] [address] ) 02/05/2020    Tobacco use disorder     last assessed 11/2/15      Past Surgical History  Past Surgical History:   Procedure Laterality Date    EPIDURAL BLOCK INJECTION Bilateral 4/19/2018    Procedure: T9-10 INTERLAMINAR EPIDURAL STEROID INJECTION;  Surgeon: Harleen Salgado MD;  Location: [facility];  Service: Pain Management     IR PICC PLACEMENT SINGLE LUMEN  1/27/2023 02/03/23 0856   OT Last Visit   OT Visit Date 02/03/23   Note Type   Note type Re-Evaluation   Pain Assessment   Pain Assessment Tool 0-10   Pain Score 9   Pain Location/Orientation Location: Back   Restrictions/Precautions   Weight Bearing Precautions Per Order No   Other Precautions Fall Risk   Home Living   Additional Comments see initial evaluation for details   Prior Function   Comments see initial evaluation for details   Subjective   Subjective "I will need crutches for home"   ADL   Where Assessed Edge of bed   LB Dressing Assistance 7  Independent   Toileting Assistance  7  Independent   Additional Comments pt performs shoe donning seated EOB   Bed Mobility   Supine to Sit 6  Modified independent   Additional items Bedrails   Sit to Supine 6  Modified independent   Additional items Bedrails   Additional Comments pt on RA during session; SpO2 WFL with no complaints of SOB; HR elevated into 130s, however pt with no symptoms of dizziness   Transfers   Sit to Stand 6  Modified independent   Additional items Bedrails   Stand to Sit 6  Modified independent   Additional items Bedrails   Additional Comments pt performs functional transfers with mod (I) level; no significant LOB; pt utilizes RW   Functional Mobility   Functional Mobility 6  Modified independent   Additional Comments pt performs functional mobility with RW at mod (I) level; performs ~150ft with no significant LOB   Additional items Rolling walker   Balance   Static Sitting Good   Dynamic Sitting Good   Static Standing Good   Dynamic Standing Good   Ambulatory Fair +   Activity Tolerance   Activity Tolerance Patient limited by fatigue;Patient limited by pain   RUE Assessment   RUE Assessment WFL   LUE Assessment   LUE Assessment WFL   Hand Function   Gross Motor Coordination Functional   Fine Motor Coordination Functional   Sensation   Light Touch No apparent deficits   Sharp/Dull No apparent deficits   Psychosocial   Psychosocial (WDL) WDL   Cognition   Overall Cognitive Status WFL   Arousal/Participation Alert   Attention Within functional limits   Orientation Level Oriented X4   Memory Within functional limits   Following Commands Follows all commands and directions without difficulty   Assessment   Assessment Patient is a 41 y o  male admitted to [facility] on 1/18/2023 due to Bacteremia due to methicillin susceptible Staphylococcus aureus (MSSA)  Pt performed at (I) level with no OT needs identified at this time Comorbidities affecting pt's physical performance at time of assessment include drug addiction, hep C, chronic pain, hepatitis, anxiety, heroin abuse, MDD  The patient's raw score on the AM-PAC Daily Activity inpatient short form is 24, standardized score is 57 54, greater than 39 4  Patients at this level are likely to benefit from DC to home   From OT standpoint recommend anticipate no needs upon D/C  No further acute OT needs identified at this time  Recommend continued mobilization with hospital staff and restorative services while in the hospital to increase pt’s endurance and strength upon D/C  From OT standpoint, recommend D/C to home with family support when medically cleared  D/C pt from OT caseload at this time  Pt benefited from co-evaluation of skilled OT and PT therapists in order to most appropriately address functional deficits d/t extensive assistance required for safe functional mobility, decreased activity tolerance, and regression from functioning level prior to admission and/or onset of present illness  OT/PT objectives were addressed separately; please see PT note for specific goal areas targeted     Goals   Patient Goals to go home   Recommendation   OT Discharge Recommendation Home with outpatient rehabilitation   AM-PAC Daily Activity Inpatient   Lower Body Dressing 4   Bathing 4   Toileting 4   Upper Body Dressing 4   Grooming 4   Eating 4   Daily Activity Raw Score 24   Daily Activity Standardized Score (Calc for Raw Score >=11) 57 54   AM-PAC Applied Cognition Inpatient   Following a Speech/Presentation 4   Understanding Ordinary Conversation 4   Taking Medications 4   Remembering Where Things Are Placed or Put Away 4   Remembering List of 4-5 Errands 4   Taking Care of Complicated Tasks 4   Applied Cognition Raw Score 24   Applied Cognition Standardized Score 62 21

## 2023-02-03 NOTE — TELEMEDICINE
REQUIRED DOCUMENTATION:     1  This service was provided via Telemedicine  2  Provider located at [address]  3  TeleMed provider: Navjot Calvin MD   4  Identify all parties in room with patient during tele consult:  Patient, RN  5  After connecting through [employer], patient was identified by name and date of birth and assistant checked wristband  Patient was then informed that this was a Telemedicine visit and that the exam was being conducted confidentially over secure lines  Patient acknowledged consent and understanding of privacy and security of the Telemedicine visit, and gave us permission to have the assistant stay in the room in order to assist with the history and to conduct the exam   I informed the patient that I have reviewed their record in Epic and presented the opportunity for them to ask any questions regarding the visit today  The patient agreed to participate  TeleConsultation - Infectious Disease   Simon Ventura 41 y o  male MRN: [id number]  Unit/Bed#: 543-22 Encounter: [id number]      Impression/Recommendations:  1  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  TTE with possible small MV vegetation but CECILIO is without vegetation  Given underlying IVDU additionally, patient will be treated with a prolonged IV antibiotic course  He is agreeable  Continue high-dose IV cefazolin  Treat x4 weeks total IV antibiotic, through 2/16  Monitor temperature/WBC      2  Low back pain and right hip pain  This was present prior to bacteremia  Consider discitis/vertebral osteomyelitis given staff aureus bacteremia and elevated ESR  However, L-spine MRI without contrast did not show discitis or vertebral osteomyelitis  Patient could not tolerate MRI with contrast   Patient will be getting 4 weeks of IV antibiotic, as in above    If ESR remains elevated after completion of IV antibiotic course, he will likely stay on p o  antibiotic for an extended period of time, until ESR normalizes  IV antibiotic plan as in above  Monitor ESR weekly  If ESR remains elevated at end of IV cefazolin course, will transition to p o  Keflex, to continue until ESR normalizes  Monitor back pain      3  History of HCV infection  HCV PCR negative on admission      4  Active IVDU  Patient is not a candidate for safe home IV antibiotic      Discussed with patient in detail regarding the above plan  Discussed with Dr Chay Bejarano from pul service      I spent 30 minutes in evaluation of the patient of which 15 minutes was in counseling/coordination of care     Antibiotics:  Cefazolin # 15  Day # 15 from clearance of bacteremia     Subjective:  Patient with stable back, knee and hip pain, worse with repeat  No leg weakness  Temperature stays down  No chills  He is tolerating antibiotic well  No nausea, vomiting or diarrhea      Objective:  Vitals:  Temp:  [97 3 °F (36 3 °C)-97 9 °F (36 6 °C)] 97 9 °F (36 6 °C)  HR:  [60-67] 60  Resp:  [16-19] 19  BP: (144-153)/(89-94) 153/94  SpO2:  [97 %-98 %] 97 %  Temp (24hrs), Av 6 °F (36 4 °C), Min:97 3 °F (36 3 °C), Max:97 9 °F (36 6 °C)  Current: Temperature: 97 9 °F (36 6 °C)    Physical Exam:    Physical exam has been primarily done by the patient's nurse and/or the primary service due to limited examination abilities on telemedicine  General: Awake, alert, cooperative, no distress  Neck:  Supple  No mass  No lymphadenopathy  Lungs: Expansion symmetric, no rales, no wheezing, respirations unlabored  Heart:  Regular rate and rhythm, S1 and S2 normal, no murmur  Abdomen: Soft, nondistended, non-tender, bowel sounds active all four quadrants, no masses, no organomegaly  Extremities: No edema  No erythema/warmth  No ulcer  Nontender to palpation  Skin:  No rash  Neuro: Moves all extremities       Invasive Devices     Peripherally Inserted Central Catheter Line  Duration           PICC Line 23 Right Other (Comment) 6 days Labs studies:   I have personally reviewed pertinent labs  Results from last 7 days   Lab Units 02/01/23  0442 01/29/23  0736   POTASSIUM mmol/L 3 9 4 0   CHLORIDE mmol/L 103 100   CO2 mmol/L 26 26   BUN mg/dL 22 18   CREATININE mg/dL 0 62 0 69   EGFR ml/min/1 73sq m 126 121   CALCIUM mg/dL 8 4 9 0   AST U/L 18 15   ALT U/L 24 17   ALK PHOS U/L 55 76     Results from last 7 days   Lab Units 02/03/23  0535 02/01/23  0442 01/29/23  0736   WBC Thousand/uL 14 51* 14 54* 12 04*   HEMOGLOBIN g/dL 10 8* 10 6* 10 7*   PLATELETS Thousands/uL 480* 492* 452*           Imaging Studies:   I have personally reviewed pertinent imaging study reports and images in PACS  EKG, Pathology, and Other Studies:   I have personally reviewed pertinent reports

## 2023-02-03 NOTE — PHYSICAL THERAPY NOTE
Physical Therapy Re-Evaluation    Patient Name: Savita Turcios    CTFOT'I Date: 2/3/2023     Problem List  Principal Problem:    Bacteremia due to methicillin susceptible Staphylococcus aureus (MSSA)  Active Problems:    Hepatitis C virus    Intravenous drug abuse, continuous ([facility] )    Ambulatory dysfunction    Acute right-sided low back pain with right-sided sciatica    Tobacco abuse    Hypomagnesemia    Acute pain of right knee       Past Medical History  Past Medical History:   Diagnosis Date    Addiction to drug [facility])     Anxiety     Arthritis     Chronic pain     Hepatitis     Hepatitis C virus     Heroin abuse ([facility] ) 05/02/2018    Moderate episode of recurrent major depressive disorder ([facility] ) 02/05/2020    Tobacco use disorder     last assessed 11/2/15         Past Surgical History  Past Surgical History:   Procedure Laterality Date    EPIDURAL BLOCK INJECTION Bilateral 4/19/2018    Procedure: T9-10 INTERLAMINAR EPIDURAL STEROID INJECTION;  Surgeon: Ligia Quintero MD;  Location: [facility] MAIN OR;  Service: Pain Management     IR PICC PLACEMENT SINGLE LUMEN  1/27/2023 02/03/23 0855   PT Last Visit   PT Visit Date 02/03/23   Note Type   Note type Re-Evaluation   Pain Assessment   Pain Assessment Tool 0-10   Pain Score 9   Pain Location/Orientation Location: Back;Orientation: Right;Location: Leg   Restrictions/Precautions   Weight Bearing Precautions Per Order No   Other Precautions Pain; Fall Risk   Home Living   Additional Comments see initial eval   Prior Function   Comments see initial eval   General   Family/Caregiver Present No   Cognition   Overall Cognitive Status WFL   Arousal/Participation Alert   Attention Within functional limits   Orientation Level Oriented X4   Following Commands Follows all commands and directions without difficulty   Subjective   Subjective "I won't be able to use the walker at home"   RLE Assessment   RLE Assessment X  (4/5 grossly)   LLE Assessment   LLE Assessment X  (4/5 grossly)   Bed Mobility   Supine to Sit 6  Modified independent   Sit to Supine 6  Modified independent   Transfers   Sit to Stand 6  Modified independent   Stand to Sit 6  Modified independent   Additional Comments RW used   Ambulation/Elevation   Gait pattern Excessively slow; Short stride;Decreased R stance; Antalgic; Improper Weight shift   Gait Assistance 5  Supervision   Additional items Verbal cues   Assistive Device Rolling walker   Distance 100'   Balance   Static Sitting Good   Dynamic Sitting Good   Static Standing Fair +   Dynamic Standing Fair   Ambulatory Fair  (with RW)   Endurance Deficit   Endurance Deficit Yes   Endurance Deficit Description pt fatiguing quickly with ambulation   Activity Tolerance   Activity Tolerance Patient limited by fatigue;Patient limited by pain   Assessment   Prognosis Good   Problem List Decreased strength;Decreased endurance; Impaired balance;Decreased mobility;Pain   Assessment Pt seen this date for PT re-evaluation d/t upcoming expiration of POC  All goals established upon initial evaluation not met at this time and pt requires continuation of PT intervention  Pt able to perform all functional mobility with mod(I)-SUP with RW  Seated rest break taken following ~100' of ambulation d/t reports of R LE pain; no true LOB experienced  Pt reports feeling stable while ambulating with RW but would like to transition to crutches to return home d/t home setup having many stairs  Pt educated on the need to perform stair training prior to d/c  Pt demonstrating understanding and is in agreement but declines on this date  The patient's AM-PAC Basic Mobility Inpatient Short Form Raw Score is 20  A Raw score of greater than 16 suggests the patient may benefit from discharge to home  Please also refer to the recommendation of the Physical Therapist for safe discharge planning   Co treatment with OT secondary to complex medical condition of pt, possible A of 2 required to achieve and maintain transitional movements, requiring the need of skilled therapeutic intervention of 2 therapists to achieve delivery of services  D/c recommendation at this time continues to be home with outpatient rehabilitation services  Goals   LTG Expiration Date 02/17/23   Long Term Goal #1 Pt will participate in B LE strengthening exercises to facilitate improved functional activity tolerance  Pt will perform all functional transfers and bed mobility mod(I) with good safety awareness  Pt will ambulate 250' mod(I) with LRAD while maintaining good functional dynamic balance  Pt will ascend/descend a FFOS with HR and SUP to reflect the ability to safely navigate the home  PT Treatment Day 0   Plan   Treatment/Interventions Functional transfer training;LE strengthening/ROM; Elevations; Therapeutic exercise; Endurance training;Bed mobility;Gait training   PT Frequency 3-5x/wk   Recommendation   PT Discharge Recommendation Home with outpatient rehabilitation   Equipment Recommended Crutches   AM-PAC Basic Mobility Inpatient   Turning in Flat Bed Without Bedrails 3   Lying on Back to Sitting on Edge of Flat Bed Without Bedrails 3   Moving Bed to Chair 4   Standing Up From Chair Using Arms 4   Walk in Room 4   Climb 3-5 Stairs With Railing 2   Basic Mobility Inpatient Raw Score 20   Basic Mobility Standardized Score 43 99   Highest Level Of Mobility   JH-HLM Goal 6: Walk 10 steps or more   JH-HLM Achieved 7: Walk 25 feet or more   End of Consult   Patient Position at End of Consult Supine; All needs within reach

## 2023-02-03 NOTE — ASSESSMENT & PLAN NOTE
H/o right sided back pain with sciatica  Intermittent severe right hip pain, radiates into right lateral hip & buttocks  Patient unable to ambulate without cane  1/16/23 CT shows:  1  Mild multilevel disc degeneration  2  Grade 1 retrolisthesis of L5 on S1    3  Disc bulge with small herniation at L5-S1  Disc bulging from L2-3 through L4-5    4  No evidence of periostitis along the endplates to suggest advanced osteomyelitis on CT imaging  If there is clinical concern for underlying infection, MRI would be a more sensitive modality to assess for early changes of discitis Osteomyelitis  1/23/23 MRI w/o contrast shows -  No definite MR evidence for discitis osteoma myelitis as clinically questioned  Degenerative changes at L5-S1     1/24/23 CT with contrast of abdomen, pelvis, right hip, no evidence of infection/acute findings    1/28/23 Discussed case with neurosurgery; no transfer or surgical intervention at this time  Manage pain acutely  May pursue nerve block as outpatient  Appointment with Neurosurgery to discuss treatment options upon discharge  Added oral Decadron 4 mg every 6 to pain regimen  Note; gabapentin will take 2 to 3 weeks to reach full effect  2/3/23 ambulatory referral made to neurosurgery, Dr Shera Carrel requesting x-ray of lumbar spine with 6 views extension flexion done 2 days before discharge (2/15)      Plan:  Scheduled Tylenol 650mg Q6H   PRN Toradol 10mg Q6H   Morphine 60mg PO every 6H as needed  Morphine 8mg IV prn for breakthrough pain  Gabapentin 300mg TID  Trizinidine 4mg TID  Decadron oral 4 mg every 6H

## 2023-02-03 NOTE — PROGRESS NOTES
[address]  Progress Note - Nova Mack 1985, 41 y o  male MRN: [id number]  Unit/Bed#: 252-03 Encounter: [id number]  Primary Care Provider: Aleksandra Oden DO   Date and time admitted to hospital: 1/18/2023  5:14 PM    * Bacteremia due to methicillin susceptible Staphylococcus aureus (MSSA)  Assessment & Plan  Blood cultures positive for MSSA on 1/16/23  Negative repeat blood cultures on 1/20/23  History of IV drug abuse  CECILIO negative for vegetations  No evidence of localized infection on imaging    ID recommendations: IV Ancef 2g Q8 ending 2/16/2023, PICC line placed, patient not able to return home with PICC line in place due to IV drug abuse  PICC line placed successfully on 1/27  Case management unable to find placement  Patient will stay as [facility] until 2/16/2023  Plan:  Continue IV Ancef 2g Q8 ending 2/16/23      Acute right-sided low back pain with right-sided sciatica  Assessment & Plan  H/o right sided back pain with sciatica  Intermittent severe right hip pain, radiates into right lateral hip & buttocks  Patient unable to ambulate without cane  1/16/23 CT shows:  1  Mild multilevel disc degeneration  2  Grade 1 retrolisthesis of L5 on S1    3  Disc bulge with small herniation at L5-S1  Disc bulging from L2-3 through L4-5    4  No evidence of periostitis along the endplates to suggest advanced osteomyelitis on CT imaging  If there is clinical concern for underlying infection, MRI would be a more sensitive modality to assess for early changes of discitis Osteomyelitis  1/23/23 MRI w/o contrast shows -  No definite MR evidence for discitis osteoma myelitis as clinically questioned  Degenerative changes at L5-S1     1/24/23 CT with contrast of abdomen, pelvis, right hip, no evidence of infection/acute findings    1/28/23 Discussed case with neurosurgery; no transfer or surgical intervention at this time  Manage pain acutely    May pursue nerve block as outpatient  Appointment with Neurosurgery to discuss treatment options upon discharge  Added oral Decadron 4 mg every 6 to pain regimen  Note; gabapentin will take 2 to 3 weeks to reach full effect  2/3/23 ambulatory referral made to neurosurgery, Dr Shera Carrel requesting x-ray of lumbar spine with 6 views extension flexion done 2 days before discharge (2/15)  Plan:  Scheduled Tylenol 650mg Q6H   PRN Toradol 10mg Q6H   Morphine 60mg PO every 6H as needed  Morphine 8mg IV prn for breakthrough pain  Gabapentin 300mg TID  Trizinidine 4mg TID  Decadron oral 4 mg every 6H      Intravenous drug abuse, continuous ([facility] [address] )  Assessment & Plan  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  Last use - IV,1 bag of fentanyl, at 12pm on 1/18/23  Patient started on Suboxone upon admission, maintenance dose 16 mg daily    Plan:  · Patient has scheduled follow-up with Dr Clementina Caceres in [place] on 2/22/23   Pt discharge planned for Friday 2/17/23 - he will need to obtain a prescription for Suboxone from his doctor as we will be unable to provide a prescription for Suboxone  · Suboxone 4mg QID   · Seroquel 100 mg at night for sleep  · Zofran as needed for nausea    Acute pain of right knee  Assessment & Plan  Right knee pain since fall 2 weeks ago  Unremarkable exam and no acute findings on x-ray of the knee  Plan:  Continue pain regimen  Continue PT OT    Hypomagnesemia  Assessment & Plan  Magnesium 1 7  Plan:  Replenish as needed  Trend magnesium periodically    Tobacco abuse  Assessment & Plan  History of tobacco smoking  Plan:  Nicotine patch  Smoking cessation    Ambulatory dysfunction  Assessment & Plan  Unable to ambulate without cane  Pain with bearing weight on right knee    2/3 -patient previously refused PT OT multiple times, today PT/OT reports improvement, patient ambulating well with walker    Plan:  Continue PT/OT    Hepatitis C virus  Assessment & Plan  Most recent labs 3/7/2022  Positive Hep A  Positive Hep B surface antibody  Positive Hep C antibody  Not taking hepatitis meds    ID recs: Hep C RNA negative    Plan:  Follow-up with ID as outpatient          VTE Pharmacologic Prophylaxis: VTE Score: 2 Low Risk (Score 0-2) - Encourage Ambulation  Patient Centered Rounds: I performed bedside rounds with nursing staff today  Discussions with Specialists or Other Care Team Provider: Neurosurgery    Education and Discussions with Family / Patient: Patient declined call to   Time Spent for Care: 60 minutes  More than 50% of total time spent on counseling and coordination of care as described above  Current Length of Stay: 16 day(s)  Current Patient Status: Inpatient   Certification Statement: The patient will continue to require additional inpatient hospital stay due to IV antibiotics  Discharge Plan: 2023    Code Status: Level 1 - Full Code    Subjective:   Patient seen at bedside this morning, laying in bed comfortably  Patient states that back pain is unchanged from yesterday, no pain when laying still, shooting pain with movement  Current pain regimen is helping  Patient tolerating antibiotics well, no pain associated with PICC line  Objective:     Vitals:   Temp (24hrs), Av 6 °F (36 4 °C), Min:97 3 °F (36 3 °C), Max:97 9 °F (36 6 °C)    Temp:  [97 3 °F (36 3 °C)-97 9 °F (36 6 °C)] 97 9 °F (36 6 °C)  HR:  [60-67] 60  Resp:  [16-19] 19  BP: (144-153)/(89-94) 153/94  SpO2:  [97 %-98 %] 97 %  Body mass index is 27 67 kg/m²  Input and Output Summary (last 24 hours): Intake/Output Summary (Last 24 hours) at 2/3/2023 1403  Last data filed at 2/3/2023 0831  Gross per 24 hour   Intake 840 ml   Output --   Net 840 ml       Physical Exam:   Physical Exam  Vitals and nursing note reviewed  Constitutional:       General: He is not in acute distress  Appearance: He is well-developed  HENT:      Head: Normocephalic and atraumatic        Right Ear: External ear normal       Left Ear: External ear normal       Nose: No rhinorrhea  Mouth/Throat:      Mouth: Mucous membranes are moist    Eyes:      General:         Right eye: No discharge  Left eye: No discharge  Pulmonary:      Effort: Pulmonary effort is normal  No respiratory distress  Abdominal:      General: There is no distension  Musculoskeletal:         General: No swelling  Cervical back: Normal range of motion  Comments: Decreased range of motion  Mild tenderness  No swelling, no erythema   Skin:     General: Skin is warm and dry  Neurological:      General: No focal deficit present  Mental Status: He is alert     Psychiatric:         Mood and Affect: Mood normal          Additional Data:     Labs:  Results from last 7 days   Lab Units 02/03/23  0535 02/01/23  0442   WBC Thousand/uL 14 51* 14 54*   HEMOGLOBIN g/dL 10 8* 10 6*   HEMATOCRIT % 33 1* 32 6*   PLATELETS Thousands/uL 480* 492*   BANDS PCT % 1  --    NEUTROS PCT %  --  81*   LYMPHS PCT %  --  11*   LYMPHO PCT % 10*  --    MONOS PCT %  --  6   MONO PCT % 7  --    EOS PCT % 0 0     Results from last 7 days   Lab Units 02/01/23  0442   SODIUM mmol/L 138   POTASSIUM mmol/L 3 9   CHLORIDE mmol/L 103   CO2 mmol/L 26   BUN mg/dL 22   CREATININE mg/dL 0 62   ANION GAP mmol/L 9   CALCIUM mg/dL 8 4   ALBUMIN g/dL 3 4*   TOTAL BILIRUBIN mg/dL 0 20   ALK PHOS U/L 55   ALT U/L 24   AST U/L 18   GLUCOSE RANDOM mg/dL 150*                       Lines/Drains:  Invasive Devices     Peripherally Inserted Central Catheter Line  Duration           PICC Line 01/27/23 Right Other (Comment) 6 days                Central Line:  Goal for removal: Needs till 2/17             Imaging: Reviewed radiology reports from this admission including: MRI spine    Recent Cultures (last 7 days):         Last 24 Hours Medication List:   Current Facility-Administered Medications   Medication Dose Route Frequency Provider Last Rate   • acetaminophen  650 mg Oral Q6H [address] [facility] Jack Pantoja MD     • buprenorphine-naloxone  4 mg Sublingual 4x Daily Tyra Stoner MD     • cefazolin  2,000 mg Intravenous Q8H Fam Dutton MD 2,000 mg (02/03/23 6787)   • dexamethasone  4 mg Oral Q6H [address] Glenny Bridges MD     • docusate sodium  100 mg Oral BID Glenny Bridges MD     • enoxaparin  40 mg Subcutaneous Daily Jacklyn Hardy MD     • famotidine  20 mg Oral BID Yeny Patel PA-C     • gabapentin  300 mg Oral TID Glenny Bridges MD     • ketorolac  10 mg Oral Q6H PRN Glenny Bridges MD     • morphine  60 mg Oral Q6H PRN Glenny Bridges MD     • morphine injection  8 mg Intravenous Q6H PRN Tyra Stoner MD     • naloxone  0 1 mg Intravenous PRN Magdi Mcdaniel MD     • nicotine  14 mg Transdermal Daily Jacklyn Hardy MD     • ondansetron  4 mg Oral Q6H PRN Tyra Stoner MD     • QUEtiapine  100 mg Oral HS Glenny Bridges MD     • tiZANidine  4 mg Oral Q8H PRN Glenny Bridges MD          Today, Patient Was Seen By: Glenny Bridges MD    **Please Note: This note may have been constructed using a voice recognition system  **

## 2023-02-03 NOTE — ASSESSMENT & PLAN NOTE
Unable to ambulate without cane  Pain with bearing weight on right knee    2/3 -patient previously refused PT OT multiple times, today PT/OT reports improvement, patient ambulating well with walker    Plan:  Continue PT/OT

## 2023-02-03 NOTE — ASSESSMENT & PLAN NOTE
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  Last use - IV,1 bag of fentanyl, at 12pm on 1/18/23  Patient started on Suboxone upon admission, maintenance dose 16 mg daily    Plan:  · Patient has scheduled follow-up with Dr Familia Hernandez in [place] on 2/22/23   Pt discharge planned for Friday 2/17/23 - he will need to obtain a prescription for Suboxone from his doctor as we will be unable to provide a prescription for Suboxone  · Suboxone 4mg QID   · Seroquel 100 mg at night for sleep  · Zofran as needed for nausea

## 2023-02-03 NOTE — PLAN OF CARE
Problem: PHYSICAL THERAPY ADULT  Goal: Performs mobility at highest level of function for planned discharge setting  See evaluation for individualized goals  Description: Treatment/Interventions: Functional transfer training, LE strengthening/ROM, Elevations, Therapeutic exercise, Endurance training, Bed mobility, Gait training  Equipment Recommended: Crutches       See flowsheet documentation for full assessment, interventions and recommendations  Outcome: Progressing  Note: Prognosis: Good  Problem List: Decreased strength, Decreased endurance, Impaired balance, Decreased mobility, Pain  Assessment: Pt seen this date for PT re-evaluation d/t upcoming expiration of POC  All goals established upon initial evaluation not met at this time and pt requires continuation of PT intervention  Pt able to perform all functional mobility with mod(I)-SUP with RW  Seated rest break taken following ~100' of ambulation d/t reports of R LE pain; no true LOB experienced  Pt reports feeling stable while ambulating with RW but would like to transition to crutches to return home d/t home setup having many stairs  Pt educated on the need to perform stair training prior to d/c  Pt demonstrating understanding and is in agreement but declines on this date  The patient's AM-PAC Basic Mobility Inpatient Short Form Raw Score is 20  A Raw score of greater than 16 suggests the patient may benefit from discharge to home  Please also refer to the recommendation of the Physical Therapist for safe discharge planning  Co treatment with OT secondary to complex medical condition of pt, possible A of 2 required to achieve and maintain transitional movements, requiring the need of skilled therapeutic intervention of 2 therapists to achieve delivery of services  D/c recommendation at this time continues to be home with outpatient rehabilitation services          PT Discharge Recommendation: Home with outpatient rehabilitation    See flowsheet documentation for full assessment

## 2023-02-03 NOTE — ASSESSMENT & PLAN NOTE
Blood cultures positive for MSSA on 1/16/23  Negative repeat blood cultures on 1/20/23  History of IV drug abuse  CECILIO negative for vegetations  No evidence of localized infection on imaging    ID recommendations: IV Ancef 2g Q8 ending 2/16/2023, PICC line placed, patient not able to return home with PICC line in place due to IV drug abuse  PICC line placed successfully on 1/27  Case management unable to find placement  Patient will stay as [facility] inpatient until 2/16/2023      Plan:  Continue IV Ancef 2g Q8 ending 2/16/23

## 2023-02-04 RX ADMIN — KETOROLAC TROMETHAMINE 10 MG: 10 TABLET, FILM COATED ORAL at 21:31

## 2023-02-04 RX ADMIN — BUPRENORPHINE AND NALOXONE 4 MG: 2; .5 FILM BUCCAL; SUBLINGUAL at 12:16

## 2023-02-04 RX ADMIN — KETOROLAC TROMETHAMINE 10 MG: 10 TABLET, FILM COATED ORAL at 08:05

## 2023-02-04 RX ADMIN — QUETIAPINE FUMARATE 100 MG: 100 TABLET ORAL at 21:32

## 2023-02-04 RX ADMIN — MORPHINE SULFATE 60 MG: 15 TABLET ORAL at 17:34

## 2023-02-04 RX ADMIN — DEXAMETHASONE 4 MG: 4 TABLET ORAL at 05:16

## 2023-02-04 RX ADMIN — MORPHINE SULFATE 60 MG: 15 TABLET ORAL at 10:45

## 2023-02-04 RX ADMIN — MORPHINE SULFATE 60 MG: 15 TABLET ORAL at 23:58

## 2023-02-04 RX ADMIN — GABAPENTIN 300 MG: 300 CAPSULE ORAL at 08:05

## 2023-02-04 RX ADMIN — FAMOTIDINE 20 MG: 20 TABLET ORAL at 17:34

## 2023-02-04 RX ADMIN — MORPHINE SULFATE 8 MG: 10 INJECTION INTRAVENOUS at 19:46

## 2023-02-04 RX ADMIN — CEFAZOLIN SODIUM 2000 MG: 2 SOLUTION INTRAVENOUS at 21:31

## 2023-02-04 RX ADMIN — GABAPENTIN 300 MG: 300 CAPSULE ORAL at 17:34

## 2023-02-04 RX ADMIN — DEXAMETHASONE 4 MG: 4 TABLET ORAL at 12:16

## 2023-02-04 RX ADMIN — BUPRENORPHINE AND NALOXONE 4 MG: 2; .5 FILM BUCCAL; SUBLINGUAL at 17:34

## 2023-02-04 RX ADMIN — MORPHINE SULFATE 8 MG: 10 INJECTION INTRAVENOUS at 12:16

## 2023-02-04 RX ADMIN — DEXAMETHASONE 4 MG: 4 TABLET ORAL at 23:59

## 2023-02-04 RX ADMIN — BUPRENORPHINE AND NALOXONE 4 MG: 2; .5 FILM BUCCAL; SUBLINGUAL at 21:32

## 2023-02-04 RX ADMIN — ACETAMINOPHEN 650 MG: 325 TABLET, FILM COATED ORAL at 05:16

## 2023-02-04 RX ADMIN — KETOROLAC TROMETHAMINE 10 MG: 10 TABLET, FILM COATED ORAL at 15:31

## 2023-02-04 RX ADMIN — ACETAMINOPHEN 650 MG: 325 TABLET, FILM COATED ORAL at 17:34

## 2023-02-04 RX ADMIN — CEFAZOLIN SODIUM 2000 MG: 2 SOLUTION INTRAVENOUS at 05:16

## 2023-02-04 RX ADMIN — MORPHINE SULFATE 60 MG: 15 TABLET ORAL at 04:18

## 2023-02-04 RX ADMIN — GABAPENTIN 300 MG: 300 CAPSULE ORAL at 21:31

## 2023-02-04 RX ADMIN — MORPHINE SULFATE 8 MG: 10 INJECTION INTRAVENOUS at 06:26

## 2023-02-04 RX ADMIN — ACETAMINOPHEN 650 MG: 325 TABLET, FILM COATED ORAL at 23:58

## 2023-02-04 RX ADMIN — BUPRENORPHINE AND NALOXONE 4 MG: 2; .5 FILM BUCCAL; SUBLINGUAL at 08:05

## 2023-02-04 RX ADMIN — DEXAMETHASONE 4 MG: 4 TABLET ORAL at 17:34

## 2023-02-04 RX ADMIN — CEFAZOLIN SODIUM 2000 MG: 2 SOLUTION INTRAVENOUS at 13:24

## 2023-02-04 RX ADMIN — FAMOTIDINE 20 MG: 20 TABLET ORAL at 08:05

## 2023-02-04 RX ADMIN — ACETAMINOPHEN 650 MG: 325 TABLET, FILM COATED ORAL at 12:16

## 2023-02-04 NOTE — ASSESSMENT & PLAN NOTE
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  Last use - IV,1 bag of fentanyl, at 12pm on 1/18/23  Patient started on Suboxone upon admission, maintenance dose 16 mg daily    Plan:  · Patient has scheduled follow-up with Dr Tapan Brewer in [place] on 2/22/23   Pt discharge planned for Friday 2/17/23 - he will need to obtain a prescription for Suboxone from his doctor as we will be unable to provide a prescription for Suboxone  · Suboxone 4mg QID   · Seroquel 100 mg at night for sleep  · Zofran as needed for nausea

## 2023-02-04 NOTE — PLAN OF CARE
Problem: PAIN - ADULT  Goal: Verbalizes/displays adequate comfort level or baseline comfort level  Description: Interventions:  - Encourage patient to monitor pain and request assistance  - Assess pain using appropriate pain scale (0-10 pain scale)  - Administer analgesics based on type and severity of pain and evaluate response  - Implement non-pharmacological measures as appropriate and evaluate response  - Consider cultural and social influences on pain and pain management  - Notify physician/advanced practitioner if interventions unsuccessful or patient reports new pain  2/3/2023 2359 by Delano Preston RN  Outcome: Progressing  2/3/2023 2359 by Delano Preston RN  Outcome: Progressing     Problem: INFECTION - ADULT  Goal: Absence or prevention of progression during hospitalization  Description: INTERVENTIONS:  - Assess and monitor for signs and symptoms of infection  - Monitor lab/diagnostic results  - Monitor all insertion sites, i e  indwelling lines  - Administer medications as ordered  - Instruct and encourage patient and family to use good hand hygiene technique  2/3/2023 2359 by Delano Preston RN  Outcome: Progressing  2/3/2023 2359 by Delano Preston RN  Outcome: Progressing     Problem: MUSCULOSKELETAL - ADULT  Goal: Maintain or return mobility to safest level of function  Description: INTERVENTIONS:  - Assess patient's ability to carry out ADLs; (independent)  - Assess/evaluate cause of self-care deficits (limited movement, pain, crutches/walker)  - Assess range of motion  - Assess patient's mobility (modified independent)  - Assess patient's need for assistive devices and provide as appropriate  - Encourage maximum independence but intervene and supervise when necessary  - Involve family in performance of ADLs  - Assess for home care needs following discharge   - Consider OT consult to assist with ADL evaluation and planning for discharge  - Provide patient education as appropriate  2/3/2023 2359 by Cheo KIRAN Bolden  Outcome: Progressing  2/3/2023 2359 by Ai London RN  Outcome: Progressing  Goal: Maintain proper alignment of affected body part  Description: INTERVENTIONS:  - Support, maintain and protect limb and body alignment  - Provide patient/ family with appropriate education  2/3/2023 2359 by Ai London RN  Outcome: Progressing  2/3/2023 2359 by Ai London RN  Outcome: Progressing     Problem: MOBILITY - ADULT  Goal: Maintain or return to baseline ADL function  Description: INTERVENTIONS:  -  Assess patient's ability to carry out ADLs; (independent)  - Assess/evaluate cause of self-care deficits (limited movement, pain, crutches/walker)  - Assess range of motion  - Assess patient's mobility; (modified independent)  - Assess patient's need for assistive devices and provide as appropriate  - Encourage maximum independence but intervene and supervise when necessary  - Involve family in performance of ADLs  - Assess for home care needs following discharge   - Consider OT consult to assist with ADL evaluation and planning for discharge  - Provide patient education as appropriate  Outcome: Progressing  Goal: Maintains/Returns to pre admission functional level  Description: INTERVENTIONS:  - Perform BMAT or MOVE assessment daily    - Set and communicate daily mobility goal to care team and patient/family/caregiver     - Collaborate with rehabilitation services on mobility goals if consulted  - Ambulate patient 3 times a day  - Out of bed to chair 3 times a day   - Out of bed for meals 3 times a day  - Out of bed for toileting  - Record patient progress and toleration of activity level   Outcome: Progressing     Problem: DISCHARGE PLANNING  Goal: Discharge to home or other facility with appropriate resources  Description: INTERVENTIONS:  - Identify barriers to discharge w/patient and caregiver  - Arrange for needed discharge resources and transportation as appropriate  - Identify discharge learning needs (meds, wound care, etc )  - Refer to Case Management Department for coordinating discharge planning if the patient needs post-hospital services based on physician/advanced practitioner order or complex needs related to functional status, cognitive ability, or social support system  Outcome: Progressing     Problem: Knowledge Deficit  Goal: Patient/family/caregiver demonstrates understanding of disease process, treatment plan, medications, and discharge instructions  Description: Complete learning assessment and assess knowledge base    Interventions:  - Provide teaching at level of understanding  - Provide teaching via preferred learning methods  Outcome: Progressing

## 2023-02-04 NOTE — ASSESSMENT & PLAN NOTE
Unable to ambulate without cane  Pain with bearing weight on right knee    2/3 -patient previously refused PT/OT multiple times, today PT/OT reports improvement, patient ambulating well with walker    Plan:  Continue PT/OT

## 2023-02-04 NOTE — PLAN OF CARE
Problem: PAIN - ADULT  Goal: Verbalizes/displays adequate comfort level or baseline comfort level  Description: Interventions:  - Encourage patient to monitor pain and request assistance  - Assess pain using appropriate pain scale (0-10 pain scale)  - Administer analgesics based on type and severity of pain and evaluate response  - Implement non-pharmacological measures as appropriate and evaluate response  - Consider cultural and social influences on pain and pain management  - Notify physician/advanced practitioner if interventions unsuccessful or patient reports new pain  Outcome: Progressing     Problem: INFECTION - ADULT  Goal: Absence or prevention of progression during hospitalization  Description: INTERVENTIONS:  - Assess and monitor for signs and symptoms of infection  - Monitor lab/diagnostic results  - Monitor all insertion sites, i e  indwelling lines  - Administer medications as ordered  - Instruct and encourage patient and family to use good hand hygiene technique  Outcome: Progressing     Problem: MUSCULOSKELETAL - ADULT  Goal: Maintain or return mobility to safest level of function  Description: INTERVENTIONS:  - Assess patient's ability to carry out ADLs; (independent)  - Assess/evaluate cause of self-care deficits (limited movement, pain, crutches/walker)  - Assess range of motion  - Assess patient's mobility (modified independent)  - Assess patient's need for assistive devices and provide as appropriate  - Encourage maximum independence but intervene and supervise when necessary  - Involve family in performance of ADLs  - Assess for home care needs following discharge   - Consider OT consult to assist with ADL evaluation and planning for discharge  - Provide patient education as appropriate  Outcome: Progressing  Goal: Maintain proper alignment of affected body part  Description: INTERVENTIONS:  - Support, maintain and protect limb and body alignment  - Provide patient/ family with appropriate education  Outcome: Progressing     Problem: MOBILITY - ADULT  Goal: Maintain or return to baseline ADL function  Description: INTERVENTIONS:  -  Assess patient's ability to carry out ADLs; (independent)  - Assess/evaluate cause of self-care deficits (limited movement, pain, crutches/walker)  - Assess range of motion  - Assess patient's mobility; (modified independent)  - Assess patient's need for assistive devices and provide as appropriate  - Encourage maximum independence but intervene and supervise when necessary  - Involve family in performance of ADLs  - Assess for home care needs following discharge   - Consider OT consult to assist with ADL evaluation and planning for discharge  - Provide patient education as appropriate  Outcome: Progressing  Goal: Maintains/Returns to pre admission functional level  Description: INTERVENTIONS:  - Perform BMAT or MOVE assessment daily    - Set and communicate daily mobility goal to care team and patient/family/caregiver     - Collaborate with rehabilitation services on mobility goals if consulted  - Ambulate patient 3 times a day  - Out of bed to chair 3 times a day   - Out of bed for meals 3 times a day  - Out of bed for toileting  - Record patient progress and toleration of activity level   Outcome: Progressing     Problem: DISCHARGE PLANNING  Goal: Discharge to home or other facility with appropriate resources  Description: INTERVENTIONS:  - Identify barriers to discharge w/patient and caregiver  - Arrange for needed discharge resources and transportation as appropriate  - Identify discharge learning needs (meds, wound care, etc )  - Refer to Case Management Department for coordinating discharge planning if the patient needs post-hospital services based on physician/advanced practitioner order or complex needs related to functional status, cognitive ability, or social support system  Outcome: Progressing     Problem: Knowledge Deficit  Goal: Patient/family/caregiver demonstrates understanding of disease process, treatment plan, medications, and discharge instructions  Description: Complete learning assessment and assess knowledge base    Interventions:  - Provide teaching at level of understanding  - Provide teaching via preferred learning methods  Outcome: Progressing     Problem: METABOLIC, FLUID AND ELECTROLYTES - ADULT  Goal: Electrolytes maintained within normal limits  Description: INTERVENTIONS:  - Monitor labs and assess patient for signs and symptoms of electrolyte imbalances  - Administer electrolyte replacement as ordered  - Monitor response to electrolyte replacements, including repeat lab results as appropriate  - Instruct patient on fluid and nutrition as appropriate  Outcome: Progressing  Goal: Fluid balance maintained  Description: INTERVENTIONS:  - Monitor labs   - Monitor I/O and WT  - Instruct patient on fluid and nutrition as appropriate  - Assess for signs & symptoms of volume excess or deficit  Outcome: Progressing

## 2023-02-04 NOTE — PROGRESS NOTES
[address]  Progress Note - Cm  1985, 41 y o  male MRN: [id number]  Unit/Bed#: 312-56 Encounter: [id number]  Primary Care Provider: Juana Neal DO   Date and time admitted to hospital: 2023  5:14 PM    * Bacteremia due to methicillin susceptible Staphylococcus aureus (MSSA)  Assessment & Plan  Blood cultures positive for MSSA on 23, repeat blood cultures on 23 negative  History of IV drug abuse  CECILIO negative for vegetations MRI negative for osteomyelitis    ID recommendations: IV Ancef 2g Q8 ending 2023, PICC line placed , patient unable to return home with PICC line in place due to IV drug abuse  Case management unable to find placement  Patient will stay as [facility] until 2023  Plan:  Continue IV Ancef 2g Q8 ending 23      Acute right-sided low back pain with right-sided sciatica  Assessment & Plan  H/o right sided back pain with sciatica  Intermittent severe right hip pain, radiates into right lateral hip & buttocks  Patient unable to ambulate without cane  23 CT lumbar - Mild multilevel disc degeneration  Grade 1 retrolisthesis of L5 on S1  Disc bulge with small herniation at L5-S1  Disc bulging from L2-3 through L4-5      23 MRI w/o contrast -  Degenerative changes at L5-S1     23 Discussed case with neurosurgery; no transfer or surgical intervention at this time  Manage pain acutely  May pursue nerve block as outpatient  Add oral Decadron 4 mg every 6 to pain regimen  Note; gabapentin will take 2 to 3 weeks to reach full effect  2/3/23 - Placed ambulatory referral neurosurgery, Dr Cassandra Stacy requesting x-ray of lumbar spine with 6 views extension flexion completed 2 days prior to discharge (2/15)      Plan:  Scheduled Tylenol 650mg Q6H   PRN Toradol 10mg Q6H   Morphine 60mg PO every 6H as needed  Morphine 8mg IV prn for breakthrough pain  Gabapentin 300mg TID  Trizinidine 4mg TID  Decadron oral 4 mg every 6H  Continue PT/OT      Ambulatory dysfunction  Assessment & Plan  Unable to ambulate without cane  Pain with bearing weight on right knee    2/3 -patient previously refused PT/OT multiple times, today PT/OT reports improvement, patient ambulating well with walker    Plan:  Continue PT/OT    Acute pain of right knee  Assessment & Plan  Right knee pain since fall 2 weeks ago  Unremarkable exam and no acute findings on x-ray of the knee  Plan:  Continue pain regimen  Continue PT OT    Intravenous drug abuse, continuous ([address] )  Assessment & Plan  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  Last use - IV,1 bag of fentanyl, at 12pm on 1/18/23  Patient started on Suboxone upon admission, maintenance dose 16 mg daily    Plan:  · Patient has scheduled follow-up with Dr Sabrina Andrew in [place] on 2/22/23  Pt discharge planned for Friday 2/17/23 - he will need to obtain a prescription for Suboxone from his doctor as we will be unable to provide a prescription for Suboxone  · Suboxone 4mg QID   · Seroquel 100 mg at night for sleep  · Zofran as needed for nausea    Hypomagnesemia  Assessment & Plan  Magnesium 1 7  Plan:  Replenish as needed  Trend magnesium periodically    Tobacco abuse  Assessment & Plan  History of tobacco smoking  Plan:  Nicotine patch  Smoking cessation    Hepatitis C virus  Assessment & Plan  Most recent labs 3/7/2022  Positive Hep A  Positive Hep B surface antibody  Positive Hep C antibody  Not taking hepatitis meds    ID recs: Hep C RNA negative    Plan:  Follow-up with ID as outpatient          VTE Pharmacologic Prophylaxis: VTE Score: 2 Low Risk (Score 0-2) - Encourage Ambulation  Patient Centered Rounds: I performed bedside rounds with nursing staff today  Discussions with Specialists or Other Care Team Provider: NeuroSurgery    Education and Discussions with Family / Patient: Patient declined call to   Time Spent for Care: 60 minutes   More than 50% of total time spent on counseling and coordination of care as described above  Current Length of Stay: 17 day(s)  Current Patient Status: Inpatient   Certification Statement: The patient will continue to require additional inpatient hospital stay due to antibiotics via PICC line  Discharge Plan: 2023    Code Status: Level 1 - Full Code    Subjective:   Patient seen at bedside this morning, laying in bed comfortably  Patient reports no changes from yesterday  Slept well overnight, eating well  Having daily bowel movements  Pain has improved compared to admission, no back pain when laying still  No new complaints  Objective:     Vitals:   Temp (24hrs), Av 2 °F (36 8 °C), Min:98 1 °F (36 7 °C), Max:98 2 °F (36 8 °C)    Temp:  [98 1 °F (36 7 °C)-98 2 °F (36 8 °C)] 98 1 °F (36 7 °C)  Resp:  [14-20] 20  BP: (136-153)/(89-94) 153/89  Body mass index is 27 67 kg/m²  Input and Output Summary (last 24 hours): Intake/Output Summary (Last 24 hours) at 2023 1003  Last data filed at 2023 0805  Gross per 24 hour   Intake 1380 ml   Output 275 ml   Net 1105 ml       Physical Exam:   Physical Exam  Vitals and nursing note reviewed  Constitutional:       General: He is not in acute distress  Appearance: He is well-developed  HENT:      Head: Normocephalic and atraumatic  Right Ear: External ear normal       Left Ear: External ear normal       Nose: No rhinorrhea  Mouth/Throat:      Mouth: Mucous membranes are moist    Eyes:      General:         Right eye: No discharge  Left eye: No discharge  Cardiovascular:      Rate and Rhythm: Normal rate and regular rhythm  Pulmonary:      Effort: Pulmonary effort is normal  No respiratory distress  Abdominal:      Palpations: Abdomen is soft  Tenderness: There is no abdominal tenderness  Musculoskeletal:         General: Tenderness present  No swelling  Cervical back: Normal range of motion and neck supple        Comments: Right knee: decreased range of motion  No erythema, no swelling  Mild tenderness over posterior    Lumbar spine:  Decreased range of motion right lateral flexion     Skin:     General: Skin is warm and dry  Capillary Refill: Capillary refill takes less than 2 seconds  Findings: No erythema  Neurological:      General: No focal deficit present  Mental Status: He is alert     Psychiatric:         Mood and Affect: Mood normal          Behavior: Behavior normal          Additional Data:     Labs:  Results from last 7 days   Lab Units 02/03/23  0535 02/01/23  0442   WBC Thousand/uL 14 51* 14 54*   HEMOGLOBIN g/dL 10 8* 10 6*   HEMATOCRIT % 33 1* 32 6*   PLATELETS Thousands/uL 480* 492*   BANDS PCT % 1  --    NEUTROS PCT %  --  81*   LYMPHS PCT %  --  11*   LYMPHO PCT % 10*  --    MONOS PCT %  --  6   MONO PCT % 7  --    EOS PCT % 0 0     Results from last 7 days   Lab Units 02/01/23  0442   SODIUM mmol/L 138   POTASSIUM mmol/L 3 9   CHLORIDE mmol/L 103   CO2 mmol/L 26   BUN mg/dL 22   CREATININE mg/dL 0 62   ANION GAP mmol/L 9   CALCIUM mg/dL 8 4   ALBUMIN g/dL 3 4*   TOTAL BILIRUBIN mg/dL 0 20   ALK PHOS U/L 55   ALT U/L 24   AST U/L 18   GLUCOSE RANDOM mg/dL 150*                       Lines/Drains:  Invasive Devices     Peripherally Inserted Central Catheter Line  Duration           PICC Line 01/27/23 Right Other (Comment) 7 days                Central Line:  Goal for removal: N/A - Discharging with PICC for IV ABX/medications             Imaging: Reviewed radiology reports from this admission including: MRI spine    Recent Cultures (last 7 days):         Last 24 Hours Medication List:   Current Facility-Administered Medications   Medication Dose Route Frequency Provider Last Rate   • acetaminophen  650 mg Oral Q6H [address] Jo-Ann Yuen MD     • buprenorphine-naloxone  4 mg Sublingual 4x Daily Tyra Mancera MD     • cefazolin  2,000 mg Intravenous Nehemiah Mesa MD 2,000 mg (02/04/23 0516)   • dexamethasone  4 mg Oral Q6H 89 YO Duong MD     • docusate sodium  100 mg Oral BID Glenny Bridges MD     • enoxaparin  40 mg Subcutaneous Daily Jacklyn Hardy MD     • famotidine  20 mg Oral BID Yeny Patel PA-C     • gabapentin  300 mg Oral TID Glenny Bridges MD     • ketorolac  10 mg Oral Q6H PRN Glenny Bridges MD     • morphine  60 mg Oral Q6H PRN Glenny Bridges MD     • morphine injection  8 mg Intravenous Q6H PRN Tyra Stoner MD     • naloxone  0 1 mg Intravenous PRN Tyra Stoner MD     • nicotine  14 mg Transdermal Daily Jacklyn Hardy MD     • ondansetron  4 mg Oral Q6H PRN Tyra Stoner MD     • QUEtiapine  100 mg Oral HS Glenny Bridges MD     • tiZANidine  4 mg Oral Q8H PRN Glenny Bridges MD          Today, Patient Was Seen By: Glenny Bridges MD    **Please Note: This note may have been constructed using a voice recognition system  **

## 2023-02-04 NOTE — ASSESSMENT & PLAN NOTE
H/o right sided back pain with sciatica  Intermittent severe right hip pain, radiates into right lateral hip & buttocks  Patient unable to ambulate without cane  1/16/23 CT lumbar - Mild multilevel disc degeneration  Grade 1 retrolisthesis of L5 on S1  Disc bulge with small herniation at L5-S1  Disc bulging from L2-3 through L4-5      1/23/23 MRI w/o contrast -  Degenerative changes at L5-S1     1/28/23 Discussed case with neurosurgery; no transfer or surgical intervention at this time  Manage pain acutely  May pursue nerve block as outpatient  Add oral Decadron 4 mg every 6 to pain regimen  Note; gabapentin will take 2 to 3 weeks to reach full effect  2/3/23 - Placed ambulatory referral neurosurgery, Dr Salomon Roles requesting x-ray of lumbar spine with 6 views extension flexion completed 2 days prior to discharge (2/15)      Plan:  Scheduled Tylenol 650mg Q6H   PRN Toradol 10mg Q6H   Morphine 60mg PO every 6H as needed  Morphine 8mg IV prn for breakthrough pain  Gabapentin 300mg TID  Trizinidine 4mg TID  Decadron oral 4 mg every 6H  Continue PT/OT

## 2023-02-04 NOTE — ASSESSMENT & PLAN NOTE
Blood cultures positive for MSSA on 1/16/23, repeat blood cultures on 1/20/23 negative  History of IV drug abuse  CECILIO negative for vegetations MRI negative for osteomyelitis    ID recommendations: IV Ancef 2g Q8 ending 2/16/2023, PICC line placed 1/27, patient unable to return home with PICC line in place due to IV drug abuse  Case management unable to find placement  Patient will stay as [facility] inpatient until 2/16/2023      Plan:  Continue IV Ancef 2g Q8 ending 2/16/23

## 2023-02-05 PROBLEM — D72.829 LEUKOCYTOSIS: Status: ACTIVE | Noted: 2023-02-05

## 2023-02-05 LAB
ALBUMIN SERPL BCP-MCNC: 3.4 G/DL (ref 3.5–5)
ALP SERPL-CCNC: 41 U/L (ref 34–104)
ALT SERPL W P-5'-P-CCNC: 13 U/L (ref 7–52)
ANION GAP SERPL CALCULATED.3IONS-SCNC: 7 MMOL/L (ref 4–13)
ANISOCYTOSIS BLD QL SMEAR: PRESENT
AST SERPL W P-5'-P-CCNC: 9 U/L (ref 13–39)
BASOPHILS # BLD MANUAL: 0 THOUSAND/UL (ref 0–0.1)
BASOPHILS NFR MAR MANUAL: 0 % (ref 0–1)
BILIRUB SERPL-MCNC: 0.26 MG/DL (ref 0.2–1)
BUN SERPL-MCNC: 27 MG/DL (ref 5–25)
CALCIUM ALBUM COR SERPL-MCNC: 8.3 MG/DL (ref 8.3–10.1)
CALCIUM SERPL-MCNC: 7.8 MG/DL (ref 8.4–10.2)
CHLORIDE SERPL-SCNC: 100 MMOL/L (ref 96–108)
CO2 SERPL-SCNC: 28 MMOL/L (ref 21–32)
CREAT SERPL-MCNC: 0.57 MG/DL (ref 0.6–1.3)
EOSINOPHIL # BLD MANUAL: 0 THOUSAND/UL (ref 0–0.4)
EOSINOPHIL NFR BLD MANUAL: 0 % (ref 0–6)
ERYTHROCYTE [DISTWIDTH] IN BLOOD BY AUTOMATED COUNT: 14.1 % (ref 11.6–15.1)
GFR SERPL CREATININE-BSD FRML MDRD: 131 ML/MIN/1.73SQ M
GLUCOSE SERPL-MCNC: 114 MG/DL (ref 65–140)
HCT VFR BLD AUTO: 33.8 % (ref 36.5–49.3)
HGB BLD-MCNC: 11 G/DL (ref 12–17)
HYPERCHROMIA BLD QL SMEAR: PRESENT
LYMPHOCYTES # BLD AUTO: 1.22 THOUSAND/UL (ref 0.6–4.47)
LYMPHOCYTES # BLD AUTO: 6 % (ref 14–44)
MAGNESIUM SERPL-MCNC: 2 MG/DL (ref 1.9–2.7)
MCH RBC QN AUTO: 28.5 PG (ref 26.8–34.3)
MCHC RBC AUTO-ENTMCNC: 32.5 G/DL (ref 31.4–37.4)
MCV RBC AUTO: 88 FL (ref 82–98)
MONOCYTES # BLD AUTO: 1.82 THOUSAND/UL (ref 0–1.22)
MONOCYTES NFR BLD: 9 % (ref 4–12)
MYELOCYTES NFR BLD MANUAL: 2 % (ref 0–1)
NEUTROPHILS # BLD MANUAL: 16.82 THOUSAND/UL (ref 1.85–7.62)
NEUTS SEG NFR BLD AUTO: 83 % (ref 43–75)
PHOSPHATE SERPL-MCNC: 3.7 MG/DL (ref 2.7–4.5)
PLATELET # BLD AUTO: 455 THOUSANDS/UL (ref 149–390)
PLATELET BLD QL SMEAR: ABNORMAL
PMV BLD AUTO: 9.3 FL (ref 8.9–12.7)
POTASSIUM SERPL-SCNC: 4.1 MMOL/L (ref 3.5–5.3)
PROT SERPL-MCNC: 5.9 G/DL (ref 6.4–8.4)
RBC # BLD AUTO: 3.86 MILLION/UL (ref 3.88–5.62)
SODIUM SERPL-SCNC: 135 MMOL/L (ref 135–147)
WBC # BLD AUTO: 20.26 THOUSAND/UL (ref 4.31–10.16)

## 2023-02-05 RX ORDER — MAGNESIUM HYDROXIDE/ALUMINUM HYDROXICE/SIMETHICONE 120; 1200; 1200 MG/30ML; MG/30ML; MG/30ML
30 SUSPENSION ORAL EVERY 4 HOURS PRN
Status: DISCONTINUED | OUTPATIENT
Start: 2023-02-05 | End: 2023-02-17 | Stop reason: HOSPADM

## 2023-02-05 RX ADMIN — FAMOTIDINE 20 MG: 20 TABLET ORAL at 08:17

## 2023-02-05 RX ADMIN — KETOROLAC TROMETHAMINE 10 MG: 10 TABLET, FILM COATED ORAL at 10:12

## 2023-02-05 RX ADMIN — ACETAMINOPHEN 650 MG: 325 TABLET, FILM COATED ORAL at 12:16

## 2023-02-05 RX ADMIN — CEFAZOLIN SODIUM 2000 MG: 2 SOLUTION INTRAVENOUS at 14:23

## 2023-02-05 RX ADMIN — DEXAMETHASONE 4 MG: 4 TABLET ORAL at 12:16

## 2023-02-05 RX ADMIN — BUPRENORPHINE AND NALOXONE 4 MG: 2; .5 FILM BUCCAL; SUBLINGUAL at 08:17

## 2023-02-05 RX ADMIN — DEXAMETHASONE 4 MG: 4 TABLET ORAL at 17:02

## 2023-02-05 RX ADMIN — CEFAZOLIN SODIUM 2000 MG: 2 SOLUTION INTRAVENOUS at 22:31

## 2023-02-05 RX ADMIN — GABAPENTIN 300 MG: 300 CAPSULE ORAL at 22:31

## 2023-02-05 RX ADMIN — MORPHINE SULFATE 60 MG: 15 TABLET ORAL at 12:16

## 2023-02-05 RX ADMIN — ACETAMINOPHEN 650 MG: 325 TABLET, FILM COATED ORAL at 23:29

## 2023-02-05 RX ADMIN — DEXAMETHASONE 4 MG: 4 TABLET ORAL at 23:29

## 2023-02-05 RX ADMIN — GABAPENTIN 300 MG: 300 CAPSULE ORAL at 08:16

## 2023-02-05 RX ADMIN — MORPHINE SULFATE 8 MG: 10 INJECTION INTRAVENOUS at 14:24

## 2023-02-05 RX ADMIN — ACETAMINOPHEN 650 MG: 325 TABLET, FILM COATED ORAL at 17:02

## 2023-02-05 RX ADMIN — MORPHINE SULFATE 60 MG: 15 TABLET ORAL at 06:52

## 2023-02-05 RX ADMIN — QUETIAPINE FUMARATE 100 MG: 100 TABLET ORAL at 22:31

## 2023-02-05 RX ADMIN — GABAPENTIN 300 MG: 300 CAPSULE ORAL at 17:02

## 2023-02-05 RX ADMIN — KETOROLAC TROMETHAMINE 10 MG: 10 TABLET, FILM COATED ORAL at 17:02

## 2023-02-05 RX ADMIN — DEXAMETHASONE 4 MG: 4 TABLET ORAL at 06:09

## 2023-02-05 RX ADMIN — FAMOTIDINE 20 MG: 20 TABLET ORAL at 17:02

## 2023-02-05 RX ADMIN — MORPHINE SULFATE 8 MG: 10 INJECTION INTRAVENOUS at 08:17

## 2023-02-05 RX ADMIN — MORPHINE SULFATE 8 MG: 10 INJECTION INTRAVENOUS at 01:39

## 2023-02-05 RX ADMIN — BUPRENORPHINE AND NALOXONE 4 MG: 2; .5 FILM BUCCAL; SUBLINGUAL at 12:17

## 2023-02-05 RX ADMIN — BUPRENORPHINE AND NALOXONE 4 MG: 2; .5 FILM BUCCAL; SUBLINGUAL at 17:02

## 2023-02-05 RX ADMIN — MORPHINE SULFATE 8 MG: 10 INJECTION INTRAVENOUS at 22:31

## 2023-02-05 RX ADMIN — MORPHINE SULFATE 60 MG: 15 TABLET ORAL at 20:31

## 2023-02-05 RX ADMIN — BUPRENORPHINE AND NALOXONE 4 MG: 2; .5 FILM BUCCAL; SUBLINGUAL at 22:31

## 2023-02-05 RX ADMIN — ACETAMINOPHEN 650 MG: 325 TABLET, FILM COATED ORAL at 06:08

## 2023-02-05 RX ADMIN — CEFAZOLIN SODIUM 2000 MG: 2 SOLUTION INTRAVENOUS at 06:08

## 2023-02-05 RX ADMIN — ALUMINUM HYDROXIDE, MAGNESIUM HYDROXIDE, AND DIMETHICONE 30 ML: 200; 20; 200 SUSPENSION ORAL at 06:52

## 2023-02-05 NOTE — ASSESSMENT & PLAN NOTE
Blood cultures positive for MSSA on 1/16/23, repeat blood cultures on 1/20/23 negative  History of IV drug abuse  CECILIO negative for vegetations MRI negative for osteomyelitis    ID recommendations: IV Ancef 2g Q8H ending 2/16/2023, PICC line placed 1/27, patient unable to return home with PICC line in place due to IV drug abuse  Case management unable to find placement  Patient will stay as [facility] until 2/16/2023      Plan:  Continue IV Ancef 2g Q8H ending 2/16/23

## 2023-02-05 NOTE — ASSESSMENT & PLAN NOTE
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  Last use - IV,1 bag of fentanyl, at 12pm on 1/18/23  Patient started on Suboxone upon admission, maintenance dose 16 mg daily    Plan:  · Patient has scheduled follow-up with Dr Dalila Hayden in [place] on 2/22/23   Pt discharge planned for Friday 2/17/23 - he will need to obtain a prescription for Suboxone from his doctor as we will be unable to provide a prescription for Suboxone  · Suboxone 4mg QID   · Seroquel 100 mg at night for sleep  · Zofran as needed for nausea

## 2023-02-05 NOTE — ASSESSMENT & PLAN NOTE
H/o right sided back pain with sciatica  Intermittent severe right hip pain, radiates into right lateral hip & buttocks  Patient unable to ambulate without cane  1/16/23 CT lumbar - Mild multilevel disc degeneration  Grade 1 retrolisthesis of L5 on S1  Disc bulge with small herniation at L5-S1  Disc bulging from L2-3 through L4-5      1/23/23 MRI w/o contrast -  Degenerative changes at L5-S1     1/28/23 Discussed case with neurosurgery; no transfer or surgical intervention at this time  Manage pain acutely  May pursue nerve block as outpatient  Add oral Decadron 4 mg every 6 to pain regimen  Note; gabapentin will take 2 to 3 weeks to reach full effect  2/3/23 - Placed ambulatory referral neurosurgery, Dr Easton Sánchez requesting x-ray of lumbar spine with 6 views extension flexion completed 2 days prior to discharge (2/15)      Plan:  Scheduled Tylenol 650mg Q6H   PRN Toradol 10mg Q6H   Morphine 60mg PO every 6H as needed  Morphine 8mg IV prn for breakthrough pain  Gabapentin 300mg TID  Trizinidine 4mg TID  Decadron oral 4 mg every 6H - would favor initiating taper in next 24-48 hours   Continue PT/OT

## 2023-02-05 NOTE — PROGRESS NOTES
[address] [facility]  Progress Note - Eulalio Yarbrough 1985, 41 y o  male MRN: [id number]  Unit/Bed#: 652-86 Encounter: [id number]  Primary Care Provider: Amisha Neff DO   Date and time admitted to hospital: 1/18/2023  5:14 PM    * Bacteremia due to methicillin susceptible Staphylococcus aureus (MSSA)  Assessment & Plan  Blood cultures positive for MSSA on 1/16/23, repeat blood cultures on 1/20/23 negative  History of IV drug abuse  CECILIO negative for vegetations MRI negative for osteomyelitis    ID recommendations: IV Ancef 2g Q8H ending 2/16/2023, PICC line placed 1/27, patient unable to return home with PICC line in place due to IV drug abuse  Case management unable to find placement  Patient will stay as [facility] inpatient until 2/16/2023  Plan:  Continue IV Ancef 2g Q8H ending 2/16/23      Intravenous drug abuse, continuous ([facility] [address] )  Assessment & Plan  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  Last use - IV,1 bag of fentanyl, at 12pm on 1/18/23  Patient started on Suboxone upon admission, maintenance dose 16 mg daily    Plan:  · Patient has scheduled follow-up with Dr Yahaira Pro in [place] on 2/22/23  Pt discharge planned for Friday 2/17/23 - he will need to obtain a prescription for Suboxone from his doctor as we will be unable to provide a prescription for Suboxone  · Suboxone 4mg QID   · Seroquel 100 mg at night for sleep  · Zofran as needed for nausea    Acute right-sided low back pain with right-sided sciatica  Assessment & Plan  H/o right sided back pain with sciatica  Intermittent severe right hip pain, radiates into right lateral hip & buttocks  Patient unable to ambulate without cane  1/16/23 CT lumbar - Mild multilevel disc degeneration  Grade 1 retrolisthesis of L5 on S1  Disc bulge with small herniation at L5-S1   Disc bulging from L2-3 through L4-5      1/23/23 MRI w/o contrast -  Degenerative changes at L5-S1     1/28/23 Discussed case with neurosurgery; no transfer or surgical intervention at this time  Manage pain acutely  May pursue nerve block as outpatient  Add oral Decadron 4 mg every 6 to pain regimen  Note; gabapentin will take 2 to 3 weeks to reach full effect  2/3/23 - Placed ambulatory referral neurosurgery, Dr Yani Myles requesting x-ray of lumbar spine with 6 views extension flexion completed 2 days prior to discharge (2/15)  Plan:  Scheduled Tylenol 650mg Q6H   PRN Toradol 10mg Q6H   Morphine 60mg PO every 6H as needed  Morphine 8mg IV prn for breakthrough pain  Gabapentin 300mg TID  Trizinidine 4mg TID  Decadron oral 4 mg every 6H - would favor initiating taper in next 24-48 hours   Continue PT/OT      Leukocytosis  Assessment & Plan  · Likely in the setting of steroid therapy  · Could repeat procalcitonin level  · Again, would favor tapering off steroids    Ambulatory dysfunction  Assessment & Plan  Utilizing walker, patient hopes to be discharged home with utilization of crutches    Patient previously refused PT/OT multiple times, now with improved compliance    Plan:  Outpatient PT/OT        VTE Pharmacologic Prophylaxis: VTE Score: 2 Lovenox     Patient Centered Rounds: I performed bedside rounds with nursing staff today  Discussions with Specialists or Other Care Team Provider: CM    Education and Discussions with Family / Patient: Patient     Time Spent for Care: 45 minutes  More than 50% of total time spent on counseling and coordination of care as described above  Current Length of Stay: 18 day(s)  Current Patient Status: Inpatient   Certification Statement: The patient will continue to require additional inpatient hospital stay due to IV Rx   Discharge Plan: 2023     Code Status: Level 1 - Full Code    Subjective:   Patient seen and examined  No acute complaints, continues to have R-sided back pain       Objective:     Vitals:   Temp (24hrs), Av 6 °F (36 4 °C), Min:97 5 °F (36 4 °C), Max:98 °F (36 7 °C)    Temp:  [97 5 °F (36 4 °C)-98 °F (36 7 °C)] 97 5 °F (36 4 °C)  Resp:  [16-20] 20  BP: (135-151)/() 135/97  Body mass index is 27 67 kg/m²  Input and Output Summary (last 24 hours): Intake/Output Summary (Last 24 hours) at 2/5/2023 1215  Last data filed at 2/5/2023 0734  Gross per 24 hour   Intake 900 ml   Output --   Net 900 ml       Physical Exam:   Physical Exam     Additional Data:     Labs:  Results from last 7 days   Lab Units 02/05/23  0607 02/03/23  0535 02/01/23  0442 02/01/23  0442   WBC Thousand/uL 20 26* 14 51*  --  14 54*   HEMOGLOBIN g/dL 11 0* 10 8*  --  10 6*   HEMATOCRIT % 33 8* 33 1*  --  32 6*   PLATELETS Thousands/uL 455* 480*  --  492*   BANDS PCT %  --  1  --   --    NEUTROS PCT %  --   --   --  81*   LYMPHS PCT %  --   --   --  11*   LYMPHO PCT % 6* 10*   < >  --    MONOS PCT %  --   --   --  6   MONO PCT % 9 7   < >  --    EOS PCT % 0 0   < > 0    < > = values in this interval not displayed  Results from last 7 days   Lab Units 02/05/23  0607   SODIUM mmol/L 135   POTASSIUM mmol/L 4 1   CHLORIDE mmol/L 100   CO2 mmol/L 28   BUN mg/dL 27*   CREATININE mg/dL 0 57*   ANION GAP mmol/L 7   CALCIUM mg/dL 7 8*   ALBUMIN g/dL 3 4*   TOTAL BILIRUBIN mg/dL 0 26   ALK PHOS U/L 41   ALT U/L 13   AST U/L 9*   GLUCOSE RANDOM mg/dL 114                       Lines/Drains:  Invasive Devices     Peripherally Inserted Central Catheter Line  Duration           PICC Line 01/27/23 Right Other (Comment) 8 days                Central Line:  Goal for removal: Will discontinue when meds requiring line are completed  Imaging: No pertinent imaging reviewed      Recent Cultures (last 7 days):         Last 24 Hours Medication List:   Current Facility-Administered Medications   Medication Dose Route Frequency Provider Last Rate   • acetaminophen  650 mg Oral Q6H Isidra Molina MD     • aluminum-magnesium hydroxide-simethicone  30 mL Oral Q4H PRN Trace Hinds MD     • buprenorphine-naloxone  4 mg Sublingual 4x Daily Perla Porras MD     • cefazolin  2,000 mg Intravenous Q8H Gaetano Javier MD 2,000 mg (02/05/23 0822)   • dexamethasone  4 mg Oral Q6H [address] Rekha Gan MD     • docusate sodium  100 mg Oral BID Rekha Gan MD     • enoxaparin  40 mg Subcutaneous Daily Nato Shelley MD     • famotidine  20 mg Oral BID Rehana Castillo PA-C     • gabapentin  300 mg Oral TID Rekha Gan MD     • ketorolac  10 mg Oral Q6H PRN Rekha Gan MD     • morphine  60 mg Oral Q6H PRN Rekha Gan MD     • morphine injection  8 mg Intravenous Q6H PRN Tyra Krishnamurthy MD     • naloxone  0 1 mg Intravenous PRN Perla Porras MD     • nicotine  14 mg Transdermal Daily Nato Shelley MD     • ondansetron  4 mg Oral Q6H PRN Tyra Krishnamurthy MD     • QUEtiapine  100 mg Oral HS Rekha Gan MD     • tiZANidine  4 mg Oral Q8H PRN Rekha Gan MD          Today, Patient Was Seen By: Isabella Espinoza MD    **Please Note: This note may have been constructed using a voice recognition system  **

## 2023-02-05 NOTE — ASSESSMENT & PLAN NOTE
· Likely in the setting of steroid therapy  · Could repeat procalcitonin level  · Again, would favor tapering off steroids

## 2023-02-05 NOTE — ASSESSMENT & PLAN NOTE
Utilizing walker, patient hopes to be discharged home with utilization of crutches    Patient previously refused PT/OT multiple times, now with improved compliance    Plan:  Outpatient PT/OT

## 2023-02-06 RX ADMIN — MORPHINE SULFATE 8 MG: 10 INJECTION INTRAVENOUS at 12:49

## 2023-02-06 RX ADMIN — MORPHINE SULFATE 60 MG: 15 TABLET ORAL at 21:05

## 2023-02-06 RX ADMIN — MORPHINE SULFATE 60 MG: 15 TABLET ORAL at 08:46

## 2023-02-06 RX ADMIN — DEXAMETHASONE 4 MG: 4 TABLET ORAL at 23:30

## 2023-02-06 RX ADMIN — ALUMINUM HYDROXIDE, MAGNESIUM HYDROXIDE, AND DIMETHICONE 30 ML: 200; 20; 200 SUSPENSION ORAL at 13:46

## 2023-02-06 RX ADMIN — CEFAZOLIN SODIUM 2000 MG: 2 SOLUTION INTRAVENOUS at 22:45

## 2023-02-06 RX ADMIN — FAMOTIDINE 20 MG: 20 TABLET ORAL at 18:04

## 2023-02-06 RX ADMIN — MORPHINE SULFATE 60 MG: 15 TABLET ORAL at 14:55

## 2023-02-06 RX ADMIN — KETOROLAC TROMETHAMINE 10 MG: 10 TABLET, FILM COATED ORAL at 22:13

## 2023-02-06 RX ADMIN — BUPRENORPHINE AND NALOXONE 4 MG: 2; .5 FILM BUCCAL; SUBLINGUAL at 18:04

## 2023-02-06 RX ADMIN — GABAPENTIN 300 MG: 300 CAPSULE ORAL at 15:03

## 2023-02-06 RX ADMIN — QUETIAPINE FUMARATE 100 MG: 100 TABLET ORAL at 22:44

## 2023-02-06 RX ADMIN — GABAPENTIN 300 MG: 300 CAPSULE ORAL at 22:12

## 2023-02-06 RX ADMIN — DEXAMETHASONE 4 MG: 4 TABLET ORAL at 18:04

## 2023-02-06 RX ADMIN — DEXAMETHASONE 4 MG: 4 TABLET ORAL at 11:01

## 2023-02-06 RX ADMIN — MORPHINE SULFATE 8 MG: 10 INJECTION INTRAVENOUS at 06:27

## 2023-02-06 RX ADMIN — ACETAMINOPHEN 650 MG: 325 TABLET, FILM COATED ORAL at 06:26

## 2023-02-06 RX ADMIN — ACETAMINOPHEN 650 MG: 325 TABLET, FILM COATED ORAL at 23:30

## 2023-02-06 RX ADMIN — BUPRENORPHINE AND NALOXONE 4 MG: 2; .5 FILM BUCCAL; SUBLINGUAL at 08:14

## 2023-02-06 RX ADMIN — CEFAZOLIN SODIUM 2000 MG: 2 SOLUTION INTRAVENOUS at 14:25

## 2023-02-06 RX ADMIN — ACETAMINOPHEN 650 MG: 325 TABLET, FILM COATED ORAL at 18:04

## 2023-02-06 RX ADMIN — ALUMINUM HYDROXIDE, MAGNESIUM HYDROXIDE, AND DIMETHICONE 30 ML: 200; 20; 200 SUSPENSION ORAL at 06:41

## 2023-02-06 RX ADMIN — BUPRENORPHINE AND NALOXONE 4 MG: 2; .5 FILM BUCCAL; SUBLINGUAL at 11:01

## 2023-02-06 RX ADMIN — MORPHINE SULFATE 60 MG: 15 TABLET ORAL at 02:31

## 2023-02-06 RX ADMIN — MORPHINE SULFATE 8 MG: 10 INJECTION INTRAVENOUS at 19:03

## 2023-02-06 RX ADMIN — DEXAMETHASONE 4 MG: 4 TABLET ORAL at 06:26

## 2023-02-06 RX ADMIN — ACETAMINOPHEN 650 MG: 325 TABLET, FILM COATED ORAL at 11:01

## 2023-02-06 RX ADMIN — CEFAZOLIN SODIUM 2000 MG: 2 SOLUTION INTRAVENOUS at 06:27

## 2023-02-06 RX ADMIN — GABAPENTIN 300 MG: 300 CAPSULE ORAL at 08:14

## 2023-02-06 RX ADMIN — BUPRENORPHINE AND NALOXONE 4 MG: 2; .5 FILM BUCCAL; SUBLINGUAL at 22:44

## 2023-02-06 RX ADMIN — KETOROLAC TROMETHAMINE 10 MG: 10 TABLET, FILM COATED ORAL at 11:01

## 2023-02-06 NOTE — ASSESSMENT & PLAN NOTE
Utilizing walker, patient hopes to be discharged home with utilization of crutches    Patient previously refused PT/OT multiple times, now with improved compliance  Patient reports walking down the hallway past 5 doors      Plan:  Outpatient PT/OT

## 2023-02-06 NOTE — PROGRESS NOTES
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   TTE with possible small MV vegetation but CECILIO is without vegetation   Given underlying IVDU additionally, patient will be treated with a prolonged IV antibiotic course  Amy Seen is agreeable  Continue high-dose IV cefazolin  Treat x4 weeks total IV antibiotic, through 2/16  Monitor temperature/WBC      2   Low back pain and right hip pain   This was present prior to bacteremia   Consider discitis/vertebral osteomyelitis given staff aureus bacteremia and elevated ESR   However, L-spine MRI without contrast did not show discitis or vertebral osteomyelitis   Patient could not tolerate MRI with contrast   Patient will be getting 4 weeks of IV antibiotic, as in above   If ESR remains elevated after completion of IV antibiotic course, he will likely stay on p o  antibiotic for an extended period of time, until ESR normalizes  IV antibiotic plan as in above  Monitor ESR weekly  If ESR remains elevated at end of IV cefazolin course, will transition to p o  Keflex, to continue until ESR normalizes  Monitor back pain      3   History of HCV infection   HCV PCR negative on admission      4   Leukocytosis  Patient is on dexamethasone  This is likely steroid effect  Clinically, there is no active infection  Monitor WBC  5  Active IVDU  Be Millan is not a candidate for safe home IV antibiotic      Discussed with patient in detail regarding the above plan  Discussed with Dr Baldomero Painting from pulm service      Antibiotics:  Cefazolin # 18  Day # 25 from clearance of bacteremia     Subjective:  Patient with stable back, knee and hip pain, worse with repeat  No leg weakness  Temperature stays down   No chills    He is tolerating antibiotic well   No nausea, vomiting or diarrhea      Objective:  Vitals:  Temp:  [97 7 °F (36 5 °C)-98 °F (36 7 °C)] 98 °F (36 7 °C)  HR:  [74] 74  Resp:  [19-20] 19  BP: (150-156)/(101-102) 150/101  SpO2:  [98 %] 98 %  Temp (24hrs), Av 9 °F (36 6 °C), Min:97 7 °F (36 5 °C), Max:98 °F (36 7 °C)  Current: Temperature: 98 °F (36 7 °C)    Physical Exam:     General: Awake, alert, cooperative, no distress  Neck:  Supple  No mass  No lymphadenopathy  Lungs: Expansion symmetric, no rales, no wheezing, respirations unlabored  Heart:  Regular rate and rhythm, S1 and S2 normal, no murmur  Abdomen: Soft, nondistended, non-tender, bowel sounds active all four quadrants, no masses, no organomegaly  Extremities: Trace leg edema  No knee or hip effusion  No erythema/warmth  No ulcer  Mild tenderness  Skin:  No rash  Neuro: Moves all extremities  Invasive Devices     Peripherally Inserted Central Catheter Line  Duration           PICC Line 23 Right Other (Comment) 9 days                Labs studies:   I have personally reviewed pertinent labs  Results from last 7 days   Lab Units 23  0607 23  0442   POTASSIUM mmol/L 4 1 3 9   CHLORIDE mmol/L 100 103   CO2 mmol/L 28 26   BUN mg/dL 27* 22   CREATININE mg/dL 0 57* 0 62   EGFR ml/min/1 73sq m 131 126   CALCIUM mg/dL 7 8* 8 4   AST U/L 9* 18   ALT U/L 13 24   ALK PHOS U/L 41 55     Results from last 7 days   Lab Units 23  0607 23  0535 23  0442   WBC Thousand/uL 20 26* 14 51* 14 54*   HEMOGLOBIN g/dL 11 0* 10 8* 10 6*   PLATELETS Thousands/uL 455* 480* 492*           Imaging Studies:   I have personally reviewed pertinent imaging study reports and images in PACS  EKG, Pathology, and Other Studies:   I have personally reviewed pertinent reports

## 2023-02-06 NOTE — PHYSICAL THERAPY NOTE
PHYSICAL THERAPY NOTE          Patient Name: Anisa Gallardo  GJXEN'B Date: 2/6/2023 02/06/23 1030   PT Last Visit   PT Visit Date 02/06/23   Note Type   Note Type Treatment   Pain Assessment   Pain Assessment Tool 0-10   Pain Score 8   Pain Location/Orientation Orientation: Right;Location: Knee;Orientation: Mid;Location: Back   Restrictions/Precautions   Weight Bearing Precautions Per Order No   Other Precautions   (Fall Risk; Pain)   Cognition   Overall Cognitive Status WFL   Following Commands Follows all commands and directions without difficulty   Subjective   Subjective Agreeable to therapy  Bed Mobility   Supine to Sit 6  Modified independent   Additional items Bedrails; Increased time required   Additional Comments seated EOB end session   Transfers   Sit to Stand 6  Modified independent   Additional items Increased time required   Stand to Sit 6  Modified independent   Additional items Increased time required   Stand pivot 5  Supervision   Additional Comments RW used   Ambulation/Elevation   Gait pattern   (R Foot drag; Decreased foot clearance; Decreased R stance; Antalgic; Excessively slow)   Gait Assistance 5  Supervision   Assistive Device Rolling walker   Distance 140'   Balance   Static Sitting Good   Dynamic Sitting Fair +   Static Standing Fair   Dynamic Standing Fair   Ambulatory Fair  (RW)   Endurance Deficit   Endurance Deficit Yes   Activity Tolerance   Activity Tolerance Patient limited by fatigue;Patient limited by pain   Assessment   Prognosis Good   Problem List   (Decreased strength; Decreased endurance; Impaired balance; Decreased mobility; Pain)   Assessment Pt  seen for PT treatment session this date with interventions consisting of bed mobility, transfers and  gait training w/ emphasis on improving pt's ability to ambulate  Pt  Requiring occasional cues for sequence and safety   In comparison to previous session, Pt  With improvements in activity tolerance  Increased distance with no LOB  Pt is in need of continued activity in PT to improve strength balance endurance mobility transfers and ambulation with return to maximize LOF  From PT/mobility standpoint, recommendation at time of d/c would be OPPT in order to promote return to PLOF and independence  The patient's AM-PAC Basic Mobility Inpatient Short Form Raw Score is 20  A Raw score of greater than 16 suggests the patient may benefit from discharge to home  Please also refer to physical therapy recommendation for safe DC planning  Goals   LTG Expiration Date 02/17/23   Plan   Treatment/Interventions   (Functional transfer training; Therapeutic exercise; Endurance training; Patient/family training; Bed mobility; Gait training)   Progress Progressing toward goals   PT Frequency 3-5x/wk   Recommendation   PT Discharge Recommendation Home with outpatient rehabilitation   AM-PAC Basic Mobility Inpatient   Turning in Flat Bed Without Bedrails 3   Lying on Back to Sitting on Edge of Flat Bed Without Bedrails 3   Moving Bed to Chair 4   Standing Up From Chair Using Arms 4   Walk in Room 4   Climb 3-5 Stairs With Railing 2   Basic Mobility Inpatient Raw Score 20   Basic Mobility Standardized Score 43 99   Highest Level Of Mobility   JH-HLM Goal 6: Walk 10 steps or more   JH-HLM Achieved 7: Walk 25 feet or more   Education   Education Provided Mobility training   Patient Demonstrates verbal understanding   End of Consult   Patient Position at End of Consult Seated edge of bed; All needs within reach   End of Consult Comments discussed POC with PT

## 2023-02-06 NOTE — CASE MANAGEMENT
Case Management Discharge Planning Note    Patient name Bianca Trammell  Location [address] [id number] MRN [id number]  : 1985 Date 2023       Current Admission Date: 2023  Current Admission Diagnosis:Bacteremia due to methicillin susceptible Staphylococcus aureus (MSSA)   Patient Active Problem List    Diagnosis Date Noted   • Leukocytosis 2023   • Acute pain of right knee 2023   • Hypomagnesemia 2023   • Bacteremia due to methicillin susceptible Staphylococcus aureus (MSSA) 2023   • Intravenous drug abuse, continuous ([facility] [address] ) 2023   • Ambulatory dysfunction 2023   • Acute right-sided low back pain with right-sided sciatica 2023   • Tobacco abuse 2023   • Acute pain of right shoulder 2022   • Bilateral groin pain 2022   • Opiate overdose ([facility] [address] ) 2021   • Moderate episode of recurrent major depressive disorder ([facility] [address] ) 2020   • Drug dependence ([facility] [address] ) 2018   • Heroin abuse ([facility] [address] ) 2018   • Encounter for monitoring Suboxone maintenance therapy 2018   • Drug therapy continued 2018   • Degeneration of thoracic intervertebral disc 2018   • Degeneration of intervertebral disc of thoracic region 2017   • Myofascial pain 2017   • Chronic midline thoracic back pain 2016   • Anxiety 2015   • Hepatitis C virus 2015      LOS (days): 19  Geometric Mean LOS (GMLOS) (days): 2 80  Days to GMLOS:-16     OBJECTIVE:  Risk of Unplanned Readmission Score: 20 82         Current admission status: Inpatient   Preferred Pharmacy:   [address] [place] [address] [facility] [address]  Phone: [phone] Fax: [phone]    Primary Care Provider: Flavia Calvin DO    Primary Insurance: [address] [place] [address]  Secondary Insurance:     DISCHARGE DETAILS:  Pt to remain in the hospital until  to continue his IV ABX treatment

## 2023-02-06 NOTE — ASSESSMENT & PLAN NOTE
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  Last use - IV,1 bag of fentanyl, at 12pm on 1/18/23  Patient started on Suboxone upon admission, maintenance dose 16 mg daily    Plan:  · Patient has scheduled follow-up with Dr Anurag Bryan in [place] on 2/22/23   Pt discharge planned for Friday 2/17/23 - he will need to obtain a prescription for Suboxone from his doctor as we will be unable to provide a prescription for Suboxone  · Suboxone 4mg QID   · Seroquel 100 mg at night for sleep  · Zofran as needed for nausea

## 2023-02-06 NOTE — PLAN OF CARE
Problem: PAIN - ADULT  Goal: Verbalizes/displays adequate comfort level or baseline comfort level  Description: Interventions:  - Encourage patient to monitor pain and request assistance  - Assess pain using appropriate pain scale (0-10 pain scale)  - Administer analgesics based on type and severity of pain and evaluate response  - Implement non-pharmacological measures as appropriate and evaluate response  - Consider cultural and social influences on pain and pain management  - Notify physician/advanced practitioner if interventions unsuccessful or patient reports new pain  Outcome: Progressing     Problem: INFECTION - ADULT  Goal: Absence or prevention of progression during hospitalization  Description: INTERVENTIONS:  - Assess and monitor for signs and symptoms of infection  - Monitor lab/diagnostic results  - Monitor all insertion sites, i e  indwelling lines  - Administer medications as ordered  - Instruct and encourage patient and family to use good hand hygiene technique  Outcome: Progressing     Problem: DISCHARGE PLANNING  Goal: Discharge to home or other facility with appropriate resources  Description: INTERVENTIONS:  - Identify barriers to discharge w/patient and caregiver  - Arrange for needed discharge resources and transportation as appropriate  - Identify discharge learning needs (meds, wound care, etc )  - Refer to Case Management Department for coordinating discharge planning if the patient needs post-hospital services based on physician/advanced practitioner order or complex needs related to functional status, cognitive ability, or social support system  Outcome: Progressing     Problem: Knowledge Deficit  Goal: Patient/family/caregiver demonstrates understanding of disease process, treatment plan, medications, and discharge instructions  Description: Complete learning assessment and assess knowledge base    Interventions:  - Provide teaching at level of understanding  - Provide teaching via preferred learning methods  Outcome: Progressing     Problem: MUSCULOSKELETAL - ADULT  Goal: Maintain or return mobility to safest level of function  Description: INTERVENTIONS:  - Assess patient's ability to carry out ADLs; (independent)  - Assess/evaluate cause of self-care deficits (limited movement, pain, crutches/walker)  - Assess range of motion  - Assess patient's mobility (modified independent)  - Assess patient's need for assistive devices and provide as appropriate  - Encourage maximum independence but intervene and supervise when necessary  - Involve family in performance of ADLs  - Assess for home care needs following discharge   - Consider OT consult to assist with ADL evaluation and planning for discharge  - Provide patient education as appropriate  Outcome: Progressing  Goal: Maintain proper alignment of affected body part  Description: INTERVENTIONS:  - Support, maintain and protect limb and body alignment  - Provide patient/ family with appropriate education  Outcome: Progressing     Problem: METABOLIC, FLUID AND ELECTROLYTES - ADULT  Goal: Electrolytes maintained within normal limits  Description: INTERVENTIONS:  - Monitor labs and assess patient for signs and symptoms of electrolyte imbalances  - Administer electrolyte replacement as ordered  - Monitor response to electrolyte replacements, including repeat lab results as appropriate  - Instruct patient on fluid and nutrition as appropriate  Outcome: Progressing  Goal: Fluid balance maintained  Description: INTERVENTIONS:  - Monitor labs   - Monitor I/O and WT  - Instruct patient on fluid and nutrition as appropriate  - Assess for signs & symptoms of volume excess or deficit  Outcome: Progressing     Problem: MOBILITY - ADULT  Goal: Maintain or return to baseline ADL function  Description: INTERVENTIONS:  -  Assess patient's ability to carry out ADLs; (independent)  - Assess/evaluate cause of self-care deficits (limited movement, pain, crutches/walker)  - Assess range of motion  - Assess patient's mobility; (modified independent)  - Assess patient's need for assistive devices and provide as appropriate  - Encourage maximum independence but intervene and supervise when necessary  - Involve family in performance of ADLs  - Assess for home care needs following discharge   - Consider OT consult to assist with ADL evaluation and planning for discharge  - Provide patient education as appropriate  Outcome: Progressing  Goal: Maintains/Returns to pre admission functional level  Description: INTERVENTIONS:  - Perform BMAT or MOVE assessment daily    - Set and communicate daily mobility goal to care team and patient/family/caregiver     - Collaborate with rehabilitation services on mobility goals if consulted  - Ambulate patient 3 times a day  - Out of bed to chair 3 times a day   - Out of bed for meals 3 times a day  - Out of bed for toileting  - Record patient progress and toleration of activity level   Outcome: Progressing

## 2023-02-06 NOTE — UTILIZATION REVIEW
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  for a total of 4 weeks - through 2-16            Vital Signs:       Date/Time Temp Pulse Resp BP MAP SpO2 O2 Device   02/06/23 07:41:02 98 °F (36 7 °C) -- 19 150/101   Abnormal  117 -- --   02/05/23 22:39:33 97 7 °F (36 5 °C) -- -- 150/102   Abnormal  118 -- --   02/05/23 2030 -- -- -- -- -- -- None (Room air)   02/05/23 15:13:30 98 °F (36 7 °C) 74 20 156/102   Abnormal  120 98 % --           Pertinent Labs/Diagnostic Results:       Results from last 7 days   Lab Units 02/05/23 0607 02/03/23 0535 02/01/23  0442   WBC Thousand/uL 20 26* 14 51* 14 54*   HEMOGLOBIN g/dL 11 0* 10 8* 10 6*   HEMATOCRIT % 33 8* 33 1* 32 6*   PLATELETS Thousands/uL 455* 480* 492*   NEUTROS ABS Thousands/µL  --   --  11 90*   BANDS PCT %  --  1  --          Results from last 7 days   Lab Units 02/05/23  0607 02/01/23  0442   SODIUM mmol/L 135 138   POTASSIUM mmol/L 4 1 3 9   CHLORIDE mmol/L 100 103   CO2 mmol/L 28 26   ANION GAP mmol/L 7 9   BUN mg/dL 27* 22   CREATININE mg/dL 0 57* 0 62   EGFR ml/min/1 73sq m 131 126   CALCIUM mg/dL 7 8* 8 4   MAGNESIUM mg/dL 2 0  --    PHOSPHORUS mg/dL 3 7  --      Results from last 7 days   Lab Units 02/05/23  0607 02/01/23  0442   AST U/L 9* 18   ALT U/L 13 24   ALK PHOS U/L 41 55   TOTAL PROTEIN g/dL 5 9* 6 6   ALBUMIN g/dL 3 4* 3 4*   TOTAL BILIRUBIN mg/dL 0 26 0 20         Results from last 7 days   Lab Units 02/05/23  0607 02/01/23  0442   GLUCOSE RANDOM mg/dL 114 150*           Scheduled Medications:    acetaminophen, 650 mg, Oral, Q6H HERMAN  buprenorphine-naloxone, 4 mg, Sublingual, 4x Daily  cefazolin, 2,000 mg, Intravenous, Q8H  dexamethasone, 4 mg, Oral, Q6H [facility]  docusate sodium, 100 mg, Oral, BID  enoxaparin, 40 mg, Subcutaneous, Daily  famotidine, 20 mg, Oral, BID  gabapentin, 300 mg, Oral, TID  nicotine, 14 mg, Transdermal, Daily  QUEtiapine, 100 mg, Oral, HS      Continuous IV Infusions:     PRN Meds:  aluminum-magnesium hydroxide-simethicone, 30 mL, Oral, Q4H PRN  ketorolac, 10 mg, Oral, Q6H PRN  morphine, 60 mg, Oral, Q6H PRN  morphine injection, 8 mg, Intravenous, Q6H PRN  naloxone, 0 1 mg, Intravenous, PRN  ondansetron, 4 mg, Oral, Q6H PRN  tiZANidine, 4 mg, Oral, Q8H PRN        Discharge Plan: to be determined     Network Utilization Review Department  ATTENTION: Please call with any questions or concerns to [phone] and carefully listen to the prompts so that you are directed to the right person  All voicemails are confidential   [facility] all requests for admission clinical reviews, approved or denied determinations and any other requests to dedicated fax number below belonging to the campus where the patient is receiving treatment   List of dedicated fax numbers for the Facilities:  [address] DENIALS (Administrative/Medical Necessity) [phone]   [address] (Maternity/NICU/Pediatrics) [phone]   912 Aileen Hinojosa [phone]   [facility] [place] [address] [phone]   [address] [place] [address] Angela Manohar [address] [phone]   [address] [place] [facility] [address] [phone]

## 2023-02-06 NOTE — ASSESSMENT & PLAN NOTE
H/o right sided back pain with sciatica  Intermittent severe right hip pain, radiates into right lateral hip & buttocks  Patient unable to ambulate without cane  1/16/23 CT lumbar - Mild multilevel disc degeneration  Grade 1 retrolisthesis of L5 on S1  Disc bulge with small herniation at L5-S1  Disc bulging from L2-3 through L4-5      1/23/23 MRI w/o contrast -  Degenerative changes at L5-S1     1/28/23 Discussed case with neurosurgery; no transfer or surgical intervention at this time  Manage pain acutely  May pursue nerve block as outpatient  Add oral Decadron 4 mg every 6 to pain regimen  Note; gabapentin will take 2 to 3 weeks to reach full effect  2/3/23 - Placed ambulatory referral neurosurgery, Dr Rosa Maria Collins requesting x-ray of lumbar spine with 6 views extension flexion completed 2 days prior to discharge (2/15)      Plan:  Scheduled Tylenol 650mg Q6H   PRN Toradol 10mg Q6H   Morphine 60mg PO every 6H as needed  Morphine 8mg IV prn for breakthrough pain  Gabapentin 300mg TID  Trizinidine 4mg TID  Decadron oral 4 mg every 6H - would favor initiating taper in next 24-48 hours   Continue PT/OT

## 2023-02-06 NOTE — PROGRESS NOTES
[facility]  Progress Note - Cm  1985, 41 y o  male MRN: [id number]  Unit/Bed#: 301-05 Encounter: [id number]  Primary Care Provider: Juana Neal DO   Date and time admitted to hospital: 2023  5:14 PM    * Bacteremia due to methicillin susceptible Staphylococcus aureus (MSSA)  Assessment & Plan  Blood cultures positive for MSSA on 23, repeat blood cultures on 23 negative  History of IV drug abuse  CECILIO negative for vegetations MRI negative for osteomyelitis    ID recommendations: IV Ancef 2g Q8H ending 2023, PICC line placed , patient unable to return home with PICC line in place due to IV drug abuse  Case management unable to find placement  Patient will stay as [facility] inpatient until 2023  Plan:  Continue IV Ancef 2g Q8H ending 23      Acute right-sided low back pain with right-sided sciatica  Assessment & Plan  H/o right sided back pain with sciatica  Intermittent severe right hip pain, radiates into right lateral hip & buttocks  Patient unable to ambulate without cane  23 CT lumbar - Mild multilevel disc degeneration  Grade 1 retrolisthesis of L5 on S1  Disc bulge with small herniation at L5-S1  Disc bulging from L2-3 through L4-5      23 MRI w/o contrast -  Degenerative changes at L5-S1     23 Discussed case with neurosurgery; no transfer or surgical intervention at this time  Manage pain acutely  May pursue nerve block as outpatient  Add oral Decadron 4 mg every 6 to pain regimen  Note; gabapentin will take 2 to 3 weeks to reach full effect  2/3/23 - Placed ambulatory referral neurosurgery, Dr Cassandra Stacy requesting x-ray of lumbar spine with 6 views extension flexion completed 2 days prior to discharge (2/15)      Plan:  Scheduled Tylenol 650mg Q6H   PRN Toradol 10mg Q6H   Morphine 60mg PO every 6H as needed  Morphine 8mg IV prn for breakthrough pain  Gabapentin 300mg TID  Trizinidine 4mg TID  Decadron oral 4 mg every [facility] - would favor initiating taper in next 24-48 hours   Continue PT/OT      Ambulatory dysfunction  Assessment & Plan  Utilizing walker, patient hopes to be discharged home with utilization of crutches    Patient previously refused PT/OT multiple times, now with improved compliance  Patient reports walking down the hallway past 5 doors  Plan:  Outpatient PT/OT    Acute pain of right knee  Assessment & Plan  Right knee pain since fall 2 weeks ago  Unremarkable exam and no acute findings on x-ray of the knee  Plan:  Continue pain regimen  Continue PT OT    Intravenous drug abuse, continuous ([facility] [address] )  Assessment & Plan  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  Last use - IV,1 bag of fentanyl, at 12pm on 1/18/23  Patient started on Suboxone upon admission, maintenance dose 16 mg daily    Plan:  · Patient has scheduled follow-up with Dr Raúl Lion in [place] on 2/22/23  Pt discharge planned for Friday 2/17/23 - he will need to obtain a prescription for Suboxone from his doctor as we will be unable to provide a prescription for Suboxone  · Suboxone 4mg QID   · Seroquel 100 mg at night for sleep  · Zofran as needed for nausea    Leukocytosis  Assessment & Plan  · Likely in the setting of steroid therapy  · Could repeat procalcitonin level  · Again, would favor tapering off steroids    Hypomagnesemia  Assessment & Plan  Magnesium 1 7  Plan:  Replenish as needed  Trend magnesium periodically    Tobacco abuse  Assessment & Plan  History of tobacco smoking  Plan:  Nicotine patch  Smoking cessation    Hepatitis C virus  Assessment & Plan  Most recent labs 3/7/2022  Positive Hep A  Positive Hep B surface antibody  Positive Hep C antibody  Not taking hepatitis meds    ID recs: Hep C RNA negative    Plan:  Follow-up with ID as outpatient            VTE Pharmacologic Prophylaxis: VTE Score: 2 Low Risk (Score 0-2) - Encourage Ambulation      Patient Centered Rounds: I performed bedside rounds with nursing staff today   Discussions with Specialists or Other Care Team Provider: PT/OT    Education and Discussions with Family / Patient: Patient declined call to   Time Spent for Care: 60 minutes  More than 50% of total time spent on counseling and coordination of care as described above  Current Length of Stay: 19 day(s)  Current Patient Status: Inpatient   Certification Statement: The patient will continue to require additional inpatient hospital stay due to IV antibiotics  Discharge Plan: 2023    Code Status: Level 1 - Full Code    Subjective:   Patient seen at bedside this morning sitting up in bed comfortably  No acute distress  Patient states that he is doing much better with PT, he walked the hallways and passed about 5 doors  Patient states that he still has pain but it is improving  No new complaints    Objective:     Vitals:   Temp (24hrs), Av 8 °F (36 6 °C), Min:97 7 °F (36 5 °C), Max:98 °F (36 7 °C)    Temp:  [97 7 °F (36 5 °C)-98 °F (36 7 °C)] 97 7 °F (36 5 °C)  Resp:  [19-20] 20  BP: (150-154)/(101-108) 154/108  Body mass index is 27 67 kg/m²  Input and Output Summary (last 24 hours): Intake/Output Summary (Last 24 hours) at 2023 1548  Last data filed at 2023 1244  Gross per 24 hour   Intake 420 ml   Output --   Net 420 ml       Physical Exam:   Physical Exam  Vitals and nursing note reviewed  Constitutional:       General: He is not in acute distress  Appearance: He is well-developed  HENT:      Head: Normocephalic and atraumatic  Right Ear: External ear normal       Left Ear: External ear normal       Nose: No rhinorrhea  Mouth/Throat:      Mouth: Mucous membranes are moist    Eyes:      General:         Right eye: No discharge  Left eye: No discharge  Cardiovascular:      Rate and Rhythm: Normal rate and regular rhythm  Pulmonary:      Effort: Pulmonary effort is normal  No respiratory distress     Abdominal:      Palpations: Abdomen is soft  Tenderness: There is no abdominal tenderness  Musculoskeletal:         General: Tenderness present  No swelling  Cervical back: Normal range of motion and neck supple  Comments: Right knee:   decreased range of motion  No erythema, no swelling  Mild tenderness over posterior    Lumbar spine:  Decreased range of motion right lateral flexion     Skin:     General: Skin is warm and dry  Capillary Refill: Capillary refill takes less than 2 seconds  Findings: No erythema  Neurological:      General: No focal deficit present  Mental Status: He is alert  Psychiatric:         Mood and Affect: Mood normal          Behavior: Behavior normal          Additional Data:     Labs:  Results from last 7 days   Lab Units 02/05/23  0607 02/03/23  0535 02/01/23 0442 02/01/23 0442   WBC Thousand/uL 20 26* 14 51*  --  14 54*   HEMOGLOBIN g/dL 11 0* 10 8*  --  10 6*   HEMATOCRIT % 33 8* 33 1*  --  32 6*   PLATELETS Thousands/uL 455* 480*  --  492*   BANDS PCT %  --  1  --   --    NEUTROS PCT %  --   --   --  81*   LYMPHS PCT %  --   --   --  11*   LYMPHO PCT % 6* 10*   < >  --    MONOS PCT %  --   --   --  6   MONO PCT % 9 7   < >  --    EOS PCT % 0 0   < > 0    < > = values in this interval not displayed       Results from last 7 days   Lab Units 02/05/23  0607   SODIUM mmol/L 135   POTASSIUM mmol/L 4 1   CHLORIDE mmol/L 100   CO2 mmol/L 28   BUN mg/dL 27*   CREATININE mg/dL 0 57*   ANION GAP mmol/L 7   CALCIUM mg/dL 7 8*   ALBUMIN g/dL 3 4*   TOTAL BILIRUBIN mg/dL 0 26   ALK PHOS U/L 41   ALT U/L 13   AST U/L 9*   GLUCOSE RANDOM mg/dL 114                       Lines/Drains:  Invasive Devices     Peripherally Inserted Central Catheter Line  Duration           PICC Line 01/27/23 Right Other (Comment) 9 days                Central Line:  Goal for removal: After 4 weeks of IV antibiotics             Imaging: Reviewed radiology reports from this admission including: MRI spine    Recent Cultures (last 7 days):         Last 24 Hours Medication List:   Current Facility-Administered Medications   Medication Dose Route Frequency Provider Last Rate   • acetaminophen  650 mg Oral Q6H [address] Edita Dominguez MD     • aluminum-magnesium hydroxide-simethicone  30 mL Oral Q4H PRN Jeremiah Mccoy MD     • buprenorphine-naloxone  4 mg Sublingual 4x Daily Tyra Hodges MD     • cefazolin  2,000 mg Intravenous Q8H Veronica Guo MD 2,000 mg (02/06/23 1664)   • dexamethasone  4 mg Oral Q6H [address] Edita Dominguez MD     • docusate sodium  100 mg Oral BID Edita Dominguez MD     • enoxaparin  40 mg Subcutaneous Daily Desire Campos MD     • famotidine  20 mg Oral BID Toribio Solid, PA-C     • gabapentin  300 mg Oral TID Edita Dominguez MD     • ketorolac  10 mg Oral Q6H PRN Edita Dominguez MD     • morphine  60 mg Oral Q6H PRN Edita Dominguez MD     • morphine injection  8 mg Intravenous Q6H PRN Marifer Pandey MD     • naloxone  0 1 mg Intravenous PRN Marifer Pandey MD     • nicotine  14 mg Transdermal Daily Desire Campos MD     • ondansetron  4 mg Oral Q6H PRN Tyra Hodges MD     • QUEtiapine  100 mg Oral HS Edita Dominguez MD     • tiZANidine  4 mg Oral Q8H PRN Edita Dominguez MD          Today, Patient Was Seen By: Edita Dominguez MD    **Please Note: This note may have been constructed using a voice recognition system  **

## 2023-02-07 LAB
ALBUMIN SERPL BCP-MCNC: 3.3 G/DL (ref 3.5–5)
ALP SERPL-CCNC: 35 U/L (ref 34–104)
ALT SERPL W P-5'-P-CCNC: 9 U/L (ref 7–52)
ANION GAP SERPL CALCULATED.3IONS-SCNC: 8 MMOL/L (ref 4–13)
ANISOCYTOSIS BLD QL SMEAR: PRESENT
AST SERPL W P-5'-P-CCNC: 9 U/L (ref 13–39)
BASOPHILS # BLD MANUAL: 0 THOUSAND/UL (ref 0–0.1)
BASOPHILS NFR MAR MANUAL: 0 % (ref 0–1)
BILIRUB SERPL-MCNC: 0.23 MG/DL (ref 0.2–1)
BUN SERPL-MCNC: 27 MG/DL (ref 5–25)
CALCIUM ALBUM COR SERPL-MCNC: 8.3 MG/DL (ref 8.3–10.1)
CALCIUM SERPL-MCNC: 7.7 MG/DL (ref 8.4–10.2)
CHLORIDE SERPL-SCNC: 98 MMOL/L (ref 96–108)
CO2 SERPL-SCNC: 28 MMOL/L (ref 21–32)
CREAT SERPL-MCNC: 0.57 MG/DL (ref 0.6–1.3)
EOSINOPHIL # BLD MANUAL: 0 THOUSAND/UL (ref 0–0.4)
EOSINOPHIL NFR BLD MANUAL: 0 % (ref 0–6)
ERYTHROCYTE [DISTWIDTH] IN BLOOD BY AUTOMATED COUNT: 14.9 % (ref 11.6–15.1)
ERYTHROCYTE [SEDIMENTATION RATE] IN BLOOD: 6 MM/HOUR (ref 0–14)
GFR SERPL CREATININE-BSD FRML MDRD: 131 ML/MIN/1.73SQ M
GLUCOSE SERPL-MCNC: 130 MG/DL (ref 65–140)
HCT VFR BLD AUTO: 31.1 % (ref 36.5–49.3)
HGB BLD-MCNC: 10.3 G/DL (ref 12–17)
LYMPHOCYTES # BLD AUTO: 1.36 THOUSAND/UL (ref 0.6–4.47)
LYMPHOCYTES # BLD AUTO: 9 % (ref 14–44)
MCH RBC QN AUTO: 29.6 PG (ref 26.8–34.3)
MCHC RBC AUTO-ENTMCNC: 33.1 G/DL (ref 31.4–37.4)
MCV RBC AUTO: 89 FL (ref 82–98)
MONOCYTES # BLD AUTO: 1.66 THOUSAND/UL (ref 0–1.22)
MONOCYTES NFR BLD: 11 % (ref 4–12)
NEUTROPHILS # BLD MANUAL: 12.06 THOUSAND/UL (ref 1.85–7.62)
NEUTS SEG NFR BLD AUTO: 80 % (ref 43–75)
PLATELET # BLD AUTO: 319 THOUSANDS/UL (ref 149–390)
PLATELET BLD QL SMEAR: ADEQUATE
PMV BLD AUTO: 9.5 FL (ref 8.9–12.7)
POLYCHROMASIA BLD QL SMEAR: PRESENT
POTASSIUM SERPL-SCNC: 4.1 MMOL/L (ref 3.5–5.3)
PROCALCITONIN SERPL-MCNC: 0.09 NG/ML
PROT SERPL-MCNC: 5.5 G/DL (ref 6.4–8.4)
RBC # BLD AUTO: 3.48 MILLION/UL (ref 3.88–5.62)
SODIUM SERPL-SCNC: 134 MMOL/L (ref 135–147)
WBC # BLD AUTO: 15.08 THOUSAND/UL (ref 4.31–10.16)

## 2023-02-07 RX ORDER — MORPHINE SULFATE 15 MG/1
60 TABLET ORAL EVERY 4 HOURS PRN
Status: DISCONTINUED | OUTPATIENT
Start: 2023-02-07 | End: 2023-02-11

## 2023-02-07 RX ORDER — MORPHINE SULFATE 15 MG/1
45 TABLET ORAL EVERY 4 HOURS PRN
Status: DISCONTINUED | OUTPATIENT
Start: 2023-02-07 | End: 2023-02-11

## 2023-02-07 RX ORDER — KETOROLAC TROMETHAMINE 10 MG/1
10 TABLET, FILM COATED ORAL EVERY 6 HOURS
Status: COMPLETED | OUTPATIENT
Start: 2023-02-07 | End: 2023-02-09

## 2023-02-07 RX ORDER — MORPHINE SULFATE 15 MG/1
60 TABLET ORAL EVERY 6 HOURS PRN
Status: DISCONTINUED | OUTPATIENT
Start: 2023-02-07 | End: 2023-02-07

## 2023-02-07 RX ADMIN — DEXAMETHASONE 4 MG: 4 TABLET ORAL at 17:01

## 2023-02-07 RX ADMIN — ACETAMINOPHEN 650 MG: 325 TABLET, FILM COATED ORAL at 06:24

## 2023-02-07 RX ADMIN — KETOROLAC TROMETHAMINE 10 MG: 10 TABLET, FILM COATED ORAL at 04:28

## 2023-02-07 RX ADMIN — BUPRENORPHINE AND NALOXONE 4 MG: 2; .5 FILM BUCCAL; SUBLINGUAL at 17:01

## 2023-02-07 RX ADMIN — MORPHINE SULFATE 8 MG: 10 INJECTION INTRAVENOUS at 01:05

## 2023-02-07 RX ADMIN — BUPRENORPHINE AND NALOXONE 4 MG: 2; .5 FILM BUCCAL; SUBLINGUAL at 21:45

## 2023-02-07 RX ADMIN — MORPHINE SULFATE 60 MG: 15 TABLET ORAL at 16:58

## 2023-02-07 RX ADMIN — MORPHINE SULFATE 8 MG: 10 INJECTION INTRAVENOUS at 14:25

## 2023-02-07 RX ADMIN — CEFAZOLIN SODIUM 2000 MG: 2 SOLUTION INTRAVENOUS at 05:48

## 2023-02-07 RX ADMIN — QUETIAPINE FUMARATE 100 MG: 100 TABLET ORAL at 21:45

## 2023-02-07 RX ADMIN — MORPHINE SULFATE 60 MG: 15 TABLET ORAL at 09:43

## 2023-02-07 RX ADMIN — MORPHINE SULFATE 8 MG: 10 INJECTION INTRAVENOUS at 20:32

## 2023-02-07 RX ADMIN — KETOROLAC TROMETHAMINE 10 MG: 10 TABLET, FILM COATED ORAL at 10:49

## 2023-02-07 RX ADMIN — GABAPENTIN 300 MG: 300 CAPSULE ORAL at 21:45

## 2023-02-07 RX ADMIN — MORPHINE SULFATE 8 MG: 10 INJECTION INTRAVENOUS at 08:08

## 2023-02-07 RX ADMIN — CEFAZOLIN SODIUM 2000 MG: 2 SOLUTION INTRAVENOUS at 14:25

## 2023-02-07 RX ADMIN — ALUMINUM HYDROXIDE, MAGNESIUM HYDROXIDE, AND DIMETHICONE 30 ML: 200; 20; 200 SUSPENSION ORAL at 03:16

## 2023-02-07 RX ADMIN — ACETAMINOPHEN 650 MG: 325 TABLET, FILM COATED ORAL at 17:01

## 2023-02-07 RX ADMIN — GABAPENTIN 300 MG: 300 CAPSULE ORAL at 08:07

## 2023-02-07 RX ADMIN — CEFAZOLIN SODIUM 2000 MG: 2 SOLUTION INTRAVENOUS at 21:45

## 2023-02-07 RX ADMIN — DEXAMETHASONE 4 MG: 4 TABLET ORAL at 06:24

## 2023-02-07 RX ADMIN — KETOROLAC TROMETHAMINE 10 MG: 10 TABLET, FILM COATED ORAL at 18:23

## 2023-02-07 RX ADMIN — MORPHINE SULFATE 60 MG: 15 TABLET ORAL at 03:25

## 2023-02-07 RX ADMIN — FAMOTIDINE 20 MG: 20 TABLET ORAL at 08:07

## 2023-02-07 RX ADMIN — GABAPENTIN 300 MG: 300 CAPSULE ORAL at 16:57

## 2023-02-07 RX ADMIN — DEXAMETHASONE 4 MG: 4 TABLET ORAL at 11:52

## 2023-02-07 RX ADMIN — ACETAMINOPHEN 650 MG: 325 TABLET, FILM COATED ORAL at 11:52

## 2023-02-07 RX ADMIN — BUPRENORPHINE AND NALOXONE 4 MG: 2; .5 FILM BUCCAL; SUBLINGUAL at 08:07

## 2023-02-07 RX ADMIN — BUPRENORPHINE AND NALOXONE 4 MG: 2; .5 FILM BUCCAL; SUBLINGUAL at 11:52

## 2023-02-07 RX ADMIN — MORPHINE SULFATE 45 MG: 15 TABLET ORAL at 23:04

## 2023-02-07 RX ADMIN — FAMOTIDINE 20 MG: 20 TABLET ORAL at 17:01

## 2023-02-07 NOTE — ASSESSMENT & PLAN NOTE
· Likely in the setting of steroid therapy  · WBC trending down  · Procalcitonin normal  · Start tapering steroids tomorrow 2/8

## 2023-02-07 NOTE — ASSESSMENT & PLAN NOTE
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  Last use - IV,1 bag of fentanyl, at 12pm on 1/18/23  Patient started on Suboxone upon admission, maintenance dose 16 mg daily    Plan:  · Patient has scheduled follow-up with Dr Donald Haley in [place] at 2pm on 2/22/23   Pt discharge planned for Friday 2/17/23 - he will need to obtain a prescription for Suboxone from his doctor as we will be unable to provide a prescription for Suboxone  · Suboxone 4mg QID   · Seroquel 100 mg at night for sleep  · Zofran as needed for nausea

## 2023-02-07 NOTE — ASSESSMENT & PLAN NOTE
H/o right sided back pain with sciatica  Intermittent severe right hip pain, radiates into right lateral hip & buttocks  Patient unable to ambulate without cane  1/16/23 CT lumbar - Mild multilevel disc degeneration  Grade 1 retrolisthesis of L5 on S1  Disc bulge with small herniation at L5-S1  Disc bulging from L2-3 through L4-5      1/23/23 MRI w/o contrast -  Degenerative changes at L5-S1     1/28/23 Discussed case with neurosurgery; no transfer or surgical intervention at this time  Manage pain acutely  May pursue nerve block as outpatient  Add oral Decadron 4 mg every 6 to pain regimen  Note; gabapentin will take 2 to 3 weeks to reach full effect  2/3/23 - Placed ambulatory referral neurosurgery, Dr Selwyn Baeza requesting x-ray of lumbar spine with 6 views extension flexion completed 2 days prior to discharge (2/15)      Plan:  Scheduled Tylenol 650mg Q6H   PRN Toradol 10mg Q6H - transition to Ibuprofen 400mg Q6   Morphine 60mg PO every 6H as needed  Morphine 8mg IV prn for breakthrough pain  Gabapentin 300mg TID  Trizinidine 4mg TID  Decadron oral 4 mg every 6H - taper to every 12H for 3 days, then once a day for 3 days, then 2mg for 3 days  Continue PT/OT

## 2023-02-07 NOTE — PROGRESS NOTES
[address]  Progress Note - Carloz Clonts 1985, 41 y o  male MRN: [id number]  Unit/Bed#: 841-16 Encounter: [id number]  Primary Care Provider: Antonette Cardoso DO   Date and time admitted to hospital: 1/18/2023  5:14 PM    Acute right-sided low back pain with right-sided sciatica  Assessment & Plan  H/o right sided back pain with sciatica  Intermittent severe right hip pain, radiates into right lateral hip & buttocks  Patient unable to ambulate without cane  1/16/23 CT lumbar - Mild multilevel disc degeneration  Grade 1 retrolisthesis of L5 on S1  Disc bulge with small herniation at L5-S1  Disc bulging from L2-3 through L4-5      1/23/23 MRI w/o contrast -  Degenerative changes at L5-S1     1/28/23 Discussed case with neurosurgery; no transfer or surgical intervention at this time  Manage pain acutely  May pursue nerve block as outpatient  Add oral Decadron 4 mg every 6 to pain regimen  Note; gabapentin will take 2 to 3 weeks to reach full effect  2/3/23 - Placed ambulatory referral neurosurgery, Dr Yani Myles requesting x-ray of lumbar spine with 6 views extension flexion completed 2 days prior to discharge (2/15)  Plan:  Scheduled Tylenol 650mg Q6H   PRN Toradol 10mg Q6H - transition to Ibuprofen 400mg Q6   Morphine 60mg PO every 6H as needed  Morphine 8mg IV prn for breakthrough pain  Gabapentin 300mg TID  Trizinidine 4mg TID  Decadron oral 4 mg every 6H - taper to every 12H for 3 days, then once a day for 3 days, then 2mg for 3 days  Continue PT/OT      Ambulatory dysfunction  Assessment & Plan  Utilizing walker, patient hopes to be discharged home with utilization of crutches    Patient previously refused PT/OT multiple times, now with improved compliance  Patient reports walking down the hallway past 5 doors  2/7 -patient reports worsened right hip pain today from 8/10 to 10/10  Attributes this to overdoing it with PT yesterday    We will hold off on PT today     Plan:  Outpatient PT/OT    Acute pain of right knee  Assessment & Plan  Right knee pain since fall 2 weeks ago  Unremarkable exam and no acute findings on x-ray of the knee  Plan:  Continue pain regimen  Continue PT OT    Intravenous drug abuse, continuous ([facility] [address] )  Assessment & Plan  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  Last use - IV,1 bag of fentanyl, at 12pm on 1/18/23  Patient started on Suboxone upon admission, maintenance dose 16 mg daily    Plan:  · Patient has scheduled follow-up with Dr Yahaira Pro in [place] at 2pm on 2/22/23  Pt discharge planned for Friday 2/17/23 - he will need to obtain a prescription for Suboxone from his doctor as we will be unable to provide a prescription for Suboxone  · Suboxone 4mg QID   · Seroquel 100 mg at night for sleep  · Zofran as needed for nausea    Leukocytosis  Assessment & Plan  · Likely in the setting of steroid therapy  · WBC trending down  · Procalcitonin normal  · Start tapering steroids tomorrow 2/8    Hypomagnesemia  Assessment & Plan  Magnesium 1 7, resolved, now 2 0  Plan:  Replenish as needed  Trend magnesium periodically    Tobacco abuse  Assessment & Plan  History of tobacco smoking  Plan:  Nicotine patch  Smoking cessation    Hepatitis C virus  Assessment & Plan  Most recent labs 3/7/2022  Positive Hep A  Positive Hep B surface antibody  Positive Hep C antibody  Not taking hepatitis meds    ID recs: Hep C RNA negative    Plan:  Follow-up with ID as outpatient            VTE Pharmacologic Prophylaxis: VTE Score: 2 Low Risk (Score 0-2) - Encourage Ambulation  Patient Centered Rounds: I performed bedside rounds with nursing staff today  Discussions with Specialists or Other Care Team Provider: none    Education and Discussions with Family / Patient: Patient declined call to   Time Spent for Care: 60 minutes  More than 50% of total time spent on counseling and coordination of care as described above      Current Length of Stay: 20 day(s)  Current Patient Status: Inpatient   Certification Statement: The patient will continue to require additional inpatient hospital stay due to IV antibiotics  Discharge Plan: 2023    Code Status: Level 1 - Full Code    Subjective:   Patient seen at bedside this morning, laying in bed comfortably  Patient states that his right hip pain is worse today, from 8/10 to a 10/10  Patient believes he worked too hard during PT yesterday and this is causing the pain  Patient wishes to hold off from PT today  No new complaints  Objective:     Vitals:   Temp (24hrs), Av 7 °F (36 5 °C), Min:97 2 °F (36 2 °C), Max:98 2 °F (36 8 °C)    Temp:  [97 2 °F (36 2 °C)-98 2 °F (36 8 °C)] 97 2 °F (36 2 °C)  HR:  [79] 79  Resp:  [16-20] 18  BP: (134-154)/() 134/90  Body mass index is 27 67 kg/m²  Input and Output Summary (last 24 hours): Intake/Output Summary (Last 24 hours) at 2023 1040  Last data filed at 2023 3015  Gross per 24 hour   Intake 540 ml   Output --   Net 540 ml       Physical Exam:   Physical Exam  Vitals and nursing note reviewed  Constitutional:       General: He is not in acute distress  Appearance: He is well-developed  HENT:      Head: Normocephalic and atraumatic  Right Ear: External ear normal       Left Ear: External ear normal       Nose: No rhinorrhea  Mouth/Throat:      Mouth: Mucous membranes are moist    Eyes:      General:         Right eye: No discharge  Left eye: No discharge  Cardiovascular:      Rate and Rhythm: Normal rate and regular rhythm  Pulmonary:      Effort: Pulmonary effort is normal  No respiratory distress  Abdominal:      Palpations: Abdomen is soft  Tenderness: There is no abdominal tenderness  Musculoskeletal:         General: Tenderness present  No swelling  Cervical back: Normal range of motion and neck supple        Comments: Right knee:   decreased range of motion  No erythema, no swelling  Lumbar spine:  Decreased range of motion right lateral flexion     Skin:     General: Skin is warm and dry  Capillary Refill: Capillary refill takes less than 2 seconds  Findings: No erythema  Neurological:      General: No focal deficit present  Mental Status: He is alert  Psychiatric:         Mood and Affect: Mood normal          Behavior: Behavior normal          Additional Data:     Labs:  Results from last 7 days   Lab Units 02/07/23  0544 02/05/23  0607 02/03/23  0535 02/01/23  0442   WBC Thousand/uL 15 08*   < > 14 51* 14 54*   HEMOGLOBIN g/dL 10 3*   < > 10 8* 10 6*   HEMATOCRIT % 31 1*   < > 33 1* 32 6*   PLATELETS Thousands/uL 319   < > 480* 492*   BANDS PCT %  --   --  1  --    NEUTROS PCT %  --   --   --  81*   LYMPHS PCT %  --   --   --  11*   LYMPHO PCT % 9*   < > 10*  --    MONOS PCT %  --   --   --  6   MONO PCT % 11   < > 7  --    EOS PCT % 0   < > 0 0    < > = values in this interval not displayed       Results from last 7 days   Lab Units 02/07/23  0544   SODIUM mmol/L 134*   POTASSIUM mmol/L 4 1   CHLORIDE mmol/L 98   CO2 mmol/L 28   BUN mg/dL 27*   CREATININE mg/dL 0 57*   ANION GAP mmol/L 8   CALCIUM mg/dL 7 7*   ALBUMIN g/dL 3 3*   TOTAL BILIRUBIN mg/dL 0 23   ALK PHOS U/L 35   ALT U/L 9   AST U/L 9*   GLUCOSE RANDOM mg/dL 130                 Results from last 7 days   Lab Units 02/07/23  0544   PROCALCITONIN ng/ml 0 09       Lines/Drains:  Invasive Devices     Peripherally Inserted Central Catheter Line  Duration           PICC Line 01/27/23 Right Other (Comment) 10 days                Central Line:  Goal for removal: 2/17/2023             Imaging: Reviewed radiology reports from this admission including: MRI spine    Recent Cultures (last 7 days):         Last 24 Hours Medication List:   Current Facility-Administered Medications   Medication Dose Route Frequency Provider Last Rate   • acetaminophen  650 mg Oral Q6H [address] Billy Santo MD     • aluminum-magnesium hydroxide-simethicone  30 mL Oral Q4H PRN Wilberto Alfaro MD     • buprenorphine-naloxone  4 mg Sublingual 4x Daily Tyra Dias MD     • cefazolin  2,000 mg Intravenous Q8H Ellen Alonso MD 2,000 mg (02/07/23 0548)   • dexamethasone  4 mg Oral Q6H [facility] Jenni Ayala MD     • docusate sodium  100 mg Oral BID Jenni Ayala MD     • enoxaparin  40 mg Subcutaneous Daily Bettina Gannon MD     • famotidine  20 mg Oral BID Pablito Dunn PA-C     • gabapentin  300 mg Oral TID Jenni Ayala MD     • ketorolac  10 mg Oral Q6H PRN Jenni Ayala MD     • morphine  60 mg Oral Q6H PRN Jenni Ayala MD     • morphine injection  8 mg Intravenous Q6H PRN Tyra Dias MD     • naloxone  0 1 mg Intravenous PRN Carly Oconnor MD     • nicotine  14 mg Transdermal Daily Bettina Gannon MD     • ondansetron  4 mg Oral Q6H PRN Tyra Dias MD     • QUEtiapine  100 mg Oral HS Jenni Ayala MD     • tiZANidine  4 mg Oral Q8H PRN Jenni Ayala MD          Today, Patient Was Seen By: Jenni Ayala MD    **Please Note: This note may have been constructed using a voice recognition system  **

## 2023-02-07 NOTE — ASSESSMENT & PLAN NOTE
Utilizing walker, patient hopes to be discharged home with utilization of crutches    Patient previously refused PT/OT multiple times, now with improved compliance  Patient reports walking down the hallway past 5 doors  2/7 -patient reports worsened right hip pain today from 8/10 to 10/10  Attributes this to overdoing it with PT yesterday  We will hold off on PT today      Plan:  Outpatient PT/OT

## 2023-02-07 NOTE — PROGRESS NOTES
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   TTE with possible small MV vegetation but CECILIO is without vegetation   Given underlying IVDU additionally, patient will be treated with a prolonged IV antibiotic course  Rena Casanova is agreeable  Continue high-dose IV cefazolin  Treat x4 weeks total IV antibiotic, through   Monitor temperature/WBC      2   Low back pain and right hip pain   This was present prior to bacteremia   Consider discitis/vertebral osteomyelitis given staff aureus bacteremia and elevated ESR   However, L-spine MRI without contrast did not show discitis or vertebral osteomyelitis   Patient could not tolerate MRI with contrast   Patient will be getting 4 weeks of IV antibiotic, as in above  ESR has normalized  IV antibiotic plan as in above  Monitor low back pain      3   History of HCV infection   HCV PCR negative on admission      4   Leukocytosis  Patient is on dexamethasone  This is likely steroid effect  Clinically, there is no active infection  Monitor WBC      5  Active IVDU  [place] is not a candidate for safe home IV antibiotic      Discussed with patient in detail regarding the above plan  Discussed with Dr Jaime Moe from pulm service      Antibiotics:  Cefazolin # 19  Day # 19 from clearance of bacteremia     Subjective:  Patient with unchanged back, knee and hip pain, worse with repeat  No leg weakness  Temperature stays down   No chills    He is tolerating antibiotic well   No nausea, vomiting or diarrhea      Objective:  Vitals:  Temp:  [97 2 °F (36 2 °C)-98 2 °F (36 8 °C)] 97 2 °F (36 2 °C)  HR:  [79] 79  Resp:  [16-20] 20  BP: (134-154)/() 138/89  Temp (24hrs), Av 7 °F (36 5 °C), Min:97 2 °F (36 2 °C), Max:98 2 °F (36 8 °C)  Current: Temperature: (!) 97 2 °F (36 2 °C)    Physical Exam:     General: Awake, alert, cooperative, no distress  Neck:  Supple  No mass  No lymphadenopathy  Lungs: Expansion symmetric, no rales, no wheezing, respirations unlabored  Heart:  Regular rate and rhythm, S1 and S2 normal, no murmur  Abdomen: Soft, nondistended, non-tender, bowel sounds active all four quadrants, no masses, no organomegaly  Extremities: Transiently edema  No erythema/warmth  No ulcer  Joints without effusion  Skin:  No rash  Neuro: Moves all extremities  Invasive Devices     Peripherally Inserted Central Catheter Line  Duration           PICC Line 01/27/23 Right Other (Comment) 10 days                Labs studies:   I have personally reviewed pertinent labs  Results from last 7 days   Lab Units 02/07/23  0544 02/05/23  0607 02/01/23  0442   POTASSIUM mmol/L 4 1 4 1 3 9   CHLORIDE mmol/L 98 100 103   CO2 mmol/L 28 28 26   BUN mg/dL 27* 27* 22   CREATININE mg/dL 0 57* 0 57* 0 62   EGFR ml/min/1 73sq m 131 131 126   CALCIUM mg/dL 7 7* 7 8* 8 4   AST U/L 9* 9* 18   ALT U/L 9 13 24   ALK PHOS U/L 35 41 55     Results from last 7 days   Lab Units 02/07/23  0544 02/05/23  0607 02/03/23  0535   WBC Thousand/uL 15 08* 20 26* 14 51*   HEMOGLOBIN g/dL 10 3* 11 0* 10 8*   PLATELETS Thousands/uL 319 455* 480*           Imaging Studies:   I have personally reviewed pertinent imaging study reports and images in PACS  EKG, Pathology, and Other Studies:   I have personally reviewed pertinent reports

## 2023-02-07 NOTE — PHYSICAL THERAPY NOTE
Physical Therapy Cancellation Note               02/07/23 1317   PT Last Visit   PT Visit Date 02/07/23   Note Type   Cancel Reasons Refusal  (declined PT  Encouraged participation and trial practice stairs   Pt states " I'm not doing stairs until I'm able to walk the whole floor")

## 2023-02-08 RX ORDER — DEXAMETHASONE 4 MG/1
4 TABLET ORAL EVERY 8 HOURS SCHEDULED
Status: DISCONTINUED | OUTPATIENT
Start: 2023-02-08 | End: 2023-02-10

## 2023-02-08 RX ORDER — DIPHENHYDRAMINE HCL 25 MG
25 TABLET ORAL EVERY 6 HOURS PRN
Status: DISCONTINUED | OUTPATIENT
Start: 2023-02-08 | End: 2023-02-17 | Stop reason: HOSPADM

## 2023-02-08 RX ADMIN — CEFAZOLIN SODIUM 2000 MG: 2 SOLUTION INTRAVENOUS at 14:26

## 2023-02-08 RX ADMIN — MORPHINE SULFATE 60 MG: 15 TABLET ORAL at 05:51

## 2023-02-08 RX ADMIN — MORPHINE SULFATE 60 MG: 15 TABLET ORAL at 21:06

## 2023-02-08 RX ADMIN — DEXAMETHASONE 4 MG: 4 TABLET ORAL at 05:39

## 2023-02-08 RX ADMIN — MORPHINE SULFATE 60 MG: 15 TABLET ORAL at 14:26

## 2023-02-08 RX ADMIN — GABAPENTIN 300 MG: 300 CAPSULE ORAL at 17:19

## 2023-02-08 RX ADMIN — ACETAMINOPHEN 650 MG: 325 TABLET, FILM COATED ORAL at 05:39

## 2023-02-08 RX ADMIN — KETOROLAC TROMETHAMINE 10 MG: 10 TABLET, FILM COATED ORAL at 05:39

## 2023-02-08 RX ADMIN — GABAPENTIN 300 MG: 300 CAPSULE ORAL at 21:08

## 2023-02-08 RX ADMIN — KETOROLAC TROMETHAMINE 10 MG: 10 TABLET, FILM COATED ORAL at 00:13

## 2023-02-08 RX ADMIN — ALUMINUM HYDROXIDE, MAGNESIUM HYDROXIDE, AND DIMETHICONE 30 ML: 200; 20; 200 SUSPENSION ORAL at 17:04

## 2023-02-08 RX ADMIN — KETOROLAC TROMETHAMINE 10 MG: 10 TABLET, FILM COATED ORAL at 12:46

## 2023-02-08 RX ADMIN — DEXAMETHASONE 4 MG: 4 TABLET ORAL at 14:26

## 2023-02-08 RX ADMIN — CEFAZOLIN SODIUM 2000 MG: 2 SOLUTION INTRAVENOUS at 21:08

## 2023-02-08 RX ADMIN — CEFAZOLIN SODIUM 2000 MG: 2 SOLUTION INTRAVENOUS at 05:39

## 2023-02-08 RX ADMIN — MORPHINE SULFATE 8 MG: 10 INJECTION INTRAVENOUS at 08:06

## 2023-02-08 RX ADMIN — DEXAMETHASONE 4 MG: 4 TABLET ORAL at 00:13

## 2023-02-08 RX ADMIN — MORPHINE SULFATE 60 MG: 15 TABLET ORAL at 10:10

## 2023-02-08 RX ADMIN — KETOROLAC TROMETHAMINE 10 MG: 10 TABLET, FILM COATED ORAL at 23:37

## 2023-02-08 RX ADMIN — BUPRENORPHINE AND NALOXONE 4 MG: 2; .5 FILM BUCCAL; SUBLINGUAL at 12:46

## 2023-02-08 RX ADMIN — FAMOTIDINE 20 MG: 20 TABLET ORAL at 09:12

## 2023-02-08 RX ADMIN — DEXAMETHASONE 4 MG: 4 TABLET ORAL at 21:07

## 2023-02-08 RX ADMIN — KETOROLAC TROMETHAMINE 10 MG: 10 TABLET, FILM COATED ORAL at 17:19

## 2023-02-08 RX ADMIN — ACETAMINOPHEN 650 MG: 325 TABLET, FILM COATED ORAL at 00:13

## 2023-02-08 RX ADMIN — BUPRENORPHINE AND NALOXONE 4 MG: 2; .5 FILM BUCCAL; SUBLINGUAL at 17:19

## 2023-02-08 RX ADMIN — ACETAMINOPHEN 650 MG: 325 TABLET, FILM COATED ORAL at 12:46

## 2023-02-08 RX ADMIN — BUPRENORPHINE AND NALOXONE 4 MG: 2; .5 FILM BUCCAL; SUBLINGUAL at 09:12

## 2023-02-08 RX ADMIN — ALUMINUM HYDROXIDE, MAGNESIUM HYDROXIDE, AND DIMETHICONE 30 ML: 200; 20; 200 SUSPENSION ORAL at 06:21

## 2023-02-08 RX ADMIN — QUETIAPINE FUMARATE 100 MG: 100 TABLET ORAL at 21:08

## 2023-02-08 RX ADMIN — ACETAMINOPHEN 650 MG: 325 TABLET, FILM COATED ORAL at 23:37

## 2023-02-08 RX ADMIN — GABAPENTIN 300 MG: 300 CAPSULE ORAL at 09:12

## 2023-02-08 RX ADMIN — BUPRENORPHINE AND NALOXONE 4 MG: 2; .5 FILM BUCCAL; SUBLINGUAL at 21:08

## 2023-02-08 RX ADMIN — FAMOTIDINE 20 MG: 20 TABLET ORAL at 17:19

## 2023-02-08 RX ADMIN — MORPHINE SULFATE 8 MG: 10 INJECTION INTRAVENOUS at 23:38

## 2023-02-08 RX ADMIN — MORPHINE SULFATE 8 MG: 10 INJECTION INTRAVENOUS at 16:22

## 2023-02-08 RX ADMIN — ACETAMINOPHEN 650 MG: 325 TABLET, FILM COATED ORAL at 17:19

## 2023-02-08 NOTE — ASSESSMENT & PLAN NOTE
· In setting of acute back and knee pain  · Utilizing walker currently, patient hopes to be discharged home with utilization of crutches  · Patient previously refused PT/OT multiple times, now with improved compliance      · For now recommendations are for Outpatient PT/OT

## 2023-02-08 NOTE — ASSESSMENT & PLAN NOTE
· Likely in the setting of steroid therapy  · Procalcitonin normal  · Start tapering steroids today and continue to trend WBC

## 2023-02-08 NOTE — ASSESSMENT & PLAN NOTE
· Blood cultures positive for MSSA on 1/16/23, repeat blood cultures on 1/20/23 negative  · History of IV drug abuse  · CECILIO negative for vegetations MRI negative for osteomyelitis  · ID recommendations: IV Ancef 2g Q8H ending 2/16/2023, PICC line placed 1/27, patient unable to return home with PICC line in place due to IV drug abuse  Case management unable to find placement  Patient will stay as [facility] inpatient until 2/16/2023

## 2023-02-08 NOTE — ASSESSMENT & PLAN NOTE
· Right knee pain since fall 2 weeks ago  · Unremarkable exam and no acute findings on x-ray of the knee  · Continue pain regimen  · Continue PT OT

## 2023-02-08 NOTE — ASSESSMENT & PLAN NOTE
· Most recent labs 3/7/2022  · Positive Hep A  · Positive Hep B surface antibody  · Positive Hep C antibody  · Hep C RNA negative  · Not taking hepatitis meds  · To follow with ID/GI outpatient

## 2023-02-08 NOTE — PROGRESS NOTES
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   TTE with possible small MV vegetation but CECILIO is without vegetation   Given underlying IVDU additionally, patient will be treated with a prolonged IV antibiotic course  Rena Casanova is agreeable  Continue high-dose IV cefazolin  Treat x4 weeks total IV antibiotic, through   Monitor temperature/WBC      2   Low back pain and right hip pain   This was present prior to bacteremia   Consider discitis/vertebral osteomyelitis given staff aureus bacteremia and elevated ESR   However, L-spine MRI without contrast did not show discitis or vertebral osteomyelitis   Patient could not tolerate MRI with contrast   Patient will be getting 4 weeks of IV antibiotic, as in above  ESR has normalized  IV antibiotic plan as in above  Monitor low back pain      3   History of HCV infection   HCV PCR negative on admission      4   Leukocytosis   Patient is on dexamethasone   This is likely steroid effect   Clinically, there is no active infection  WBC stable  Monitor WBC      5  Active IVDU  [place] is not a candidate for safe home IV antibiotic      Discussed with patient in detail regarding the above plan  Discussed with slim service      Antibiotics:  Cefazolin # 20  Day # 20 from clearance of bacteremia     Subjective:  Patient with stable back, knee and hip pain, worse with repeat  No leg weakness  Temperature stays down   No chills    He is tolerating antibiotic well   No nausea, vomiting or diarrhea      Objective:  Vitals:  Temp:  [97 7 °F (36 5 °C)] 97 7 °F (36 5 °C)  HR:  [75] 75  Resp:  [18-19] 19  BP: (136-148)/(92-97) 148/92  SpO2:  [92 %-98 %] 92 %  Temp (24hrs), Av 7 °F (36 5 °C), Min:97 7 °F (36 5 °C), Max:97 7 °F (36 5 °C)  Current: Temperature: 97 7 °F (36 5 °C)    Physical Exam:     General: Awake, alert, cooperative, no distress  Neck:  Supple  No mass  No lymphadenopathy  Lungs: Expansion symmetric, no rales, no wheezing, respirations unlabored  Heart:  Regular rate and rhythm, S1 and S2 normal, no murmur  Abdomen: Soft, nondistended, non-tender, bowel sounds active all four quadrants, no masses, no organomegaly  Extremities: No edema  No erythema/warmth  No ulcer  Nontender to palpation  Skin:  No rash  Neuro: Moves all extremities  Invasive Devices     Peripherally Inserted Central Catheter Line  Duration           PICC Line 01/27/23 Right Other (Comment) 11 days                Labs studies:   I have personally reviewed pertinent labs  Results from last 7 days   Lab Units 02/07/23  0544 02/05/23  0607   POTASSIUM mmol/L 4 1 4 1   CHLORIDE mmol/L 98 100   CO2 mmol/L 28 28   BUN mg/dL 27* 27*   CREATININE mg/dL 0 57* 0 57*   EGFR ml/min/1 73sq m 131 131   CALCIUM mg/dL 7 7* 7 8*   AST U/L 9* 9*   ALT U/L 9 13   ALK PHOS U/L 35 41     Results from last 7 days   Lab Units 02/07/23  0544 02/05/23  0607 02/03/23  0535   WBC Thousand/uL 15 08* 20 26* 14 51*   HEMOGLOBIN g/dL 10 3* 11 0* 10 8*   PLATELETS Thousands/uL 319 455* 480*           Imaging Studies:   I have personally reviewed pertinent imaging study reports and images in PACS  EKG, Pathology, and Other Studies:   I have personally reviewed pertinent reports

## 2023-02-08 NOTE — PROGRESS NOTES
[address]  Progress Note - Sage Davis 1985, 41 y o  male MRN: [id number]  Unit/Bed#: 085-72 Encounter: [id number]  Primary Care Provider: Sherman Lyman DO   Date and time admitted to hospital: 1/18/2023  5:14 PM    * Bacteremia due to methicillin susceptible Staphylococcus aureus (MSSA)  Assessment & Plan  · Blood cultures positive for MSSA on 1/16/23, repeat blood cultures on 1/20/23 negative  · History of IV drug abuse  · CECILIO negative for vegetations MRI negative for osteomyelitis  · ID recommendations: IV Ancef 2g Q8H ending 2/16/2023, PICC line placed 1/27, patient unable to return home with PICC line in place due to IV drug abuse  Case management unable to find placement  Patient will stay as [facility] inpatient until 2/16/2023  Acute right-sided low back pain with right-sided sciatica  Assessment & Plan  · H/o right sided back pain with sciatica  · Intermittent severe right hip pain, radiates into right lateral hip & buttocks  Patient unable to ambulate without walker currently  · CT lumbar - Mild multilevel disc degeneration  Grade 1 retrolisthesis of L5 on S1  Disc bulge with small herniation at L5-S1  Disc bulging from L2-3 through L4-5  MRI w/o contrast -  Degenerative changes at L5-S1  · Discussed case with neurosurgery  · no transfer or surgical intervention at this time  · Add oral Decadron 4 mg every 6 to pain regimen - now weaning to q8hr x 2 days then q12hr for three days, daily for three days, then 2 mg for three days prior to discontinuation  · gabapentin will take 2 to 3 weeks to reach full effect  · Dr Yamilet Miranda requesting x-ray of lumbar spine with 6 views extension flexion completed 2 days prior to discharge (2/15)    · Pain regimen ordered; goal to wean off opiate medications, will not provide on discharge  · Tylenol 650mg Q6H   · Toradol 10mg Q6H   · Morphine 60mg PO every 6H as needed  · Morphine 8mg IV prn for breakthrough pain  · Gabapentin 300mg TID  · Tizinidine 4mg TID        Acute pain of right knee  Assessment & Plan  · Right knee pain since fall 2 weeks ago  · Unremarkable exam and no acute findings on x-ray of the knee  · Continue pain regimen  · Continue PT OT    Ambulatory dysfunction  Assessment & Plan  · In setting of acute back and knee pain  · Utilizing walker currently, patient hopes to be discharged home with utilization of crutches  · Patient previously refused PT/OT multiple times, now with improved compliance  · For now recommendations are for Outpatient PT/OT    Intravenous drug abuse, continuous ([facility] [address] )  Assessment & Plan  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  Last use - IV,1 bag of fentanyl, at 12pm on 1/18/23  Patient started on Suboxone upon admission, maintenance dose 16 mg daily    Plan:  · Patient has scheduled follow-up with Dr Antonia Croft in [place] at 2pm on 2/22/23  Pt discharge planned for Friday 2/17/23 - he will need to obtain a prescription for Suboxone from his doctor as we will be unable to provide a prescription for Suboxone  · Suboxone 4mg QID   · Seroquel 100 mg at night for sleep  · Zofran as needed for nausea    Leukocytosis  Assessment & Plan  · Likely in the setting of steroid therapy  · Procalcitonin normal  · Start tapering steroids today and continue to trend WBC    Tobacco abuse  Assessment & Plan  · Nicotine patch ordered  · Smoking cessation encouraged    Hepatitis C virus  Assessment & Plan  · Most recent labs 3/7/2022  · Positive Hep A  · Positive Hep B surface antibody  · Positive Hep C antibody  · Hep C RNA negative  · Not taking hepatitis meds  · To follow with ID/GI outpatient            VTE Pharmacologic Prophylaxis: VTE Score: 2 Moderate Risk (Score 3-4) - Pharmacological DVT Prophylaxis Ordered: enoxaparin (Lovenox)  Patient Centered Rounds: I performed bedside rounds with nursing staff today    Discussions with Specialists or Other Care Team Provider: case management    Education and Discussions with Family / Patient: Patient declined call to   Time Spent for Care: 36 minutes  More than 50% of total time spent on counseling and coordination of care as described above  Current Length of Stay: 21 day(s)  Current Patient Status: Inpatient   Certification Statement: The patient will continue to require additional inpatient hospital stay due to IV abx for bacteremia, acute back pain  Discharge Plan: Anticipate discharge in >72 hrs to discharge location to be determined pending rehab evaluations  Code Status: Level 1 - Full Code    Subjective:   Patient still having back and knee pain  Objective:     Vitals:   Temp (24hrs), Av 7 °F (36 5 °C), Min:97 7 °F (36 5 °C), Max:97 7 °F (36 5 °C)    Temp:  [97 7 °F (36 5 °C)] 97 7 °F (36 5 °C)  HR:  [75] 75  Resp:  [18-20] 19  BP: (136-148)/(89-97) 148/92  SpO2:  [92 %-98 %] 92 %  Body mass index is 27 67 kg/m²  Input and Output Summary (last 24 hours): Intake/Output Summary (Last 24 hours) at 2023 1330  Last data filed at 2023 0806  Gross per 24 hour   Intake 540 ml   Output 300 ml   Net 240 ml       Physical Exam:   Physical Exam  Vitals and nursing note reviewed  Constitutional:       General: He is not in acute distress  Appearance: He is not ill-appearing  HENT:      Head: Normocephalic and atraumatic  Cardiovascular:      Rate and Rhythm: Normal rate and regular rhythm  Pulses: Normal pulses  Heart sounds: Normal heart sounds  Pulmonary:      Effort: Pulmonary effort is normal       Breath sounds: Normal breath sounds  Abdominal:      General: Bowel sounds are normal       Palpations: Abdomen is soft  Musculoskeletal:         General: Tenderness (lower back) present  Right lower leg: Edema present  Left lower leg: No edema  Skin:     General: Skin is warm and dry  Neurological:      Mental Status: He is alert  Mental status is at baseline     Psychiatric:         Mood and Affect: Mood normal          Behavior: Behavior normal           Additional Data:     Labs:  Results from last 7 days   Lab Units 02/07/23  0544 02/05/23  0607 02/03/23  0535   WBC Thousand/uL 15 08*   < > 14 51*   HEMOGLOBIN g/dL 10 3*   < > 10 8*   HEMATOCRIT % 31 1*   < > 33 1*   PLATELETS Thousands/uL 319   < > 480*   BANDS PCT %  --   --  1   LYMPHO PCT % 9*   < > 10*   MONO PCT % 11   < > 7   EOS PCT % 0   < > 0    < > = values in this interval not displayed  Results from last 7 days   Lab Units 02/07/23  0544   SODIUM mmol/L 134*   POTASSIUM mmol/L 4 1   CHLORIDE mmol/L 98   CO2 mmol/L 28   BUN mg/dL 27*   CREATININE mg/dL 0 57*   ANION GAP mmol/L 8   CALCIUM mg/dL 7 7*   ALBUMIN g/dL 3 3*   TOTAL BILIRUBIN mg/dL 0 23   ALK PHOS U/L 35   ALT U/L 9   AST U/L 9*   GLUCOSE RANDOM mg/dL 130                 Results from last 7 days   Lab Units 02/07/23  0544   PROCALCITONIN ng/ml 0 09       Lines/Drains:  Invasive Devices     Peripherally Inserted Central Catheter Line  Duration           PICC Line 01/27/23 Right Other (Comment) 11 days                Central Line:  Goal for removal: Will discontinue when meds requiring line are completed  Imaging: No pertinent imaging reviewed      Recent Cultures (last 7 days):         Last 24 Hours Medication List:   Current Facility-Administered Medications   Medication Dose Route Frequency Provider Last Rate   • acetaminophen  650 mg Oral Q6H [address] Camelia David MD     • aluminum-magnesium hydroxide-simethicone  30 mL Oral Q4H PRN Linda Gomez MD     • buprenorphine-naloxone  4 mg Sublingual 4x Daily Tyra Montes MD     • cefazolin  2,000 mg Intravenous Q8H Fawad Lazo MD 2,000 mg (02/08/23 0539)   • dexamethasone  4 mg Oral Q8H [address] Krishna Lan PA-C     • diphenhydrAMINE  25 mg Oral Q6H PRN Krishna Lan PA-C     • docusate sodium  100 mg Oral BID Camelia David MD     • enoxaparin  40 mg Subcutaneous Daily Kathlean Boeck, MD     • famotidine  20 mg Oral BID Aaron Loza PA-C     • gabapentin  300 mg Oral TID Maureen Mcardle, MD     • ketorolac  10 mg Oral Q6H Alexus London MD     • morphine  45 mg Oral Q4H PRN Alexus London MD      Or   • morphine  60 mg Oral Q4H PRN Alexus London MD     • morphine injection  8 mg Intravenous Q6H PRN Tyra Encarnacion MD     • naloxone  0 1 mg Intravenous PRN Tyra Encarnacion MD     • nicotine  14 mg Transdermal Daily Demario Taylor MD     • ondansetron  4 mg Oral Q6H PRN Tyra Encarnacion MD     • QUEtiapine  100 mg Oral HS Maureen Mcardle, MD     • tiZANidine  4 mg Oral Q8H PRN Maureen Mcardle, MD          Today, Patient Was Seen By: Deniz Andrade PA-C    **Please Note: This note may have been constructed using a voice recognition system  **

## 2023-02-08 NOTE — PLAN OF CARE
Problem: PHYSICAL THERAPY ADULT  Goal: Performs mobility at highest level of function for planned discharge setting  See evaluation for individualized goals  Description:  Outcome: Progressing  Note: Prognosis: Fair  Problem List:  (Decreased strength; Decreased endurance; Impaired balance; Decreased mobility; Pain)  Assessment: Pt  seen for PT treatment session this date with interventions consisting of   bed mobility, transfers and  gait training w/ emphasis on improving pt's ability to ambulate  Pt  Requiring occasional cues for sequence and safety  In comparison to previous session, Pt  With improvements in activity tolerance  Improved  gait pattern, pt  Appears to be tolerating weight LLE and not dragging  Pt is in need of continued activity in PT to improve strength balance endurance mobility transfers and ambulation with return to maximize LOF  From PT/mobility standpoint, recommendation at time of d/c would be OPPT  in order to promote return to PLOF and independence  The patient's AM-PAC Basic Mobility Inpatient Short Form Raw Score is 20  A Raw score of greater than 16 suggests the patient may benefit from discharge to home  Please also refer to physical therapy recommendation for safe DC planning  PT Discharge Recommendation: Home with outpatient rehabilitation    See flowsheet documentation for full assessment

## 2023-02-08 NOTE — ASSESSMENT & PLAN NOTE
· H/o right sided back pain with sciatica  · Intermittent severe right hip pain, radiates into right lateral hip & buttocks  Patient unable to ambulate without walker currently  · CT lumbar - Mild multilevel disc degeneration  Grade 1 retrolisthesis of L5 on S1  Disc bulge with small herniation at L5-S1  Disc bulging from L2-3 through L4-5  MRI w/o contrast -  Degenerative changes at L5-S1  · Discussed case with neurosurgery  · no transfer or surgical intervention at this time  · Add oral Decadron 4 mg every 6 to pain regimen - now weaning to q8hr x 2 days then q12hr for three days, daily for three days, then 2 mg for three days prior to discontinuation  · gabapentin will take 2 to 3 weeks to reach full effect  · Dr Arteaga Riding requesting x-ray of lumbar spine with 6 views extension flexion completed 2 days prior to discharge (2/15)    · Pain regimen ordered; goal to wean off opiate medications, will not provide on discharge  · Tylenol 650mg Q6H   · Toradol 10mg Q6H   · Morphine 60mg PO every 6H as needed  · Morphine 8mg IV prn for breakthrough pain  · Gabapentin 300mg TID  · Tizinidine 4mg TID

## 2023-02-08 NOTE — PHYSICAL THERAPY NOTE
PHYSICAL THERAPY NOTE          Patient Name: Ellyn Buckley  AHPTZ'D Date: 2/8/2023 02/08/23 1315   PT Last Visit   PT Visit Date 02/08/23   Note Type   Note Type Treatment   Pain Assessment   Pain Assessment Tool 0-10   Pain Score 9   Pain Location/Orientation Location: Back   Restrictions/Precautions   Weight Bearing Precautions Per Order No   Other Precautions Pain; Fall Risk   General   Response to Previous Treatment Patient with no complaints from previous session  Family/Caregiver Present No   Cognition   Overall Cognitive Status WFL   Arousal/Participation Alert   Following Commands Follows all commands and directions without difficulty   Subjective   Subjective Agreeable to therapy  States, I can't stand the pain, I'm gonna have the nerves in my back burned"   Bed Mobility   Supine to Sit 6  Modified independent   Sit to Supine 6  Modified independent   Transfers   Sit to Stand 5  Supervision   Stand to Sit 5  Supervision   Stand pivot 5  Supervision   Additional Comments RW used   Ambulation/Elevation   Gait pattern   (Decreased foot clearance; Decreased R stance; Antalgic; Excessively slow)   Gait Assistance 5  Supervision   Distance 150'   Balance   Static Sitting Good   Dynamic Sitting Fair +   Static Standing Fair   Dynamic Standing Fair   Ambulatory Fair -  (RW)   Endurance Deficit   Endurance Deficit Yes   Activity Tolerance   Activity Tolerance Patient limited by fatigue;Patient limited by pain   Assessment   Prognosis Fair   Problem List   (Decreased strength; Decreased endurance; Impaired balance; Decreased mobility; Pain)   Assessment Pt  seen for PT treatment session this date with interventions consisting of   bed mobility, transfers and  gait training w/ emphasis on improving pt's ability to ambulate  Pt  Requiring occasional cues for sequence and safety  In comparison to previous session, Pt   With improvements in activity tolerance  Improved  gait pattern, pt  Appears to be tolerating weight LLE and not dragging  Pt is in need of continued activity in PT to improve strength balance endurance mobility transfers and ambulation with return to maximize LOF  From PT/mobility standpoint, recommendation at time of d/c would be OPPT  in order to promote return to PLOF and independence  The patient's AM-PAC Basic Mobility Inpatient Short Form Raw Score is 20  A Raw score of greater than 16 suggests the patient may benefit from discharge to home  Please also refer to physical therapy recommendation for safe DC planning  Goals   LTG Expiration Date 02/17/23   Plan   Treatment/Interventions   (Functional transfer training; Therapeutic exercise; Endurance training; Patient/family training; Bed mobility; Gait training)   Progress Slow progress, decreased activity tolerance   PT Frequency 3-5x/wk   Recommendation   PT Discharge Recommendation Home with outpatient rehabilitation   AM-PAC Basic Mobility Inpatient   Turning in Flat Bed Without Bedrails 3   Lying on Back to Sitting on Edge of Flat Bed Without Bedrails 3   Moving Bed to Chair 4   Standing Up From Chair Using Arms 4   Walk in Room 4   Climb 3-5 Stairs With Railing 2   Basic Mobility Inpatient Raw Score 20   Basic Mobility Standardized Score 43 99   Highest Level Of Mobility   JH-HLM Goal 6: Walk 10 steps or more   JH-HLM Achieved 7: Walk 25 feet or more   Education   Education Provided Mobility training   Patient Demonstrates verbal understanding   End of Consult   Patient Position at End of Consult Supine; All needs within reach   End of Consult Comments discussed POC with PT

## 2023-02-08 NOTE — ASSESSMENT & PLAN NOTE
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  Last use - IV,1 bag of fentanyl, at 12pm on 1/18/23  Patient started on Suboxone upon admission, maintenance dose 16 mg daily    Plan:  · Patient has scheduled follow-up with Dr Donald Haley in [place] at 2pm on 2/22/23   Pt discharge planned for Friday 2/17/23 - he will need to obtain a prescription for Suboxone from his doctor as we will be unable to provide a prescription for Suboxone  · Suboxone 4mg QID   · Seroquel 100 mg at night for sleep  · Zofran as needed for nausea

## 2023-02-09 PROBLEM — R19.7 DIARRHEA: Status: ACTIVE | Noted: 2023-02-09

## 2023-02-09 LAB
ALBUMIN SERPL BCP-MCNC: 3.4 G/DL (ref 3.5–5)
ALP SERPL-CCNC: 40 U/L (ref 34–104)
ALT SERPL W P-5'-P-CCNC: 9 U/L (ref 7–52)
ANION GAP SERPL CALCULATED.3IONS-SCNC: 6 MMOL/L (ref 4–13)
AST SERPL W P-5'-P-CCNC: 9 U/L (ref 13–39)
BILIRUB SERPL-MCNC: 0.49 MG/DL (ref 0.2–1)
BUN SERPL-MCNC: 32 MG/DL (ref 5–25)
CALCIUM ALBUM COR SERPL-MCNC: 8.4 MG/DL (ref 8.3–10.1)
CALCIUM SERPL-MCNC: 7.9 MG/DL (ref 8.4–10.2)
CHLORIDE SERPL-SCNC: 101 MMOL/L (ref 96–108)
CO2 SERPL-SCNC: 30 MMOL/L (ref 21–32)
CREAT SERPL-MCNC: 0.59 MG/DL (ref 0.6–1.3)
CRP SERPL QL: 10 MG/L
ERYTHROCYTE [DISTWIDTH] IN BLOOD BY AUTOMATED COUNT: 15.6 % (ref 11.6–15.1)
ERYTHROCYTE [SEDIMENTATION RATE] IN BLOOD: 7 MM/HOUR (ref 0–14)
FLUAV RNA RESP QL NAA+PROBE: NEGATIVE
FLUBV RNA RESP QL NAA+PROBE: NEGATIVE
GFR SERPL CREATININE-BSD FRML MDRD: 129 ML/MIN/1.73SQ M
GLUCOSE SERPL-MCNC: 113 MG/DL (ref 65–140)
HCT VFR BLD AUTO: 36.2 % (ref 36.5–49.3)
HGB BLD-MCNC: 11.9 G/DL (ref 12–17)
MAGNESIUM SERPL-MCNC: 2.2 MG/DL (ref 1.9–2.7)
MCH RBC QN AUTO: 29.6 PG (ref 26.8–34.3)
MCHC RBC AUTO-ENTMCNC: 32.9 G/DL (ref 31.4–37.4)
MCV RBC AUTO: 90 FL (ref 82–98)
PLATELET # BLD AUTO: 221 THOUSANDS/UL (ref 149–390)
PMV BLD AUTO: 9.3 FL (ref 8.9–12.7)
POTASSIUM SERPL-SCNC: 4.8 MMOL/L (ref 3.5–5.3)
PROT SERPL-MCNC: 5.8 G/DL (ref 6.4–8.4)
RBC # BLD AUTO: 4.02 MILLION/UL (ref 3.88–5.62)
RSV RNA RESP QL NAA+PROBE: NEGATIVE
SARS-COV-2 RNA RESP QL NAA+PROBE: NEGATIVE
SODIUM SERPL-SCNC: 137 MMOL/L (ref 135–147)
WBC # BLD AUTO: 20.23 THOUSAND/UL (ref 4.31–10.16)

## 2023-02-09 RX ORDER — TIZANIDINE 4 MG/1
4 TABLET ORAL 3 TIMES DAILY
Status: DISCONTINUED | OUTPATIENT
Start: 2023-02-09 | End: 2023-02-10

## 2023-02-09 RX ORDER — LOPERAMIDE HYDROCHLORIDE 2 MG/1
4 CAPSULE ORAL ONCE
Status: COMPLETED | OUTPATIENT
Start: 2023-02-09 | End: 2023-02-09

## 2023-02-09 RX ORDER — DICYCLOMINE HYDROCHLORIDE 10 MG/1
10 CAPSULE ORAL
Status: DISCONTINUED | OUTPATIENT
Start: 2023-02-09 | End: 2023-02-10

## 2023-02-09 RX ORDER — SACCHAROMYCES BOULARDII 250 MG
250 CAPSULE ORAL 2 TIMES DAILY
Status: DISCONTINUED | OUTPATIENT
Start: 2023-02-09 | End: 2023-02-12

## 2023-02-09 RX ADMIN — BUPRENORPHINE AND NALOXONE 4 MG: 2; .5 FILM BUCCAL; SUBLINGUAL at 08:25

## 2023-02-09 RX ADMIN — MORPHINE SULFATE 8 MG: 10 INJECTION INTRAVENOUS at 14:20

## 2023-02-09 RX ADMIN — LOPERAMIDE HYDROCHLORIDE 4 MG: 2 CAPSULE ORAL at 11:05

## 2023-02-09 RX ADMIN — MORPHINE SULFATE 60 MG: 15 TABLET ORAL at 18:06

## 2023-02-09 RX ADMIN — DICYCLOMINE HYDROCHLORIDE 10 MG: 10 CAPSULE ORAL at 14:20

## 2023-02-09 RX ADMIN — ACETAMINOPHEN 650 MG: 325 TABLET, FILM COATED ORAL at 05:45

## 2023-02-09 RX ADMIN — DEXAMETHASONE 4 MG: 4 TABLET ORAL at 05:45

## 2023-02-09 RX ADMIN — ALUMINUM HYDROXIDE, MAGNESIUM HYDROXIDE, AND DIMETHICONE 30 ML: 200; 20; 200 SUSPENSION ORAL at 09:12

## 2023-02-09 RX ADMIN — MORPHINE SULFATE 60 MG: 15 TABLET ORAL at 22:53

## 2023-02-09 RX ADMIN — CEFAZOLIN SODIUM 2000 MG: 2 SOLUTION INTRAVENOUS at 21:01

## 2023-02-09 RX ADMIN — QUETIAPINE FUMARATE 100 MG: 100 TABLET ORAL at 21:01

## 2023-02-09 RX ADMIN — CEFAZOLIN SODIUM 2000 MG: 2 SOLUTION INTRAVENOUS at 14:20

## 2023-02-09 RX ADMIN — TIZANIDINE 4 MG: 4 TABLET ORAL at 21:01

## 2023-02-09 RX ADMIN — ACETAMINOPHEN 650 MG: 325 TABLET, FILM COATED ORAL at 11:05

## 2023-02-09 RX ADMIN — KETOROLAC TROMETHAMINE 10 MG: 10 TABLET, FILM COATED ORAL at 11:05

## 2023-02-09 RX ADMIN — MORPHINE SULFATE 8 MG: 10 INJECTION INTRAVENOUS at 07:59

## 2023-02-09 RX ADMIN — DEXAMETHASONE 4 MG: 4 TABLET ORAL at 21:01

## 2023-02-09 RX ADMIN — Medication 250 MG: at 17:16

## 2023-02-09 RX ADMIN — GABAPENTIN 300 MG: 300 CAPSULE ORAL at 08:25

## 2023-02-09 RX ADMIN — ACETAMINOPHEN 650 MG: 325 TABLET, FILM COATED ORAL at 17:12

## 2023-02-09 RX ADMIN — KETOROLAC TROMETHAMINE 10 MG: 10 TABLET, FILM COATED ORAL at 05:45

## 2023-02-09 RX ADMIN — BUPRENORPHINE AND NALOXONE 4 MG: 2; .5 FILM BUCCAL; SUBLINGUAL at 11:05

## 2023-02-09 RX ADMIN — MORPHINE SULFATE 60 MG: 15 TABLET ORAL at 05:44

## 2023-02-09 RX ADMIN — TIZANIDINE 4 MG: 4 TABLET ORAL at 14:20

## 2023-02-09 RX ADMIN — ACETAMINOPHEN 650 MG: 325 TABLET, FILM COATED ORAL at 23:34

## 2023-02-09 RX ADMIN — MORPHINE SULFATE 60 MG: 15 TABLET ORAL at 11:05

## 2023-02-09 RX ADMIN — DEXAMETHASONE 4 MG: 4 TABLET ORAL at 14:20

## 2023-02-09 RX ADMIN — FAMOTIDINE 20 MG: 20 TABLET ORAL at 08:25

## 2023-02-09 RX ADMIN — Medication 250 MG: at 11:05

## 2023-02-09 RX ADMIN — GABAPENTIN 300 MG: 300 CAPSULE ORAL at 17:12

## 2023-02-09 RX ADMIN — DICYCLOMINE HYDROCHLORIDE 10 MG: 10 CAPSULE ORAL at 17:12

## 2023-02-09 RX ADMIN — BUPRENORPHINE AND NALOXONE 4 MG: 2; .5 FILM BUCCAL; SUBLINGUAL at 17:13

## 2023-02-09 RX ADMIN — BUPRENORPHINE AND NALOXONE 4 MG: 2; .5 FILM BUCCAL; SUBLINGUAL at 21:01

## 2023-02-09 RX ADMIN — FAMOTIDINE 20 MG: 20 TABLET ORAL at 17:12

## 2023-02-09 RX ADMIN — MORPHINE SULFATE 8 MG: 10 INJECTION INTRAVENOUS at 20:53

## 2023-02-09 RX ADMIN — GABAPENTIN 300 MG: 300 CAPSULE ORAL at 21:01

## 2023-02-09 RX ADMIN — CEFAZOLIN SODIUM 2000 MG: 2 SOLUTION INTRAVENOUS at 05:46

## 2023-02-09 NOTE — ASSESSMENT & PLAN NOTE
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  Last use - IV,1 bag of fentanyl, at 12pm on 1/18/23  Patient started on Suboxone upon admission, maintenance dose 16 mg daily    Plan:  · Patient has scheduled follow-up with Dr Ebony Gonzales in [place] at 2pm on 2/22/23   Pt discharge planned for Friday 2/17/23 - he will need to obtain a prescription for Suboxone from his doctor as we will be unable to provide a prescription for Suboxone  · Suboxone 4mg QID   · Seroquel 100 mg at night for sleep  · Zofran as needed for nausea

## 2023-02-09 NOTE — PROGRESS NOTES
[address]  Progress Note - Bianca Trammell 1985, 41 y o  male MRN: [id number]  Unit/Bed#: 949-27 Encounter: [id number]  Primary Care Provider: Flavia Calvin,    Date and time admitted to hospital: 1/18/2023  5:14 PM    * Bacteremia due to methicillin susceptible Staphylococcus aureus (MSSA)  Assessment & Plan  · Blood cultures positive for MSSA on 1/16/23, repeat blood cultures on 1/20/23 negative  · History of IV drug abuse  · CECILIO negative for vegetations MRI negative for osteomyelitis  · ID recommendations: IV Ancef 2g Q8H ending 2/16/2023, PICC line placed 1/27, patient unable to return home with PICC line in place due to IV drug abuse  Case management unable to find placement  Patient will stay [facility] until 2/16/2023  · PICC MUST be removed prior to discharge        Acute right-sided low back pain with right-sided sciatica  Assessment & Plan  · H/o right sided back pain with sciatica  · Intermittent severe right hip pain, radiates into right lateral hip & buttocks  Patient unable to ambulate without walker currently  · CT lumbar - Mild multilevel disc degeneration  Grade 1 retrolisthesis of L5 on S1  Disc bulge with small herniation at L5-S1  Disc bulging from L2-3 through L4-5  MRI w/o contrast -  Degenerative changes at L5-S1  · Discussed case with neurosurgery  · no transfer or surgical intervention at this time  · Add oral Decadron 4 mg every 6 to pain regimen - now weaning to q8hr x 2 days then q12hr for three days, daily for three days, then 2 mg for three days prior to discontinuation  · gabapentin will take 2 to 3 weeks to reach full effect  · Dr Jeremias Virgen requesting x-ray of lumbar spine with 6 views extension flexion completed 2 days prior to discharge (2/15)    · Pain regimen ordered; goal to wean off opiate medications, will not provide on discharge  · Tylenol 650mg Q6H   · Toradol 10mg Q6H   · Morphine 60mg PO every 6H as needed  · Morphine 8mg IV prn for breakthrough pain  · Gabapentin 300mg TID  · Tizinidine 4mg TID    Acute pain of right knee  Assessment & Plan  · Right knee pain since fall 2 weeks ago  · Unremarkable exam and no acute findings on x-ray of the knee  · Continue pain regimen  · Continue PT OT    Ambulatory dysfunction  Assessment & Plan  · In setting of acute back and knee pain  · Utilizing walker currently, patient hopes to be discharged home with utilization of crutches  · Patient previously refused PT/OT multiple times, now with improved compliance  · For now recommendations are for Outpatient PT/OT    Intravenous drug abuse, continuous ([facility] [address] )  Assessment & Plan  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  Last use - IV,1 bag of fentanyl, at 12pm on 1/18/23  Patient started on Suboxone upon admission, maintenance dose 16 mg daily    Plan:  · Patient has scheduled follow-up with Dr Donald Haley in [place] at 2pm on 2/22/23   Pt discharge planned for Friday 2/17/23 - he will need to obtain a prescription for Suboxone from his doctor as we will be unable to provide a prescription for Suboxone  · Suboxone 4mg QID   · Seroquel 100 mg at night for sleep  · Zofran as needed for nausea    Diarrhea  Assessment & Plan  · New onset diarrhea 2/9  · Reports family has been having diarrhea and they have visited  · Will provide with supportive care for now  · Should diarrhea persist, may have to rule out c diff given his long term abx    Leukocytosis  Assessment & Plan  · Likely in the setting of steroid therapy  · Procalcitonin normal  · Start tapering steroids today and continue to trend WBC    Tobacco abuse  Assessment & Plan  · Nicotine patch ordered  · Smoking cessation encouraged    Hepatitis C virus  Assessment & Plan  · Most recent labs 3/7/2022  · Positive Hep A  · Positive Hep B surface antibody  · Positive Hep C antibody  · Hep C RNA negative  · Not taking hepatitis meds  · To follow with ID/GI outpatient            VTE Pharmacologic Prophylaxis: VTE Score: 2 Moderate Risk (Score 3-4) - Pharmacological DVT Prophylaxis Ordered: enoxaparin (Lovenox)  Patient Centered Rounds: I performed bedside rounds with nursing staff today  Discussions with Specialists or Other Care Team Provider: case management, ID    Education and Discussions with Family / Patient: Patient declined call to   Time Spent for Care: 35  More than 50% of total time spent on counseling and coordination of care as described above  Current Length of Stay: 22 day(s)  Current Patient Status: Inpatient   Certification Statement: The patient will continue to require additional inpatient hospital stay due to IV abx, acute back pain  Discharge Plan: Anticipate discharge in >72 hrs to home  Code Status: Level 1 - Full Code    Subjective:   Patient reports new pain between his shoulder blades  Experienced 5 episodes of diarrhea this AM  Still having back pain and knee pain    Objective:     Vitals:   Temp (24hrs), Av 7 °F (36 5 °C), Min:97 7 °F (36 5 °C), Max:97 7 °F (36 5 °C)    Temp:  [97 7 °F (36 5 °C)] 97 7 °F (36 5 °C)  HR:  [] 128  Resp:  [17-20] 20  BP: (126-138)/(81-91) 126/81  SpO2:  [93 %] 93 %  Body mass index is 27 67 kg/m²  Input and Output Summary (last 24 hours): Intake/Output Summary (Last 24 hours) at 2023 1250  Last data filed at 2023 0817  Gross per 24 hour   Intake 480 ml   Output 1200 ml   Net -720 ml       Physical Exam:   Physical Exam  Vitals and nursing note reviewed  Constitutional:       General: He is not in acute distress  Appearance: He is ill-appearing  HENT:      Head: Normocephalic and atraumatic  Cardiovascular:      Rate and Rhythm: Normal rate and regular rhythm  Pulses: Normal pulses  Heart sounds: Normal heart sounds  Pulmonary:      Effort: Pulmonary effort is normal       Breath sounds: Normal breath sounds  No wheezing, rhonchi or rales  Abdominal:      Tenderness:  There is abdominal tenderness  There is no guarding or rebound  Comments: Hyperactive bowel sounds   Musculoskeletal:      Right lower leg: No edema  Left lower leg: No edema  Skin:     General: Skin is warm and dry  Neurological:      General: No focal deficit present  Mental Status: He is alert  Mental status is at baseline  Psychiatric:         Mood and Affect: Mood normal          Behavior: Behavior normal           Additional Data:     Labs:  Results from last 7 days   Lab Units 02/09/23  0510 02/07/23  0544 02/05/23  0607 02/03/23  0535   WBC Thousand/uL 20 23* 15 08*   < > 14 51*   HEMOGLOBIN g/dL 11 9* 10 3*   < > 10 8*   HEMATOCRIT % 36 2* 31 1*   < > 33 1*   PLATELETS Thousands/uL 221 319   < > 480*   BANDS PCT %  --   --   --  1   LYMPHO PCT %  --  9*   < > 10*   MONO PCT %  --  11   < > 7   EOS PCT %  --  0   < > 0    < > = values in this interval not displayed  Results from last 7 days   Lab Units 02/09/23  0510   SODIUM mmol/L 137   POTASSIUM mmol/L 4 8   CHLORIDE mmol/L 101   CO2 mmol/L 30   BUN mg/dL 32*   CREATININE mg/dL 0 59*   ANION GAP mmol/L 6   CALCIUM mg/dL 7 9*   ALBUMIN g/dL 3 4*   TOTAL BILIRUBIN mg/dL 0 49   ALK PHOS U/L 40   ALT U/L 9   AST U/L 9*   GLUCOSE RANDOM mg/dL 113                 Results from last 7 days   Lab Units 02/07/23  0544   PROCALCITONIN ng/ml 0 09       Lines/Drains:  Invasive Devices     Peripherally Inserted Central Catheter Line  Duration           PICC Line 01/27/23 Right Other (Comment) 12 days                Central Line:  Goal for removal: Will discontinue when meds requiring line are completed  Imaging: No pertinent imaging reviewed      Recent Cultures (last 7 days):         Last 24 Hours Medication List:   Current Facility-Administered Medications   Medication Dose Route Frequency Provider Last Rate   • acetaminophen  650 mg Oral Q6H Melia Jacobs MD     • aluminum-magnesium hydroxide-simethicone  30 mL Oral Q4H PRN Tan Ocampo MD     • buprenorphine-naloxone  4 mg Sublingual 4x Daily Tyra Jefferson MD     • cefazolin  2,000 mg Intravenous Q8H Karlene Felix MD 2,000 mg (02/09/23 0546)   • dexamethasone  4 mg Oral Q8H [address] Brionna Alcantara PA-C     • diphenhydrAMINE  25 mg Oral Q6H PRN Brionna Alcantara PA-C     • docusate sodium  100 mg Oral BID Braulio Esqueda MD     • enoxaparin  40 mg Subcutaneous Daily Tony Gatica MD     • famotidine  20 mg Oral BID Khushbu Hancock PA-C     • gabapentin  300 mg Oral TID Braulio Esqueda MD     • morphine  45 mg Oral Q4H PRN Roseline Aldana MD      Or   • morphine  60 mg Oral Q4H PRN Roseline Aldana MD     • morphine injection  8 mg Intravenous Q6H PRN Tyra Jefferson MD     • naloxone  0 1 mg Intravenous PRN Tyra Jefferson MD     • nicotine  14 mg Transdermal Daily Tony Gatica MD     • ondansetron  4 mg Oral Q6H PRN Tyra Jefferson MD     • QUEtiapine  100 mg Oral HS Braulio Esqueda MD     • saccharomyces boulardii  250 mg Oral BID Brionna Alcantara PA-C     • tiZANidine  4 mg Oral TID Brionna Alcantara PA-C          Today, Patient Was Seen By: Brionna Alcantara PA-C    **Please Note: This note may have been constructed using a voice recognition system  **

## 2023-02-09 NOTE — PHYSICAL THERAPY NOTE
Physical Therapy Cancellation Note               02/09/23 0912   PT Last Visit   PT Visit Date 02/09/23   Note Type   Note Type Cancelled Session   Cancel Reasons Refusal  ("I'm doing it today, I have pain and I'm tired")

## 2023-02-09 NOTE — ASSESSMENT & PLAN NOTE
· H/o right sided back pain with sciatica  · Intermittent severe right hip pain, radiates into right lateral hip & buttocks  Patient unable to ambulate without walker currently  · CT lumbar - Mild multilevel disc degeneration  Grade 1 retrolisthesis of L5 on S1  Disc bulge with small herniation at L5-S1  Disc bulging from L2-3 through L4-5  MRI w/o contrast -  Degenerative changes at L5-S1  · Discussed case with neurosurgery  · no transfer or surgical intervention at this time  · Add oral Decadron 4 mg every 6 to pain regimen - now weaning to q8hr x 2 days then q12hr for three days, daily for three days, then 2 mg for three days prior to discontinuation  · gabapentin will take 2 to 3 weeks to reach full effect  · Dr Loida Lowery requesting x-ray of lumbar spine with 6 views extension flexion completed 2 days prior to discharge (2/15)    · Pain regimen ordered; goal to wean off opiate medications, will not provide on discharge  · Tylenol 650mg Q6H   · Toradol 10mg Q6H   · Morphine 60mg PO every 6H as needed  · Morphine 8mg IV prn for breakthrough pain  · Gabapentin 300mg TID  · Tizinidine 4mg TID

## 2023-02-09 NOTE — PROGRESS NOTES
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   TTE with possible small MV vegetation but CECILIO is without vegetation   Given underlying IVDU additionally, patient will be treated with a prolonged IV antibiotic course  [facility] is agreeable  Continue high-dose IV cefazolin  Treat x4 weeks total IV antibiotic, through 2/16  Monitor temperature/WBC      2   Low back pain and right hip pain   This was present prior to bacteremia   Consider discitis/vertebral osteomyelitis given staff aureus bacteremia and elevated ESR   However, L-spine MRI without contrast did not show discitis or vertebral osteomyelitis   Patient could not tolerate MRI with contrast   Patient will be getting 4 weeks of IV antibiotic, as in above   ESR has normalized  IV antibiotic plan as in above  Monitor low back pain      3   Diarrhea  Patient developed diarrhea over the last 24 hours  Patient states that his family was visiting and multiple family members has had diarrhea recently  Will monitor patient's diarrhea  If diarrhea persist for more than 2 to 3 days, will consider C  difficile colitis at that time  Monitor diarrhea  If diarrhea persist for more than 2 to 3 days, will check stool for C  difficile toxin  4  History of HCV infection   HCV PCR negative on admission      5   Leukocytosis   Patient is on dexamethasone   This is likely steroid effect   Clinically, there is no active infection  WBC stable  Monitor WBC      6  Active IVDU  [facility] is not a candidate for safe home IV antibiotic      Discussed with patient in detail regarding the above plan  Discussed with slim service      Antibiotics:  Cefazolin # 21  Day # 21 from clearance of bacteremia     Subjective:  Patient with  diarrhea over the last 24 hours  Otherwise, he is stable    Stable back, knee and hip pain, worse with repeat  No leg weakness  Temperature stays down   No chills  He is tolerating antibiotic well   No nausea, vomiting or diarrhea      Objective:  Vitals:  Temp:  [97 7 °F (36 5 °C)] 97 7 °F (36 5 °C)  HR:  [] 128  Resp:  [17-20] 20  BP: (126-138)/(81-91) 126/81  SpO2:  [93 %] 93 %  Temp (24hrs), Av 7 °F (36 5 °C), Min:97 7 °F (36 5 °C), Max:97 7 °F (36 5 °C)  Current: Temperature: 97 7 °F (36 5 °C)    Physical Exam:     General: Awake, alert, cooperative, no distress  Neck:  Supple  No mass  No lymphadenopathy  Lungs: Expansion symmetric, no rales, no wheezing, respirations unlabored  Heart:  Regular rate and rhythm, S1 and S2 normal, no murmur  Abdomen: Soft, nondistended, non-tender, bowel sounds active all four quadrants, no masses, no organomegaly  Extremities: No edema  No erythema/warmth  No ulcer  Nontender to palpation  Skin:  No rash  Neuro: Moves all extremities  Invasive Devices     Peripherally Inserted Central Catheter Line  Duration           PICC Line 23 Right Other (Comment) 12 days                Labs studies:   I have personally reviewed pertinent labs  Results from last 7 days   Lab Units 23  0510 23  0544 23  0607   POTASSIUM mmol/L 4 8 4 1 4 1   CHLORIDE mmol/L 101 98 100   CO2 mmol/L 30 28 28   BUN mg/dL 32* 27* 27*   CREATININE mg/dL 0 59* 0 57* 0 57*   EGFR ml/min/1 73sq m 129 131 131   CALCIUM mg/dL 7 9* 7 7* 7 8*   AST U/L 9* 9* 9*   ALT U/L 9 9 13   ALK PHOS U/L 40 35 41     Results from last 7 days   Lab Units 23  0510 23  0544 23  0607   WBC Thousand/uL 20 23* 15 08* 20 26*   HEMOGLOBIN g/dL 11 9* 10 3* 11 0*   PLATELETS Thousands/uL 221 319 455*           Imaging Studies:   I have personally reviewed pertinent imaging study reports and images in PACS  EKG, Pathology, and Other Studies:   I have personally reviewed pertinent reports

## 2023-02-09 NOTE — ASSESSMENT & PLAN NOTE
· Blood cultures positive for MSSA on 1/16/23, repeat blood cultures on 1/20/23 negative  · History of IV drug abuse  · CECILIO negative for vegetations MRI negative for osteomyelitis  · ID recommendations: IV Ancef 2g Q8H ending 2/16/2023, PICC line placed 1/27, patient unable to return home with PICC line in place due to IV drug abuse  Case management unable to find placement  Patient will stay as [facility] inpatient until 2/16/2023    · PICC MUST be removed prior to discharge

## 2023-02-10 RX ORDER — DEXAMETHASONE 4 MG/1
4 TABLET ORAL EVERY 8 HOURS SCHEDULED
Status: COMPLETED | OUTPATIENT
Start: 2023-02-10 | End: 2023-02-10

## 2023-02-10 RX ORDER — METHOCARBAMOL 500 MG/1
750 TABLET, FILM COATED ORAL EVERY 8 HOURS SCHEDULED
Status: DISCONTINUED | OUTPATIENT
Start: 2023-02-10 | End: 2023-02-13

## 2023-02-10 RX ORDER — DEXAMETHASONE 0.5 MG/1
2 TABLET ORAL EVERY 8 HOURS SCHEDULED
Status: DISCONTINUED | OUTPATIENT
Start: 2023-02-11 | End: 2023-02-10

## 2023-02-10 RX ORDER — GABAPENTIN 400 MG/1
400 CAPSULE ORAL 3 TIMES DAILY
Status: DISCONTINUED | OUTPATIENT
Start: 2023-02-10 | End: 2023-02-13

## 2023-02-10 RX ORDER — LIDOCAINE HYDROCHLORIDE 20 MG/ML
15 SOLUTION OROPHARYNGEAL 4 TIMES DAILY PRN
Status: DISCONTINUED | OUTPATIENT
Start: 2023-02-10 | End: 2023-02-10

## 2023-02-10 RX ORDER — GUAIFENESIN/DEXTROMETHORPHAN 100-10MG/5
10 SYRUP ORAL 2 TIMES DAILY
Status: DISCONTINUED | OUTPATIENT
Start: 2023-02-10 | End: 2023-02-12

## 2023-02-10 RX ADMIN — METHOCARBAMOL 750 MG: 500 TABLET ORAL at 22:29

## 2023-02-10 RX ADMIN — MORPHINE SULFATE 45 MG: 15 TABLET ORAL at 19:41

## 2023-02-10 RX ADMIN — BUPRENORPHINE AND NALOXONE 4 MG: 2; .5 FILM BUCCAL; SUBLINGUAL at 18:47

## 2023-02-10 RX ADMIN — DICYCLOMINE HYDROCHLORIDE 10 MG: 10 CAPSULE ORAL at 07:36

## 2023-02-10 RX ADMIN — FAMOTIDINE 20 MG: 20 TABLET ORAL at 08:31

## 2023-02-10 RX ADMIN — CEFAZOLIN SODIUM 2000 MG: 2 SOLUTION INTRAVENOUS at 22:29

## 2023-02-10 RX ADMIN — MORPHINE SULFATE 8 MG: 10 INJECTION INTRAVENOUS at 12:19

## 2023-02-10 RX ADMIN — DEXAMETHASONE 4 MG: 4 TABLET ORAL at 05:01

## 2023-02-10 RX ADMIN — DICYCLOMINE HYDROCHLORIDE 10 MG: 10 CAPSULE ORAL at 11:14

## 2023-02-10 RX ADMIN — MORPHINE SULFATE 8 MG: 10 INJECTION INTRAVENOUS at 04:30

## 2023-02-10 RX ADMIN — BUPRENORPHINE AND NALOXONE 4 MG: 2; .5 FILM BUCCAL; SUBLINGUAL at 22:29

## 2023-02-10 RX ADMIN — MORPHINE SULFATE 60 MG: 15 TABLET ORAL at 11:14

## 2023-02-10 RX ADMIN — MORPHINE SULFATE 60 MG: 15 TABLET ORAL at 15:39

## 2023-02-10 RX ADMIN — ACETAMINOPHEN 650 MG: 325 TABLET, FILM COATED ORAL at 11:14

## 2023-02-10 RX ADMIN — TIZANIDINE 4 MG: 4 TABLET ORAL at 08:31

## 2023-02-10 RX ADMIN — DEXAMETHASONE 4 MG: 4 TABLET ORAL at 22:29

## 2023-02-10 RX ADMIN — GABAPENTIN 400 MG: 400 CAPSULE ORAL at 18:47

## 2023-02-10 RX ADMIN — GABAPENTIN 400 MG: 400 CAPSULE ORAL at 22:30

## 2023-02-10 RX ADMIN — GUAIFENESIN AND DEXTROMETHORPHAN 10 ML: 100; 10 SYRUP ORAL at 12:19

## 2023-02-10 RX ADMIN — CEFAZOLIN SODIUM 2000 MG: 2 SOLUTION INTRAVENOUS at 05:01

## 2023-02-10 RX ADMIN — BUPRENORPHINE AND NALOXONE 4 MG: 2; .5 FILM BUCCAL; SUBLINGUAL at 08:32

## 2023-02-10 RX ADMIN — Medication 524 MG: at 09:39

## 2023-02-10 RX ADMIN — MORPHINE SULFATE 60 MG: 15 TABLET ORAL at 07:36

## 2023-02-10 RX ADMIN — ACETAMINOPHEN 650 MG: 325 TABLET, FILM COATED ORAL at 18:47

## 2023-02-10 RX ADMIN — ALUMINUM HYDROXIDE, MAGNESIUM HYDROXIDE, AND DIMETHICONE 30 ML: 200; 20; 200 SUSPENSION ORAL at 03:08

## 2023-02-10 RX ADMIN — MORPHINE SULFATE 60 MG: 15 TABLET ORAL at 03:00

## 2023-02-10 RX ADMIN — Medication 524 MG: at 12:19

## 2023-02-10 RX ADMIN — CEFAZOLIN SODIUM 2000 MG: 2 SOLUTION INTRAVENOUS at 14:40

## 2023-02-10 RX ADMIN — QUETIAPINE FUMARATE 100 MG: 100 TABLET ORAL at 22:29

## 2023-02-10 RX ADMIN — DEXAMETHASONE 4 MG: 4 TABLET ORAL at 14:40

## 2023-02-10 RX ADMIN — MORPHINE SULFATE 8 MG: 10 INJECTION INTRAVENOUS at 18:34

## 2023-02-10 RX ADMIN — ACETAMINOPHEN 650 MG: 325 TABLET, FILM COATED ORAL at 05:01

## 2023-02-10 RX ADMIN — METHOCARBAMOL 750 MG: 500 TABLET ORAL at 14:40

## 2023-02-10 RX ADMIN — MORPHINE SULFATE 60 MG: 15 TABLET ORAL at 23:37

## 2023-02-10 RX ADMIN — GABAPENTIN 300 MG: 300 CAPSULE ORAL at 08:31

## 2023-02-10 RX ADMIN — Medication 250 MG: at 08:31

## 2023-02-10 RX ADMIN — BUPRENORPHINE AND NALOXONE 4 MG: 2; .5 FILM BUCCAL; SUBLINGUAL at 11:14

## 2023-02-10 NOTE — PHYSICAL THERAPY NOTE
PHYSICAL THERAPY NOTE          Patient Name: Rose FAITH Date: 2/10/2023   02/10/23 1145   PT Last Visit   PT Visit Date 02/10/23   Note Type   Note Type Treatment   Restrictions/Precautions   Weight Bearing Precautions Per Order No   Other Precautions   (Fall Risk; Pain)   General   Response to Previous Treatment Patient with no complaints from previous session  Family/Caregiver Present No   Cognition   Overall Cognitive Status WFL   Arousal/Participation Alert   Following Commands Follows all commands and directions without difficulty   Subjective   Subjective Declines stair training  c/o pain upper thoracic spine from use of RW and  increased weightbearing UE's   Bed Mobility   Supine to Sit 6  Modified independent   Additional Comments Increased time to transition to EOB  Transfers   Sit to Stand 6  Modified independent   Stand to Sit 6  Modified independent   Additional Comments RW used   Ambulation/Elevation   Gait pattern   (Decreased foot clearance; Decreased R stance; Antalgic; Excessively slow)   Gait Assistance 5  Supervision   Assistive Device Rolling walker   Distance 125'   Balance   Static Sitting Fair +   Dynamic Sitting Fair   Static Standing Fair   Dynamic Standing Fair   Ambulatory Fair  (RW)   Endurance Deficit   Endurance Deficit Yes   Activity Tolerance   Activity Tolerance Patient limited by pain; Patient limited by fatigue   Assessment   Prognosis Fair   Problem List   (Decreased strength; Decreased endurance; Impaired balance; Decreased mobility; Pain)   Assessment Pt  seen for PT treatment session this date with interventions consisting of  bed mobility, transfers and  gait training w/ emphasis on improving pt's ability to ambulate  Pt  Requiring occasional cues for sequence and safety  Declined stairs, encouraged participation  In comparison to previous session, Pt   With no change in activity tolerance  Pt is in need of continued activity in PT to improve strength balance endurance mobility transfers and ambulation with return to maximize LOF  From PT/mobility standpoint, recommendation at time of d/c would be OPPT in order to promote return to PLOF and independence  The patient's AM-PAC Basic Mobility Inpatient Short Form Raw Score is 20  A Raw score of greater than 16 suggests the patient may benefit from discharge to home  Please also refer to physical therapy recommendation for safe DC planning  Goals   LTG Expiration Date 02/17/23   Plan   Treatment/Interventions   (Functional transfer training; Therapeutic exercise; Endurance training; Patient/family training; Bed mobility; Gait training)   Progress Slow progress, decreased activity tolerance   PT Frequency 3-5x/wk   Recommendation   PT Discharge Recommendation Home with outpatient rehabilitation   AM-PAC Basic Mobility Inpatient   Turning in Flat Bed Without Bedrails 3   Lying on Back to Sitting on Edge of Flat Bed Without Bedrails 3   Moving Bed to Chair 4   Standing Up From Chair Using Arms 4   Walk in Room 4   Climb 3-5 Stairs With Railing 2   Basic Mobility Inpatient Raw Score 20   Basic Mobility Standardized Score 43 99   Highest Level Of Mobility   JH-HLM Goal 6: Walk 10 steps or more   JH-HLM Achieved 7: Walk 25 feet or more   Education   Education Provided Mobility training   Patient Demonstrates verbal understanding   End of Consult   Patient Position at End of Consult Seated edge of bed; All needs within reach   End of Consult Comments discussed POC with PT

## 2023-02-10 NOTE — ASSESSMENT & PLAN NOTE
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  Last use - IV,1 bag of fentanyl, at 12pm on 1/18/23  Patient started on Suboxone upon admission, maintenance dose 16 mg daily    Plan:  · Patient has scheduled follow-up with Dr Yahaira Pro in [place] at 2pm on 2/22/23   Pt discharge planned for Friday 2/17/23 - he will need to obtain a prescription for Suboxone from his doctor as we will be unable to provide a prescription for Suboxone  · Suboxone 4mg QID   · Seroquel 100 mg at night for sleep  · Zofran as needed for nausea

## 2023-02-10 NOTE — ASSESSMENT & PLAN NOTE
· H/o right sided back pain with sciatica  · Intermittent severe right hip pain, radiates into right lateral hip & buttocks  Patient unable to ambulate without walker currently  · CT lumbar - Mild multilevel disc degeneration  Grade 1 retrolisthesis of L5 on S1  Disc bulge with small herniation at L5-S1  Disc bulging from L2-3 through L4-5  MRI w/o contrast -  Degenerative changes at L5-S1  · Discussed case with neurosurgery  · no transfer or surgical intervention at this time  · Add oral Decadron 4 mg every 6 to pain regimen - now weaning to q8hr x 2 days then q12hr for three days, daily for three days, then 2 mg for three days prior to discontinuation  · gabapentin will take 2 to 3 weeks to reach full effect  · Dr Louella Hatchet requesting x-ray of lumbar spine with 6 views extension flexion completed 2 days prior to discharge (2/15)    · Pain regimen ordered; goal to wean off opiate medications starting on 2/11, will not provide on discharge  · Tylenol 650mg Q6H   · Toradol 10mg Q6H   · Morphine 60mg PO every 6H as needed  · Morphine 8mg IV prn for breakthrough pain  · Gabapentin 400 mg TID  · Robaxin 750 mg TID

## 2023-02-10 NOTE — ASSESSMENT & PLAN NOTE
· New onset diarrhea 2/9  · Reports family has been having diarrheal virus run through the family and they have visited  · Will provide with supportive care for now  · Should diarrhea persist, may have to rule out c diff given his long term abx - per ID, no need for rule out currently

## 2023-02-10 NOTE — PROGRESS NOTES
[address]  Progress Note - [place] 1985, 41 y o  male MRN: [id number]  Unit/Bed#: 791-98 Encounter: [id number]  Primary Care Provider: Jessee Hughes DO   Date and time admitted to hospital: 1/18/2023  5:14 PM    * Bacteremia due to methicillin susceptible Staphylococcus aureus (MSSA)  Assessment & Plan  · Blood cultures positive for MSSA on 1/16/23, repeat blood cultures on 1/20/23 negative  · History of IV drug abuse  · CECILIO negative for vegetations MRI negative for osteomyelitis  · ID recommendations: IV Ancef 2g Q8H ending 2/16/2023, PICC line placed 1/27, patient unable to return home with PICC line in place due to IV drug abuse  Case management unable to find placement  Patient will stay as [facility] inpatient until 2/16/2023  · PICC MUST be removed prior to discharge        Acute right-sided low back pain with right-sided sciatica  Assessment & Plan  · H/o right sided back pain with sciatica  · Intermittent severe right hip pain, radiates into right lateral hip & buttocks  Patient unable to ambulate without walker currently  · CT lumbar - Mild multilevel disc degeneration  Grade 1 retrolisthesis of L5 on S1  Disc bulge with small herniation at L5-S1  Disc bulging from L2-3 through L4-5  MRI w/o contrast -  Degenerative changes at L5-S1  · Discussed case with neurosurgery  · no transfer or surgical intervention at this time  · Add oral Decadron 4 mg every 6 to pain regimen - now weaning to q8hr x 2 days then q12hr for three days, daily for three days, then 2 mg for three days prior to discontinuation  · gabapentin will take 2 to 3 weeks to reach full effect  · Dr Shera Carrel requesting x-ray of lumbar spine with 6 views extension flexion completed 2 days prior to discharge (2/15)    · Pain regimen ordered; goal to wean off opiate medications starting on 2/11, will not provide on discharge  · Tylenol 650mg Q6H   · Toradol 10mg Q6H   · Morphine 60mg PO every 6H as needed  · Morphine 8mg IV prn for breakthrough pain  · Gabapentin 400 mg TID  · Robaxin 750 mg TID    Acute pain of right knee  Assessment & Plan  · Right knee pain since fall 2 weeks ago  · Unremarkable exam and no acute findings on x-ray of the knee  · Continue pain regimen  · Continue PT OT    Ambulatory dysfunction  Assessment & Plan  · In setting of acute back and knee pain  · Utilizing walker currently, patient hopes to be discharged home with utilization of crutches  · Patient previously refused PT/OT multiple times, now with improved compliance  · For now recommendations are for Outpatient PT/OT    Intravenous drug abuse, continuous ([facility] [address] )  Assessment & Plan  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  Last use - IV,1 bag of fentanyl, at 12pm on 1/18/23  Patient started on Suboxone upon admission, maintenance dose 16 mg daily    Plan:  · Patient has scheduled follow-up with Dr Ebony Gonzales in [place] at 2pm on 2/22/23   Pt discharge planned for Friday 2/17/23 - he will need to obtain a prescription for Suboxone from his doctor as we will be unable to provide a prescription for Suboxone  · Suboxone 4mg QID   · Seroquel 100 mg at night for sleep  · Zofran as needed for nausea    Diarrhea  Assessment & Plan  · New onset diarrhea 2/9  · Reports family has been having diarrheal virus run through the family and they have visited  · Will provide with supportive care for now  · Should diarrhea persist, may have to rule out c diff given his long term abx - per ID, no need for rule out currently    Leukocytosis  Assessment & Plan  · Likely in the setting of steroid therapy  · Procalcitonin normal  · Start tapering steroids 2/8 and continue to trend WBC    Tobacco abuse  Assessment & Plan  · Nicotine patch ordered  · Smoking cessation encouraged    Hepatitis C virus  Assessment & Plan  · Most recent labs 3/7/2022  · Positive Hep A  · Positive Hep B surface antibody  · Positive Hep C antibody  · Hep C RNA negative  · Not taking hepatitis meds  · To follow with ID/GI outpatient            VTE Pharmacologic Prophylaxis: VTE Score: 2 Moderate Risk (Score 3-4) - Pharmacological DVT Prophylaxis Ordered: enoxaparin (Lovenox)  Patient Centered Rounds: I performed bedside rounds with nursing staff today  Discussions with Specialists or Other Care Team Provider: case management    Education and Discussions with Family / Patient: Patient declined call to   Time Spent for Care: 30 minutes  More than 50% of total time spent on counseling and coordination of care as described above  Current Length of Stay: 23 day(s)  Current Patient Status: Inpatient   Certification Statement: The patient will continue to require additional inpatient hospital stay due to IV abx, pain regimen  Discharge Plan: Anticipate discharge in >72 hrs to home  Code Status: Level 1 - Full Code    Subjective:   Patient reports continued diarrhea, developing sore throat, with pain between his shoulder blades    Objective:     Vitals:   No data recorded  BP: (127)/(89) 127/89  Body mass index is 27 67 kg/m²  Input and Output Summary (last 24 hours): Intake/Output Summary (Last 24 hours) at 2/10/2023 1307  Last data filed at 2/10/2023 0900  Gross per 24 hour   Intake 720 ml   Output --   Net 720 ml       Physical Exam:   Physical Exam  Vitals and nursing note reviewed  Constitutional:       General: He is not in acute distress  Appearance: He is ill-appearing  HENT:      Head: Normocephalic and atraumatic  Cardiovascular:      Rate and Rhythm: Normal rate and regular rhythm  Pulses: Normal pulses  Heart sounds: Normal heart sounds  Pulmonary:      Effort: Pulmonary effort is normal       Breath sounds: Normal breath sounds  Abdominal:      General: Bowel sounds are normal       Palpations: Abdomen is soft  Musculoskeletal:      Right lower leg: No edema  Left lower leg: No edema     Skin: General: Skin is warm and dry  Neurological:      General: No focal deficit present  Mental Status: He is alert and oriented to person, place, and time  Psychiatric:         Mood and Affect: Mood normal          Behavior: Behavior normal           Additional Data:     Labs:  Results from last 7 days   Lab Units 02/09/23  0510 02/07/23  0544   WBC Thousand/uL 20 23* 15 08*   HEMOGLOBIN g/dL 11 9* 10 3*   HEMATOCRIT % 36 2* 31 1*   PLATELETS Thousands/uL 221 319   LYMPHO PCT %  --  9*   MONO PCT %  --  11   EOS PCT %  --  0     Results from last 7 days   Lab Units 02/09/23  0510   SODIUM mmol/L 137   POTASSIUM mmol/L 4 8   CHLORIDE mmol/L 101   CO2 mmol/L 30   BUN mg/dL 32*   CREATININE mg/dL 0 59*   ANION GAP mmol/L 6   CALCIUM mg/dL 7 9*   ALBUMIN g/dL 3 4*   TOTAL BILIRUBIN mg/dL 0 49   ALK PHOS U/L 40   ALT U/L 9   AST U/L 9*   GLUCOSE RANDOM mg/dL 113                 Results from last 7 days   Lab Units 02/07/23  0544   PROCALCITONIN ng/ml 0 09       Lines/Drains:  Invasive Devices     Peripherally Inserted Central Catheter Line  Duration           PICC Line 01/27/23 Right Other (Comment) 13 days                Central Line:  Goal for removal: Will discontinue when meds requiring line are completed  Imaging: No pertinent imaging reviewed      Recent Cultures (last 7 days):         Last 24 Hours Medication List:   Current Facility-Administered Medications   Medication Dose Route Frequency Provider Last Rate   • acetaminophen  650 mg Oral Q6H [facility] David Diallo MD     • aluminum-magnesium hydroxide-simethicone  30 mL Oral Q4H PRN Mateo Moore MD     • bismuth subsalicylate  181 mg Oral Q4H Gisele Shook PA-C     • buprenorphine-naloxone  4 mg Sublingual 4x Daily Tyra Vega MD     • cefazolin  2,000 mg Intravenous Q8H Vani Seals MD 2,000 mg (02/10/23 0501)   • [START ON 2/11/2023] dexamethasone  3 mg Oral Q8H [facility] [facility], PA-C     • dexamethasone  4 mg Oral [facility] Candido France PA-C     • dextromethorphan-guaiFENesin  10 mL Oral BID Candido France PA-C     • diphenhydrAMINE  25 mg Oral Q6H PRN Candido France PA-C     • enoxaparin  40 mg Subcutaneous Daily Brii Ferrell MD     • famotidine  20 mg Oral BID Leo Jacques PA-C     • gabapentin  400 mg Oral TID Candido France PA-C     • methocarbamol  750 mg Oral [facility] Candido France PA-C     • morphine  45 mg Oral Q4H PRN Alexus London MD      Or   • morphine  60 mg Oral Q4H PRN Alexus London MD     • morphine injection  8 mg Intravenous Q6H PRN Tyra Ramirez MD     • naloxone  0 1 mg Intravenous PRN Tyra Ramirez MD     • nicotine  14 mg Transdermal Daily Brii Ferrell MD     • ondansetron  4 mg Oral Q6H PRN Tyra Ramirez MD     • phenol  1 spray Mouth/Throat Q2H PRN Candido France PA-C     • QUEtiapine  100 mg Oral HS Nicol Owens MD     • saccharomyces boulardii  250 mg Oral BID Candido France PA-C          Today, Patient Was Seen By: Candido France PA-C    **Please Note: This note may have been constructed using a voice recognition system  **

## 2023-02-10 NOTE — ASSESSMENT & PLAN NOTE
· Likely in the setting of steroid therapy  · Procalcitonin normal  · Start tapering steroids 2/8 and continue to trend WBC

## 2023-02-10 NOTE — PLAN OF CARE
Problem: PHYSICAL THERAPY ADULT  Goal: Performs mobility at highest level of function for planned discharge setting  See evaluation for individualized goals  Description: Progressing  Note: Prognosis: Fair  Problem List:  (Decreased strength; Decreased endurance; Impaired balance; Decreased mobility; Pain)  Assessment: Pt  seen for PT treatment session this date with interventions consisting of  bed mobility, transfers and  gait training w/ emphasis on improving pt's ability to ambulate  Pt  Requiring occasional cues for sequence and safety  Declined stairs, encouraged participation  In comparison to previous session, Pt  With no change in activity tolerance  Pt is in need of continued activity in PT to improve strength balance endurance mobility transfers and ambulation with return to maximize LOF  From PT/mobility standpoint, recommendation at time of d/c would be OPPT in order to promote return to PLOF and independence  The patient's AM-PAC Basic Mobility Inpatient Short Form Raw Score is 20  A Raw score of greater than 16 suggests the patient may benefit from discharge to home  Please also refer to physical therapy recommendation for safe DC planning  PT Discharge Recommendation: Home with outpatient rehabilitation    See flowsheet documentation for full assessment

## 2023-02-10 NOTE — PLAN OF CARE
Problem: PAIN - ADULT  Goal: Verbalizes/displays adequate comfort level or baseline comfort level  Description: Interventions:  - Encourage patient to monitor pain and request assistance  - Assess pain using appropriate pain scale (0-10 pain scale)  - Administer analgesics based on type and severity of pain and evaluate response  - Implement non-pharmacological measures as appropriate and evaluate response  - Consider cultural and social influences on pain and pain management  - Notify physician/advanced practitioner if interventions unsuccessful or patient reports new pain  2/10/2023 0216 by Dina Aase  Outcome: Progressing  2/10/2023 0216 by Dina Aase  Outcome: Progressing     Problem: INFECTION - ADULT  Goal: Absence or prevention of progression during hospitalization  Description: INTERVENTIONS:  - Assess and monitor for signs and symptoms of infection  - Monitor lab/diagnostic results  - Monitor all insertion sites, i e  indwelling lines  - Administer medications as ordered  - Instruct and encourage patient and family to use good hand hygiene technique  2/10/2023 0216 by Dina Aase  Outcome: Progressing  2/10/2023 0216 by Dina Aase  Outcome: Progressing     Problem: MUSCULOSKELETAL - ADULT  Goal: Maintain or return mobility to safest level of function  Description: INTERVENTIONS:  - Assess patient's ability to carry out ADLs; (independent)  - Assess/evaluate cause of self-care deficits (limited movement, pain, crutches/walker)  - Assess range of motion  - Assess patient's mobility (modified independent)  - Assess patient's need for assistive devices and provide as appropriate  - Encourage maximum independence but intervene and supervise when necessary  - Involve family in performance of ADLs  - Assess for home care needs following discharge   - Consider OT consult to assist with ADL evaluation and planning for discharge  - Provide patient education as appropriate  2/10/2023 0216 by Jose Francisco Sigala Samara  Outcome: Progressing  2/10/2023 0216 by Jassi Rape  Outcome: Progressing  Goal: Maintain proper alignment of affected body part  Description: INTERVENTIONS:  - Support, maintain and protect limb and body alignment  - Provide patient/ family with appropriate education  2/10/2023 0216 by Jassi Rape  Outcome: Progressing  2/10/2023 0216 by Jassi Rape  Outcome: Progressing     Problem: MOBILITY - ADULT  Goal: Maintain or return to baseline ADL function  Description: INTERVENTIONS:  -  Assess patient's ability to carry out ADLs; (independent)  - Assess/evaluate cause of self-care deficits (limited movement, pain, crutches/walker)  - Assess range of motion  - Assess patient's mobility; (modified independent)  - Assess patient's need for assistive devices and provide as appropriate  - Encourage maximum independence but intervene and supervise when necessary  - Involve family in performance of ADLs  - Assess for home care needs following discharge   - Consider OT consult to assist with ADL evaluation and planning for discharge  - Provide patient education as appropriate  2/10/2023 0216 by Jassi Rape  Outcome: Progressing  2/10/2023 0216 by Jassi Rape  Outcome: Progressing  Goal: Maintains/Returns to pre admission functional level  Description: INTERVENTIONS:  - Perform BMAT or MOVE assessment daily    - Set and communicate daily mobility goal to care team and patient/family/caregiver     - Collaborate with rehabilitation services on mobility goals if consulted  - Ambulate patient 3 times a day  - Out of bed to chair 3 times a day   - Out of bed for meals 3 times a day  - Out of bed for toileting  - Record patient progress and toleration of activity level   2/10/2023 0216 by Jassi Rape  Outcome: Progressing  2/10/2023 0216 by Jassi Rape  Outcome: Progressing     Problem: DISCHARGE PLANNING  Goal: Discharge to home or other facility with appropriate resources  Description: INTERVENTIONS:  - Identify barriers to discharge w/patient and caregiver  - Arrange for needed discharge resources and transportation as appropriate  - Identify discharge learning needs (meds, wound care, etc )  - Refer to Case Management Department for coordinating discharge planning if the patient needs post-hospital services based on physician/advanced practitioner order or complex needs related to functional status, cognitive ability, or social support system  2/10/2023 0216 by Ramone Vasquez  Outcome: Progressing  2/10/2023 0216 by Ramone Vasquez  Outcome: Progressing     Problem: Knowledge Deficit  Goal: Patient/family/caregiver demonstrates understanding of disease process, treatment plan, medications, and discharge instructions  Description: Complete learning assessment and assess knowledge base    Interventions:  - Provide teaching at level of understanding  - Provide teaching via preferred learning methods  2/10/2023 0216 by Ramone Vasquez  Outcome: Progressing  2/10/2023 0216 by Ramone Vasquez  Outcome: Progressing     Problem: METABOLIC, FLUID AND ELECTROLYTES - ADULT  Goal: Electrolytes maintained within normal limits  Description: INTERVENTIONS:  - Monitor labs and assess patient for signs and symptoms of electrolyte imbalances  - Administer electrolyte replacement as ordered  - Monitor response to electrolyte replacements, including repeat lab results as appropriate  - Instruct patient on fluid and nutrition as appropriate  2/10/2023 0216 by Ramone Vasquez  Outcome: Progressing  2/10/2023 0216 by Ramone Vasquez  Outcome: Progressing  Goal: Fluid balance maintained  Description: INTERVENTIONS:  - Monitor labs   - Monitor I/O and WT  - Instruct patient on fluid and nutrition as appropriate  - Assess for signs & symptoms of volume excess or deficit  2/10/2023 0216 by Ramone Vasquez  Outcome: Progressing  2/10/2023 0216 by Ramone Vasquez  Outcome: Progressing

## 2023-02-11 RX ORDER — QUETIAPINE FUMARATE 100 MG/1
150 TABLET, FILM COATED ORAL
Qty: 45 TABLET | Refills: 0 | Status: SHIPPED | OUTPATIENT
Start: 2023-02-11 | End: 2023-02-17 | Stop reason: SDUPTHER

## 2023-02-11 RX ORDER — GABAPENTIN 400 MG/1
400 CAPSULE ORAL 3 TIMES DAILY
Qty: 90 CAPSULE | Refills: 0 | Status: SHIPPED | OUTPATIENT
Start: 2023-02-11 | End: 2023-02-27

## 2023-02-11 RX ORDER — METHOCARBAMOL 750 MG/1
750 TABLET, FILM COATED ORAL EVERY 8 HOURS SCHEDULED
Qty: 90 TABLET | Refills: 0 | Status: SHIPPED | OUTPATIENT
Start: 2023-02-11

## 2023-02-11 RX ORDER — MORPHINE SULFATE 15 MG/1
45 TABLET ORAL EVERY 4 HOURS PRN
Status: COMPLETED | OUTPATIENT
Start: 2023-02-11 | End: 2023-02-11

## 2023-02-11 RX ORDER — CELECOXIB 200 MG/1
200 CAPSULE ORAL 2 TIMES DAILY
Qty: 14 CAPSULE | Refills: 0 | Status: SHIPPED | OUTPATIENT
Start: 2023-02-11 | End: 2023-02-17 | Stop reason: SDUPTHER

## 2023-02-11 RX ORDER — MORPHINE SULFATE 15 MG/1
60 TABLET ORAL EVERY 6 HOURS PRN
Status: DISCONTINUED | OUTPATIENT
Start: 2023-02-12 | End: 2023-02-13

## 2023-02-11 RX ORDER — MORPHINE SULFATE 15 MG/1
60 TABLET ORAL EVERY 4 HOURS PRN
Status: COMPLETED | OUTPATIENT
Start: 2023-02-11 | End: 2023-02-11

## 2023-02-11 RX ADMIN — DEXAMETHASONE 3 MG: 0.5 TABLET ORAL at 21:06

## 2023-02-11 RX ADMIN — BUPRENORPHINE AND NALOXONE 4 MG: 2; .5 FILM BUCCAL; SUBLINGUAL at 08:15

## 2023-02-11 RX ADMIN — GABAPENTIN 400 MG: 400 CAPSULE ORAL at 15:41

## 2023-02-11 RX ADMIN — MORPHINE SULFATE 60 MG: 15 TABLET ORAL at 19:41

## 2023-02-11 RX ADMIN — ACETAMINOPHEN 650 MG: 325 TABLET, FILM COATED ORAL at 11:30

## 2023-02-11 RX ADMIN — BUPRENORPHINE AND NALOXONE 4 MG: 2; .5 FILM BUCCAL; SUBLINGUAL at 21:07

## 2023-02-11 RX ADMIN — CEFAZOLIN SODIUM 2000 MG: 2 SOLUTION INTRAVENOUS at 06:57

## 2023-02-11 RX ADMIN — METHOCARBAMOL 750 MG: 500 TABLET ORAL at 21:06

## 2023-02-11 RX ADMIN — CEFAZOLIN SODIUM 2000 MG: 2 SOLUTION INTRAVENOUS at 21:07

## 2023-02-11 RX ADMIN — DEXAMETHASONE 3 MG: 0.5 TABLET ORAL at 06:51

## 2023-02-11 RX ADMIN — GABAPENTIN 400 MG: 400 CAPSULE ORAL at 21:06

## 2023-02-11 RX ADMIN — MORPHINE SULFATE 60 MG: 15 TABLET ORAL at 15:41

## 2023-02-11 RX ADMIN — GABAPENTIN 400 MG: 400 CAPSULE ORAL at 08:14

## 2023-02-11 RX ADMIN — MORPHINE SULFATE 8 MG: 10 INJECTION INTRAVENOUS at 21:06

## 2023-02-11 RX ADMIN — MORPHINE SULFATE 8 MG: 10 INJECTION INTRAVENOUS at 14:26

## 2023-02-11 RX ADMIN — MORPHINE SULFATE 60 MG: 15 TABLET ORAL at 11:29

## 2023-02-11 RX ADMIN — ACETAMINOPHEN 650 MG: 325 TABLET, FILM COATED ORAL at 19:41

## 2023-02-11 RX ADMIN — BUPRENORPHINE AND NALOXONE 4 MG: 2; .5 FILM BUCCAL; SUBLINGUAL at 19:42

## 2023-02-11 RX ADMIN — MORPHINE SULFATE 8 MG: 10 INJECTION INTRAVENOUS at 08:15

## 2023-02-11 RX ADMIN — METHOCARBAMOL 750 MG: 500 TABLET ORAL at 06:52

## 2023-02-11 RX ADMIN — ACETAMINOPHEN 650 MG: 325 TABLET, FILM COATED ORAL at 00:39

## 2023-02-11 RX ADMIN — METHOCARBAMOL 750 MG: 500 TABLET ORAL at 13:35

## 2023-02-11 RX ADMIN — MORPHINE SULFATE 8 MG: 10 INJECTION INTRAVENOUS at 00:40

## 2023-02-11 RX ADMIN — QUETIAPINE FUMARATE 150 MG: 100 TABLET ORAL at 21:06

## 2023-02-11 RX ADMIN — MORPHINE SULFATE 60 MG: 15 TABLET ORAL at 23:54

## 2023-02-11 RX ADMIN — CEFAZOLIN SODIUM 2000 MG: 2 SOLUTION INTRAVENOUS at 13:34

## 2023-02-11 RX ADMIN — BUPRENORPHINE AND NALOXONE 4 MG: 2; .5 FILM BUCCAL; SUBLINGUAL at 11:30

## 2023-02-11 RX ADMIN — MORPHINE SULFATE 60 MG: 15 TABLET ORAL at 06:50

## 2023-02-11 RX ADMIN — ACETAMINOPHEN 650 MG: 325 TABLET, FILM COATED ORAL at 06:52

## 2023-02-11 NOTE — ASSESSMENT & PLAN NOTE
· New onset diarrhea 2/9  · Reports family has been having diarrheal virus run through the family and they have visited  · Will provide with supportive care for now  · Should diarrhea persist, may have to rule out c diff given his long term abx - per ID, no need for rule out currently  · Resolved per pt

## 2023-02-11 NOTE — ASSESSMENT & PLAN NOTE
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  Last use - IV,1 bag of fentanyl, at 12pm on 1/18/23  Patient started on Suboxone upon admission, maintenance dose 16 mg daily    Plan:  · Patient has scheduled follow-up with Dr Cora Rodriguez in [place] at 2pm on 2/22/23   Pt discharge planned for Friday 2/17/23 - he will need to obtain a prescription for Suboxone from his doctor as we will be unable to provide a prescription for Suboxone - we will have to call the office prior to discharge to set this up  · Suboxone 4mg QID   · Seroquel 100 mg at night for sleep --> increase to 150  · Zofran as needed for nausea

## 2023-02-11 NOTE — ASSESSMENT & PLAN NOTE
· In setting of acute back and knee pain  · Utilizing walker currently, patient hopes to be discharged home with utilization of crutches - currently preferring cane over walker  · Patient previously refused PT/OT multiple times, now with improved compliance      · For now recommendations are for Outpatient PT/OT

## 2023-02-11 NOTE — ASSESSMENT & PLAN NOTE
· H/o right sided back pain with sciatica  · Intermittent severe right hip pain, radiates into right lateral hip & buttocks  Patient unable to ambulate without walker currently  · CT lumbar - Mild multilevel disc degeneration  Grade 1 retrolisthesis of L5 on S1  Disc bulge with small herniation at L5-S1  Disc bulging from L2-3 through L4-5  MRI w/o contrast -  Degenerative changes at L5-S1  · Discussed case with neurosurgery  · no transfer or surgical intervention at this time      · Add oral Decadron 4 mg every 6 to pain regimen - now weaning to q8hr x 2 days then 3 mg q8, q12hr for three days, daily for three days, then 2 mg for three days prior to discontinuation  · Dr Easton Sánchez requesting x-ray of lumbar spine with 6 views extension flexion completed 2 days prior to discharge (2/15) - I have messaged their office with assistance for getting an appt  · Pain regimen ordered; goal to wean off opiate medications starting on 2/11, will not provide on discharge  · Tylenol 650mg Q6H   · Toradol 10mg Q6H   · Morphine 60mg PO every 4H as needed + Morphine 8mg IV prn for breakthrough pain  · 2/12 - morphine 60 mg PO q6 + morphine 8 mg IV q6  · 2/13 - morphine 45 mg PO q6 + morphine 6 mg IV q6  · 2/14 - morphine 30 mg PO q6 + morphine 4 mg IV q6  · 2/15 - morphine 15 mg q6 + morphine 2 mg IV q6  · Gabapentin 400 mg TID  · Robaxin 750 mg TID

## 2023-02-11 NOTE — PROGRESS NOTES
[address]  Progress Note - Donna Le 1985, 41 y o  male MRN: [id number]  Unit/Bed#: 696-02 Encounter: [id number]  Primary Care Provider: Jennie Chandra DO   Date and time admitted to hospital: 1/18/2023  5:14 PM    * Bacteremia due to methicillin susceptible Staphylococcus aureus (MSSA)  Assessment & Plan  · Blood cultures positive for MSSA on 1/16/23, repeat blood cultures on 1/20/23 negative  · History of IV drug abuse  · CECILIO negative for vegetations MRI negative for osteomyelitis  · ID recommendations: IV Ancef 2g Q8H ending 2/16/2023, PICC line placed 1/27, patient unable to return home with PICC line in place due to IV drug abuse  Case management unable to find placement  Patient will stay as [facility] inpatient until 2/16/2023 for last dose, then likely discharge 2/17  · PICC MUST be removed prior to discharge        Acute right-sided low back pain with right-sided sciatica  Assessment & Plan  · H/o right sided back pain with sciatica  · Intermittent severe right hip pain, radiates into right lateral hip & buttocks  Patient unable to ambulate without walker currently  · CT lumbar - Mild multilevel disc degeneration  Grade 1 retrolisthesis of L5 on S1  Disc bulge with small herniation at L5-S1  Disc bulging from L2-3 through L4-5  MRI w/o contrast -  Degenerative changes at L5-S1  · Discussed case with neurosurgery  · no transfer or surgical intervention at this time      · Add oral Decadron 4 mg every 6 to pain regimen - now weaning to q8hr x 2 days then 3 mg q8, q12hr for three days, daily for three days, then 2 mg for three days prior to discontinuation  · Dr Rosa Maria Collins requesting x-ray of lumbar spine with 6 views extension flexion completed 2 days prior to discharge (2/15) - I have messaged their office with assistance for getting an appt  · Pain regimen ordered; goal to wean off opiate medications starting on 2/11, will not provide on discharge  · Tylenol 650mg Q6H · Toradol 10mg Q6H   · Morphine 60mg PO every 4H as needed + Morphine 8mg IV prn for breakthrough pain  · 2/12 - morphine 60 mg PO q6 + morphine 8 mg IV q6  · 2/13 - morphine 45 mg PO q6 + morphine 6 mg IV q6  · 2/14 - morphine 30 mg PO q6 + morphine 4 mg IV q6  · 2/15 - morphine 15 mg q6 + morphine 2 mg IV q6  · Gabapentin 400 mg TID  · Robaxin 750 mg TID    Acute pain of right knee  Assessment & Plan  · Right knee pain since fall 2 weeks ago  · Unremarkable exam and no acute findings on x-ray of the knee  · Continue pain regimen  · Continue PT OT  · Declined knee brace    Ambulatory dysfunction  Assessment & Plan  · In setting of acute back and knee pain  · Utilizing walker currently, patient hopes to be discharged home with utilization of crutches - currently preferring cane over walker  · Patient previously refused PT/OT multiple times, now with improved compliance  · For now recommendations are for Outpatient PT/OT    [facility]  Assessment & Plan  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  Last use - IV,1 bag of fentanyl, at 12pm on 1/18/23  Patient started on Suboxone upon admission, maintenance dose 16 mg daily    Plan:  · Patient has scheduled follow-up with Dr Raj Abernathy in [place] at 2pm on 2/22/23   Pt discharge planned for Friday 2/17/23 - he will need to obtain a prescription for Suboxone from his doctor as we will be unable to provide a prescription for Suboxone - we will have to call the office prior to discharge to set this up  · Suboxone 4mg QID   · Seroquel 100 mg at night for sleep --> increase to 150  · Zofran as needed for nausea    Diarrhea  Assessment & Plan  · New onset diarrhea 2/9  · Reports family has been having diarrheal virus run through the family and they have visited  · Will provide with supportive care for now  · Should diarrhea persist, may have to rule out c diff given his long term abx - per ID, no need for rule out currently  · Resolved per pt    Leukocytosis  Assessment & Plan  · Likely in the setting of steroid therapy  · Procalcitonin normal  · Start tapering steroids  and continue to trend WBC    Tobacco abuse  Assessment & Plan  · Nicotine patch ordered  · Smoking cessation encouraged    Hepatitis C virus  Assessment & Plan  · Most recent labs 3/7/2022  · Positive Hep A  · Positive Hep B surface antibody  · Positive Hep C antibody  · Hep C RNA negative  · Not taking hepatitis meds  · To follow with ID/GI outpatient            VTE Pharmacologic Prophylaxis: VTE Score: 2 Moderate Risk (Score 3-4) - Pharmacological DVT Prophylaxis Ordered: enoxaparin (Lovenox)  Patient Centered Rounds: I performed bedside rounds with nursing staff today  Discussions with Specialists or Other Care Team Provider: case management    Education and Discussions with Family / Patient: Patient declined call to   Time Spent for Care: 45 minutes  More than 50% of total time spent on counseling and coordination of care as described above  Current Length of Stay: 24 day(s)  Current Patient Status: Inpatient   Certification Statement: The patient will continue to require additional inpatient hospital stay due to IV abx for bacteremia, pain regimen  Discharge Plan: Anticipate discharge in >72 hrs to home  Code Status: Level 1 - Full Code    Subjective:   Patient reports diarrhea is resolved  Ready to taper pain meds  Objective:     Vitals:   Temp (24hrs), Av 7 °F (36 5 °C), Min:97 5 °F (36 4 °C), Max:97 9 °F (36 6 °C)    Temp:  [97 5 °F (36 4 °C)-97 9 °F (36 6 °C)] 97 5 °F (36 4 °C)  HR:  [] 110  Resp:  [14-15] 14  BP: (127-155)/() 138/99  SpO2:  [94 %-97 %] 97 %  Body mass index is 27 67 kg/m²  Input and Output Summary (last 24 hours):      Intake/Output Summary (Last 24 hours) at 2023 1448  Last data filed at 2023 1343  Gross per 24 hour   Intake 960 ml   Output --   Net 960 ml       Physical Exam:   Physical Exam  Vitals and nursing note reviewed  Constitutional:       General: He is not in acute distress  Appearance: He is not ill-appearing  HENT:      Head: Normocephalic and atraumatic  Cardiovascular:      Rate and Rhythm: Normal rate and regular rhythm  Pulses: Normal pulses  Heart sounds: Normal heart sounds  No murmur heard  No gallop  Pulmonary:      Effort: Pulmonary effort is normal       Breath sounds: Normal breath sounds  No wheezing, rhonchi or rales  Abdominal:      General: Bowel sounds are normal       Palpations: Abdomen is soft  Tenderness: There is no abdominal tenderness  There is no guarding or rebound  Musculoskeletal:      Right lower leg: No edema  Left lower leg: No edema  Comments: Gait appears improved and able to utilize cane   Skin:     General: Skin is warm and dry  Neurological:      Mental Status: He is alert     Psychiatric:         Mood and Affect: Mood normal          Behavior: Behavior normal           Additional Data:     Labs:  Results from last 7 days   Lab Units 02/09/23  0510 02/07/23  0544   WBC Thousand/uL 20 23* 15 08*   HEMOGLOBIN g/dL 11 9* 10 3*   HEMATOCRIT % 36 2* 31 1*   PLATELETS Thousands/uL 221 319   LYMPHO PCT %  --  9*   MONO PCT %  --  11   EOS PCT %  --  0     Results from last 7 days   Lab Units 02/09/23  0510   SODIUM mmol/L 137   POTASSIUM mmol/L 4 8   CHLORIDE mmol/L 101   CO2 mmol/L 30   BUN mg/dL 32*   CREATININE mg/dL 0 59*   ANION GAP mmol/L 6   CALCIUM mg/dL 7 9*   ALBUMIN g/dL 3 4*   TOTAL BILIRUBIN mg/dL 0 49   ALK PHOS U/L 40   ALT U/L 9   AST U/L 9*   GLUCOSE RANDOM mg/dL 113                 Results from last 7 days   Lab Units 02/07/23  0544   PROCALCITONIN ng/ml 0 09       Lines/Drains:  Invasive Devices     Peripherally Inserted Central Catheter Line  Duration           PICC Line 01/27/23 Right Other (Comment) 14 days                Central Line:  Goal for removal: Will discontinue when meds requiring line are completed  Imaging: No pertinent imaging reviewed  Recent Cultures (last 7 days):         Last 24 Hours Medication List:   Current Facility-Administered Medications   Medication Dose Route Frequency Provider Last Rate   • acetaminophen  650 mg Oral Q6H [address] Amparo Tineo MD     • aluminum-magnesium hydroxide-simethicone  30 mL Oral Q4H PRN Jennifer Victoria MD     • bismuth subsalicylate  309 mg Oral Q4H Alexei Rutherford PA-C     • buprenorphine-naloxone  4 mg Sublingual 4x Daily Tyra Blum MD     • cefazolin  2,000 mg Intravenous Q8H Alban Patten MD 2,000 mg (02/11/23 1334)   • dexamethasone  3 mg Oral Q12H [address] Alexei Rutherford PA-C     • dextromethorphan-guaiFENesin  10 mL Oral BID Alexei Rutherford PA-C     • diphenhydrAMINE  25 mg Oral Q6H PRN Alexei Rutherford PA-C     • enoxaparin  40 mg Subcutaneous Daily Carman Sacks, MD     • famotidine  20 mg Oral BID Deacon Spencer PA-C     • gabapentin  400 mg Oral TID Alexei Rutherford PA-C     • methocarbamol  750 mg Oral [facility] Alexei Rutherford PA-C     • morphine  45 mg Oral Q4H PRN Alexei Rutherford PA-C      Or   • morphine  60 mg Oral Q4H PRN Alexei Rutherford PA-C     • [START ON 2/12/2023] morphine  60 mg Oral Q6H PRN Alexei Rutherford PA-C     • morphine injection  8 mg Intravenous Q6H PRN Tyra Blum MD     • naloxone  0 1 mg Intravenous PRN Tyra Blum MD     • nicotine  14 mg Transdermal Daily Carman Sacks, MD     • ondansetron  4 mg Oral Q6H PRN Tyra Blum MD     • phenol  1 spray Mouth/Throat Q2H PRN Alexei Rutherford PA-C     • QUEtiapine  150 mg Oral HS Alexei Rutherford PA-C     • saccharomyces boulardii  250 mg Oral BID Alexei Rutherford PA-C          Today, Patient Was Seen By: Alexei Rutherford PA-C    **Please Note: This note may have been constructed using a voice recognition system  **

## 2023-02-11 NOTE — ASSESSMENT & PLAN NOTE
· Blood cultures positive for MSSA on 1/16/23, repeat blood cultures on 1/20/23 negative  · History of IV drug abuse  · CECILIO negative for vegetations MRI negative for osteomyelitis  · ID recommendations: IV Ancef 2g Q8H ending 2/16/2023, PICC line placed 1/27, patient unable to return home with PICC line in place due to IV drug abuse  Case management unable to find placement    Patient will stay as [facility] inpatient until 2/16/2023 for last dose, then likely discharge 2/17  · PICC MUST be removed prior to discharge

## 2023-02-11 NOTE — ASSESSMENT & PLAN NOTE
· Right knee pain since fall 2 weeks ago  · Unremarkable exam and no acute findings on x-ray of the knee  · Continue pain regimen  · Continue PT OT  · Declined knee brace

## 2023-02-12 ENCOUNTER — APPOINTMENT (INPATIENT)
Dept: CT IMAGING | Facility: HOSPITAL | Age: 38
End: 2023-02-12

## 2023-02-12 RX ORDER — DEXAMETHASONE 0.5 MG/1
2 TABLET ORAL EVERY 12 HOURS SCHEDULED
Status: DISCONTINUED | OUTPATIENT
Start: 2023-02-13 | End: 2023-02-13

## 2023-02-12 RX ADMIN — BUPRENORPHINE AND NALOXONE 4 MG: 2; .5 FILM BUCCAL; SUBLINGUAL at 09:01

## 2023-02-12 RX ADMIN — GABAPENTIN 400 MG: 400 CAPSULE ORAL at 09:04

## 2023-02-12 RX ADMIN — MORPHINE SULFATE 8 MG: 10 INJECTION INTRAVENOUS at 22:44

## 2023-02-12 RX ADMIN — GABAPENTIN 400 MG: 400 CAPSULE ORAL at 21:15

## 2023-02-12 RX ADMIN — METHOCARBAMOL 750 MG: 500 TABLET ORAL at 14:10

## 2023-02-12 RX ADMIN — MORPHINE SULFATE 60 MG: 15 TABLET ORAL at 21:22

## 2023-02-12 RX ADMIN — METHOCARBAMOL 750 MG: 500 TABLET ORAL at 21:15

## 2023-02-12 RX ADMIN — ACETAMINOPHEN 650 MG: 325 TABLET, FILM COATED ORAL at 12:13

## 2023-02-12 RX ADMIN — CEFAZOLIN SODIUM 2000 MG: 2 SOLUTION INTRAVENOUS at 07:52

## 2023-02-12 RX ADMIN — QUETIAPINE FUMARATE 150 MG: 100 TABLET ORAL at 21:34

## 2023-02-12 RX ADMIN — DEXAMETHASONE 3 MG: 0.5 TABLET ORAL at 08:59

## 2023-02-12 RX ADMIN — ACETAMINOPHEN 650 MG: 325 TABLET, FILM COATED ORAL at 07:53

## 2023-02-12 RX ADMIN — CEFAZOLIN SODIUM 2000 MG: 2 SOLUTION INTRAVENOUS at 21:14

## 2023-02-12 RX ADMIN — CEFAZOLIN SODIUM 2000 MG: 2 SOLUTION INTRAVENOUS at 14:10

## 2023-02-12 RX ADMIN — ACETAMINOPHEN 650 MG: 325 TABLET, FILM COATED ORAL at 17:55

## 2023-02-12 RX ADMIN — BUPRENORPHINE AND NALOXONE 4 MG: 2; .5 FILM BUCCAL; SUBLINGUAL at 21:16

## 2023-02-12 RX ADMIN — BUPRENORPHINE AND NALOXONE 4 MG: 2; .5 FILM BUCCAL; SUBLINGUAL at 12:13

## 2023-02-12 RX ADMIN — DEXAMETHASONE 3 MG: 0.5 TABLET ORAL at 21:16

## 2023-02-12 RX ADMIN — MORPHINE SULFATE 8 MG: 10 INJECTION INTRAVENOUS at 16:26

## 2023-02-12 RX ADMIN — MORPHINE SULFATE 60 MG: 15 TABLET ORAL at 15:02

## 2023-02-12 RX ADMIN — MORPHINE SULFATE 60 MG: 15 TABLET ORAL at 08:57

## 2023-02-12 RX ADMIN — GABAPENTIN 400 MG: 400 CAPSULE ORAL at 15:01

## 2023-02-12 RX ADMIN — BUPRENORPHINE AND NALOXONE 4 MG: 2; .5 FILM BUCCAL; SUBLINGUAL at 17:55

## 2023-02-12 RX ADMIN — METHOCARBAMOL 750 MG: 500 TABLET ORAL at 07:53

## 2023-02-12 RX ADMIN — MORPHINE SULFATE 8 MG: 10 INJECTION INTRAVENOUS at 10:11

## 2023-02-12 NOTE — ASSESSMENT & PLAN NOTE
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  Last use - IV,1 bag of fentanyl, at 12pm on 1/18/23  Patient started on Suboxone upon admission, maintenance dose 16 mg daily    Plan:  · Patient has scheduled follow-up with Dr Arsh Stern in [place] at 2pm on 2/22/23   Pt discharge planned for Friday 2/17/23 - he will need to obtain a prescription for Suboxone from his doctor as we will be unable to provide a prescription for Suboxone - we will have to call the office prior to discharge to set this up  · Suboxone 4mg QID   · Seroquel 100 mg at night for sleep --> increase to 150  · Zofran as needed for nausea

## 2023-02-12 NOTE — ASSESSMENT & PLAN NOTE
· Right knee pain since fall 2 weeks ago  · Unremarkable exam and no acute findings on x-ray of the knee  · Continue pain regimen  · Continue PT OT  · Declined knee brace  · Now having hip pain on same side   Will get CT w/wo contrast right hip and knee

## 2023-02-12 NOTE — ASSESSMENT & PLAN NOTE
· H/o right sided back pain with sciatica  · Intermittent severe right hip pain, radiates into right lateral hip & buttocks  Patient unable to ambulate without walker currently  · CT lumbar - Mild multilevel disc degeneration  Grade 1 retrolisthesis of L5 on S1  Disc bulge with small herniation at L5-S1  Disc bulging from L2-3 through L4-5  MRI w/o contrast -  Degenerative changes at L5-S1  · Discussed case with neurosurgery  · no transfer or surgical intervention at this time      · Weaning oral decadron 2 mg q12hr for two days, daily for three days, then discontinue  · Dr Padmini Yousif requesting x-ray of lumbar spine with 6 views extension flexion completed 2 days prior to discharge (2/15) - I have messaged their office with assistance for getting an appt  · Pain regimen ordered; goal to wean off opiate medications starting on 2/11, will not provide on discharge  · Tylenol 650mg Q6H   · Toradol 10mg Q6H   · Morphine 60mg PO every 4H as needed + Morphine 8mg IV prn for breakthrough pain  · 2/12 - morphine 60 mg PO q6 + morphine 8 mg IV q6  · 2/13 - morphine 45 mg PO q6 + morphine 6 mg IV q6  · 2/14 - morphine 30 mg PO q6 + morphine 4 mg IV q6  · 2/15 - morphine 15 mg q6 + morphine 2 mg IV q6  · Gabapentin 400 mg TID  · Robaxin 750 mg TID

## 2023-02-12 NOTE — ASSESSMENT & PLAN NOTE
· In setting of acute back and knee pain  · Utilizing walker currently, patient hopes to be discharged home with utilization of crutches - patient requesting both crutches and walker on discharge  · Patient previously refused PT/OT multiple times, now with improved compliance      · For now recommendations are for Outpatient PT/OT

## 2023-02-12 NOTE — PROGRESS NOTES
[address] [facility]  Progress Note - uLke Fofana 1985, 41 y o  male MRN: [id number]  Unit/Bed#: 181-43 Encounter: [id number]  Primary Care Provider: Bill Briscoe DO   Date and time admitted to hospital: 1/18/2023  5:14 PM    Acute right-sided low back pain with right-sided sciatica  Assessment & Plan  · H/o right sided back pain with sciatica  · Intermittent severe right hip pain, radiates into right lateral hip & buttocks  Patient unable to ambulate without walker currently  · CT lumbar - Mild multilevel disc degeneration  Grade 1 retrolisthesis of L5 on S1  Disc bulge with small herniation at L5-S1  Disc bulging from L2-3 through L4-5  MRI w/o contrast -  Degenerative changes at L5-S1  · Discussed case with neurosurgery  · no transfer or surgical intervention at this time  · Weaning oral decadron 2 mg q12hr for two days, daily for three days, then discontinue  · Dr Kalyn Alvarado requesting x-ray of lumbar spine with 6 views extension flexion completed 2 days prior to discharge (2/15) - I have messaged their office with assistance for getting an appt  · Pain regimen ordered; goal to wean off opiate medications starting on 2/11, will not provide on discharge  · Tylenol 650mg Q6H   · Toradol 10mg Q6H   · Morphine 60mg PO every 4H as needed + Morphine 8mg IV prn for breakthrough pain  · 2/12 - morphine 60 mg PO q6 + morphine 8 mg IV q6  · 2/13 - morphine 45 mg PO q6 + morphine 6 mg IV q6  · 2/14 - morphine 30 mg PO q6 + morphine 4 mg IV q6  · 2/15 - morphine 15 mg q6 + morphine 2 mg IV q6  · Gabapentin 400 mg TID  · Robaxin 750 mg TID    Acute pain of right knee  Assessment & Plan  · Right knee pain since fall 2 weeks ago  · Unremarkable exam and no acute findings on x-ray of the knee  · Continue pain regimen  · Continue PT OT  · Declined knee brace  · Now having hip pain on same side   Will get CT w/wo contrast right hip and knee    Ambulatory dysfunction  Assessment & Plan  · In setting of acute back and knee pain  · Utilizing walker currently, patient hopes to be discharged home with utilization of crutches - patient requesting both crutches and walker on discharge  · Patient previously refused PT/OT multiple times, now with improved compliance  · For now recommendations are for Outpatient PT/OT    Intravenous drug abuse, continuous ([facility] )  Assessment & Plan  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  Last use - IV,1 bag of fentanyl, at 12pm on 1/18/23  Patient started on Suboxone upon admission, maintenance dose 16 mg daily    Plan:  · Patient has scheduled follow-up with Dr Adam German in [place] at 2pm on 2/22/23  Pt discharge planned for Friday 2/17/23 - he will need to obtain a prescription for Suboxone from his doctor as we will be unable to provide a prescription for Suboxone - we will have to call the office prior to discharge to set this up  · Suboxone 4mg QID   · Seroquel 100 mg at night for sleep --> increase to 150  · Zofran as needed for nausea    Tobacco abuse  Assessment & Plan  · Nicotine patch ordered  · Smoking cessation encouraged    Hepatitis C virus  Assessment & Plan  · Most recent labs 3/7/2022  · Positive Hep A  · Positive Hep B surface antibody  · Positive Hep C antibody  · Hep C RNA negative  · Not taking hepatitis meds  · To follow with ID/GI outpatient            VTE Pharmacologic Prophylaxis: VTE Score: 2 Low Risk (Score 0-2) - Encourage Ambulation  Patient Centered Rounds: I performed bedside rounds with nursing staff today  Discussions with Specialists or Other Care Team Provider: none    Education and Discussions with Family / Patient: Patient declined call to   Time Spent for Care: 45 minutes  More than 50% of total time spent on counseling and coordination of care as described above      Current Length of Stay: 25 day(s)  Current Patient Status: Inpatient   Certification Statement: The patient will continue to require additional inpatient hospital stay due to completing IV abx for bacteremia  Discharge Plan: Anticipate discharge in >72 hrs to home  Code Status: Level 1 - Full Code    Subjective:   Patient reports right hip and knee pain especially when walking  Objective:     Vitals:   Temp (24hrs), Av 9 °F (36 6 °C), Min:97 5 °F (36 4 °C), Max:98 2 °F (36 8 °C)    Temp:  [97 5 °F (36 4 °C)-98 2 °F (36 8 °C)] 98 2 °F (36 8 °C)  HR:  [110-116] 116  Resp:  [14] 14  BP: (131-138)/(92-99) 131/92  SpO2:  [95 %-97 %] 95 %  Body mass index is 27 67 kg/m²  Input and Output Summary (last 24 hours): Intake/Output Summary (Last 24 hours) at 2023 1112  Last data filed at 2023 1007  Gross per 24 hour   Intake 1140 ml   Output --   Net 1140 ml       Physical Exam:   Physical Exam  Vitals and nursing note reviewed  Constitutional:       General: He is not in acute distress  Appearance: He is not ill-appearing  HENT:      Head: Normocephalic and atraumatic  Cardiovascular:      Rate and Rhythm: Normal rate and regular rhythm  Pulses: Normal pulses  Heart sounds: Normal heart sounds  Pulmonary:      Effort: Pulmonary effort is normal       Breath sounds: Normal breath sounds  Abdominal:      General: Bowel sounds are normal       Palpations: Abdomen is soft  Tenderness: There is no abdominal tenderness  There is no guarding or rebound  Musculoskeletal:      Right lower leg: No edema  Left lower leg: No edema  Skin:     General: Skin is warm and dry  Neurological:      Mental Status: He is alert     Psychiatric:         Mood and Affect: Mood normal          Behavior: Behavior normal           Additional Data:     Labs:  Results from last 7 days   Lab Units 23  0510 23  0544   WBC Thousand/uL 20 23* 15 08*   HEMOGLOBIN g/dL 11 9* 10 3*   HEMATOCRIT % 36 2* 31 1*   PLATELETS Thousands/uL 221 319   LYMPHO PCT %  --  9*   MONO PCT %  --  11   EOS PCT %  --  0     Results from last 7 days   Lab Units 02/09/23  0510   SODIUM mmol/L 137   POTASSIUM mmol/L 4 8   CHLORIDE mmol/L 101   CO2 mmol/L 30   BUN mg/dL 32*   CREATININE mg/dL 0 59*   ANION GAP mmol/L 6   CALCIUM mg/dL 7 9*   ALBUMIN g/dL 3 4*   TOTAL BILIRUBIN mg/dL 0 49   ALK PHOS U/L 40   ALT U/L 9   AST U/L 9*   GLUCOSE RANDOM mg/dL 113                 Results from last 7 days   Lab Units 02/07/23  0544   PROCALCITONIN ng/ml 0 09       Lines/Drains:  Invasive Devices     Peripherally Inserted Central Catheter Line  Duration           PICC Line 01/27/23 Right Other (Comment) 15 days                Central Line:  Goal for removal: Will discontinue when meds requiring line are completed               Imaging: Reviewed radiology reports from this admission including: CT right leg    Recent Cultures (last 7 days):         Last 24 Hours Medication List:   Current Facility-Administered Medications   Medication Dose Route Frequency Provider Last Rate   • acetaminophen  650 mg Oral Q6H [address] David Diallo MD     • aluminum-magnesium hydroxide-simethicone  30 mL Oral Q4H PRN Mateo Moore MD     • buprenorphine-naloxone  4 mg Sublingual 4x Daily Tyra Vega MD     • cefazolin  2,000 mg Intravenous Q8H Vani Seals MD 2,000 mg (02/12/23 0752)   • [START ON 2/13/2023] dexamethasone  2 mg Oral Q12H [address] Gisele Shook PA-C     • dexamethasone  3 mg Oral Q12H [address] Gisele Shook PA-C     • diphenhydrAMINE  25 mg Oral Q6H PRN Gisele Shook PA-C     • enoxaparin  40 mg Subcutaneous Daily Abner Gasca MD     • famotidine  20 mg Oral BID Moi Gillette PA-C     • gabapentin  400 mg Oral TID Gisele Shook PA-C     • methocarbamol  750 mg Oral [facility] Gisele Shook PA-C     • morphine  60 mg Oral Q6H PRN Gisele Shook PA-C     • morphine injection  8 mg Intravenous Q6H PRN Glen Cifuentes MD     • naloxone  0 1 mg Intravenous PRN Glen Cifuentes MD     • nicotine  14 mg Transdermal Daily Abner Gasca MD     • ondansetron  4 mg Oral Q6H PRN Tyra PA Jesenia Cleveland MD     • phenol  1 spray Mouth/Throat Q2H PRN Lesley Donnelly PA-C     • QUEtiapine  150 mg Oral HS Lesley Donnelly PA-C          Today, Patient Was Seen By: Lesley Donnelly PA-C    **Please Note: This note may have been constructed using a voice recognition system  **

## 2023-02-13 LAB
ALBUMIN SERPL BCP-MCNC: 3.6 G/DL (ref 3.5–5)
ALP SERPL-CCNC: 47 U/L (ref 34–104)
ALT SERPL W P-5'-P-CCNC: 51 U/L (ref 7–52)
ANION GAP SERPL CALCULATED.3IONS-SCNC: 8 MMOL/L (ref 4–13)
ANISOCYTOSIS BLD QL SMEAR: PRESENT
AST SERPL W P-5'-P-CCNC: 23 U/L (ref 13–39)
BASOPHILS # BLD MANUAL: 0 THOUSAND/UL (ref 0–0.1)
BASOPHILS NFR MAR MANUAL: 0 % (ref 0–1)
BILIRUB SERPL-MCNC: 0.45 MG/DL (ref 0.2–1)
BUN SERPL-MCNC: 27 MG/DL (ref 5–25)
CALCIUM SERPL-MCNC: 8.4 MG/DL (ref 8.4–10.2)
CHLORIDE SERPL-SCNC: 98 MMOL/L (ref 96–108)
CO2 SERPL-SCNC: 28 MMOL/L (ref 21–32)
CREAT SERPL-MCNC: 0.76 MG/DL (ref 0.6–1.3)
CRP SERPL QL: 86.6 MG/L
EOSINOPHIL # BLD MANUAL: 0 THOUSAND/UL (ref 0–0.4)
EOSINOPHIL NFR BLD MANUAL: 0 % (ref 0–6)
ERYTHROCYTE [DISTWIDTH] IN BLOOD BY AUTOMATED COUNT: 15.9 % (ref 11.6–15.1)
ERYTHROCYTE [SEDIMENTATION RATE] IN BLOOD: 13 MM/HOUR (ref 0–14)
GFR SERPL CREATININE-BSD FRML MDRD: 116 ML/MIN/1.73SQ M
GLUCOSE SERPL-MCNC: 124 MG/DL (ref 65–140)
HCT VFR BLD AUTO: 35.6 % (ref 36.5–49.3)
HGB BLD-MCNC: 11.6 G/DL (ref 12–17)
LG PLATELETS BLD QL SMEAR: PRESENT
LYMPHOCYTES # BLD AUTO: 1.35 THOUSAND/UL (ref 0.6–4.47)
LYMPHOCYTES # BLD AUTO: 8 % (ref 14–44)
MCH RBC QN AUTO: 29.1 PG (ref 26.8–34.3)
MCHC RBC AUTO-ENTMCNC: 32.6 G/DL (ref 31.4–37.4)
MCV RBC AUTO: 89 FL (ref 82–98)
METAMYELOCYTES NFR BLD MANUAL: 2 % (ref 0–1)
MONOCYTES # BLD AUTO: 1.18 THOUSAND/UL (ref 0–1.22)
MONOCYTES NFR BLD: 7 % (ref 4–12)
MYELOCYTES NFR BLD MANUAL: 1 % (ref 0–1)
NEUTROPHILS # BLD MANUAL: 13.86 THOUSAND/UL (ref 1.85–7.62)
NEUTS BAND NFR BLD MANUAL: 1 % (ref 0–8)
NEUTS SEG NFR BLD AUTO: 81 % (ref 43–75)
NRBC BLD AUTO-RTO: 1 /100 WBC (ref 0–2)
PLATELET # BLD AUTO: 70 THOUSANDS/UL (ref 149–390)
PLATELET BLD QL SMEAR: ABNORMAL
PMV BLD AUTO: 11.4 FL (ref 8.9–12.7)
POTASSIUM SERPL-SCNC: 4.6 MMOL/L (ref 3.5–5.3)
PROT SERPL-MCNC: 6.4 G/DL (ref 6.4–8.4)
RBC # BLD AUTO: 3.99 MILLION/UL (ref 3.88–5.62)
SODIUM SERPL-SCNC: 134 MMOL/L (ref 135–147)
WBC # BLD AUTO: 16.9 THOUSAND/UL (ref 4.31–10.16)

## 2023-02-13 RX ORDER — METHOCARBAMOL 500 MG/1
1000 TABLET, FILM COATED ORAL EVERY 8 HOURS SCHEDULED
Status: DISCONTINUED | OUTPATIENT
Start: 2023-02-13 | End: 2023-02-17 | Stop reason: HOSPADM

## 2023-02-13 RX ORDER — GABAPENTIN 300 MG/1
600 CAPSULE ORAL 3 TIMES DAILY
Status: DISCONTINUED | OUTPATIENT
Start: 2023-02-13 | End: 2023-02-17 | Stop reason: HOSPADM

## 2023-02-13 RX ORDER — MORPHINE SULFATE 10 MG/ML
6 INJECTION, SOLUTION INTRAMUSCULAR; INTRAVENOUS EVERY 6 HOURS PRN
Status: DISCONTINUED | OUTPATIENT
Start: 2023-02-13 | End: 2023-02-17 | Stop reason: HOSPADM

## 2023-02-13 RX ORDER — MORPHINE SULFATE 15 MG/1
45 TABLET ORAL EVERY 6 HOURS PRN
Status: DISCONTINUED | OUTPATIENT
Start: 2023-02-13 | End: 2023-02-17 | Stop reason: HOSPADM

## 2023-02-13 RX ORDER — KETOROLAC TROMETHAMINE 30 MG/ML
30 INJECTION, SOLUTION INTRAMUSCULAR; INTRAVENOUS EVERY 6 HOURS SCHEDULED
Status: DISCONTINUED | OUTPATIENT
Start: 2023-02-13 | End: 2023-02-13

## 2023-02-13 RX ORDER — KETOROLAC TROMETHAMINE 10 MG/1
10 TABLET, FILM COATED ORAL EVERY 6 HOURS
Status: DISCONTINUED | OUTPATIENT
Start: 2023-02-13 | End: 2023-02-15

## 2023-02-13 RX ADMIN — ACETAMINOPHEN 650 MG: 325 TABLET, FILM COATED ORAL at 00:00

## 2023-02-13 RX ADMIN — ACETAMINOPHEN 650 MG: 325 TABLET, FILM COATED ORAL at 11:56

## 2023-02-13 RX ADMIN — MORPHINE SULFATE 60 MG: 15 TABLET ORAL at 09:26

## 2023-02-13 RX ADMIN — GABAPENTIN 600 MG: 300 CAPSULE ORAL at 17:24

## 2023-02-13 RX ADMIN — MORPHINE SULFATE 8 MG: 10 INJECTION INTRAVENOUS at 04:49

## 2023-02-13 RX ADMIN — BUPRENORPHINE AND NALOXONE 4 MG: 2; .5 FILM BUCCAL; SUBLINGUAL at 21:38

## 2023-02-13 RX ADMIN — MORPHINE SULFATE 45 MG: 15 TABLET ORAL at 21:43

## 2023-02-13 RX ADMIN — MORPHINE SULFATE 6 MG: 10 INJECTION INTRAVENOUS at 17:33

## 2023-02-13 RX ADMIN — MORPHINE SULFATE 6 MG: 10 INJECTION INTRAVENOUS at 11:09

## 2023-02-13 RX ADMIN — ACETAMINOPHEN 650 MG: 325 TABLET, FILM COATED ORAL at 17:24

## 2023-02-13 RX ADMIN — DEXAMETHASONE 3 MG: 0.5 TABLET ORAL at 21:38

## 2023-02-13 RX ADMIN — BUPRENORPHINE AND NALOXONE 4 MG: 2; .5 FILM BUCCAL; SUBLINGUAL at 09:26

## 2023-02-13 RX ADMIN — GABAPENTIN 400 MG: 400 CAPSULE ORAL at 09:26

## 2023-02-13 RX ADMIN — VANCOMYCIN HYDROCHLORIDE 2000 MG: 5 INJECTION, POWDER, LYOPHILIZED, FOR SOLUTION INTRAVENOUS at 11:56

## 2023-02-13 RX ADMIN — CEFAZOLIN SODIUM 2000 MG: 2 SOLUTION INTRAVENOUS at 05:49

## 2023-02-13 RX ADMIN — FAMOTIDINE 20 MG: 20 TABLET ORAL at 17:24

## 2023-02-13 RX ADMIN — GABAPENTIN 600 MG: 300 CAPSULE ORAL at 21:38

## 2023-02-13 RX ADMIN — BUPRENORPHINE AND NALOXONE 4 MG: 2; .5 FILM BUCCAL; SUBLINGUAL at 11:56

## 2023-02-13 RX ADMIN — METHOCARBAMOL 750 MG: 500 TABLET ORAL at 05:49

## 2023-02-13 RX ADMIN — MORPHINE SULFATE 6 MG: 10 INJECTION INTRAVENOUS at 23:49

## 2023-02-13 RX ADMIN — BUPRENORPHINE AND NALOXONE 4 MG: 2; .5 FILM BUCCAL; SUBLINGUAL at 17:24

## 2023-02-13 RX ADMIN — FAMOTIDINE 20 MG: 20 TABLET ORAL at 09:26

## 2023-02-13 RX ADMIN — VANCOMYCIN HYDROCHLORIDE 1500 MG: 750 INJECTION, POWDER, LYOPHILIZED, FOR SOLUTION INTRAVENOUS at 23:46

## 2023-02-13 RX ADMIN — ACETAMINOPHEN 650 MG: 325 TABLET, FILM COATED ORAL at 05:49

## 2023-02-13 RX ADMIN — MORPHINE SULFATE 60 MG: 15 TABLET ORAL at 03:23

## 2023-02-13 RX ADMIN — ACETAMINOPHEN 650 MG: 325 TABLET, FILM COATED ORAL at 23:47

## 2023-02-13 RX ADMIN — MORPHINE SULFATE 45 MG: 15 TABLET ORAL at 15:23

## 2023-02-13 RX ADMIN — METHOCARBAMOL 1000 MG: 500 TABLET ORAL at 13:50

## 2023-02-13 RX ADMIN — QUETIAPINE FUMARATE 150 MG: 100 TABLET ORAL at 21:38

## 2023-02-13 RX ADMIN — KETOROLAC TROMETHAMINE 10 MG: 10 TABLET, FILM COATED ORAL at 21:37

## 2023-02-13 RX ADMIN — DEXAMETHASONE 2 MG: 0.5 TABLET ORAL at 09:26

## 2023-02-13 RX ADMIN — KETOROLAC TROMETHAMINE 30 MG: 30 INJECTION, SOLUTION INTRAMUSCULAR at 13:49

## 2023-02-13 RX ADMIN — METHOCARBAMOL 1000 MG: 500 TABLET ORAL at 21:37

## 2023-02-13 NOTE — UTILIZATION REVIEW
Continued Stay Review    Date:         2-11-23               Current Patient Class:  Inpatient  Current Level of Care: med sug    HPI:38 y o  male initially admitted on 1-18-23 MSSA BACTEREMIA ,History of IV drug abuse  Assessment/Plan:     Continue iv vancomycin  ending 2-16-23  History of Iv drug abuse  Add oral decadron q8 hr for right sided low back pain and right sided sciatic pain    Also added  po mso4 q4 hr as needed prn q6 hr with decreasing doses to gabpentin, and robaxin and iv toradol scheduled 16 hr            Vital Signs:     Date/Time Temp Pulse Resp BP MAP (mmHg) SpO2   02/11/23 14:21:22 97 5 °F (36 4 °C) 110   Abnormal  14 138/99 112 97 %   02/11/23 07:33:44 97 9 °F (36 6 °C) 94 15 127/87 100 94 %             Pertinent Labs/Diagnostic Results:   Results from last 7 days   Lab Units 02/09/23  1116   SARS-COV-2  Negative     Results from last 7 days   Lab Units 02/13/23  0450 02/09/23  0510 02/07/23  0544   WBC Thousand/uL 16 90* 20 23* 15 08*   HEMOGLOBIN g/dL 11 6* 11 9* 10 3*   HEMATOCRIT % 35 6* 36 2* 31 1*   PLATELETS Thousands/uL 70* 221 319   BANDS PCT % 1  --   --          Results from last 7 days   Lab Units 02/13/23 0450 02/09/23  0510 02/07/23  0544   SODIUM mmol/L 134* 137 134*   POTASSIUM mmol/L 4 6 4 8 4 1   CHLORIDE mmol/L 98 101 98   CO2 mmol/L 28 30 28   ANION GAP mmol/L 8 6 8   BUN mg/dL 27* 32* 27*   CREATININE mg/dL 0 76 0 59* 0 57*   EGFR ml/min/1 73sq m 116 129 131   CALCIUM mg/dL 8 4 7 9* 7 7*   MAGNESIUM mg/dL  --  2 2  --      Results from last 7 days   Lab Units 02/13/23  0450 02/09/23  0510 02/07/23  0544   AST U/L 23 9* 9*   ALT U/L 51 9 9   ALK PHOS U/L 47 40 35   TOTAL PROTEIN g/dL 6 4 5 8* 5 5*   ALBUMIN g/dL 3 6 3 4* 3 3*   TOTAL BILIRUBIN mg/dL 0 45 0 49 0 23         Results from last 7 days   Lab Units 02/13/23  0450 02/09/23  0510 02/07/23  0544   GLUCOSE RANDOM mg/dL 124 113 130         Results from last 7 days   Lab Units 02/07/23  0544   PROCALCITONIN ng/ml 0 09       Results from last 7 days   Lab Units 02/13/23  0450 02/09/23  0510 02/07/23  0544   CRP mg/L 86 6* 10 0*  --    SED RATE mm/hour 13 7 6       Results from last 7 days   Lab Units 02/09/23  1116   INFLUENZA A PCR  Negative   INFLUENZA B PCR  Negative   RSV PCR  Negative     Scheduled Medications:    acetaminophen, 650 mg, Oral, Q6H HERMAN  buprenorphine-naloxone, 4 mg, Sublingual, 4x Daily  dexamethasone, 3 mg, Oral, Q12H HERMAN  famotidine, 20 mg, Oral, BID  gabapentin, 600 mg, Oral, TID  ketorolac, 30 mg, Intravenous, Q6H HERMAN  methocarbamol, 1,000 mg, Oral, Q8H HERMAN  nicotine, 14 mg, Transdermal, Daily  QUEtiapine, 150 mg, Oral, HS  vancomycin, 1,500 mg, Intravenous, Q12H      Continuous IV Infusions:     PRN Meds:  aluminum-magnesium hydroxide-simethicone, 30 mL, Oral, Q4H PRN  diphenhydrAMINE, 25 mg, Oral, Q6H PRN  morphine, 45 mg, Oral, Q6H PRN  morphine injection, 6 mg, Intravenous, Q6H PRN  naloxone, 0 1 mg, Intravenous, PRN  ondansetron, 4 mg, Oral, Q6H PRN  phenol, 1 spray, Mouth/Throat, Q2H PRN        Discharge Plan: to be determined    Network Utilization Review Department  ATTENTION: Please call with any questions or concerns to [phone] and carefully listen to the prompts so that you are directed to the right person  All voicemails are confidential   Jossue May all requests for admission clinical reviews, approved or denied determinations and any other requests to dedicated fax number below belonging to the campus where the patient is receiving treatment   List of dedicated fax numbers for the Facilities:  [address] [place] [address] DENIALS (Administrative/Medical Necessity) [phone]   [address] [place] [address] (Maternity/NICU/Pediatrics) [phone]   [address] [phone]   [facility] [phone]   [facility] [phone]   [address] [place] [facility] [phone]   [address] [place] [address] [id number] [place] [address] 28 [address] [facility] 310 [facility] [place] [address] [phone]

## 2023-02-13 NOTE — PLAN OF CARE
Problem: PAIN - ADULT  Goal: Verbalizes/displays adequate comfort level or baseline comfort level  Description: Interventions:  - Encourage patient to monitor pain and request assistance  - Assess pain using appropriate pain scale (0-10 pain scale)  - Administer analgesics based on type and severity of pain and evaluate response  - Implement non-pharmacological measures as appropriate and evaluate response  - Consider cultural and social influences on pain and pain management  - Notify physician/advanced practitioner if interventions unsuccessful or patient reports new pain  Outcome: Progressing     Problem: INFECTION - ADULT  Goal: Absence or prevention of progression during hospitalization  Description: INTERVENTIONS:  - Assess and monitor for signs and symptoms of infection  - Monitor lab/diagnostic results  - Administer medications as ordered  - Instruct and encourage patient and family to use good hand hygiene technique  Outcome: Progressing     Problem: DISCHARGE PLANNING  Goal: Discharge to home or other facility with appropriate resources  Description: INTERVENTIONS:  - Identify barriers to discharge w/patient and caregiver  - Arrange for needed discharge resources and transportation as appropriate  - Identify discharge learning needs (meds, wound care, etc )  - Refer to Case Management Department for coordinating discharge planning if the patient needs post-hospital services based on physician/advanced practitioner order or complex needs related to functional status, cognitive ability, or social support system  Outcome: Progressing     Problem: Knowledge Deficit  Goal: Patient/family/caregiver demonstrates understanding of disease process, treatment plan, medications, and discharge instructions  Description: Complete learning assessment and assess knowledge base    Interventions:  - Provide teaching at level of understanding  - Provide teaching via preferred learning methods  Outcome: Progressing Problem: MUSCULOSKELETAL - ADULT  Goal: Maintain or return mobility to safest level of function  Description: INTERVENTIONS:  - Assess patient's ability to carry out ADLs; (independent)  - Assess/evaluate cause of self-care deficits (limited movement, pain, crutches/walker)  - Assess range of motion  - Assess patient's mobility (modified independent)  - Assess patient's need for assistive devices and provide as appropriate  - Encourage maximum independence but intervene and supervise when necessary  - Involve family in performance of ADLs  - Assess for home care needs following discharge   - Consider OT consult to assist with ADL evaluation and planning for discharge  - Provide patient education as appropriate  Outcome: Progressing  Goal: Maintain proper alignment of affected body part  Description: INTERVENTIONS:  - Support, maintain and protect limb and body alignment  - Provide patient/ family with appropriate education  Outcome: Progressing

## 2023-02-13 NOTE — ASSESSMENT & PLAN NOTE
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  Last use - IV,1 bag of fentanyl, at 12pm on 1/18/23  Patient started on Suboxone upon admission, maintenance dose 16 mg daily    Plan:  · Patient has scheduled follow-up with Dr Jessa Jolly in [place] at 2pm on 2/22/23   Pt discharge planned for Friday 2/17/23 - he will need to obtain a prescription for Suboxone from his doctor as we will be unable to provide a prescription for Suboxone - we will have to call the office prior to discharge to set this up  · Suboxone 4mg QID   · Seroquel 100 mg at night for sleep --> increase to 150  · Zofran as needed for nausea

## 2023-02-13 NOTE — PROGRESS NOTES
Carloz Moreno is a 41 y o  male who is currently ordered Vancomycin IV with management by the Pharmacy Consult service  Relevant clinical data and objective / subjective history reviewed  Vancomycin Assessment:  Indication and Goal AUC/Trough: Bacteremia (goal -600, trough >10), -600, trough >10  Clinical Status: stable  Micro:   pending  Renal Function:  SCr: 0 76 mg/dL  CrCl: 146 1 mL/min  Renal replacement: Not on dialysis  Days of Therapy: 1 restart  Current Dose: 2000mg once  Vancomycin Plan:  New Dosinmg q12h  Estimated AUC: 467 mcg*hr/mL  Estimated Trough: 14 6 mcg/mL  Next Level: 02/15/23 0600  Renal Function Monitoring: Daily BMP and UOP  Pharmacy will continue to follow closely for s/sx of nephrotoxicity, infusion reactions and appropriateness of therapy  BMP and CBC will be ordered per protocol  We will continue to follow the patient’s culture results and clinical progress daily      Ngoc Lowe, Pharmacist

## 2023-02-13 NOTE — ASSESSMENT & PLAN NOTE
· Blood cultures positive for MSSA on 1/16/23, repeat blood cultures on 1/20/23 negative  · History of IV drug abuse  · CECILIO negative for vegetations MRI negative for osteomyelitis  · ID recommendations: IV Ancef 2g Q8H ending 2/16/2023, PICC line placed 1/27, patient unable to return home with PICC line in place due to IV drug abuse  Case management unable to find placement    Patient will stay as [facility] inpatient until 2/16/2023 for last dose, then likely discharge 2/17  · ID possibly extending abx with PO after discharge due to elevated CRP  · PICC MUST be removed prior to discharge

## 2023-02-13 NOTE — ASSESSMENT & PLAN NOTE
· Right knee pain since fall 2 weeks ago  · Unremarkable exam and no acute findings on x-ray of the knee  · Continue pain regimen  · Continue PT OT  · Declined knee brace  · Now having hip pain on same side  Will get CT w/wo contrast right hip and knee - mild edema noted   Based on patient's description of hip pain he may be experiencing sacroillitis will resume on toradol to help reduce inflammation

## 2023-02-13 NOTE — PROGRESS NOTES
[address]  Progress Note - Cm  1985, 41 y o  male MRN: [id number]  Unit/Bed#: 502-93 Encounter: [id number]  Primary Care Provider: Juana Neal DO   Date and time admitted to hospital: 2023  5:14 PM    * Bacteremia due to methicillin susceptible Staphylococcus aureus (MSSA)  Assessment & Plan  · Blood cultures positive for MSSA on 23, repeat blood cultures on 23 negative  · History of IV drug abuse  · CECILIO negative for vegetations MRI negative for osteomyelitis  · ID recommendations: IV Ancef 2g Q8H ending 2023, PICC line placed , patient unable to return home with PICC line in place due to IV drug abuse  Case management unable to find placement  Patient will stay as [facility] inpatient until 2023 for last dose, then likely discharge   · ID possibly extending abx with PO after discharge due to elevated CRP  · PICC MUST be removed prior to discharge        Acute right-sided low back pain with right-sided sciatica  Assessment & Plan  · H/o right sided back pain with sciatica  · Intermittent severe right hip pain, radiates into right lateral hip & buttocks  Patient unable to ambulate without walker currently  · CT lumbar - Mild multilevel disc degeneration  Grade 1 retrolisthesis of L5 on S1  Disc bulge with small herniation at L5-S1  Disc bulging from L2-3 through L4-5  MRI w/o contrast -  Degenerative changes at L5-S1  · Discussed case with neurosurgery  · no transfer or surgical intervention at this time      · Weaning oral decadron 2 mg q12hr for two days, daily for three days, then discontinue - with rising CRP and increase in back pain now back to 3 mg BID  · Dr Cassandra Stacy requesting x-ray of lumbar spine with 6 views extension flexion completed 2 days prior to discharge (2/15) - I have messaged their office with assistance for getting an appt  · Pain regimen ordered; goal to wean off opiate medications starting on , will not provide on discharge  · Tylenol 650mg Q6H   · Toradol 10mg Q6H   · Morphine 60mg PO every 4H as needed + Morphine 8mg IV prn for breakthrough pain  · 2/12 - morphine 60 mg PO q6 + morphine 8 mg IV q6  · 2/13 - morphine 45 mg PO q6 + morphine 6 mg IV q6  · 2/14 - morphine 30 mg PO q6 + morphine 4 mg IV q6  · 2/15 - morphine 15 mg q6 + morphine 2 mg IV q6  · Gabapentin 600 mg TID  · Robaxin 1000 mg TID    NOTE patient thinks he may still require 15 mg PO morphine on discharge    Acute pain of right knee  Assessment & Plan  · Right knee pain since fall 2 weeks ago  · Unremarkable exam and no acute findings on x-ray of the knee  · Continue pain regimen  · Continue PT OT  · Declined knee brace  · Now having hip pain on same side  Will get CT w/wo contrast right hip and knee - mild edema noted  Based on patient's description of hip pain he may be experiencing sacroillitis will resume on toradol to help reduce inflammation    Ambulatory dysfunction  Assessment & Plan  · In setting of acute back and knee pain  · Utilizing walker currently, patient hopes to be discharged home with utilization of crutches - patient requesting both crutches and walker on discharge  · Patient previously refused PT/OT multiple times, now with improved compliance  · For now recommendations are for Outpatient PT/OT    Intravenous drug abuse, continuous ([address] )  Assessment & Plan  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  Last use - IV,1 bag of fentanyl, at 12pm on 1/18/23  Patient started on Suboxone upon admission, maintenance dose 16 mg daily    Plan:  · Patient has scheduled follow-up with Dr Craig Leon in [place] at 2pm on 2/22/23   Pt discharge planned for Friday 2/17/23 - he will need to obtain a prescription for Suboxone from his doctor as we will be unable to provide a prescription for Suboxone - we will have to call the office prior to discharge to set this up  · Suboxone 4mg QID   · Seroquel 100 mg at night for sleep --> increase to 150  · Zofran as needed for nausea    Tobacco abuse  Assessment & Plan  · Nicotine patch ordered  · Smoking cessation encouraged        VTE Pharmacologic Prophylaxis: VTE Score: 2 Low Risk (Score 0-2) - Encourage Ambulation  Patient Centered Rounds: I performed bedside rounds with nursing staff today  Discussions with Specialists or Other Care Team Provider: case management, ID    Education and Discussions with Family / Patient: Patient declined call to   Time Spent for Care: 45 minutes  More than 50% of total time spent on counseling and coordination of care as described above  Current Length of Stay: 26 day(s)  Current Patient Status: Inpatient   Certification Statement: The patient will continue to require additional inpatient hospital stay due to IV abx, pain control taper  Discharge Plan: Anticipate discharge in >72 hrs to home  Code Status: Level 1 - Full Code    Subjective:   Patient reports worsening mid back pain and continued right hip pain with ambulation    Objective:     Vitals:   Temp (24hrs), Av 1 °F (36 7 °C), Min:98 1 °F (36 7 °C), Max:98 1 °F (36 7 °C)    Temp:  [98 1 °F (36 7 °C)] 98 1 °F (36 7 °C)  HR:  [110] 110  Resp:  [19] 19  BP: (131)/(92) 131/92  SpO2:  [95 %] 95 %  Body mass index is 27 67 kg/m²  Input and Output Summary (last 24 hours): Intake/Output Summary (Last 24 hours) at 2023 1458  Last data filed at 2023 0839  Gross per 24 hour   Intake 360 ml   Output --   Net 360 ml       Physical Exam:   Physical Exam  Vitals and nursing note reviewed  Constitutional:       General: He is not in acute distress  HENT:      Head: Normocephalic and atraumatic  Cardiovascular:      Rate and Rhythm: Normal rate and regular rhythm  Pulses: Normal pulses  Heart sounds: Normal heart sounds  Pulmonary:      Effort: Pulmonary effort is normal       Breath sounds: Normal breath sounds     Abdominal:      General: Bowel sounds are normal  Palpations: Abdomen is soft  Musculoskeletal:         General: Tenderness (mid thoracic tenderness) present  Right lower leg: No edema  Left lower leg: No edema  Skin:     General: Skin is warm and dry  Neurological:      General: No focal deficit present  Mental Status: He is alert and oriented to person, place, and time  Psychiatric:         Mood and Affect: Mood normal          Behavior: Behavior normal           Additional Data:     Labs:  Results from last 7 days   Lab Units 02/13/23  0450   WBC Thousand/uL 16 90*   HEMOGLOBIN g/dL 11 6*   HEMATOCRIT % 35 6*   PLATELETS Thousands/uL 70*   BANDS PCT % 1   LYMPHO PCT % 8*   MONO PCT % 7   EOS PCT % 0     Results from last 7 days   Lab Units 02/13/23  0450   SODIUM mmol/L 134*   POTASSIUM mmol/L 4 6   CHLORIDE mmol/L 98   CO2 mmol/L 28   BUN mg/dL 27*   CREATININE mg/dL 0 76   ANION GAP mmol/L 8   CALCIUM mg/dL 8 4   ALBUMIN g/dL 3 6   TOTAL BILIRUBIN mg/dL 0 45   ALK PHOS U/L 47   ALT U/L 51   AST U/L 23   GLUCOSE RANDOM mg/dL 124                 Results from last 7 days   Lab Units 02/07/23  0544   PROCALCITONIN ng/ml 0 09       Lines/Drains:  Invasive Devices     Peripherally Inserted Central Catheter Line  Duration           PICC Line 01/27/23 Right Other (Comment) 16 days                Central Line:  Goal for removal: Will discontinue when meds requiring line are completed               Imaging: Reviewed radiology reports from this admission including: CT right leg    Recent Cultures (last 7 days):         Last 24 Hours Medication List:   Current Facility-Administered Medications   Medication Dose Route Frequency Provider Last Rate   • acetaminophen  650 mg Oral Q6H [address] Main Mg MD     • aluminum-magnesium hydroxide-simethicone  30 mL Oral Q4H PRN Sharda Salaazr MD     • buprenorphine-naloxone  4 mg Sublingual 4x Daily Tyra Rossi MD     • dexamethasone  3 mg Oral Q12H [facility]     • diphenhydrAMINE  25 mg Oral Q6H PRN Ruth Echeverria PA-C     • famotidine  20 mg Oral BID Didi Kerr PA-C     • gabapentin  600 mg Oral TID Ruth Echeverria PA-C     • ketorolac  30 mg Intravenous Q6H [facility] Ruth Echeverria PA-C     • methocarbamol  1,000 mg Oral [facility] Ruth Echeverria PA-C     • morphine  45 mg Oral Q6H PRN Ruth Echeverria PA-C     • morphine injection  6 mg Intravenous Q6H PRN Ruth Echeverria PA-C     • naloxone  0 1 mg Intravenous PRN Tyra Mancera MD     • nicotine  14 mg Transdermal Daily Christine Vidal MD     • ondansetron  4 mg Oral Q6H PRN Tyra Mancera MD     • phenol  1 spray Mouth/Throat Q2H PRN Ruth Echeverria PA-C     • QUEtiapine  150 mg Oral HS Ruth Echeverria PA-C     • vancomycin  1,500 mg Intravenous Q12H Doroteo Lowry MD          Today, Patient Was Seen By: Ruth Echeverria PA-C    **Please Note: This note may have been constructed using a voice recognition system  **

## 2023-02-13 NOTE — ASSESSMENT & PLAN NOTE
· H/o right sided back pain with sciatica  · Intermittent severe right hip pain, radiates into right lateral hip & buttocks  Patient unable to ambulate without walker currently  · CT lumbar - Mild multilevel disc degeneration  Grade 1 retrolisthesis of L5 on S1  Disc bulge with small herniation at L5-S1  Disc bulging from L2-3 through L4-5  MRI w/o contrast -  Degenerative changes at L5-S1  · Discussed case with neurosurgery  · no transfer or surgical intervention at this time      · Weaning oral decadron 2 mg q12hr for two days, daily for three days, then discontinue - with rising CRP and increase in back pain now back to 3 mg BID  · Dr Berto Ramirez requesting x-ray of lumbar spine with 6 views extension flexion completed 2 days prior to discharge (2/15) - I have messaged their office with assistance for getting an appt  · Pain regimen ordered; goal to wean off opiate medications starting on 2/11, will not provide on discharge  · Tylenol 650mg Q6H   · Toradol 10mg Q6H   · Morphine 60mg PO every 4H as needed + Morphine 8mg IV prn for breakthrough pain  · 2/12 - morphine 60 mg PO q6 + morphine 8 mg IV q6  · 2/13 - morphine 45 mg PO q6 + morphine 6 mg IV q6  · 2/14 - morphine 30 mg PO q6 + morphine 4 mg IV q6  · 2/15 - morphine 15 mg q6 + morphine 2 mg IV q6  · Gabapentin 600 mg TID  · Robaxin 1000 mg TID    NOTE patient thinks he may still require 15 mg PO morphine on discharge

## 2023-02-13 NOTE — PROGRESS NOTES
REQUIRED DOCUMENTATION:     1  This service was provided via Telemedicine  2  Provider located at [address]  3  TeleMed provider: Michel Shone, MD   4  Identify all parties in room with patient during tele consult:  Patient, RN  5  After connecting through [employer], patient was identified by name and date of birth and assistant checked wristband  Patient was then informed that this was a Telemedicine visit and that the exam was being conducted confidentially over secure lines  Patient acknowledged consent and understanding of privacy and security of the Telemedicine visit, and gave us permission to have the assistant stay in the room in order to assist with the history and to conduct the exam   I informed the patient that I have reviewed their record in Epic and presented the opportunity for them to ask any questions regarding the visit today  The patient agreed to participate  TeleConsultation - Infectious Disease   Obi Hodges 41 y o  male MRN: [id number]  Unit/Bed#: 466-98 Encounter: [id number]      Impression/Recommendations:  1   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   TTE with possible small MV vegetation but CECILIO is without vegetation   Given underlying IVDU additionally, patient will be treated with a prolonged IV antibiotic course  Continue IV antibiotic, with changes below  Treat x4 weeks total IV antibiotic, through 2/16  Monitor temperature/WBC      2   Low back pain and right hip pain   This was present prior to bacteremia   Consider discitis/vertebral osteomyelitis given staff aureus bacteremia and elevated ESR   However, L-spine MRI without contrast did not show discitis or vertebral osteomyelitis   Patient could not tolerate MRI with contrast   Patient will be getting 4 weeks of IV antibiotic, as in above   ESR has normalized  However, CRP is increased today  Given persistently elevated CRP, will transition antibiotic to p o  after completion of IV antibiotic course  Patient will stay on p o  antibiotic until CRP normalizes  IV antibiotic plan as in above  Transition to p o  doxycycline after completion of IV antibiotic course  Monitor ESR and CRP monthly  Patient to remain on p o  doxycycline until both ESR and CRP normalize  Monitor low back pain  Pain control per primary service      3   Thrombocytopenia  This may be beta-lactam antibiotic toxicities  Will change antibiotic to IV vancomycin  We will monitor platelet count off beta-lactam antibiotic  Discontinue IV cefazolin  Substitute with IV vancomycin  Monitor platelet count  4  Diarrhea, resolved spontaneously after 24 hours  Monitor diarrhea  If diarrhea recurs, will check stool for C  difficile toxin      5  History of HCV infection   HCV PCR negative on admission      6   Leukocytosis   Patient is on dexamethasone   This is likely steroid effect   Clinically, there is no active infection   WBC stable  Monitor WBC      7  Active IVDU  Roxie Guerin is not a candidate for safe home IV antibiotic      Discussed with patient in detail regarding the above plan  Discussed with slim service      I spent 30 minutes in evaluation of the patient of which 15 minutes was in counseling/coordination of care    Antibiotics:  Cefazolin # 25  Day # 25 from clearance of bacteremia     Subjective:  Patient with stable back, knee and hip pain, worse with repeat  No leg weakness  Temperature stays down   No chills    He is tolerating antibiotic well   No nausea, vomiting or diarrhea      Objective:  Vitals:  Temp:  [97 8 °F (36 6 °C)-98 1 °F (36 7 °C)] 98 1 °F (36 7 °C)  HR:  [110-113] 110  Resp:  [14-19] 19  BP: (131-141)/(92-97) 131/92  SpO2:  [93 %-95 %] 95 %  Temp (24hrs), Av °F (36 7 °C), Min:97 8 °F (36 6 °C), Max:98 1 °F (36 7 °C)  Current: Temperature: 98 1 °F (36 7 °C)    Physical Exam:    Physical exam has been primarily done by the patient's nurse and/or the primary service due to limited examination abilities on telemedicine  General: Awake, alert, cooperative, no distress  Neck:  Supple  No mass  No lymphadenopathy  Lungs: Expansion symmetric, no rales, no wheezing, respirations unlabored  Heart:  Regular rate and rhythm, S1 and S2 normal, no murmur  Abdomen: Soft, nondistended, non-tender, bowel sounds active all four quadrants, no masses, no organomegaly  Extremities: No edema  No erythema/warmth  No ulcer  Nontender to palpation  Skin:  No rash  Neuro: Moves all extremities  Invasive Devices     Peripherally Inserted Central Catheter Line  Duration           PICC Line 01/27/23 Right Other (Comment) 16 days                Labs studies:   I have personally reviewed pertinent labs  Results from last 7 days   Lab Units 02/13/23  0450 02/09/23  0510 02/07/23  0544   POTASSIUM mmol/L 4 6 4 8 4 1   CHLORIDE mmol/L 98 101 98   CO2 mmol/L 28 30 28   BUN mg/dL 27* 32* 27*   CREATININE mg/dL 0 76 0 59* 0 57*   EGFR ml/min/1 73sq m 116 129 131   CALCIUM mg/dL 8 4 7 9* 7 7*   AST U/L 23 9* 9*   ALT U/L 51 9 9   ALK PHOS U/L 47 40 35     Results from last 7 days   Lab Units 02/13/23  0450 02/09/23  0510 02/07/23  0544   WBC Thousand/uL 16 90* 20 23* 15 08*   HEMOGLOBIN g/dL 11 6* 11 9* 10 3*   PLATELETS Thousands/uL 70* 221 319           Imaging Studies:   I have personally reviewed pertinent imaging study reports and images in PACS  EKG, Pathology, and Other Studies:   I have personally reviewed pertinent reports

## 2023-02-14 LAB
ALBUMIN SERPL BCP-MCNC: 3.1 G/DL (ref 3.5–5)
ALP SERPL-CCNC: 38 U/L (ref 34–104)
ALT SERPL W P-5'-P-CCNC: 43 U/L (ref 7–52)
ANION GAP SERPL CALCULATED.3IONS-SCNC: 7 MMOL/L (ref 4–13)
AST SERPL W P-5'-P-CCNC: 19 U/L (ref 13–39)
BASOPHILS # BLD AUTO: 0.02 THOUSANDS/ÂΜL (ref 0–0.1)
BASOPHILS NFR BLD AUTO: 0 % (ref 0–1)
BILIRUB SERPL-MCNC: 0.35 MG/DL (ref 0.2–1)
BUN SERPL-MCNC: 25 MG/DL (ref 5–25)
CALCIUM ALBUM COR SERPL-MCNC: 8.5 MG/DL (ref 8.3–10.1)
CALCIUM SERPL-MCNC: 7.8 MG/DL (ref 8.4–10.2)
CHLORIDE SERPL-SCNC: 102 MMOL/L (ref 96–108)
CO2 SERPL-SCNC: 27 MMOL/L (ref 21–32)
CREAT SERPL-MCNC: 0.46 MG/DL (ref 0.6–1.3)
CRP SERPL QL: 85.6 MG/L
EOSINOPHIL # BLD AUTO: 0.06 THOUSAND/ÂΜL (ref 0–0.61)
EOSINOPHIL NFR BLD AUTO: 1 % (ref 0–6)
ERYTHROCYTE [DISTWIDTH] IN BLOOD BY AUTOMATED COUNT: 15.9 % (ref 11.6–15.1)
ERYTHROCYTE [SEDIMENTATION RATE] IN BLOOD: 6 MM/HOUR (ref 0–14)
GFR SERPL CREATININE-BSD FRML MDRD: 143 ML/MIN/1.73SQ M
GLUCOSE SERPL-MCNC: 115 MG/DL (ref 65–140)
HCT VFR BLD AUTO: 30.8 % (ref 36.5–49.3)
HGB BLD-MCNC: 10.1 G/DL (ref 12–17)
IMM GRANULOCYTES # BLD AUTO: 0.37 THOUSAND/UL (ref 0–0.2)
IMM GRANULOCYTES NFR BLD AUTO: 4 % (ref 0–2)
LYMPHOCYTES # BLD AUTO: 0.97 THOUSANDS/ÂΜL (ref 0.6–4.47)
LYMPHOCYTES NFR BLD AUTO: 11 % (ref 14–44)
MCH RBC QN AUTO: 29.4 PG (ref 26.8–34.3)
MCHC RBC AUTO-ENTMCNC: 32.8 G/DL (ref 31.4–37.4)
MCV RBC AUTO: 90 FL (ref 82–98)
MONOCYTES # BLD AUTO: 1.08 THOUSAND/ÂΜL (ref 0.17–1.22)
MONOCYTES NFR BLD AUTO: 12 % (ref 4–12)
NEUTROPHILS # BLD AUTO: 6.37 THOUSANDS/ÂΜL (ref 1.85–7.62)
NEUTS SEG NFR BLD AUTO: 72 % (ref 43–75)
NRBC BLD AUTO-RTO: 0 /100 WBCS
PLATELET # BLD AUTO: 52 THOUSANDS/UL (ref 149–390)
PMV BLD AUTO: 11.3 FL (ref 8.9–12.7)
POTASSIUM SERPL-SCNC: 4.4 MMOL/L (ref 3.5–5.3)
PROT SERPL-MCNC: 5.6 G/DL (ref 6.4–8.4)
RBC # BLD AUTO: 3.44 MILLION/UL (ref 3.88–5.62)
SODIUM SERPL-SCNC: 136 MMOL/L (ref 135–147)
WBC # BLD AUTO: 8.87 THOUSAND/UL (ref 4.31–10.16)

## 2023-02-14 RX ADMIN — MORPHINE SULFATE 45 MG: 15 TABLET ORAL at 13:03

## 2023-02-14 RX ADMIN — BUPRENORPHINE AND NALOXONE 4 MG: 2; .5 FILM BUCCAL; SUBLINGUAL at 08:34

## 2023-02-14 RX ADMIN — BUPRENORPHINE AND NALOXONE 4 MG: 2; .5 FILM BUCCAL; SUBLINGUAL at 18:15

## 2023-02-14 RX ADMIN — METHOCARBAMOL 1000 MG: 500 TABLET ORAL at 06:44

## 2023-02-14 RX ADMIN — GABAPENTIN 600 MG: 300 CAPSULE ORAL at 08:33

## 2023-02-14 RX ADMIN — MORPHINE SULFATE 45 MG: 15 TABLET ORAL at 06:52

## 2023-02-14 RX ADMIN — DEXAMETHASONE 3 MG: 0.5 TABLET ORAL at 08:33

## 2023-02-14 RX ADMIN — FAMOTIDINE 20 MG: 20 TABLET ORAL at 18:15

## 2023-02-14 RX ADMIN — BUPRENORPHINE AND NALOXONE 4 MG: 2; .5 FILM BUCCAL; SUBLINGUAL at 13:02

## 2023-02-14 RX ADMIN — ACETAMINOPHEN 650 MG: 325 TABLET, FILM COATED ORAL at 18:15

## 2023-02-14 RX ADMIN — MORPHINE SULFATE 6 MG: 10 INJECTION INTRAVENOUS at 08:34

## 2023-02-14 RX ADMIN — VANCOMYCIN HYDROCHLORIDE 1250 MG: 5 INJECTION, POWDER, LYOPHILIZED, FOR SOLUTION INTRAVENOUS at 23:56

## 2023-02-14 RX ADMIN — GABAPENTIN 600 MG: 300 CAPSULE ORAL at 18:15

## 2023-02-14 RX ADMIN — MORPHINE SULFATE 45 MG: 15 TABLET ORAL at 19:36

## 2023-02-14 RX ADMIN — VANCOMYCIN HYDROCHLORIDE 1250 MG: 5 INJECTION, POWDER, LYOPHILIZED, FOR SOLUTION INTRAVENOUS at 13:02

## 2023-02-14 RX ADMIN — DEXAMETHASONE 3 MG: 0.5 TABLET ORAL at 20:46

## 2023-02-14 RX ADMIN — GABAPENTIN 600 MG: 300 CAPSULE ORAL at 20:46

## 2023-02-14 RX ADMIN — KETOROLAC TROMETHAMINE 10 MG: 10 TABLET, FILM COATED ORAL at 08:33

## 2023-02-14 RX ADMIN — ACETAMINOPHEN 650 MG: 325 TABLET, FILM COATED ORAL at 06:44

## 2023-02-14 RX ADMIN — METHOCARBAMOL 1000 MG: 500 TABLET ORAL at 14:40

## 2023-02-14 RX ADMIN — KETOROLAC TROMETHAMINE 10 MG: 10 TABLET, FILM COATED ORAL at 20:45

## 2023-02-14 RX ADMIN — ACETAMINOPHEN 650 MG: 325 TABLET, FILM COATED ORAL at 13:02

## 2023-02-14 RX ADMIN — FAMOTIDINE 20 MG: 20 TABLET ORAL at 08:33

## 2023-02-14 RX ADMIN — MORPHINE SULFATE 6 MG: 10 INJECTION INTRAVENOUS at 14:40

## 2023-02-14 RX ADMIN — MORPHINE SULFATE 6 MG: 10 INJECTION INTRAVENOUS at 20:45

## 2023-02-14 RX ADMIN — KETOROLAC TROMETHAMINE 10 MG: 10 TABLET, FILM COATED ORAL at 14:40

## 2023-02-14 NOTE — ASSESSMENT & PLAN NOTE
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  Last use - IV,1 bag of fentanyl, at 12pm on 1/18/23  Patient started on Suboxone upon admission, maintenance dose 16 mg daily    Plan:  · Patient has scheduled follow-up with Dr Godwin Fountain in [place] at 2pm on 2/22/23   Pt discharge planned for Friday 2/17/23 - he will need to obtain a prescription for Suboxone from his doctor as we will be unable to provide a prescription for Suboxone - we will have to call the office prior to discharge to set this up  · Suboxone 4mg QID   · Continue Seroquel

## 2023-02-14 NOTE — PROGRESS NOTES
Nova Mack is a 41 y o  male who is currently ordered Vancomycin IV with management by the Pharmacy Consult service  Relevant clinical data and objective / subjective history reviewed  Vancomycin Assessment:  Indication and Goal AUC/Trough: Bacteremia (goal -600, trough >10), -600, trough >10  Clinical Status: stable  Micro: MSSA became toxic on ancef    Renal Function:  SCr: 0 76 mg/dL  CrCl: 146 1 mL/min  Renal replacement: Not on dialysis  Days of Therapy: restrat day 2  Current Dose: 1500mg q12h  Vancomycin Plan:  New Dosinmg q12h  Estimated AUC: 465 mcg*hr/mL  Estimated Trough: 13 9 mcg/mL  Next Level: 02/15/23 0600  Renal Function Monitoring: Daily BMP and [place] will continue to follow closely for s/sx of nephrotoxicity, infusion reactions and appropriateness of therapy  BMP and CBC will be ordered per protocol  We will continue to follow the patient’s culture results and clinical progress daily      Lisa Gregory, Pharmacist

## 2023-02-14 NOTE — PROGRESS NOTES
[address] [facility]  Progress Note - Patsy Geronimo 1985, 41 y o  male MRN: [id number]  Unit/Bed#: 362-92 Encounter: [id number]  Primary Care Provider: Marilin Farrar DO   Date and time admitted to hospital: 1/18/2023  5:14 PM    * Bacteremia due to methicillin susceptible Staphylococcus aureus (MSSA)  Assessment & Plan  · Blood cultures positive for MSSA on 1/16/23, repeat blood cultures on 1/20/23 negative  · History of IV drug abuse  · CECILIO negative for vegetations MRI negative for osteomyelitis  · ID recommendations: IV Ancef 2g Q8H ending 2/16/2023, PICC line placed 1/27, patient unable to return home with PICC line in place due to IV drug abuse  Case management unable to find placement  · Remains on IV antibiotics thru 2/16 likely will require oral antibiotics on discharge  · Follow-up ID recs        Tobacco abuse  Assessment & Plan  · Smoking cessation counseling    Acute right-sided low back pain with right-sided sciatica  Assessment & Plan  · H/o right sided back pain with sciatica  · Intermittent severe right hip pain, radiates into right lateral hip & buttocks  Patient unable to ambulate without walker currently  · CT lumbar - Mild multilevel disc degeneration  Grade 1 retrolisthesis of L5 on S1  Disc bulge with small herniation at L5-S1  Disc bulging from L2-3 through L4-5  MRI w/o contrast -  Degenerative changes at L5-S1  · Discussed case with neurosurgery  · no transfer or surgical intervention at this time      · Weaning oral decadron 2 mg q12hr for two days, daily for three days, then discontinue - with rising CRP and increase in back pain now back to 3 mg BID  · Continue pain regimen      Ambulatory dysfunction  Assessment & Plan  · In setting of acute back and knee pain  · PT/OT recommending outpatient therapy    Intravenous drug abuse, continuous ([facility] [address] )  Assessment & Plan  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  Last use - IV,1 bag of fentanyl, at 12pm on 23  Patient started on Suboxone upon admission, maintenance dose 16 mg daily    Plan:  · Patient has scheduled follow-up with Dr Rita Reyes in [place] at 2pm on 23  Pt discharge planned for 23 - he will need to obtain a prescription for Suboxone from his doctor as we will be unable to provide a prescription for Suboxone - we will have to call the office prior to discharge to set this up  · Suboxone 4mg QID   · Continue Seroquel    Hepatitis C virus  Assessment & Plan  · Outpatient follow-up          VTE Pharmacologic Prophylaxis:   Pharmacologic: Heparin  Mechanical VTE Prophylaxis in Place: Yes    Patient Centered Rounds: I have performed bedside rounds with nursing staff today  Discussions with Specialists or Other Care Team Provider: cm, nursing    Education and Discussions with Family / Patient: pt    Time Spent for Care: 30 minutes  More than 50% of total time spent on counseling and coordination of care as described above  Current Length of Stay: 27 day(s)    Current Patient Status: Inpatient   Certification Statement: The patient will continue to require additional inpatient hospital stay due to see below    Discharge Plan: Still requiring IV antibiotics    Code Status: Level 1 - Full Code      Subjective:   Denies fevers, chills, cough, chest pain  Objective:     Vitals:   Temp (24hrs), Av 8 °F (36 6 °C), Min:97 7 °F (36 5 °C), Max:97 9 °F (36 6 °C)    Temp:  [97 7 °F (36 5 °C)-97 9 °F (36 6 °C)] 97 7 °F (36 5 °C)  HR:  [118] 118  Resp:  [15] 15  BP: ()/(45-90) 111/73  SpO2:  [91 %] 91 %  Body mass index is 27 67 kg/m²  Input and Output Summary (last 24 hours): Intake/Output Summary (Last 24 hours) at 2023 0747  Last data filed at 2023 1743  Gross per 24 hour   Intake 540 ml   Output --   Net 540 ml       Physical Exam:     Physical Exam  Constitutional:       General: He is not in acute distress  Appearance: He is well-developed   He is not diaphoretic  HENT:      Head: Normocephalic and atraumatic  Nose: Nose normal       Mouth/Throat:      Pharynx: No oropharyngeal exudate  Eyes:      General: No scleral icterus  Conjunctiva/sclera: Conjunctivae normal    Cardiovascular:      Rate and Rhythm: Normal rate and regular rhythm  Heart sounds: Normal heart sounds  No murmur heard  No friction rub  No gallop  Pulmonary:      Effort: Pulmonary effort is normal  No respiratory distress  Breath sounds: Normal breath sounds  No wheezing or rales  Chest:      Chest wall: No tenderness  Abdominal:      General: Bowel sounds are normal  There is no distension  Palpations: Abdomen is soft  Tenderness: There is no abdominal tenderness  There is no guarding  Musculoskeletal:         General: No tenderness or deformity  Normal range of motion  Cervical back: Normal range of motion and neck supple  Skin:     General: Skin is warm and dry  Findings: No erythema  Neurological:      Mental Status: He is alert  Mental status is at baseline  Additional Data:     Labs:    Results from last 7 days   Lab Units 02/14/23  0640 02/13/23  0450   WBC Thousand/uL 8 87 16 90*   HEMOGLOBIN g/dL 10 1* 11 6*   HEMATOCRIT % 30 8* 35 6*   PLATELETS Thousands/uL 52* 70*   BANDS PCT %  --  1   NEUTROS PCT % 72  --    LYMPHS PCT % 11*  --    LYMPHO PCT %  --  8*   MONOS PCT % 12  --    MONO PCT %  --  7   EOS PCT % 1 0     Results from last 7 days   Lab Units 02/14/23  0640   SODIUM mmol/L 136   POTASSIUM mmol/L 4 4   CHLORIDE mmol/L 102   CO2 mmol/L 27   BUN mg/dL 25   CREATININE mg/dL 0 46*   ANION GAP mmol/L 7   CALCIUM mg/dL 7 8*   ALBUMIN g/dL 3 1*   TOTAL BILIRUBIN mg/dL 0 35   ALK PHOS U/L 38   ALT U/L 43   AST U/L 19   GLUCOSE RANDOM mg/dL 115                           * I Have Reviewed All Lab Data Listed Above  * Additional Pertinent Lab Tests Reviewed:  All Labs Within Last 24 Hours Reviewed    Imaging:    Imaging Reports Reviewed Today Include: na  Imaging Personally Reviewed by Myself Includes:  na    Recent Cultures (last 7 days):           Last 24 Hours Medication List:   Current Facility-Administered Medications   Medication Dose Route Frequency Provider Last Rate   • acetaminophen  650 mg Oral Q6H [facility] Billy Santo MD     • aluminum-magnesium hydroxide-simethicone  30 mL Oral Q4H PRN Tarun Clements MD     • buprenorphine-naloxone  4 mg Sublingual 4x Daily Tyra Cee MD     • dexamethasone  3 mg Oral Q12H [facility] Collette Slimmer, PA-C     • diphenhydrAMINE  25 mg Oral Q6H PRN Collette Slimmer, PA-C     • famotidine  20 mg Oral BID Edwina Jackson PA-C     • gabapentin  600 mg Oral TID Collette Slimmer, PA-C     • ketorolac  10 mg Oral Q6H Collette Slimmer, PA-C     • methocarbamol  1,000 mg Oral Q8H [facility] Collette Slimmer, PA-C     • morphine  45 mg Oral Q6H PRN Collette Slimmer, PA-C     • morphine injection  6 mg Intravenous Q6H PRN Collette Slimmer, PA-C     • naloxone  0 1 mg Intravenous PRN Tyra Cee MD     • nicotine  14 mg Transdermal Daily Jaquan Cleaning MD     • ondansetron  4 mg Oral Q6H PRN Tyra Cee MD     • phenol  1 spray Mouth/Throat Q2H PRN Collette Slimmer, PA-C     • QUEtiapine  150 mg Oral HS Collette Slimmer, PA-C     • vancomycin  1,250 mg Intravenous Q12H Collette Slimmer, PA-C          Today, Patient Was Seen By: Sharron Carvajal MD    ** Please Note: Dictation voice to text software may have been used in the creation of this document   **

## 2023-02-14 NOTE — ASSESSMENT & PLAN NOTE
· Blood cultures positive for MSSA on 1/16/23, repeat blood cultures on 1/20/23 negative  · History of IV drug abuse  · CECILIO negative for vegetations MRI negative for osteomyelitis  · ID recommendations: IV Ancef 2g Q8H ending 2/16/2023, PICC line placed 1/27, patient unable to return home with PICC line in place due to IV drug abuse  Case management unable to find placement     · Remains on IV antibiotics thru 2/16 likely will require oral antibiotics on discharge  · Follow-up ID recs

## 2023-02-14 NOTE — PLAN OF CARE
Problem: PAIN - ADULT  Goal: Verbalizes/displays adequate comfort level or baseline comfort level  Description: Interventions:  - Encourage patient to monitor pain and request assistance  - Assess pain using appropriate pain scale (0-10 pain scale)  - Administer analgesics based on type and severity of pain and evaluate response  - Implement non-pharmacological measures as appropriate and evaluate response  - Consider cultural and social influences on pain and pain management  - Notify physician/advanced practitioner if interventions unsuccessful or patient reports new pain  Outcome: Progressing     Problem: INFECTION - ADULT  Goal: Absence or prevention of progression during hospitalization  Description: INTERVENTIONS:  - Assess and monitor for signs and symptoms of infection  - Monitor lab/diagnostic results  - Monitor all insertion sites, i e  indwelling lines  - Administer medications as ordered  - Instruct and encourage patient and family to use good hand hygiene technique  Outcome: Progressing     Problem: DISCHARGE PLANNING  Goal: Discharge to home or other facility with appropriate resources  Description: INTERVENTIONS:  - Identify barriers to discharge w/patient and caregiver  - Arrange for needed discharge resources and transportation as appropriate  - Identify discharge learning needs (meds, wound care, etc )  - Refer to Case Management Department for coordinating discharge planning if the patient needs post-hospital services based on physician/advanced practitioner order or complex needs related to functional status, cognitive ability, or social support system  Outcome: Progressing     Problem: Knowledge Deficit  Goal: Patient/family/caregiver demonstrates understanding of disease process, treatment plan, medications, and discharge instructions  Description: Complete learning assessment and assess knowledge base    Interventions:  - Provide teaching at level of understanding  - Provide teaching via preferred learning methods  Outcome: Progressing     Problem: METABOLIC, FLUID AND ELECTROLYTES - ADULT  Goal: Electrolytes maintained within normal limits  Description: INTERVENTIONS:  - Monitor labs and assess patient for signs and symptoms of electrolyte imbalances  - Administer electrolyte replacement as ordered  - Monitor response to electrolyte replacements, including repeat lab results as appropriate  - Instruct patient on fluid and nutrition as appropriate  Outcome: Progressing  Goal: Fluid balance maintained  Description: INTERVENTIONS:  - Monitor labs   - Monitor I/O and WT  - Instruct patient on fluid and nutrition as appropriate  - Assess for signs & symptoms of volume excess or deficit  Outcome: Progressing

## 2023-02-14 NOTE — ASSESSMENT & PLAN NOTE
· H/o right sided back pain with sciatica  · Intermittent severe right hip pain, radiates into right lateral hip & buttocks  Patient unable to ambulate without walker currently  · CT lumbar - Mild multilevel disc degeneration  Grade 1 retrolisthesis of L5 on S1  Disc bulge with small herniation at L5-S1  Disc bulging from L2-3 through L4-5  MRI w/o contrast -  Degenerative changes at L5-S1  · Discussed case with neurosurgery  · no transfer or surgical intervention at this time      · Weaning oral decadron 2 mg q12hr for two days, daily for three days, then discontinue - with rising CRP and increase in back pain now back to 3 mg BID  · Continue pain regimen

## 2023-02-15 ENCOUNTER — APPOINTMENT (INPATIENT)
Dept: RADIOLOGY | Facility: HOSPITAL | Age: 38
End: 2023-02-15

## 2023-02-15 LAB — VANCOMYCIN SERPL-MCNC: 8.3 UG/ML (ref 10–20)

## 2023-02-15 RX ORDER — KETOROLAC TROMETHAMINE 30 MG/ML
15 INJECTION, SOLUTION INTRAMUSCULAR; INTRAVENOUS ONCE
Status: COMPLETED | OUTPATIENT
Start: 2023-02-15 | End: 2023-02-15

## 2023-02-15 RX ADMIN — METHOCARBAMOL 1000 MG: 500 TABLET ORAL at 21:03

## 2023-02-15 RX ADMIN — ACETAMINOPHEN 650 MG: 325 TABLET, FILM COATED ORAL at 18:48

## 2023-02-15 RX ADMIN — MORPHINE SULFATE 45 MG: 15 TABLET ORAL at 01:53

## 2023-02-15 RX ADMIN — MORPHINE SULFATE 6 MG: 10 INJECTION INTRAVENOUS at 22:41

## 2023-02-15 RX ADMIN — BUPRENORPHINE AND NALOXONE 4 MG: 2; .5 FILM BUCCAL; SUBLINGUAL at 01:54

## 2023-02-15 RX ADMIN — VANCOMYCIN HYDROCHLORIDE 1250 MG: 5 INJECTION, POWDER, LYOPHILIZED, FOR SOLUTION INTRAVENOUS at 18:49

## 2023-02-15 RX ADMIN — ACETAMINOPHEN 650 MG: 325 TABLET, FILM COATED ORAL at 11:55

## 2023-02-15 RX ADMIN — MORPHINE SULFATE 45 MG: 15 TABLET ORAL at 07:51

## 2023-02-15 RX ADMIN — KETOROLAC TROMETHAMINE 10 MG: 10 TABLET, FILM COATED ORAL at 07:55

## 2023-02-15 RX ADMIN — ACETAMINOPHEN 650 MG: 325 TABLET, FILM COATED ORAL at 08:20

## 2023-02-15 RX ADMIN — GABAPENTIN 600 MG: 300 CAPSULE ORAL at 21:03

## 2023-02-15 RX ADMIN — ACETAMINOPHEN 650 MG: 325 TABLET, FILM COATED ORAL at 01:53

## 2023-02-15 RX ADMIN — BUPRENORPHINE AND NALOXONE 4 MG: 2; .5 FILM BUCCAL; SUBLINGUAL at 11:55

## 2023-02-15 RX ADMIN — BUPRENORPHINE AND NALOXONE 4 MG: 2; .5 FILM BUCCAL; SUBLINGUAL at 18:49

## 2023-02-15 RX ADMIN — FAMOTIDINE 20 MG: 20 TABLET ORAL at 18:49

## 2023-02-15 RX ADMIN — MORPHINE SULFATE 6 MG: 10 INJECTION INTRAVENOUS at 03:01

## 2023-02-15 RX ADMIN — MORPHINE SULFATE 6 MG: 10 INJECTION INTRAVENOUS at 16:25

## 2023-02-15 RX ADMIN — KETOROLAC TROMETHAMINE 10 MG: 10 TABLET, FILM COATED ORAL at 01:53

## 2023-02-15 RX ADMIN — METHOCARBAMOL 1000 MG: 500 TABLET ORAL at 13:39

## 2023-02-15 RX ADMIN — MORPHINE SULFATE 6 MG: 10 INJECTION INTRAVENOUS at 09:02

## 2023-02-15 RX ADMIN — VANCOMYCIN HYDROCHLORIDE 1250 MG: 5 INJECTION, POWDER, LYOPHILIZED, FOR SOLUTION INTRAVENOUS at 10:31

## 2023-02-15 RX ADMIN — METHOCARBAMOL 1000 MG: 500 TABLET ORAL at 08:20

## 2023-02-15 RX ADMIN — MORPHINE SULFATE 45 MG: 15 TABLET ORAL at 14:35

## 2023-02-15 RX ADMIN — BUPRENORPHINE AND NALOXONE 4 MG: 2; .5 FILM BUCCAL; SUBLINGUAL at 08:21

## 2023-02-15 RX ADMIN — GABAPENTIN 600 MG: 300 CAPSULE ORAL at 16:39

## 2023-02-15 RX ADMIN — MORPHINE SULFATE 45 MG: 15 TABLET ORAL at 21:00

## 2023-02-15 RX ADMIN — KETOROLAC TROMETHAMINE 15 MG: 30 INJECTION, SOLUTION INTRAMUSCULAR at 12:28

## 2023-02-15 RX ADMIN — GABAPENTIN 600 MG: 300 CAPSULE ORAL at 08:20

## 2023-02-15 RX ADMIN — BUPRENORPHINE AND NALOXONE 4 MG: 2; .5 FILM BUCCAL; SUBLINGUAL at 21:03

## 2023-02-15 RX ADMIN — METHOCARBAMOL 1000 MG: 500 TABLET ORAL at 01:53

## 2023-02-15 RX ADMIN — QUETIAPINE FUMARATE 150 MG: 100 TABLET ORAL at 01:53

## 2023-02-15 NOTE — CASE MANAGEMENT
Case Management Progress Note    Patient name Gabriella Vasquez  Location [facility] [id number] MRN [id number]  : 1985 Date 2/15/2023       LOS (days): 28  Geometric Mean LOS (GMLOS) (days): 2 80  Days to GMLOS:-24 9        OBJECTIVE:        Current admission status: Inpatient  Preferred Pharmacy:   [id number] [address] [address]  Phone: [phone] Fax: [phone]    Primary Care Provider: Shantel Leos DO    Primary Insurance: [place] [employer]  Secondary Insurance:     PROGRESS NOTE:  Pt's end date for his IV ABX is  and plans are for pt to be dc'd nikole vernon

## 2023-02-15 NOTE — ASSESSMENT & PLAN NOTE
· H/o right sided back pain with sciatica  · Intermittent severe right hip pain, radiates into right lateral hip & buttocks  Patient unable to ambulate without walker currently  · CT lumbar - Mild multilevel disc degeneration  Grade 1 retrolisthesis of L5 on S1  Disc bulge with small herniation at L5-S1  Disc bulging from L2-3 through L4-5  MRI w/o contrast -  Degenerative changes at L5-S1  · Discussed case with neurosurgery  · no transfer or surgical intervention at this time      · Continue pain regimen

## 2023-02-15 NOTE — PROGRESS NOTES
Ellyn Buckley is a 41 y o  male who is currently ordered Vancomycin IV with management by the Pharmacy Consult service  Relevant clinical data and objective / subjective history reviewed  Vancomycin Assessment:  Indication and Goal AUC/Trough: Bacteremia (goal -600, trough >10), -600, trough >10  Clinical Status: stable  Renal Function:  SCr: 046 mg/dL  CrCl: 241 3 mL/min  Renal replacement: Not on dialysis  Days of Therapy: 3  Current Dose: 1250mg Q12H  Vancomycin Plan:  New Dosinmg Q8H  Estimated AUC: 462 mcg*hr/mL  Estimated Trough: 12 3 mcg/mL  Next Level: 2023 in the AM  Renal Function Monitoring: Daily BMP and [place] will continue to follow closely for s/sx of nephrotoxicity, infusion reactions and appropriateness of therapy  BMP and CBC will be ordered per protocol  We will continue to follow the patient’s culture results and clinical progress daily      Raymona Necessary, Pharmacist

## 2023-02-15 NOTE — ASSESSMENT & PLAN NOTE
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

## 2023-02-15 NOTE — PROGRESS NOTES
[address] [facility]  Progress Note - Gretel Nicholson 1985, 41 y o  male MRN: [id number]  Unit/Bed#: 389-87 Encounter: [id number]  Primary Care Provider: Darylene Griffon, DO   Date and time admitted to hospital: 1/18/2023  5:14 PM    * Bacteremia due to methicillin susceptible Staphylococcus aureus (MSSA)  Assessment & Plan  · Blood cultures positive for MSSA on 1/16/23, repeat blood cultures on 1/20/23 negative  · History of IV drug abuse  · CECILIO negative for vegetations MRI negative for osteomyelitis  · ID recommendations: IV Ancef 2g Q8H ending 2/16/2023, PICC line placed 1/27, patient unable to return home with PICC line in place due to IV drug abuse  Case management unable to find placement  · Remains on IV antibiotics through 2/16        Diarrhea  Assessment & Plan  · New onset diarrhea 2/9  · Reports family has been having diarrheal virus run through the family and they have visited  · Resolved    Tobacco abuse  Assessment & Plan  · Smoking cessation counseling    Acute right-sided low back pain with right-sided sciatica  Assessment & Plan  · H/o right sided back pain with sciatica  · Intermittent severe right hip pain, radiates into right lateral hip & buttocks  Patient unable to ambulate without walker currently  · CT lumbar - Mild multilevel disc degeneration  Grade 1 retrolisthesis of L5 on S1  Disc bulge with small herniation at L5-S1  Disc bulging from L2-3 through L4-5  MRI w/o contrast -  Degenerative changes at L5-S1  · Discussed case with neurosurgery  · no transfer or surgical intervention at this time      · Continue pain regimen      Ambulatory dysfunction  Assessment & Plan  · In setting of acute back and knee pain  · PT/OT recommending outpatient therapy    Intravenous drug abuse, continuous ([facility] [address] )  Assessment & Plan  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  Last use - IV,1 bag of fentanyl, at 12pm on 1/18/23  Patient started on Suboxone upon admission, maintenance dose 16 mg daily    Plan:  · Patient has scheduled follow-up with Dr Arsh Stern in [place] at 2pm on 23  Pt discharge planned for 23 - he will need to obtain a prescription for Suboxone from his doctor as we will be unable to provide a prescription for Suboxone - we will have to call the office prior to discharge to set this up  · Continue Suboxone    Hepatitis C virus  Assessment & Plan  · Outpatient follow-up          VTE Pharmacologic Prophylaxis:   Pharmacologic: Pharmacologic VTE Prophylaxis contraindicated due to Low risk  Mechanical VTE Prophylaxis in Place: Yes    Patient Centered Rounds: I have performed bedside rounds with nursing staff today  Discussions with Specialists or Other Care Team Provider: roxana, violetta    Education and Discussions with Family / Patient: pt    Time Spent for Care: 30 minutes  More than 50% of total time spent on counseling and coordination of care as described above  Current Length of Stay: 28 day(s)    Current Patient Status: Inpatient   Certification Statement: The patient will continue to require additional inpatient hospital stay due to see below    Discharge Plan: Still requiring IV antibiotics anticipate discharge     Code Status: Level 1 - Full Code      Subjective:   Denies fevers, chills, cough, shortness of breath    Objective:     Vitals:   Temp (24hrs), Av 3 °F (36 3 °C), Min:97 3 °F (36 3 °C), Max:97 3 °F (36 3 °C)    Temp:  [97 3 °F (36 3 °C)] 97 3 °F (36 3 °C)  Resp:  [20] 20  BP: (125)/(82) 125/82  Body mass index is 27 67 kg/m²  Input and Output Summary (last 24 hours): Intake/Output Summary (Last 24 hours) at 2/15/2023 0834  Last data filed at 2023 1407  Gross per 24 hour   Intake 360 ml   Output --   Net 360 ml       Physical Exam:     Physical Exam  Constitutional:       General: He is not in acute distress  Appearance: He is well-developed  He is not diaphoretic  HENT:      Head: Normocephalic and atraumatic  Nose: Nose normal       Mouth/Throat:      Pharynx: No oropharyngeal exudate  Eyes:      General: No scleral icterus  Conjunctiva/sclera: Conjunctivae normal    Cardiovascular:      Rate and Rhythm: Normal rate and regular rhythm  Heart sounds: Normal heart sounds  No murmur heard  No friction rub  No gallop  Pulmonary:      Effort: Pulmonary effort is normal  No respiratory distress  Breath sounds: Normal breath sounds  No wheezing or rales  Chest:      Chest wall: No tenderness  Abdominal:      General: Bowel sounds are normal  There is no distension  Palpations: Abdomen is soft  Tenderness: There is no abdominal tenderness  There is no guarding  Musculoskeletal:         General: No tenderness or deformity  Normal range of motion  Cervical back: Normal range of motion and neck supple  Skin:     General: Skin is warm and dry  Findings: No erythema  Neurological:      Mental Status: He is alert  Mental status is at baseline  Additional Data:     Labs:    Results from last 7 days   Lab Units 02/14/23  0640 02/13/23  0450   WBC Thousand/uL 8 87 16 90*   HEMOGLOBIN g/dL 10 1* 11 6*   HEMATOCRIT % 30 8* 35 6*   PLATELETS Thousands/uL 52* 70*   BANDS PCT %  --  1   NEUTROS PCT % 72  --    LYMPHS PCT % 11*  --    LYMPHO PCT %  --  8*   MONOS PCT % 12  --    MONO PCT %  --  7   EOS PCT % 1 0     Results from last 7 days   Lab Units 02/14/23  0640   SODIUM mmol/L 136   POTASSIUM mmol/L 4 4   CHLORIDE mmol/L 102   CO2 mmol/L 27   BUN mg/dL 25   CREATININE mg/dL 0 46*   ANION GAP mmol/L 7   CALCIUM mg/dL 7 8*   ALBUMIN g/dL 3 1*   TOTAL BILIRUBIN mg/dL 0 35   ALK PHOS U/L 38   ALT U/L 43   AST U/L 19   GLUCOSE RANDOM mg/dL 115                           * I Have Reviewed All Lab Data Listed Above  * Additional Pertinent Lab Tests Reviewed:  All Labs Within Last 24 Hours Reviewed    Imaging:    Imaging Reports Reviewed Today Include: na  Imaging Personally Reviewed by Myself Includes:  na    Recent Cultures (last 7 days):           Last 24 Hours Medication List:   Current Facility-Administered Medications   Medication Dose Route Frequency Provider Last Rate   • acetaminophen  650 mg Oral Q6H [address] Johnnie Cotto MD     • aluminum-magnesium hydroxide-simethicone  30 mL Oral Q4H PRN Willy Underwood MD     • buprenorphine-naloxone  4 mg Sublingual 4x Daily Tyra Feliciano MD     • diphenhydrAMINE  25 mg Oral Q6H PRN Darcie Pop, PA-C     • famotidine  20 mg Oral BID Gene CLOVIS Rucker-C     • gabapentin  600 mg Oral TID Darcie Pop, PA-C     • ketorolac  10 mg Oral Q6H Darcie Pop, PA-C     • methocarbamol  1,000 mg Oral [facility] Darcie Pop, PA-C     • morphine  45 mg Oral Q6H PRN Darcie Pop, PA-C     • morphine injection  6 mg Intravenous Q6H PRN Darcie Pop, PA-C     • naloxone  0 1 mg Intravenous PRN Tyra Feliciano MD     • nicotine  14 mg Transdermal Daily Teri Doweny MD     • ondansetron  4 mg Oral Q6H PRN Tyra Feliciano MD     • phenol  1 spray Mouth/Throat Q2H PRN Darcie Pop, PA-C     • QUEtiapine  150 mg Oral HS Darcie Pop, PA-C     • vancomycin  1,250 mg Intravenous Q8H Darcie Pop, PA-C          Today, Patient Was Seen By: Albert Tarn MD    ** Please Note: Dictation voice to text software may have been used in the creation of this document   **

## 2023-02-15 NOTE — ASSESSMENT & PLAN NOTE
· Blood cultures positive for MSSA on 1/16/23, repeat blood cultures on 1/20/23 negative  · History of IV drug abuse  · CECILIO negative for vegetations MRI negative for osteomyelitis  · ID recommendations: IV Ancef 2g Q8H ending 2/16/2023, PICC line placed 1/27, patient unable to return home with PICC line in place due to IV drug abuse  Case management unable to find placement     · Remains on IV antibiotics through 2/16

## 2023-02-15 NOTE — PROGRESS NOTES
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   TTE with possible small MV vegetation but CECILIO is without vegetation   Given underlying IVDU additionally, patient will be treated with a prolonged IV antibiotic course  Continue IV vancomycin  Treat x4 weeks total IV antibiotic, through 2/16  Monitor temperature/WBC      2   Low back pain and right hip pain   This was present prior to bacteremia   Consider discitis/vertebral osteomyelitis given staff aureus bacteremia and elevated ESR   However, L-spine MRI without contrast did not show discitis or vertebral osteomyelitis   Patient could not tolerate MRI with contrast   Patient will be getting 4 weeks of IV antibiotic, as in above   ESR has normalized  However, CRP is increased today  Given persistently elevated CRP, will transition antibiotic to p o  after completion of IV antibiotic course  Patient will stay on p o  antibiotic until CRP normalizes  IV antibiotic plan as in above  Transition to p o  doxycycline after completion of IV antibiotic course  Monitor ESR and CRP monthly  Patient to remain on p o  doxycycline until both ESR and CRP normalize  Monitor low back pain  Pain control per primary service      3   Thrombocytopenia  This may be beta-lactam antibiotic toxicities  Platelet count continues to decrease off beta-lactam antibiotic  Cefazolin may not have been the culprit  Continue off IV cefazolin  Continue IV vancomycin  Monitor platelet count      4  Diarrhea, resolved spontaneously after 24 hours  Monitor diarrhea     If diarrhea recurs, will check stool for C  difficile toxin      5  History of HCV infection   HCV PCR negative on admission      6   Leukocytosis   Patient is on dexamethasone   This is likely steroid effect   Clinically, there is no active infection   WBC stable  Monitor WBC      7  Active IVDU  Vicente Maradiaga is not a candidate for safe home IV antibiotic      Discussed with patient in detail regarding the above plan  Discussed with Dr Anamaria Bobo from [facility]      Antibiotics:  Vancomycin (antibiotic # 27)  Day # 27 from clearance of bacteremia     Subjective:  Patient with stable back, knee and hip pain, worse with repeat  No leg weakness  Temperature stays down   No chills  He is tolerating antibiotic well   No nausea, vomiting or diarrhea      Objective:  Vitals:  Temp:  [97 3 °F (36 3 °C)] 97 3 °F (36 3 °C)  Resp:  [20] 20  BP: (125)/(82) 125/82  Temp (24hrs), Av 3 °F (36 3 °C), Min:97 3 °F (36 3 °C), Max:97 3 °F (36 3 °C)  Current: Temperature: (!) 97 3 °F (36 3 °C)    Physical Exam:     General: Awake, alert, cooperative, no distress  Neck:  Supple  No mass  No lymphadenopathy  Lungs: Expansion symmetric, no rales, no wheezing, respirations unlabored  Heart:  Regular rate and rhythm, S1 and S2 normal, no murmur  Abdomen: Soft, nondistended, non-tender, bowel sounds active all four quadrants, no masses, no organomegaly  Extremities: No edema  No erythema/warmth  No ulcer  Nontender to palpation  Skin:  No rash  Neuro: Moves all extremities  Invasive Devices     Peripherally Inserted Central Catheter Line  Duration           PICC Line 23 Right Other (Comment) 18 days                Labs studies:   I have personally reviewed pertinent labs    Results from last 7 days   Lab Units 23  0640 23  0510   POTASSIUM mmol/L 4 4 4 6 4 8   CHLORIDE mmol/L 102 98 101   CO2 mmol/L 27 28 30   BUN mg/dL 25 27* 32*   CREATININE mg/dL 0 46* 0 76 0 59*   EGFR ml/min/1 73sq m 143 116 129   CALCIUM mg/dL 7 8* 8 4 7 9*   AST U/L 19 23 9*   ALT U/L 43 51 9   ALK PHOS U/L 38 47 40     Results from last 7 days   Lab Units 23  0640 23  0450 23  0510   WBC Thousand/uL 8 87 16 90* 20 23*   HEMOGLOBIN g/dL 10 1* 11 6* 11 9*   PLATELETS Thousands/uL 52* 70* 221           Imaging Studies:   I have personally reviewed pertinent imaging study reports and images in PACS  EKG, Pathology, and Other Studies:   I have personally reviewed pertinent reports

## 2023-02-15 NOTE — ASSESSMENT & PLAN NOTE
· New onset diarrhea 2/9  · Reports family has been having diarrheal virus run through the family and they have visited  · Resolved

## 2023-02-15 NOTE — PHYSICAL THERAPY NOTE
Physical Therapy Cancellation Note                   02/15/23 1512   PT Last Visit   PT Visit Date 02/15/23   Note Type   Note Type Cancelled Session   Cancel Reasons Refusal  (refused therapy today   States"I don't want to do anything right now")

## 2023-02-16 LAB
ANISOCYTOSIS BLD QL SMEAR: PRESENT
BASOPHILS # BLD MANUAL: 0 THOUSAND/UL (ref 0–0.1)
BASOPHILS NFR MAR MANUAL: 0 % (ref 0–1)
EOSINOPHIL # BLD MANUAL: 0.09 THOUSAND/UL (ref 0–0.4)
EOSINOPHIL NFR BLD MANUAL: 1 % (ref 0–6)
ERYTHROCYTE [DISTWIDTH] IN BLOOD BY AUTOMATED COUNT: 16 % (ref 11.6–15.1)
HCT VFR BLD AUTO: 28.3 % (ref 36.5–49.3)
HGB BLD-MCNC: 9.4 G/DL (ref 12–17)
LG PLATELETS BLD QL SMEAR: PRESENT
LYMPHOCYTES # BLD AUTO: 1.46 THOUSAND/UL (ref 0.6–4.47)
LYMPHOCYTES # BLD AUTO: 16 % (ref 14–44)
MCH RBC QN AUTO: 29.6 PG (ref 26.8–34.3)
MCHC RBC AUTO-ENTMCNC: 33.2 G/DL (ref 31.4–37.4)
MCV RBC AUTO: 89 FL (ref 82–98)
METAMYELOCYTES NFR BLD MANUAL: 2 % (ref 0–1)
MONOCYTES # BLD AUTO: 1.19 THOUSAND/UL (ref 0–1.22)
MONOCYTES NFR BLD: 13 % (ref 4–12)
NEUTROPHILS # BLD MANUAL: 6.22 THOUSAND/UL (ref 1.85–7.62)
NEUTS BAND NFR BLD MANUAL: 1 % (ref 0–8)
NEUTS SEG NFR BLD AUTO: 67 % (ref 43–75)
PLATELET # BLD AUTO: 48 THOUSANDS/UL (ref 149–390)
PLATELET BLD QL SMEAR: ABNORMAL
PMV BLD AUTO: 12.5 FL (ref 8.9–12.7)
RBC # BLD AUTO: 3.18 MILLION/UL (ref 3.88–5.62)
WBC # BLD AUTO: 9.15 THOUSAND/UL (ref 4.31–10.16)

## 2023-02-16 RX ORDER — KETOROLAC TROMETHAMINE 10 MG/1
10 TABLET, FILM COATED ORAL EVERY 6 HOURS PRN
Status: DISCONTINUED | OUTPATIENT
Start: 2023-02-16 | End: 2023-02-17 | Stop reason: HOSPADM

## 2023-02-16 RX ORDER — DOXYCYCLINE HYCLATE 100 MG/1
100 CAPSULE ORAL EVERY 12 HOURS SCHEDULED
Status: DISCONTINUED | OUTPATIENT
Start: 2023-02-17 | End: 2023-02-17 | Stop reason: HOSPADM

## 2023-02-16 RX ADMIN — VANCOMYCIN HYDROCHLORIDE 1250 MG: 5 INJECTION, POWDER, LYOPHILIZED, FOR SOLUTION INTRAVENOUS at 02:01

## 2023-02-16 RX ADMIN — BUPRENORPHINE AND NALOXONE 4 MG: 2; .5 FILM BUCCAL; SUBLINGUAL at 11:31

## 2023-02-16 RX ADMIN — METHOCARBAMOL 1000 MG: 500 TABLET ORAL at 21:41

## 2023-02-16 RX ADMIN — MORPHINE SULFATE 45 MG: 15 TABLET ORAL at 23:25

## 2023-02-16 RX ADMIN — Medication 1500 MG: at 10:30

## 2023-02-16 RX ADMIN — BUPRENORPHINE AND NALOXONE 4 MG: 2; .5 FILM BUCCAL; SUBLINGUAL at 17:24

## 2023-02-16 RX ADMIN — KETOROLAC TROMETHAMINE 10 MG: 10 TABLET, FILM COATED ORAL at 19:28

## 2023-02-16 RX ADMIN — Medication 1500 MG: at 17:27

## 2023-02-16 RX ADMIN — ACETAMINOPHEN 650 MG: 325 TABLET, FILM COATED ORAL at 00:01

## 2023-02-16 RX ADMIN — ACETAMINOPHEN 650 MG: 325 TABLET, FILM COATED ORAL at 17:23

## 2023-02-16 RX ADMIN — MORPHINE SULFATE 6 MG: 10 INJECTION INTRAVENOUS at 12:34

## 2023-02-16 RX ADMIN — MORPHINE SULFATE 45 MG: 15 TABLET ORAL at 17:22

## 2023-02-16 RX ADMIN — BUPRENORPHINE AND NALOXONE 4 MG: 2; .5 FILM BUCCAL; SUBLINGUAL at 09:26

## 2023-02-16 RX ADMIN — ACETAMINOPHEN 650 MG: 325 TABLET, FILM COATED ORAL at 05:17

## 2023-02-16 RX ADMIN — METHOCARBAMOL 1000 MG: 500 TABLET ORAL at 05:17

## 2023-02-16 RX ADMIN — METHOCARBAMOL 1000 MG: 500 TABLET ORAL at 14:29

## 2023-02-16 RX ADMIN — GABAPENTIN 600 MG: 300 CAPSULE ORAL at 21:41

## 2023-02-16 RX ADMIN — ACETAMINOPHEN 650 MG: 325 TABLET, FILM COATED ORAL at 11:31

## 2023-02-16 RX ADMIN — MORPHINE SULFATE 6 MG: 10 INJECTION INTRAVENOUS at 18:33

## 2023-02-16 RX ADMIN — QUETIAPINE FUMARATE 150 MG: 100 TABLET ORAL at 00:01

## 2023-02-16 RX ADMIN — BUPRENORPHINE AND NALOXONE 4 MG: 2; .5 FILM BUCCAL; SUBLINGUAL at 21:41

## 2023-02-16 RX ADMIN — GABAPENTIN 600 MG: 300 CAPSULE ORAL at 09:26

## 2023-02-16 RX ADMIN — MORPHINE SULFATE 45 MG: 15 TABLET ORAL at 05:17

## 2023-02-16 RX ADMIN — MORPHINE SULFATE 6 MG: 10 INJECTION INTRAVENOUS at 06:33

## 2023-02-16 RX ADMIN — GABAPENTIN 600 MG: 300 CAPSULE ORAL at 17:23

## 2023-02-16 RX ADMIN — MORPHINE SULFATE 45 MG: 15 TABLET ORAL at 11:32

## 2023-02-16 NOTE — PROGRESS NOTES
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   TTE with possible small MV vegetation but CECILIO is without vegetation   Given underlying IVDU additionally, patient will be treated with a prolonged IV antibiotic course  Continue IV vancomycin  Treat x4 weeks total IV antibiotic, through 2/16  Monitor temperature/WBC      2   Low back pain and right hip pain   This was present prior to bacteremia   Consider discitis/vertebral osteomyelitis given staff aureus bacteremia and elevated ESR   However, L-spine MRI without contrast did not show discitis or vertebral osteomyelitis   Patient could not tolerate MRI with contrast   Patient will be getting 4 weeks of IV antibiotic, as in above   ESR has normalized   However, CRP is increased today  Tracy Ormond persistently elevated CRP, will transition antibiotic to p o  after completion of IV antibiotic course   Patient will stay on p o  antibiotic until CRP normalizes  IV antibiotic plan as in above  Transition to p o  doxycycline (100 mg twice daily) in a m  Monitor ESR and CRP monthly  Patient to remain on p o  doxycycline until both ESR and CRP normalize  Monitor low back pain  Pain control per primary service      3   Thrombocytopenia   This may be beta-lactam antibiotic toxicities     Platelet count continues to decrease off beta-lactam antibiotic  Cefazolin may not have been the culprit  Continue off IV cefazolin  Continue IV vancomycin  Monitor platelet count  Recheck today      4  History of HCV infection   HCV PCR negative on admission      5   Leukocytosis   Patient was on dexamethasone   This is likely steroid effect   Clinically, there is no active infection   WBC has normalized off steroids    Monitor WBC      7  Active IVDU  Clotilde Moran is not a candidate for safe home IV antibiotic      Discussed with patient in detail regarding the above plan  Discussed with Dr Mark Gracia from [facility] service  [place] for discharge tomorrow, from ID viewpoint  Follow-up with us 1 month after discharge, with labs      Antibiotics:  Vancomycin (antibiotic # 28)  Day # 28 from clearance of bacteremia     Subjective:  Patient with stable back, knee and hip pain, worse with repeat  No leg weakness  Temperature stays down   No chills  He is tolerating antibiotic well   No nausea, vomiting or diarrhea      Objective:  Vitals:  Temp:  [97 6 °F (36 4 °C)-98 7 °F (37 1 °C)] 98 7 °F (37 1 °C)  Resp:  [18] 18  BP: (113-154)/() 113/74  Temp (24hrs), Av 1 °F (36 7 °C), Min:97 6 °F (36 4 °C), Max:98 7 °F (37 1 °C)  Current: Temperature: 98 7 °F (37 1 °C)    Physical Exam:     General: Awake, alert, cooperative, no distress  Neck:  Supple  No mass  No lymphadenopathy  Lungs: Expansion symmetric, no rales, no wheezing, respirations unlabored  Heart:  Regular rate and rhythm, S1 and S2 normal, no murmur  Abdomen: Soft, nondistended, non-tender, bowel sounds active all four quadrants, no masses, no organomegaly  Extremities: No edema  No erythema/warmth  No ulcer  Nontender to palpation  Skin:  No rash  Neuro: Moves all extremities  Invasive Devices     Peripherally Inserted Central Catheter Line  Duration           PICC Line 23 Right Other (Comment) 19 days                Labs studies:   I have personally reviewed pertinent labs    Results from last 7 days   Lab Units 23  0640 23  0450   POTASSIUM mmol/L 4 4 4 6   CHLORIDE mmol/L 102 98   CO2 mmol/L 27 28   BUN mg/dL 25 27*   CREATININE mg/dL 0 46* 0 76   EGFR ml/min/1 73sq m 143 116   CALCIUM mg/dL 7 8* 8 4   AST U/L 19 23   ALT U/L 43 51   ALK PHOS U/L 38 47     Results from last 7 days   Lab Units 23  0640 23  0450   WBC Thousand/uL 8 87 16 90*   HEMOGLOBIN g/dL 10 1* 11 6*   PLATELETS Thousands/uL 52* 70*           Imaging Studies:   I have personally reviewed pertinent imaging study reports and images in PACS  EKG, Pathology, and Other Studies:   I have personally reviewed pertinent reports

## 2023-02-16 NOTE — PROGRESS NOTES
[address] [facility]  Progress Note - Cm Esquivel 1985, 41 y o  male MRN: [id number]  Unit/Bed#: 385-66 Encounter: [id number]  Primary Care Provider: Lauren Diallo DO   Date and time admitted to hospital: 1/18/2023  5:14 PM    * Bacteremia due to methicillin susceptible Staphylococcus aureus (MSSA)  Assessment & Plan  · Blood cultures positive for MSSA on 1/16/23, repeat blood cultures on 1/20/23 negative  · History of IV drug abuse  · CECILIO negative for vegetations MRI negative for osteomyelitis  · ID recommendations: IV Ancef 2g Q8H ending 2/16/2023, PICC line placed 1/27, patient unable to return home with PICC line in place due to IV drug abuse  Case management unable to find placement  ·  will complete antibiotics today        Ambulatory dysfunction  Assessment & Plan  · In setting of acute back and knee pain  · Pt/ot recommending oupt therapy    Intravenous drug abuse, continuous ([facility] [address] )  Assessment & Plan  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  Last use - IV,1 bag of fentanyl, at 12pm on 1/18/23  Patient started on Suboxone upon admission, maintenance dose 16 mg daily    Plan:  · Patient has scheduled follow-up with Dr Claudio Gillette in [place] at 2pm on 2/22/23  Pt discharge planned for Friday 2/17/23 - he will need to obtain a prescription for Suboxone from his doctor as we will be unable to provide a prescription for Suboxone - we will have to call the office prior to discharge to set this up  · C/w suboxone     Hepatitis C virus  Assessment & Plan  · Follow-up outpatient            VTE Pharmacologic Prophylaxis:   Pharmacologic: Pharmacologic VTE Prophylaxis contraindicated due to low risk  Mechanical VTE Prophylaxis in Place: Yes    Patient Centered Rounds: I have performed bedside rounds with nursing staff today  Discussions with Specialists or Other Care Team Provider: cm, nursing    Education and Discussions with Family / Patient: pt    Time Spent for Care: 30 minutes  More than 50% of total time spent on counseling and coordination of care as described above  Current Length of Stay: 29 day(s)    Current Patient Status: Inpatient   Certification Statement: The patient will continue to require additional inpatient hospital stay due to see below    Discharge Plan: We will complete antibiotics IV today anticipate discharge next 24 hours  Code Status: Level 1 - Full Code      Subjective:   Denies fevers, chills, chest pain, shortness of breath or any other complaints at this time    Objective:     Vitals:   Temp (24hrs), Av 1 °F (36 7 °C), Min:97 6 °F (36 4 °C), Max:98 7 °F (37 1 °C)    Temp:  [97 6 °F (36 4 °C)-98 7 °F (37 1 °C)] 98 7 °F (37 1 °C)  Resp:  [18] 18  BP: (113-154)/() 113/74  Body mass index is 27 67 kg/m²  Input and Output Summary (last 24 hours): Intake/Output Summary (Last 24 hours) at 2023 9895  Last data filed at 2/15/2023 1253  Gross per 24 hour   Intake 480 ml   Output --   Net 480 ml       Physical Exam:     Physical Exam  Constitutional:       General: He is not in acute distress  Appearance: He is well-developed  He is not diaphoretic  HENT:      Head: Normocephalic and atraumatic  Nose: Nose normal       Mouth/Throat:      Pharynx: No oropharyngeal exudate  Eyes:      General: No scleral icterus  Conjunctiva/sclera: Conjunctivae normal    Cardiovascular:      Rate and Rhythm: Normal rate and regular rhythm  Heart sounds: Normal heart sounds  No murmur heard  No friction rub  No gallop  Pulmonary:      Effort: Pulmonary effort is normal  No respiratory distress  Breath sounds: Normal breath sounds  No wheezing or rales  Chest:      Chest wall: No tenderness  Abdominal:      General: Bowel sounds are normal  There is no distension  Palpations: Abdomen is soft  Tenderness: There is no abdominal tenderness  There is no guarding  Musculoskeletal:         General: No tenderness or deformity  Normal range of motion  Cervical back: Normal range of motion and neck supple  Skin:     General: Skin is warm and dry  Findings: No erythema  Neurological:      Mental Status: He is alert  Mental status is at baseline  (    Additional Data:     Labs:    Results from last 7 days   Lab Units 02/14/23  0640 02/13/23  0450   WBC Thousand/uL 8 87 16 90*   HEMOGLOBIN g/dL 10 1* 11 6*   HEMATOCRIT % 30 8* 35 6*   PLATELETS Thousands/uL 52* 70*   BANDS PCT %  --  1   NEUTROS PCT % 72  --    LYMPHS PCT % 11*  --    LYMPHO PCT %  --  8*   MONOS PCT % 12  --    MONO PCT %  --  7   EOS PCT % 1 0     Results from last 7 days   Lab Units 02/14/23  0640   SODIUM mmol/L 136   POTASSIUM mmol/L 4 4   CHLORIDE mmol/L 102   CO2 mmol/L 27   BUN mg/dL 25   CREATININE mg/dL 0 46*   ANION GAP mmol/L 7   CALCIUM mg/dL 7 8*   ALBUMIN g/dL 3 1*   TOTAL BILIRUBIN mg/dL 0 35   ALK PHOS U/L 38   ALT U/L 43   AST U/L 19   GLUCOSE RANDOM mg/dL 115                           * I Have Reviewed All Lab Data Listed Above    * Additional Pertinent Lab Tests Reviewed: No New Labs Available For Today    Imaging:    Imaging Reports Reviewed Today Include: na  Imaging Personally Reviewed by Myself Includes:  na    Recent Cultures (last 7 days):           Last 24 Hours Medication List:   Current Facility-Administered Medications   Medication Dose Route Frequency Provider Last Rate   • acetaminophen  650 mg Oral Q6H [address] Mark Bowser MD     • aluminum-magnesium hydroxide-simethicone  30 mL Oral Q4H PRN Krishna Merida MD     • buprenorphine-naloxone  4 mg Sublingual 4x Daily Tyra Solis MD     • diphenhydrAMINE  25 mg Oral Q6H PRN Palmer Euceda PA-C     • famotidine  20 mg Oral BID Dax Cooper PA-C     • gabapentin  600 mg Oral TID Palmer Euceda PA-C     • methocarbamol  1,000 mg Oral [facility] Palmer Euceda PA-C     • morphine  45 mg Oral Q6H PRN Palmer Euceda PA-C     • morphine injection  6 mg Intravenous Q6H PRN Mariaelena Murray PA-C     • naloxone  0 1 mg Intravenous PRN Marce Parks MD     • nicotine  14 mg Transdermal Daily Manny Villanueva MD     • ondansetron  4 mg Oral Q6H PRN Tyra Sahni MD     • phenol  1 spray Mouth/Throat Q2H PRN Mariaelena Murray PA-C     • QUEtiapine  150 mg Oral HS Mariaelena Murray PA-C     • vancomycin  1,500 mg Intravenous Jennie Mahmood MD          Today, Patient Was Seen By: Stephanie Neal MD    ** Please Note: Dictation voice to text software may have been used in the creation of this document   **

## 2023-02-16 NOTE — PHYSICAL THERAPY NOTE
Physical Therapy Cancellation Note                 02/16/23 1407   PT Last Visit   PT Visit Date 02/16/23   Note Type   Note Type Cancelled Session   Cancel Reasons Refusal

## 2023-02-16 NOTE — ASSESSMENT & PLAN NOTE
· Blood cultures positive for MSSA on 1/16/23, repeat blood cultures on 1/20/23 negative  · History of IV drug abuse  · CECILIO negative for vegetations MRI negative for osteomyelitis  · ID recommendations: IV Ancef 2g Q8H ending 2/16/2023, PICC line placed 1/27, patient unable to return home with PICC line in place due to IV drug abuse  Case management unable to find placement     ·  will complete antibiotics today

## 2023-02-16 NOTE — PROGRESS NOTES
Chloe Melendez is a 41 y o  male who is currently ordered Vancomycin IV with management by the Pharmacy Consult service  Relevant clinical data and objective / subjective history reviewed  Vancomycin Assessment:  Indication and Goal AUC/Trough: Bacteremia (goal -600, trough >10), -600, trough >10  Clinical Status: stable  Micro:   Pt failed ancef MSSA  Renal Function:  SCr: 0 46 mg/dL  CrCl: 241 3 mL/min  Renal replacement: Not on dialysis  Days of Therapy: day 4 restart  Current Dose: 1250mg q8h  Vancomycin Plan:  New Dosinmg q8h  Estimated AUC: 554 mcg*hr/mL  Estimated Trough: 14 8 mcg/mL  Next Level: 23 0600  Renal Function Monitoring: Daily BMP and [place] will continue to follow closely for s/sx of nephrotoxicity, infusion reactions and appropriateness of therapy  BMP and CBC will be ordered per protocol  We will continue to follow the patient’s culture results and clinical progress daily      Miguel Roldan, Pharmacist

## 2023-02-16 NOTE — UTILIZATION REVIEW
Continued Stay Review    Date: 02/16/2023                          Current Patient Class: Inpatient  Current Level of Care: Med/Surg    HPI:38 y o  male initially admitted on 01/18/2023    Assessment/Plan: Bacteremia  IV Ancef 2g Q8H, PICC line placed 1/27, patient unable to return home with PICC line in place due to IV drug abuse  Case management unable to find placement  will complete antibiotics today    PT/OT    Vital Signs: /74 (BP Location: Left arm)   Pulse (!) 118   Temp 98 7 °F (37 1 °C) (Oral)   Resp 18   Ht 6' (1 829 m)   Wt 92 5 kg (204 lb)   SpO2 91%   BMI 27 67 kg/m²     Pertinent Labs/Diagnostic Results:     Results from last 7 days   Lab Units 02/16/23  1038 02/14/23  0640 02/13/23  0450   WBC Thousand/uL 9 15 8 87 16 90*   HEMOGLOBIN g/dL 9 4* 10 1* 11 6*   HEMATOCRIT % 28 3* 30 8* 35 6*   PLATELETS Thousands/uL 48* 52* 70*   NEUTROS ABS Thousands/µL  --  6 37  --    BANDS PCT % 1  --  1     Results from last 7 days   Lab Units 02/14/23  0640 02/13/23  0450   SODIUM mmol/L 136 134*   POTASSIUM mmol/L 4 4 4 6   CHLORIDE mmol/L 102 98   CO2 mmol/L 27 28   ANION GAP mmol/L 7 8   BUN mg/dL 25 27*   CREATININE mg/dL 0 46* 0 76   EGFR ml/min/1 73sq m 143 116   CALCIUM mg/dL 7 8* 8 4     Results from last 7 days   Lab Units 02/14/23  0640 02/13/23  0450   AST U/L 19 23   ALT U/L 43 51   ALK PHOS U/L 38 47   TOTAL PROTEIN g/dL 5 6* 6 4   ALBUMIN g/dL 3 1* 3 6   TOTAL BILIRUBIN mg/dL 0 35 0 45     Results from last 7 days   Lab Units 02/14/23  0640 02/13/23  0450   GLUCOSE RANDOM mg/dL 115 124       Results from last 7 days   Lab Units 02/14/23  0640 02/13/23  0450   CRP mg/L 85 6* 86 6*   SED RATE mm/hour 6 13     Medications:   Scheduled Medications:  acetaminophen, 650 mg, Oral, Q6H HERMAN  buprenorphine-naloxone, 4 mg, Sublingual, 4x Daily  [START ON 2/17/2023] doxycycline hyclate, 100 mg, Oral, Q12H HERMAN  famotidine, 20 mg, Oral, BID  gabapentin, 600 mg, Oral, TID  methocarbamol, 1,000 mg, Oral, Q8H [address]  nicotine, 14 mg, Transdermal, Daily  QUEtiapine, 150 mg, Oral, HS  vancomycin, 1,500 mg, Intravenous, Q8H      Continuous IV Infusions:     PRN Meds:  aluminum-magnesium hydroxide-simethicone, 30 mL, Oral, Q4H PRN  diphenhydrAMINE, 25 mg, Oral, Q6H PRN  ketorolac, 10 mg, Oral, Q6H PRN  morphine, 45 mg, Oral, Q6H PRN  morphine injection, 6 mg, Intravenous, Q6H PRN  naloxone, 0 1 mg, Intravenous, PRN  ondansetron, 4 mg, Oral, Q6H PRN  phenol, 1 spray, Mouth/Throat, Q2H PRN        Discharge Plan: D    Network Utilization Review Department  ATTENTION: Please call with any questions or concerns to [phone] and carefully listen to the prompts so that you are directed to the right person  All voicemails are confidential   Alcario Broad all requests for admission clinical reviews, approved or denied determinations and any other requests to dedicated fax number below belonging to the campus where the patient is receiving treatment   List of dedicated fax numbers for the Facilities:  [address] DENIALS (Administrative/Medical Necessity) [phone]   [address] (Maternity/NICU/Pediatrics) [phone]   91 Aileen Hinojosa [phone]   [facility]  [phone]   [address] [address] Ruthie Moser 28 [phone]   [facility] 134 [address] [facility] [phone]

## 2023-02-16 NOTE — ASSESSMENT & PLAN NOTE
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

## 2023-02-17 ENCOUNTER — TRANSITIONAL CARE MANAGEMENT (OUTPATIENT)
Dept: FAMILY MEDICINE CLINIC | Facility: CLINIC | Age: 38
End: 2023-02-17

## 2023-02-17 VITALS
HEART RATE: 118 BPM | BODY MASS INDEX: 27.63 KG/M2 | WEIGHT: 204 LBS | RESPIRATION RATE: 20 BRPM | SYSTOLIC BLOOD PRESSURE: 113 MMHG | TEMPERATURE: 97 F | OXYGEN SATURATION: 91 % | DIASTOLIC BLOOD PRESSURE: 66 MMHG | HEIGHT: 72 IN

## 2023-02-17 LAB
DME PARACHUTE DELIVERY DATE REQUESTED: NORMAL
DME PARACHUTE ITEM DESCRIPTION: NORMAL
DME PARACHUTE ORDER STATUS: NORMAL
DME PARACHUTE SUPPLIER NAME: NORMAL
DME PARACHUTE SUPPLIER PHONE: NORMAL

## 2023-02-17 RX ORDER — GABAPENTIN 300 MG/1
600 CAPSULE ORAL 3 TIMES DAILY
Qty: 180 CAPSULE | Refills: 0 | Status: SHIPPED | OUTPATIENT
Start: 2023-02-17 | End: 2023-02-27

## 2023-02-17 RX ORDER — MORPHINE SULFATE 15 MG/1
45 TABLET ORAL EVERY 6 HOURS PRN
Qty: 30 TABLET | Refills: 0 | Status: SHIPPED | OUTPATIENT
Start: 2023-02-17 | End: 2023-02-24 | Stop reason: ALTCHOICE

## 2023-02-17 RX ORDER — QUETIAPINE FUMARATE 100 MG/1
150 TABLET, FILM COATED ORAL
Qty: 45 TABLET | Refills: 0 | Status: SHIPPED | OUTPATIENT
Start: 2023-02-17

## 2023-02-17 RX ORDER — KETOROLAC TROMETHAMINE 10 MG/1
10 TABLET, FILM COATED ORAL EVERY 6 HOURS PRN
Qty: 30 TABLET | Refills: 0 | Status: SHIPPED | OUTPATIENT
Start: 2023-02-17 | End: 2023-02-24 | Stop reason: ALTCHOICE

## 2023-02-17 RX ORDER — DOXYCYCLINE HYCLATE 100 MG/1
100 CAPSULE ORAL EVERY 12 HOURS SCHEDULED
Qty: 60 CAPSULE | Refills: 0 | Status: SHIPPED | OUTPATIENT
Start: 2023-02-17 | End: 2023-03-19

## 2023-02-17 RX ORDER — METHOCARBAMOL 1000 MG/1
1000 TABLET, COATED ORAL EVERY 8 HOURS SCHEDULED
Qty: 90 TABLET | Refills: 0 | Status: SHIPPED | OUTPATIENT
Start: 2023-02-17

## 2023-02-17 RX ORDER — CELECOXIB 200 MG/1
200 CAPSULE ORAL 2 TIMES DAILY
Qty: 14 CAPSULE | Refills: 0 | Status: SHIPPED | OUTPATIENT
Start: 2023-02-17 | End: 2023-02-27

## 2023-02-17 RX ORDER — ACETAMINOPHEN 500 MG
500 TABLET ORAL EVERY 6 HOURS PRN
Qty: 90 TABLET | Refills: 0 | Status: SHIPPED | OUTPATIENT
Start: 2023-02-17 | End: 2023-03-12

## 2023-02-17 RX ORDER — FAMOTIDINE 20 MG/1
20 TABLET, FILM COATED ORAL 2 TIMES DAILY
Qty: 60 TABLET | Refills: 0 | Status: SHIPPED | OUTPATIENT
Start: 2023-02-17

## 2023-02-17 RX ADMIN — GABAPENTIN 600 MG: 300 CAPSULE ORAL at 09:34

## 2023-02-17 RX ADMIN — MORPHINE SULFATE 6 MG: 10 INJECTION INTRAVENOUS at 07:39

## 2023-02-17 RX ADMIN — METHOCARBAMOL 1000 MG: 500 TABLET ORAL at 05:37

## 2023-02-17 RX ADMIN — KETOROLAC TROMETHAMINE 10 MG: 10 TABLET, FILM COATED ORAL at 07:44

## 2023-02-17 RX ADMIN — ACETAMINOPHEN 650 MG: 325 TABLET, FILM COATED ORAL at 00:24

## 2023-02-17 RX ADMIN — MORPHINE SULFATE 45 MG: 15 TABLET ORAL at 05:36

## 2023-02-17 RX ADMIN — MORPHINE SULFATE 6 MG: 10 INJECTION INTRAVENOUS at 00:41

## 2023-02-17 RX ADMIN — FAMOTIDINE 20 MG: 20 TABLET ORAL at 09:34

## 2023-02-17 RX ADMIN — QUETIAPINE FUMARATE 150 MG: 100 TABLET ORAL at 00:24

## 2023-02-17 RX ADMIN — DOXYCYCLINE HYCLATE 100 MG: 100 CAPSULE ORAL at 09:34

## 2023-02-17 RX ADMIN — Medication 1500 MG: at 01:44

## 2023-02-17 RX ADMIN — ACETAMINOPHEN 650 MG: 325 TABLET, FILM COATED ORAL at 05:37

## 2023-02-17 RX ADMIN — BUPRENORPHINE AND NALOXONE 4 MG: 2; .5 FILM BUCCAL; SUBLINGUAL at 09:34

## 2023-02-17 NOTE — CASE MANAGEMENT
Case Management Discharge Planning Note    Patient name Xavi Felix  Location [address] [id number] MRN [id number]  : 1985 Date 2023       Current Admission Date: 2023  Current Admission Diagnosis:Bacteremia due to methicillin susceptible Staphylococcus aureus (MSSA)   Patient Active Problem List    Diagnosis Date Noted   • Diarrhea 2023   • Leukocytosis 2023   • Acute pain of right knee 2023   • Hypomagnesemia 2023   • Bacteremia due to methicillin susceptible Staphylococcus aureus (MSSA) 2023   • Intravenous drug abuse, continuous ([facility] [address] ) 2023   • Ambulatory dysfunction 2023   • Acute right-sided low back pain with right-sided sciatica 2023   • Tobacco abuse 2023   • Acute pain of right shoulder 2022   • Bilateral groin pain 2022   • Opiate overdose ([facility] [address] ) 2021   • Moderate episode of recurrent major depressive disorder ([facility] [address] ) 2020   • Drug dependence ([facility] [address] ) 2018   • Heroin abuse ([facility] [address] ) 2018   • Encounter for monitoring Suboxone maintenance therapy 2018   • Drug therapy continued 2018   • Degeneration of thoracic intervertebral disc 2018   • Degeneration of intervertebral disc of thoracic region 2017   • Myofascial pain 2017   • Chronic midline thoracic back pain 2016   • Anxiety 2015   • Hepatitis C virus 2015      LOS (days): 30  Geometric Mean LOS (GMLOS) (days): 2 80  Days to GMLOS:-26 8     OBJECTIVE:  Risk of Unplanned Readmission Score: 16 06         Current admission status: Inpatient   Preferred Pharmacy:   [address] [place] [id number]  Phone: [phone] Fax: [phone]    Primary Care Provider: Edis Bell DO    Primary Insurance: Kelley Sanchez  Secondary Insurance:     DISCHARGE DETAILS:    Discharge planning discussed with[de-identified] Pt  Freedom of Choice: Yes     CM contacted family/caregiver?: No- see comments  Were Treatment Team discharge recommendations reviewed with patient/caregiver?: Yes  Did patient/caregiver verbalize understanding of patient care needs?: Yes  Were patient/caregiver advised of the risks associated with not following Treatment Team discharge recommendations?: Yes         [address]         Is the patient interested in [place] [address] at discharge?: No    DME Referral Provided  Referral made for DME?: No              Treatment Team Recommendation: Home  Discharge Destination Plan[de-identified] Home  Transport at Discharge : Automobile, Family      Pt was given a list of outpatient PT facilities  Discussed with patient preferences on discharge : Understanding how to manage health at home , purpose of taking meds, importance of follow up appointments and symptoms to watch out for  Nurse to review AVS with patient  All follow up providers listed   Father to transport the patient home  I in basket messaged patient's PCP to call patient with a follow up appt

## 2023-02-17 NOTE — PLAN OF CARE
Problem: PAIN - ADULT  Goal: Verbalizes/displays adequate comfort level or baseline comfort level  Description: Interventions:  - Encourage patient to monitor pain and request assistance  - Assess pain using appropriate pain scale (0-10 pain scale)  - Administer analgesics based on type and severity of pain and evaluate response  - Implement non-pharmacological measures as appropriate and evaluate response  - Consider cultural and social influences on pain and pain management  - Notify physician/advanced practitioner if interventions unsuccessful or patient reports new pain  Outcome: Progressing     Problem: INFECTION - ADULT  Goal: Absence or prevention of progression during hospitalization  Description: INTERVENTIONS:  - Assess and monitor for signs and symptoms of infection  - Monitor lab/diagnostic results  - Monitor all insertion sites, i e  indwelling lines  - Administer medications as ordered  - Instruct and encourage patient and family to use good hand hygiene technique  Outcome: Progressing     Problem: MUSCULOSKELETAL - ADULT  Goal: Maintain or return mobility to safest level of function  Description: INTERVENTIONS:  - Assess patient's ability to carry out ADLs; (independent)  - Assess/evaluate cause of self-care deficits (limited movement, pain, crutches/walker)  - Assess range of motion  - Assess patient's mobility (modified independent)  - Assess patient's need for assistive devices and provide as appropriate  - Encourage maximum independence but intervene and supervise when necessary  - Involve family in performance of ADLs  - Assess for home care needs following discharge   - Consider OT consult to assist with ADL evaluation and planning for discharge  - Provide patient education as appropriate  Outcome: Progressing  Goal: Maintain proper alignment of affected body part  Description: INTERVENTIONS:  - Support, maintain and protect limb and body alignment  - Provide patient/ family with appropriate education  Outcome: Progressing

## 2023-02-17 NOTE — PHYSICAL THERAPY NOTE
PHYSICAL THERAPY NOTE          Patient Name: Gabriella Vasquez  PJSLZ'E Date: 2/17/2023 02/17/23 0754   PT Last Visit   PT Visit Date 02/17/23   Note Type   Note Type Treatment   Restrictions/Precautions   Weight Bearing Precautions Per Order No   Other Precautions Fall Risk;Pain   General   Response to Previous Treatment Patient with no complaints from previous session  Family/Caregiver Present No   Cognition   Overall Cognitive Status WFL   Arousal/Participation Alert   Following Commands Follows all commands and directions without difficulty   Subjective   Subjective Declines stair training  States he knows what to do  Bed Mobility   Sit to Supine 6  Modified independent   Additional Comments standing at EOB start session   Transfers   Sit to Stand 6  Modified independent   Stand to Sit 6  Modified independent   Stand pivot 6  Modified independent   Additional Comments RW used   Ambulation/Elevation   Ambulation/Elevation Additional Comments Pt  declined stair training  Provided verbal instruction for safe technique on stairs  Provided RW for home and adjusted to proper height  Balance   Static Sitting Good   Dynamic Sitting Fair +   Static Standing Fair   Dynamic Standing Fair  (RW)   Endurance Deficit   Endurance Deficit Yes   Activity Tolerance   Activity Tolerance Other (Comment)  (self limits)   Assessment   Prognosis Fair   Problem List   (Decreased strength; Decreased endurance; Impaired balance; Decreased mobility; Pain)   Assessment Pt  seen for PT treatment session this date with interventions consisting of  Patient education, bed mobility, transfers and distribution of RW for home  Provided verbal instruction for stairs as pt refused training  Pt is in need of continued activity in PT to improve strength balance endurance mobility transfers and ambulation with return to maximize LOF   From PT/mobility standpoint, recommendation at time of d/c would be OPPTin order to promote return to PLOF and independence  The patient's AM-PAC Basic Mobility Inpatient Short Form Raw Score is 22  A Raw score of greater than 16 suggests the patient may benefit from discharge to home  Please also refer to physical therapy recommendation for safe DC planning  Goals   LTG Expiration Date 02/17/23   Plan   Progress Slow progress, decreased activity tolerance   Recommendation   PT Discharge Recommendation Home with outpatient rehabilitation   AM-PAC Basic Mobility Inpatient   Turning in Flat Bed Without Bedrails 4   Lying on Back to Sitting on Edge of Flat Bed Without Bedrails 3   Moving Bed to Chair 4   Standing Up From Chair Using Arms 4   Walk in Room 4   Climb 3-5 Stairs With Railing 3   Basic Mobility Inpatient Raw Score 22   Basic Mobility Standardized Score 47 4   Highest Level Of Mobility   JH-HLM Goal 7: Walk 25 feet or more   JH-HLM Achieved 7: Walk 25 feet or more  (p)   Education   Education Provided Mobility training   Patient Reinforcement needed   End of Consult   Patient Position at End of Consult Supine; All needs within reach   End of Consult Comments discussed POC with PT

## 2023-02-17 NOTE — ASSESSMENT & PLAN NOTE
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

## 2023-02-17 NOTE — DISCHARGE SUMMARY
[facility] [address]  Discharge- Eulalio Yarbrough 1985, 41 y o  male MRN: [id number]  Unit/Bed#: 430-12 Encounter: [id number]  Primary Care Provider: Amisha Neff DO   Date and time admitted to hospital: 1/18/2023  5:14 PM    * Bacteremia due to methicillin susceptible Staphylococcus aureus (MSSA)  Assessment & Plan  · Blood cultures positive for MSSA on 1/16/23, repeat blood cultures on 1/20/23 negative  · History of IV drug abuse  · CECILIO negative for vegetations MRI negative for osteomyelitis  · ID recommendations: IV Ancef 2g Q8H ending 2/16/2023, PICC line placed 1/27, patient unable to return home with PICC line in place due to IV drug abuse  Case management unable to find placement  Completed IV antibiotic therapy  Transition to oral doxycycline on discharge      Acute right-sided low back pain with right-sided sciatica  Assessment & Plan  · H/o right sided back pain with sciatica  · Intermittent severe right hip pain, radiates into right lateral hip & buttocks  Patient unable to ambulate without walker currently  · CT lumbar - Mild multilevel disc degeneration  Grade 1 retrolisthesis of L5 on S1  Disc bulge with small herniation at L5-S1  Disc bulging from L2-3 through L4-5  MRI w/o contrast -  Degenerative changes at L5-S1  · Discussed case with neurosurgery  · no transfer or surgical intervention at this time  · Continue pain regimen      Ambulatory dysfunction  Assessment & Plan  · In setting of acute back and knee pain  · PT/OT recommending outpatient therapy    [facility]  Assessment & Plan  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  Last use - IV,1 bag of fentanyl, at 12pm on 1/18/23  Patient started on Suboxone upon admission, maintenance dose 16 mg daily    Plan:  · Patient has scheduled follow-up with Dr Yahaira Pro in [place] at 2pm on 2/22/23   Pt discharge planned for Friday 2/17/23 - he will need to obtain a prescription for Suboxone from his doctor as we will be unable to provide a prescription for Suboxone -      Hepatitis C virus  Assessment & Plan  · Follow-up outpatient          Discharging Physician / Practitioner: Mikie Malin MD  PCP: Bill Briscoe DO  Admission Date:   Admission Orders (From admission, onward)     Ordered        01/18/23 2115  INPATIENT ADMISSION  Once                      Discharge Date: 02/17/23    Medical Problems     Resolved Problems  Date Reviewed: 2/17/2023   None         Consultations During Hospital Stay:  · ID    Procedures Performed:   · none    Significant Findings / Test Results:   XR hip/pelv 2-3 vws right if performed    Result Date: 1/20/2023  Impression: No fracture or hip dislocation  Workstation performed: [id number]     MRI lumbar spine wo contrast    Result Date: 1/23/2023  · Impression: Motion degraded examination  No definite MR evidence for discitis osteoma myelitis as clinically questioned  Degenerative changes at L5-S1 as described above  Workstation performed: [id number]     Incidental Findings:   · none     Test Results Pending at Discharge (will require follow up):   · none     Outpatient Tests Requested:  · none    Complications:  none    Reason for Admission: Bacteremia    Hospital Course:     Luke Fofana is a 41 y o  male patient who originally presented to the hospital on 1/18/2023 with past medical history significant IV drug abuse initially presented with MSSA bacteremia  Completed 4 weeks of IV antibiotics  Transition to oral doxycycline on discharge  We will follow-up outpatient with ID and PCP      Please see above list of diagnoses and related plan for additional information  Condition at Discharge: stable     Discharge Day Visit / Exam:     Subjective:  Denies fevers, chills, cough, chest pain  Exam:   Physical Exam  Constitutional:       General: He is not in acute distress  Appearance: He is well-developed  He is not diaphoretic     HENT:      Head: Normocephalic and atraumatic  Nose: Nose normal       Mouth/Throat:      Pharynx: No oropharyngeal exudate  Eyes:      General: No scleral icterus  Conjunctiva/sclera: Conjunctivae normal    Cardiovascular:      Rate and Rhythm: Normal rate and regular rhythm  Heart sounds: Normal heart sounds  No murmur heard  No friction rub  No gallop  Pulmonary:      Effort: Pulmonary effort is normal  No respiratory distress  Breath sounds: Normal breath sounds  No wheezing or rales  Chest:      Chest wall: No tenderness  Abdominal:      General: Bowel sounds are normal  There is no distension  Palpations: Abdomen is soft  Tenderness: There is no abdominal tenderness  There is no guarding  Musculoskeletal:         General: No tenderness or deformity  Normal range of motion  Cervical back: Normal range of motion and neck supple  Skin:     General: Skin is warm and dry  Findings: No erythema  Neurological:      Mental Status: He is alert  Mental status is at baseline  Discussion with Family: pt    Discharge instructions/Information to patient and family:   See after visit summary for information provided to patient and family  Provisions for Follow-Up Care:  See after visit summary for information related to follow-up care and any pertinent home health orders  Disposition:     Home    For Discharges to [facility] SNF:   · Not Applicable to this Patient - Not Applicable to this Patient    Planned Readmission: none     Discharge Statement:  I spent 60 minutes discharging the patient  This time was spent on the day of discharge  I had direct contact with the patient on the day of discharge  Greater than 50% of the total time was spent examining patient, answering all patient questions, arranging and discussing plan of care with patient as well as directly providing post-discharge instructions    Additional time then spent on discharge activities  Discharge Medications:  See after visit summary for reconciled discharge medications provided to patient and family        ** Please Note: This note has been constructed using a voice recognition system **

## 2023-02-17 NOTE — ASSESSMENT & PLAN NOTE
· Blood cultures positive for MSSA on 1/16/23, repeat blood cultures on 1/20/23 negative  · History of IV drug abuse  · CECILIO negative for vegetations MRI negative for osteomyelitis  · ID recommendations: IV Ancef 2g Q8H ending 2/16/2023, PICC line placed 1/27, patient unable to return home with PICC line in place due to IV drug abuse  Case management unable to find placement     Completed IV antibiotic therapy  Transition to oral doxycycline on discharge

## 2023-02-20 ENCOUNTER — HOSPITAL ENCOUNTER (EMERGENCY)
Facility: HOSPITAL | Age: 38
Discharge: HOME | End: 2023-02-20
Attending: STUDENT IN AN ORGANIZED HEALTH CARE EDUCATION/TRAINING PROGRAM | Admitting: STUDENT IN AN ORGANIZED HEALTH CARE EDUCATION/TRAINING PROGRAM

## 2023-02-20 ENCOUNTER — TELEPHONE (OUTPATIENT)
Dept: INFECTIOUS DISEASES | Facility: CLINIC | Age: 38
End: 2023-02-20

## 2023-02-20 VITALS
RESPIRATION RATE: 16 BRPM | TEMPERATURE: 97.6 F | DIASTOLIC BLOOD PRESSURE: 96 MMHG | SYSTOLIC BLOOD PRESSURE: 138 MMHG | OXYGEN SATURATION: 97 % | HEART RATE: 99 BPM

## 2023-02-20 DIAGNOSIS — Z76.0 MEDICATION REFILL: Primary | ICD-10-CM

## 2023-02-20 DIAGNOSIS — M86.9 OSTEOMYELITIS (HCC): ICD-10-CM

## 2023-02-20 DIAGNOSIS — M46.40 DISCITIS: Primary | ICD-10-CM

## 2023-02-20 DIAGNOSIS — Z79.2 LONG TERM (CURRENT) USE OF ANTIBIOTICS: ICD-10-CM

## 2023-02-20 NOTE — ED PROVIDER NOTES
History  Chief Complaint   Patient presents with   • Medication Refill     Pt requesting refill of his 15mg morphine  States he went to his pcp and they refilled his Toradol but will not refill morphine and he should come back to ED for refill  HPI: This is a 81-year-old male who presents to the emergency department for refills on his morphine  Patient states he has chronic back pain was recently admitted here and sent home  He has no other complaints  On chart review looks like patient was admitted to this facility and discharged on 2/17/2023 and prescribed a 10-day course of morphine by the inpatient hospitalist to hold him over until his appointment with Dr Cora Rodriguez on 2/22/2023  Patient states he called Dr Cora Rodriguez regarding refills and he was told by Dr Dejah Abbott office to come here to the emergency department for refills        PDMP reviewed  02/17/2023 02/17/2023   1  Morphine Sulfate Ir 15 Mg Tab 30 00  10  He Jailene Lancastertz  [id number]   Rit ([id number])   45 00 MME  Comm Ins  PA          Prior to Admission Medications   Prescriptions Last Dose Informant Patient Reported? Taking?    QUEtiapine (SEROquel) 100 mg tablet   No No   Sig: Take 1 5 tablets (150 mg total) by mouth daily at bedtime   acetaminophen (TYLENOL) 500 mg tablet   No No   Sig: Take 1 tablet (500 mg total) by mouth every 6 (six) hours as needed for mild pain for up to 23 days   celecoxib (CeleBREX) 200 mg capsule   No No   Sig: Take 1 capsule (200 mg total) by mouth 2 (two) times a day for 7 days   doxycycline hyclate (VIBRAMYCIN) 100 mg capsule   No No   Sig: Take 1 capsule (100 mg total) by mouth every 12 (twelve) hours   famotidine (PEPCID) 20 mg tablet   No No   Sig: Take 1 tablet (20 mg total) by mouth 2 (two) times a day   gabapentin (NEURONTIN) 300 mg capsule   No No   Sig: Take 2 capsules (600 mg total) by mouth 3 (three) times a day   gabapentin (NEURONTIN) 400 mg capsule   No No   Sig: Take 1 capsule (400 mg total) by mouth 3 (three) times a day   ketorolac (TORADOL) 10 mg tablet   No No   Sig: Take 1 tablet (10 mg total) by mouth every 6 (six) hours as needed for moderate pain for up to 8 days   methocarbamol (ROBAXIN) 750 mg tablet   No No   Sig: Take 1 tablet (750 mg total) by mouth every 8 (eight) hours   methocarbamol 1000 MG TABS   No No   Sig: Take 1,000 mg by mouth every 8 (eight) hours   morphine (MSIR) 15 mg tablet   No No   Sig: Take 3 tablets (45 mg total) by mouth every 6 (six) hours as needed for severe pain for up to 10 days Max Daily Amount: 180 mg      Facility-Administered Medications: None       Past Medical History:   Diagnosis Date   • Addiction to drug [facility])    • Anxiety    • Arthritis    • Chronic pain    • Hepatitis    • Hepatitis C virus    • [facility] ) 05/02/2018   • Moderate episode of recurrent major depressive disorder ([facility] ) 02/05/2020   • Tobacco use disorder     last assessed 11/2/15        Past Surgical History:   Procedure Laterality Date   • EPIDURAL BLOCK INJECTION Bilateral 4/19/2018    Procedure: T9-10 INTERLAMINAR EPIDURAL STEROID INJECTION;  Surgeon: Rosana Hernandez MD;  Location: [facility] MAIN OR;  Service: Pain Management    • IR PICC PLACEMENT SINGLE LUMEN  1/27/2023       Family History   Problem Relation Age of Onset   • No Known Problems Father    • Hepatitis Family      I have reviewed and agree with the history as documented      E-Cigarette/Vaping     E-Cigarette/Vaping Substances   • Nicotine No    • THC No    • CBD No    • Flavoring No    • Other No    • Unknown No      Social History     Tobacco Use   • Smoking status: Every Day     Packs/day: 1 00     Types: Cigarettes   • Smokeless tobacco: Former   • Tobacco comments:     current every day smoker per allscript    Substance Use Topics   • Alcohol use: Yes     Comment: occasional / social per allscript    • Drug use: Not Currently     Types: Heroin, Prescription, Marijuana     Comment: past opiate abuser / intravenous drug use per allscript                      Review of Systems   Constitutional: Negative for activity change, appetite change, chills, fatigue and fever  HENT: Negative for congestion, dental problem, drooling, ear discharge, ear pain, facial swelling, postnasal drip, rhinorrhea and sinus pain  Eyes: Negative for photophobia, pain, discharge and itching  Respiratory: Negative for apnea, cough, chest tightness and shortness of breath  Cardiovascular: Negative for chest pain and leg swelling  Gastrointestinal: Negative for abdominal distention, abdominal pain, anal bleeding, constipation, diarrhea and nausea  Endocrine: Negative for cold intolerance, heat intolerance and polydipsia  Genitourinary: Negative for difficulty urinating  Musculoskeletal: Positive for back pain  Negative for arthralgias, gait problem, joint swelling and myalgias  Skin: Negative for color change and pallor  Allergic/Immunologic: Negative for immunocompromised state  Neurological: Negative for dizziness, seizures, facial asymmetry, weakness, light-headedness, numbness and headaches  Psychiatric/Behavioral: Negative for agitation, behavioral problems, confusion, decreased concentration and dysphoric mood  All other systems reviewed and are negative  Physical Exam  Physical Exam  Constitutional:       Appearance: He is well-developed  HENT:      Head: Normocephalic  Eyes:      Pupils: Pupils are equal, round, and reactive to light  Cardiovascular:      Rate and Rhythm: Normal rate and regular rhythm  Pulmonary:      Effort: Pulmonary effort is normal       Breath sounds: Normal breath sounds  Abdominal:      General: Bowel sounds are normal       Palpations: Abdomen is soft  Musculoskeletal:         General: Normal range of motion  Cervical back: Normal range of motion and neck supple  Skin:     General: Skin is warm           Vital Signs  ED Triage Vitals [02/20/23 1512]   Temperature Pulse Respirations Blood Pressure SpO2   97 6 °F (36 4 °C) 99 16 138/96 97 %      Temp Source Heart Rate Source Patient Position - Orthostatic VS BP Location FiO2 (%)   Temporal Monitor -- Right arm --      Pain Score       --           Vitals:    02/20/23 1512   BP: 138/96   Pulse: 99         Visual Acuity      ED Medications  Medications - No data to display    Diagnostic Studies  Results Reviewed     None                 No orders to display              Procedures  Procedures         ED Course  ED Course as of 02/20/23 1518   Mon Feb 20, 2023   1517   I had a lengthy discussion with patient that we do not prescribe prescription strength narcotic pain medications I have the emergency department  And that on chart review he should still have a supply of medications as prescribed to him by the inpatient hospitalist on 2/17/2023  Patient states he has been taking the medication 3 tablets every 6 hours and was only given a supply of 30  Patient states he has been taking the medication every 6 hours as opposed to as needed which is why he ran out  Medical Decision Making  Medication refill: acute illness or injury  Amount and/or Complexity of Data Reviewed  External Data Reviewed: labs, radiology, ECG and notes  Disposition  Final diagnoses:   Medication refill     Time reflects when diagnosis was documented in both MDM as applicable and the Disposition within this note     Time User Action Codes Description Comment    2/20/2023  3:15 PM Vonnie Martin Add [Z76 0] Medication refill       ED Disposition     ED Disposition   Discharge    Condition   Stable    Date/Time   Mon Feb 20, 2023  3:15 PM    Comment   Nova Mack discharge to home/self care                 Follow-up Information     Follow up With Specialties Details Why [address] , DO Family Medicine Call   [address] ([place] [address]) [address] [place] [address],  [address] [phone]            Patient's Medications   Discharge Prescriptions    No medications on file       No discharge procedures on file      PDMP Review       Value Time User    PDMP Reviewed  Yes 12/5/2022  5:14 PM Yousuf Post, DO          ED Provider  Electronically Signed by           Jairo Darnell MD  02/20/23 0152

## 2023-02-20 NOTE — PROGRESS NOTES
OPAT NOTE    Supervising/Discharge physician: Christian Hand    Diagnosis:Discitis/osteo    Drug: Doxy    Dose/Frequency: 100mg Po BID    Infusion/VNA contact: None    Next appointment: 3/15

## 2023-02-20 NOTE — TELEPHONE ENCOUNTER
Called pt in regards to his hospital f/u  Pt's stephanie answers      Informed her that he will need his labs done a week prior to his appt with us on 3/15 @ 1pm     She verbalizes understanding and will relay the message to the pt

## 2023-02-21 ENCOUNTER — OFFICE VISIT (OUTPATIENT)
Dept: FAMILY MEDICINE CLINIC | Facility: CLINIC | Age: 38
End: 2023-02-21

## 2023-02-21 VITALS
TEMPERATURE: 98.4 F | HEART RATE: 132 BPM | HEIGHT: 72 IN | WEIGHT: 231 LBS | OXYGEN SATURATION: 98 % | DIASTOLIC BLOOD PRESSURE: 76 MMHG | BODY MASS INDEX: 31.29 KG/M2 | SYSTOLIC BLOOD PRESSURE: 122 MMHG

## 2023-02-21 DIAGNOSIS — R78.81 BACTEREMIA DUE TO METHICILLIN SUSCEPTIBLE STAPHYLOCOCCUS AUREUS (MSSA): Primary | ICD-10-CM

## 2023-02-21 DIAGNOSIS — B95.61 BACTEREMIA DUE TO METHICILLIN SUSCEPTIBLE STAPHYLOCOCCUS AUREUS (MSSA): Primary | ICD-10-CM

## 2023-02-21 NOTE — PROGRESS NOTES
Transition of Care  Follow-up After Hospitalization    Quincy Haro 41 y o  male   Date:  2/21/2023    TCM Call     Date and time call was made  2/17/2023 10:55 AM    Hospital care reviewed  Records reviewed    Patient was hospitialized at  [address]     Date of Admission  01/18/23  LEFT [address] [facility] Don AMA - went to [facility] afterwards for treatment    Date of discharge  02/17/23    Diagnosis  bacteremia due to methicillin susceptible staph, positive cultures, low back pain    Disposition  Home    Were the patients medications reviewed and updated  No      TCM Call     Scheduled for follow up? Yes  unable to reach pt - calling restrictions    Comments  letter mailed to patient to set up ODILIA  due to calling restrictions on phone        Hospital records were reviewed  Medications upon discharge reviewed/updated  Discharge Disposition:    Follow up visits with other specialists:       Assessment and Plan:  I told patient I would not prescribe any controlled for him  This has been known to him even before this admission  Patient walked out before examination was done  He states he will find a new doctor  Ricci Monsalve was seen today for transition of care management  Diagnoses and all orders for this visit:    Bacteremia due to methicillin susceptible Staphylococcus aureus (MSSA)          HPI:  ODILIA  Patient was admitted for bacteremia  He is complaining of right shoulder and hip pain  Scheduled to see neurosurgery and orthopedics  Patient ran out of his pain medications, and went to the ER yesterday  They did not give him any  Asking for something for pain now  Patient states he is currently on Suboxone  ROS: Review of Systems   Constitutional: Negative  Respiratory: Negative  Cardiovascular: Negative  Gastrointestinal: Negative  Genitourinary: Negative  Musculoskeletal: Positive for arthralgias and back pain         Past Medical History:   Diagnosis Date   • Addiction to drug [facility])    • Anxiety    • Arthritis    • Chronic pain    • Hepatitis    • Hepatitis C virus    • Heroin abuse ([facility] ) 05/02/2018   • Moderate episode of recurrent major depressive disorder ([facility] ) 02/05/2020   • Tobacco use disorder     last assessed 11/2/15        Past Surgical History:   Procedure Laterality Date   • EPIDURAL BLOCK INJECTION Bilateral 4/19/2018    Procedure: T9-10 INTERLAMINAR EPIDURAL STEROID INJECTION;  Surgeon: Rhea Mchugh MD;  Location: [facility];  Service: Pain Management    • IR PICC PLACEMENT SINGLE LUMEN  1/27/2023       Social History     Socioeconomic History   • Marital status:      Spouse name: None   • Number of children: None   • Years of education: None   • Highest education level: None   Occupational History   • Occupation:       Comment: At [employer]    Tobacco Use   • Smoking status: Every Day     Packs/day: 1 00     Types: Cigarettes   • Smokeless tobacco: Former   • Tobacco comments:     current every day smoker per allscript    Vaping Use   • Vaping Use: None   Substance and Sexual Activity   • Alcohol use: Yes     Comment: occasional / social per allscript    • Drug use: Not Currently     Types: Heroin, Prescription, Marijuana     Comment: past opiate abuser / intravenous drug use per allscript                  • Sexual activity: Yes   Other Topics Concern   • None   Social History Narrative      Three children  Social Determinants of Health     Financial Resource Strain: Not on file   Food Insecurity: No Food Insecurity   • Worried About [address] in the Last Year: Never true   • Ran Out of Food in the Last Year: Never true   Transportation Needs: No Transportation Needs   • Lack of Transportation (Medical): No   • Lack of Transportation (Non-Medical):  No   Physical Activity: Not on file   Stress: Not on file   Social Connections: Not on file   Intimate Partner Violence: Not on file   Housing Stability: Low Risk    • Unable to Pay for Housing in the Last Year: No   • Number of Places Lived in the Last Year: 1   • Unstable Housing in the Last Year: No       Family History   Problem Relation Age of Onset   • No Known Problems Father    • Hepatitis Family        Allergies   Allergen Reactions   • Clindamycin/Lincomycin Hives         Current Outpatient Medications:   •  acetaminophen (TYLENOL) 500 mg tablet, Take 1 tablet (500 mg total) by mouth every 6 (six) hours as needed for mild pain for up to 23 days, Disp: 90 tablet, Rfl: 0  •  celecoxib (CeleBREX) 200 mg capsule, Take 1 capsule (200 mg total) by mouth 2 (two) times a day for 7 days, Disp: 14 capsule, Rfl: 0  •  doxycycline hyclate (VIBRAMYCIN) 100 mg capsule, Take 1 capsule (100 mg total) by mouth every 12 (twelve) hours, Disp: 60 capsule, Rfl: 0  •  gabapentin (NEURONTIN) 300 mg capsule, Take 2 capsules (600 mg total) by mouth 3 (three) times a day, Disp: 180 capsule, Rfl: 0  •  gabapentin (NEURONTIN) 400 mg capsule, Take 1 capsule (400 mg total) by mouth 3 (three) times a day, Disp: 90 capsule, Rfl: 0  •  ketorolac (TORADOL) 10 mg tablet, Take 1 tablet (10 mg total) by mouth every 6 (six) hours as needed for moderate pain for up to 8 days, Disp: 30 tablet, Rfl: 0  •  methocarbamol 1000 MG TABS, Take 1,000 mg by mouth every 8 (eight) hours, Disp: 90 tablet, Rfl: 0  •  QUEtiapine (SEROquel) 100 mg tablet, Take 1 5 tablets (150 mg total) by mouth daily at bedtime, Disp: 45 tablet, Rfl: 0  •  famotidine (PEPCID) 20 mg tablet, Take 1 tablet (20 mg total) by mouth 2 (two) times a day (Patient not taking: Reported on 2/21/2023), Disp: 60 tablet, Rfl: 0  •  methocarbamol (ROBAXIN) 750 mg tablet, Take 1 tablet (750 mg total) by mouth every 8 (eight) hours (Patient not taking: Reported on 2/21/2023), Disp: 90 tablet, Rfl: 0  •  morphine (MSIR) 15 mg tablet, Take 3 tablets (45 mg total) by mouth every 6 (six) hours as needed for severe pain for up to 10 days Max Daily Amount: 180 mg (Patient not taking: Reported on 2/21/2023), Disp: 30 tablet, Rfl: 0      Physical Exam:  /76   Pulse (!) 132   Temp 98 4 °F (36 9 °C)   Ht 6' (1 829 m)   Wt 105 kg (231 lb)   SpO2 98%   BMI 31 33 kg/m²     Physical Exam  Vitals reviewed  Constitutional:       General: He is not in acute distress  Appearance: Normal appearance  He is well-developed  He is not diaphoretic  HENT:      Head: Normocephalic and atraumatic  Eyes:      Conjunctiva/sclera: Conjunctivae normal    Pulmonary:      Comments: Talking in full sentences in no distress  Neurological:      Mental Status: He is alert and oriented to person, place, and time               Labs:  Lab Results   Component Value Date    WBC 9 15 02/16/2023    HGB 9 4 (L) 02/16/2023    HCT 28 3 (L) 02/16/2023    MCV 89 02/16/2023    PLT 48 (LL) 02/16/2023     Lab Results   Component Value Date     06/03/2015    K 4 4 02/14/2023     02/14/2023    CO2 27 02/14/2023    ANIONGAP 9 06/03/2015    BUN 25 02/14/2023    CREATININE 0 46 (L) 02/14/2023    GLUCOSE 76 06/03/2015    CALCIUM 7 8 (L) 02/14/2023    CORRECTEDCA 8 5 02/14/2023    AST 19 02/14/2023    ALT 43 02/14/2023    ALKPHOS 38 02/14/2023    PROT 7 7 06/03/2015    BILITOT 0 4 06/03/2015    EGFR 143 02/14/2023

## 2023-02-23 ENCOUNTER — OFFICE VISIT (OUTPATIENT)
Dept: NEUROSURGERY | Facility: CLINIC | Age: 38
End: 2023-02-23

## 2023-02-23 VITALS
HEART RATE: 118 BPM | WEIGHT: 231 LBS | HEIGHT: 72 IN | DIASTOLIC BLOOD PRESSURE: 74 MMHG | RESPIRATION RATE: 18 BRPM | BODY MASS INDEX: 31.29 KG/M2 | TEMPERATURE: 99.2 F | SYSTOLIC BLOOD PRESSURE: 112 MMHG

## 2023-02-23 DIAGNOSIS — R26.2 AMBULATORY DYSFUNCTION: ICD-10-CM

## 2023-02-23 DIAGNOSIS — G89.29 CHRONIC MIDLINE THORACIC BACK PAIN: ICD-10-CM

## 2023-02-23 DIAGNOSIS — M54.59 INTRACTABLE LOW BACK PAIN: ICD-10-CM

## 2023-02-23 DIAGNOSIS — M51.34 DEGENERATION OF INTERVERTEBRAL DISC OF THORACIC REGION: ICD-10-CM

## 2023-02-23 DIAGNOSIS — M54.6 CHRONIC MIDLINE THORACIC BACK PAIN: ICD-10-CM

## 2023-02-23 DIAGNOSIS — M51.34 DEGENERATION OF THORACIC INTERVERTEBRAL DISC: ICD-10-CM

## 2023-02-23 LAB
DME PARACHUTE DELIVERY DATE ACTUAL: NORMAL
DME PARACHUTE DELIVERY DATE REQUESTED: NORMAL
DME PARACHUTE ITEM DESCRIPTION: NORMAL
DME PARACHUTE ORDER STATUS: NORMAL
DME PARACHUTE SUPPLIER NAME: NORMAL
DME PARACHUTE SUPPLIER PHONE: NORMAL

## 2023-02-23 NOTE — PROGRESS NOTES
Office Note - Neurosurgery   Quincy Estrellita 41 y o  male MRN: [id number]      Assessment:  Patient is a 33years old young gentleman with past medical history of hepatitis C viral infection anxiety, myofascial pain, chronic midline thoracic back pain, heroin IV drug abuse, opioid overdose, major depressive disorder, tobacco abuse, and recently diagnosed with MSSA after blood culture grows Staph aureus  Currently following up with ID completed his vancomycin and switched to oral doxycycline  His lumbar MRI done on 1/23/2023 demonstrates no discitis/osteomyelitis on sagittal views although the axial image appears to be motion degraded  Patient complains of central upper thoracic back pain which is chronic and also lower lumbar spine back pain  Has also right hip and right knee issues following up with orthopedics  Denies any radicular pain, numbness, paresthesia or weakness in the extremities   no bowel/bladder dysfunction   Tried Toradol and also was given morphine, now he completed and wants refill  LABS: CRP 10 from 85, ESR normal, XR lumbar-djd at L5-S1    Exam-alert and oriented x3, moves all extremities, slight limitation and right knee range of motion and right hip  Otherwise strength is 5/5 and sensation to light intact throughout  Tenderness in the upper mid thoracic region and lower lumbar region noted  Limping gait in the right leg  Patient uses crutches  for ambualtion  Hx, PEx,and images reviewed with the patient   Mx plan discussed  Given no imaging findings of osteomyelitis/discitis, patient positive for probable MSSA in the setting of IV drug abuse, would recommend continue antibiotics and follow-up with ID  From neurosurgery perspective, no procedure or regular follow-up imaging this is required  I will refer to pain management for pain control  offered referral to PT but patient declines  All  questions and concerns were answered to patient satisfaction    Patient's in agreement with management plan  Plan:  1  Ambulatory referral to pain Mx for possibel MANOJ, nerve block, RFA  2  Advised to F/U with ID and continue antibiotic  3  Fall precaution, avoid lifting heavy objects, excessive bending or twisting  4  Follow-up with neurosurgery on as needed basis  5  Call with question or concern        Subjective/Objective     C/C: " F/U for  back pain"      HPI  All patient's medical histories were reviewed and updated as appropriate: Allergies, current medication list, past medical history, past surgical history, family history, social history, and current medical lists    Patient is here for follow-up of back pain, history of IVDA , positive for MSSA diagnosed about a month ago, completed vancomycin and currently on oral doxycycline  He is following up with ID  He had lumbar spine x-rays which demonstrates grade 1 anterolisthesis of L4 on L5 with suggestion of mild instability  Also grade 1 retrolisthesis of L5 on S1 with moderate disc space narrowing and no instability  MRI of lumbar spine done a month ago shows no sign of discitis/osteomyelitis, degeneration changes seen in the lower lumbar spine  No thoracic MRI  Patient reports this ongoing upper thoracic and lower lumbar pain without radiculopathy, he is requesting pain medications  Patient says he finished Toradol and morphine  Denies any fever, chills, rigors, cough or chest pain  ROS  Review of system personally reviewed and updated  Review of Systems   Genitourinary:        Denies Urinary incontinence    Musculoskeletal: Positive for back pain (between shoulder blades shooting up into neck ) and gait problem (uses crutches- patient fell x 1 day )  Negative for arthralgias (b/l Knee- Right side HIP pain )          No previous sx     Back ( Mid/LBP) w b/l Knee pain & weakness- difficult walk ( patient uses walker) x 14 years for LBP   Pt also c/o b/l Knee- Right side HIP pain   rates pain 9/10     No recent PT     MANOJ x years ago with [facility]- last MANOJ 4/19/18    patient presented @ ED 1/18/23       Neurological: Positive for weakness  All other systems reviewed and are negative  Family History    Family History   Problem Relation Age of Onset   • No Known Problems Father    • Hepatitis Family        Social History    Social History     Socioeconomic History   • Marital status:      Spouse name: Not on file   • Number of children: Not on file   • Years of education: Not on file   • Highest education level: Not on file   Occupational History   • Occupation:       Comment: At [employer] 61  Use   • Smoking status: Every Day     Packs/day: 1 00     Types: Cigarettes   • Smokeless tobacco: Former   • Tobacco comments:     current every day smoker per allscript    Vaping Use   • Vaping Use: Not on file   Substance and Sexual Activity   • Alcohol use: Yes     Comment: occasional / social per allscript    • Drug use: Not Currently     Types: Heroin, Prescription, Marijuana     Comment: past opiate abuser / intravenous drug use per allscript                  • Sexual activity: Yes   Other Topics Concern   • Not on file   Social History Narrative      Three children  Social Determinants of Health     Financial Resource Strain: Not on file   Food Insecurity: No Food Insecurity   • Worried About [employer] [employer] in the Last Year: Never true   • Ran Out of Food in the Last Year: Never true   Transportation Needs: No Transportation Needs   • Lack of Transportation (Medical): No   • Lack of Transportation (Non-Medical):  No   Physical Activity: Not on file   Stress: Not on file   Social Connections: Not on file   Intimate Partner Violence: Not on file   Housing Stability: Low Risk    • Unable to Pay for Housing in the Last Year: No   • Number of Places Lived in the Last Year: 1   • Unstable Housing in the Last Year: No       Past Medical History    Past Medical History: Diagnosis Date   • Addiction to drug [facility])    • Anxiety    • Arthritis    • Chronic pain    • Hepatitis    • Hepatitis C virus    • Heroin abuse ([facility] [address] ) 05/02/2018   • Moderate episode of recurrent major depressive disorder ([facility] [address] ) 02/05/2020   • Tobacco use disorder     last assessed 11/2/15        Surgical History    Past Surgical History:   Procedure Laterality Date   • EPIDURAL BLOCK INJECTION Bilateral 4/19/2018    Procedure: T9-10 INTERLAMINAR EPIDURAL STEROID INJECTION;  Surgeon: Jayme Wilhelm MD;  Location: [facility] MAIN OR;  Service: Pain Management    • IR PICC PLACEMENT SINGLE LUMEN  1/27/2023       Medications      Current Outpatient Medications:   •  acetaminophen (TYLENOL) 500 mg tablet, Take 1 tablet (500 mg total) by mouth every 6 (six) hours as needed for mild pain for up to 23 days, Disp: 90 tablet, Rfl: 0  •  celecoxib (CeleBREX) 200 mg capsule, Take 1 capsule (200 mg total) by mouth 2 (two) times a day for 7 days, Disp: 14 capsule, Rfl: 0  •  doxycycline hyclate (VIBRAMYCIN) 100 mg capsule, Take 1 capsule (100 mg total) by mouth every 12 (twelve) hours, Disp: 60 capsule, Rfl: 0  •  famotidine (PEPCID) 20 mg tablet, Take 1 tablet (20 mg total) by mouth 2 (two) times a day (Patient not taking: Reported on 2/21/2023), Disp: 60 tablet, Rfl: 0  •  gabapentin (NEURONTIN) 300 mg capsule, Take 2 capsules (600 mg total) by mouth 3 (three) times a day, Disp: 180 capsule, Rfl: 0  •  gabapentin (NEURONTIN) 400 mg capsule, Take 1 capsule (400 mg total) by mouth 3 (three) times a day, Disp: 90 capsule, Rfl: 0  •  ketorolac (TORADOL) 10 mg tablet, Take 1 tablet (10 mg total) by mouth every 6 (six) hours as needed for moderate pain for up to 8 days, Disp: 30 tablet, Rfl: 0  •  methocarbamol (ROBAXIN) 750 mg tablet, Take 1 tablet (750 mg total) by mouth every 8 (eight) hours (Patient not taking: Reported on 2/21/2023), Disp: 90 tablet, Rfl: 0  •  methocarbamol 1000 MG TABS, Take 1,000 mg by mouth every 8 (eight) hours, Disp: 90 tablet, Rfl: 0  •  morphine (MSIR) 15 mg tablet, Take 3 tablets (45 mg total) by mouth every 6 (six) hours as needed for severe pain for up to 10 days Max Daily Amount: 180 mg (Patient not taking: Reported on 2/21/2023), Disp: 30 tablet, Rfl: 0  •  QUEtiapine (SEROquel) 100 mg tablet, Take 1 5 tablets (150 mg total) by mouth daily at bedtime, Disp: 45 tablet, Rfl: 0    Allergies    Allergies   Allergen Reactions   • Clindamycin/Lincomycin Hives       The following portions of the patient's history were reviewed and updated as appropriate: allergies, current medications, past family history, past medical history, past social history, past surgical history and problem list     Investigations    I personally reviewed the MRI and XRAY results with the patient:        Physical Exam    There were no vitals filed for this visit  Physical Exam  Constitutional:       Appearance: Normal appearance  HENT:      Head: Normocephalic and atraumatic  Eyes:      Extraocular Movements: Extraocular movements intact  Cardiovascular:      Rate and Rhythm: Normal rate and regular rhythm  Pulses: Normal pulses  Pulmonary:      Effort: Pulmonary effort is normal    Musculoskeletal:         General: Tenderness present  Cervical back: Normal range of motion  Neurological:      General: No focal deficit present  Mental Status: He is alert and oriented to person, place, and time  GCS: GCS eye subscore is 4  GCS verbal subscore is 5  GCS motor subscore is 6  Cranial Nerves: Cranial nerves 2-12 are intact  Sensory: Sensation is intact  Motor: Motor function is intact  Deep Tendon Reflexes: Reflexes are normal and symmetric  Reflex Scores:       Tricep reflexes are 2+ on the right side and 2+ on the left side  Bicep reflexes are 2+ on the right side and 2+ on the left side  Brachioradialis reflexes are 2+ on the right side and 2+ on the left side         Patellar reflexes are 2+ on the right side and 2+ on the left side  Achilles reflexes are 2+ on the right side and 2+ on the left side  Psychiatric:         Speech: Speech normal        Neurologic Exam     Mental Status   Oriented to person, place, and time  Speech: speech is normal   Level of consciousness: alert    Cranial Nerves   Cranial nerves II through XII intact       CN III, IV, VI   Nystagmus: none     CN XI   CN XI normal      Motor Exam   Muscle bulk: normal  Overall muscle tone: normal  Right arm tone: normal  Left arm tone: normal  Right arm pronator drift: absent  Left arm pronator drift: absent  Right leg tone: normal  Left leg tone: normal    Sensory Exam   Light touch normal      Gait, Coordination, and Reflexes     Reflexes   Right brachioradialis: 2+  Left brachioradialis: 2+  Right biceps: 2+  Left biceps: 2+  Right triceps: 2+  Left triceps: 2+  Right patellar: 2+  Left patellar: 2+  Right achilles: 2+  Left achilles: 2+  Right : 2+  Left : 2+  Right Quintero: absent  Left Quintero: absent  Right ankle clonus: absent  Left pendular knee jerk: absent

## 2023-02-24 ENCOUNTER — HOSPITAL ENCOUNTER (EMERGENCY)
Facility: HOSPITAL | Age: 38
Discharge: HOME | End: 2023-02-24
Attending: EMERGENCY MEDICINE | Admitting: EMERGENCY MEDICINE

## 2023-02-24 VITALS
TEMPERATURE: 97.2 F | DIASTOLIC BLOOD PRESSURE: 89 MMHG | OXYGEN SATURATION: 96 % | SYSTOLIC BLOOD PRESSURE: 131 MMHG | HEART RATE: 118 BPM | WEIGHT: 231 LBS | BODY MASS INDEX: 31.29 KG/M2 | RESPIRATION RATE: 20 BRPM | HEIGHT: 72 IN

## 2023-02-24 DIAGNOSIS — R60.0 PEDAL EDEMA: Primary | ICD-10-CM

## 2023-02-24 DIAGNOSIS — G89.29 CHRONIC BACK PAIN: ICD-10-CM

## 2023-02-24 DIAGNOSIS — M54.9 CHRONIC BACK PAIN: ICD-10-CM

## 2023-02-24 LAB
ANION GAP SERPL CALCULATED.3IONS-SCNC: 10 MMOL/L (ref 4–13)
ANISOCYTOSIS BLD QL SMEAR: PRESENT
BASOPHILS # BLD MANUAL: 0 THOUSAND/UL (ref 0–0.1)
BASOPHILS NFR MAR MANUAL: 0 % (ref 0–1)
BUN SERPL-MCNC: 22 MG/DL (ref 5–25)
CALCIUM SERPL-MCNC: 9.1 MG/DL (ref 8.4–10.2)
CHLORIDE SERPL-SCNC: 100 MMOL/L (ref 96–108)
CO2 SERPL-SCNC: 26 MMOL/L (ref 21–32)
CREAT SERPL-MCNC: 0.68 MG/DL (ref 0.6–1.3)
EOSINOPHIL # BLD MANUAL: 0.48 THOUSAND/UL (ref 0–0.4)
EOSINOPHIL NFR BLD MANUAL: 7 % (ref 0–6)
ERYTHROCYTE [DISTWIDTH] IN BLOOD BY AUTOMATED COUNT: 15.5 % (ref 11.6–15.1)
GFR SERPL CREATININE-BSD FRML MDRD: 122 ML/MIN/1.73SQ M
GLUCOSE SERPL-MCNC: 81 MG/DL (ref 65–140)
HCT VFR BLD AUTO: 30.1 % (ref 36.5–49.3)
HGB BLD-MCNC: 9.6 G/DL (ref 12–17)
LYMPHOCYTES # BLD AUTO: 1.98 THOUSAND/UL (ref 0.6–4.47)
LYMPHOCYTES # BLD AUTO: 29 % (ref 14–44)
MCH RBC QN AUTO: 28.6 PG (ref 26.8–34.3)
MCHC RBC AUTO-ENTMCNC: 31.9 G/DL (ref 31.4–37.4)
MCV RBC AUTO: 90 FL (ref 82–98)
MONOCYTES # BLD AUTO: 0.48 THOUSAND/UL (ref 0–1.22)
MONOCYTES NFR BLD: 7 % (ref 4–12)
NEUTROPHILS # BLD MANUAL: 3.82 THOUSAND/UL (ref 1.85–7.62)
NEUTS BAND NFR BLD MANUAL: 5 % (ref 0–8)
NEUTS SEG NFR BLD AUTO: 51 % (ref 43–75)
PLATELET # BLD AUTO: 292 THOUSANDS/UL (ref 149–390)
PLATELET BLD QL SMEAR: ADEQUATE
PMV BLD AUTO: 8.6 FL (ref 8.9–12.7)
POTASSIUM SERPL-SCNC: 4.2 MMOL/L (ref 3.5–5.3)
RBC # BLD AUTO: 3.36 MILLION/UL (ref 3.88–5.62)
SODIUM SERPL-SCNC: 136 MMOL/L (ref 135–147)
VARIANT LYMPHS # BLD AUTO: 1 %
WBC # BLD AUTO: 6.83 THOUSAND/UL (ref 4.31–10.16)

## 2023-02-24 RX ORDER — MORPHINE SULFATE 4 MG/ML
4 INJECTION, SOLUTION INTRAMUSCULAR; INTRAVENOUS ONCE
Status: COMPLETED | OUTPATIENT
Start: 2023-02-24 | End: 2023-02-24

## 2023-02-24 RX ORDER — KETOROLAC TROMETHAMINE 30 MG/ML
15 INJECTION, SOLUTION INTRAMUSCULAR; INTRAVENOUS ONCE
Status: COMPLETED | OUTPATIENT
Start: 2023-02-24 | End: 2023-02-24

## 2023-02-24 RX ADMIN — KETOROLAC TROMETHAMINE 15 MG: 30 INJECTION, SOLUTION INTRAMUSCULAR; INTRAVENOUS at 21:52

## 2023-02-24 RX ADMIN — MORPHINE SULFATE 4 MG: 4 INJECTION, SOLUTION INTRAMUSCULAR; INTRAVENOUS at 20:22

## 2023-02-25 ENCOUNTER — APPOINTMENT (OUTPATIENT)
Dept: NON INVASIVE DIAGNOSTICS | Facility: HOSPITAL | Age: 38
End: 2023-02-25

## 2023-02-25 ENCOUNTER — APPOINTMENT (EMERGENCY)
Dept: RADIOLOGY | Facility: HOSPITAL | Age: 38
End: 2023-02-25

## 2023-02-25 ENCOUNTER — HOSPITAL ENCOUNTER (EMERGENCY)
Facility: HOSPITAL | Age: 38
Discharge: HOME | End: 2023-02-25
Attending: STUDENT IN AN ORGANIZED HEALTH CARE EDUCATION/TRAINING PROGRAM

## 2023-02-25 VITALS
TEMPERATURE: 98 F | RESPIRATION RATE: 18 BRPM | BODY MASS INDEX: 30.83 KG/M2 | DIASTOLIC BLOOD PRESSURE: 96 MMHG | OXYGEN SATURATION: 98 % | HEART RATE: 113 BPM | WEIGHT: 227.29 LBS | SYSTOLIC BLOOD PRESSURE: 139 MMHG

## 2023-02-25 DIAGNOSIS — M25.473 ANKLE SWELLING: ICD-10-CM

## 2023-02-25 DIAGNOSIS — M25.572 ACUTE BILATERAL ANKLE PAIN: Primary | ICD-10-CM

## 2023-02-25 DIAGNOSIS — M25.571 ACUTE BILATERAL ANKLE PAIN: Primary | ICD-10-CM

## 2023-02-25 LAB
ALBUMIN SERPL BCP-MCNC: 3.6 G/DL (ref 3.5–5)
ALP SERPL-CCNC: 57 U/L (ref 34–104)
ALT SERPL W P-5'-P-CCNC: 22 U/L (ref 7–52)
ANION GAP SERPL CALCULATED.3IONS-SCNC: 8 MMOL/L (ref 4–13)
ANISOCYTOSIS BLD QL SMEAR: PRESENT
APTT PPP: 27 SECONDS (ref 23–37)
AST SERPL W P-5'-P-CCNC: 17 U/L (ref 13–39)
BASOPHILS # BLD MANUAL: 0 THOUSAND/UL (ref 0–0.1)
BASOPHILS NFR MAR MANUAL: 0 % (ref 0–1)
BILIRUB SERPL-MCNC: 0.25 MG/DL (ref 0.2–1)
BILIRUB UR QL STRIP: NEGATIVE
BUN SERPL-MCNC: 25 MG/DL (ref 5–25)
CALCIUM SERPL-MCNC: 9.1 MG/DL (ref 8.4–10.2)
CHLORIDE SERPL-SCNC: 104 MMOL/L (ref 96–108)
CLARITY UR: CLEAR
CO2 SERPL-SCNC: 28 MMOL/L (ref 21–32)
COLOR UR: YELLOW
CREAT SERPL-MCNC: 0.67 MG/DL (ref 0.6–1.3)
DACRYOCYTES BLD QL SMEAR: PRESENT
EOSINOPHIL # BLD MANUAL: 0.24 THOUSAND/UL (ref 0–0.4)
EOSINOPHIL NFR BLD MANUAL: 5 % (ref 0–6)
ERYTHROCYTE [DISTWIDTH] IN BLOOD BY AUTOMATED COUNT: 15.4 % (ref 11.6–15.1)
GFR SERPL CREATININE-BSD FRML MDRD: 122 ML/MIN/1.73SQ M
GLUCOSE SERPL-MCNC: 84 MG/DL (ref 65–140)
GLUCOSE UR STRIP-MCNC: NEGATIVE MG/DL
HCT VFR BLD AUTO: 29.5 % (ref 36.5–49.3)
HELMET CELLS BLD QL SMEAR: PRESENT
HGB BLD-MCNC: 9.2 G/DL (ref 12–17)
HGB UR QL STRIP.AUTO: NEGATIVE
HYPERCHROMIA BLD QL SMEAR: PRESENT
INR PPP: 0.94 (ref 0.84–1.19)
KETONES UR STRIP-MCNC: NEGATIVE MG/DL
LACTATE SERPL-SCNC: 1.5 MMOL/L (ref 0.5–2)
LEUKOCYTE ESTERASE UR QL STRIP: NEGATIVE
LYMPHOCYTES # BLD AUTO: 1.64 THOUSAND/UL (ref 0.6–4.47)
LYMPHOCYTES # BLD AUTO: 34 % (ref 14–44)
MCH RBC QN AUTO: 28.4 PG (ref 26.8–34.3)
MCHC RBC AUTO-ENTMCNC: 31.2 G/DL (ref 31.4–37.4)
MCV RBC AUTO: 91 FL (ref 82–98)
MONOCYTES # BLD AUTO: 0.68 THOUSAND/UL (ref 0–1.22)
MONOCYTES NFR BLD: 14 % (ref 4–12)
NEUTROPHILS # BLD MANUAL: 2.22 THOUSAND/UL (ref 1.85–7.62)
NEUTS BAND NFR BLD MANUAL: 4 % (ref 0–8)
NEUTS SEG NFR BLD AUTO: 42 % (ref 43–75)
NITRITE UR QL STRIP: NEGATIVE
PH UR STRIP.AUTO: 6.5 [PH]
PLATELET # BLD AUTO: 308 THOUSANDS/UL (ref 149–390)
PLATELET BLD QL SMEAR: ADEQUATE
PMV BLD AUTO: 8.6 FL (ref 8.9–12.7)
POTASSIUM SERPL-SCNC: 4.3 MMOL/L (ref 3.5–5.3)
PROCALCITONIN SERPL-MCNC: 0.07 NG/ML
PROT SERPL-MCNC: 7 G/DL (ref 6.4–8.4)
PROT UR STRIP-MCNC: NEGATIVE MG/DL
PROTHROMBIN TIME: 12.8 SECONDS (ref 11.6–14.5)
RBC # BLD AUTO: 3.24 MILLION/UL (ref 3.88–5.62)
SODIUM SERPL-SCNC: 140 MMOL/L (ref 135–147)
SP GR UR STRIP.AUTO: 1.02 (ref 1–1.03)
UROBILINOGEN UR QL STRIP.AUTO: 0.2 E.U./DL
VARIANT LYMPHS # BLD AUTO: 1 %
WBC # BLD AUTO: 4.83 THOUSAND/UL (ref 4.31–10.16)

## 2023-02-25 RX ORDER — SODIUM CHLORIDE, SODIUM GLUCONATE, SODIUM ACETATE, POTASSIUM CHLORIDE, MAGNESIUM CHLORIDE, SODIUM PHOSPHATE, DIBASIC, AND POTASSIUM PHOSPHATE .53; .5; .37; .037; .03; .012; .00082 G/100ML; G/100ML; G/100ML; G/100ML; G/100ML; G/100ML; G/100ML
1000 INJECTION, SOLUTION INTRAVENOUS ONCE
Status: DISCONTINUED | OUTPATIENT
Start: 2023-02-25 | End: 2023-02-25

## 2023-02-25 RX ORDER — KETOROLAC TROMETHAMINE 30 MG/ML
15 INJECTION, SOLUTION INTRAMUSCULAR; INTRAVENOUS ONCE
Status: COMPLETED | OUTPATIENT
Start: 2023-02-25 | End: 2023-02-25

## 2023-02-25 RX ORDER — MORPHINE SULFATE 4 MG/ML
4 INJECTION, SOLUTION INTRAMUSCULAR; INTRAVENOUS ONCE
Status: DISCONTINUED | OUTPATIENT
Start: 2023-02-25 | End: 2023-02-25

## 2023-02-25 RX ORDER — OXYCODONE HYDROCHLORIDE 5 MG/1
5 TABLET ORAL ONCE
Status: COMPLETED | OUTPATIENT
Start: 2023-02-25 | End: 2023-02-25

## 2023-02-25 RX ADMIN — KETOROLAC TROMETHAMINE 15 MG: 30 INJECTION, SOLUTION INTRAMUSCULAR at 16:59

## 2023-02-25 RX ADMIN — OXYCODONE HYDROCHLORIDE 5 MG: 5 TABLET ORAL at 16:57

## 2023-02-25 NOTE — DISCHARGE INSTRUCTIONS
You have been evaluated in the Emergency Department today for ankle pain and swelling  Your evaluation did not find evidence of medical conditions requiring emergent intervention at this time  You may take ibuprofen every 6 hours or tylenol every 6 hours as needed for pain  As discussed, please follow-up with pain management as well as orthopedics  Please schedule an appointment for follow up with your primary care physician this week  Return to the Emergency Department if you experience worsening pain, numbness, tingling, change of color in your toes, or any other concerning symptoms

## 2023-02-25 NOTE — ED PROVIDER NOTES
History  Chief Complaint   Patient presents with   • Ankle Swelling     Patient reports bilateral ankle swelling starting 2 days ago  41 male presenting with bilateral pedal edema x2 days  Patient states he has had worsening back pain and said uses crutches more and states since he has been putting more weight on his left leg he has noted slight swelling and there is left ankle and left foot  Denies any calf pain tenderness or asymmetry  No history of DVT or PE  No cellulitis  No fevers or chills  Normal ambulation  No recent injuries  Denies any recent IV drug abuse  No recent travel  Prior to Admission Medications   Prescriptions Last Dose Informant Patient Reported? Taking?    QUEtiapine (SEROquel) 100 mg tablet   No No   Sig: Take 1 5 tablets (150 mg total) by mouth daily at bedtime   acetaminophen (TYLENOL) 500 mg tablet   No No   Sig: Take 1 tablet (500 mg total) by mouth every 6 (six) hours as needed for mild pain for up to 23 days   celecoxib (CeleBREX) 200 mg capsule   No No   Sig: Take 1 capsule (200 mg total) by mouth 2 (two) times a day for 7 days   doxycycline hyclate (VIBRAMYCIN) 100 mg capsule   No No   Sig: Take 1 capsule (100 mg total) by mouth every 12 (twelve) hours   famotidine (PEPCID) 20 mg tablet   No No   Sig: Take 1 tablet (20 mg total) by mouth 2 (two) times a day   Patient not taking: Reported on 2/21/2023   gabapentin (NEURONTIN) 300 mg capsule   No No   Sig: Take 2 capsules (600 mg total) by mouth 3 (three) times a day   gabapentin (NEURONTIN) 400 mg capsule   No No   Sig: Take 1 capsule (400 mg total) by mouth 3 (three) times a day   ketorolac (TORADOL) 10 mg tablet   No No   Sig: Take 1 tablet (10 mg total) by mouth every 6 (six) hours as needed for moderate pain for up to 8 days   methocarbamol (ROBAXIN) 750 mg tablet   No No   Sig: Take 1 tablet (750 mg total) by mouth every 8 (eight) hours   methocarbamol 1000 MG TABS   No No   Sig: Take 1,000 mg by mouth every 8 (eight) hours   morphine (MSIR) 15 mg tablet   No No   Sig: Take 3 tablets (45 mg total) by mouth every 6 (six) hours as needed for severe pain for up to 10 days Max Daily Amount: 180 mg   Patient not taking: Reported on 2/21/2023      Facility-Administered Medications: None       Past Medical History:   Diagnosis Date   • Addiction to drug [facility])    • Anxiety    • Arthritis    • Chronic pain    • Hepatitis    • Hepatitis C virus    • Heroin abuse ([facility] ) 05/02/2018   • Moderate episode of recurrent major depressive disorder ([facility] ) 02/05/2020   • Tobacco use disorder     last assessed 11/2/15        Past Surgical History:   Procedure Laterality Date   • EPIDURAL BLOCK INJECTION Bilateral 4/19/2018    Procedure: T9-10 INTERLAMINAR EPIDURAL STEROID INJECTION;  Surgeon: Nathanel Lanes, MD;  Location: [facility] MAIN OR;  Service: Pain Management    • IR PICC PLACEMENT SINGLE LUMEN  1/27/2023       Family History   Problem Relation Age of Onset   • No Known Problems Father    • Hepatitis Family      I have reviewed and agree with the history as documented  E-Cigarette/Vaping     E-Cigarette/Vaping Substances   • Nicotine No    • THC No    • CBD No    • Flavoring No    • Other No    • Unknown No      Social History     Tobacco Use   • Smoking status: Every Day     Packs/day: 1 00     Types: Cigarettes   • Smokeless tobacco: Former   • Tobacco comments:     current every day smoker per allscript    Substance Use Topics   • Alcohol use: Not Currently     Comment: occasional / social per allscript    • Drug use: Not Currently     Types: Heroin, Prescription, Marijuana     Comment: past opiate abuser / intravenous drug use per allscript                      Review of Systems   Constitutional: Negative for activity change, appetite change, chills and fever  HENT: Negative for ear pain, hearing loss, rhinorrhea, sneezing, sore throat and trouble swallowing  Eyes: Negative for pain and visual disturbance  Respiratory: Negative for cough, choking, chest tightness, shortness of breath, wheezing and stridor  Cardiovascular: Negative for chest pain and palpitations  Gastrointestinal: Negative for abdominal pain, constipation, diarrhea, nausea and vomiting  Genitourinary: Negative for difficulty urinating, dysuria, frequency, hematuria and testicular pain  Musculoskeletal: Positive for arthralgias, back pain and myalgias  Negative for gait problem and neck pain  Skin: Negative for color change and rash  Allergic/Immunologic: Negative for immunocompromised state  Neurological: Negative for dizziness, seizures, syncope, speech difficulty, weakness, light-headedness, numbness and headaches  Psychiatric/Behavioral: Negative for confusion  The patient is not nervous/anxious  All other systems reviewed and are negative  Physical Exam  Physical Exam  Vitals and nursing note reviewed  Constitutional:       General: He is not in acute distress  Appearance: Normal appearance  He is well-developed and normal weight  He is not ill-appearing, toxic-appearing or diaphoretic  HENT:      Head: Normocephalic and atraumatic  Nose: Nose normal       Mouth/Throat:      Mouth: Mucous membranes are moist       Pharynx: Oropharynx is clear  Eyes:      General: No scleral icterus  Extraocular Movements: Extraocular movements intact  Conjunctiva/sclera: Conjunctivae normal    Cardiovascular:      Rate and Rhythm: Normal rate and regular rhythm  Pulses: Normal pulses  Heart sounds: Normal heart sounds  No murmur heard  Pulmonary:      Effort: Pulmonary effort is normal  No respiratory distress  Breath sounds: Normal breath sounds  No wheezing or rales  Chest:      Chest wall: No tenderness  Abdominal:      General: Bowel sounds are normal  There is no distension  Palpations: Abdomen is soft  There is no mass  Tenderness: There is no abdominal tenderness   There is no guarding or rebound  Musculoskeletal:         General: Tenderness present  No swelling or deformity  Normal range of motion  Cervical back: Normal range of motion and neck supple  Right lower leg: Edema present  Left lower leg: Edema present  Comments: Chronic right hip and low and upper mid back pain  No focal motor or sensory neurological deficits  There is trace pedal edema bilaterally no calf pain tenderness or asymmetry  No evidence for cellulitis or warmth  Lymphadenopathy:      Cervical: No cervical adenopathy  Skin:     General: Skin is warm and dry  Capillary Refill: Capillary refill takes less than 2 seconds  Findings: No erythema, lesion or rash  Neurological:      General: No focal deficit present  Mental Status: He is alert and oriented to person, place, and time  Motor: No abnormal muscle tone     Psychiatric:         Mood and Affect: Mood normal          Behavior: Behavior normal          Vital Signs  ED Triage Vitals [02/24/23 1954]   Temperature Pulse Respirations Blood Pressure SpO2   (!) 97 2 °F (36 2 °C) (!) 130 20 136/83 95 %      Temp Source Heart Rate Source Patient Position - Orthostatic VS BP Location FiO2 (%)   Temporal Monitor Lying Left arm --      Pain Score       9           Vitals:    02/24/23 1954 02/24/23 2000 02/24/23 2042 02/24/23 2130   BP: 136/83 129/73 123/63 131/89   Pulse: (!) 130 (!) 124 (!) 119 (!) 118   Patient Position - Orthostatic VS: Lying Sitting Lying Sitting         Visual Acuity      ED Medications  Medications   morphine injection 4 mg (4 mg Intravenous Given 2/24/23 2022)   ketorolac (TORADOL) injection 15 mg (15 mg Intravenous Given 2/24/23 2152)       Diagnostic Studies  Results Reviewed     Procedure Component Value Units Date/Time    CBC and differential [[id number]]  (Abnormal) Collected: 02/24/23 2020    Lab Status: Final result Specimen: Blood from Arm, Right Updated: 02/24/23 2125     WBC 6 83 Thousand/uL RBC 3 36 Million/uL      Hemoglobin 9 6 g/dL      Hematocrit 30 1 %      MCV 90 fL      MCH 28 6 pg      MCHC 31 9 g/dL      RDW 15 5 %      MPV 8 6 fL      Platelets 612 Thousands/uL     Narrative: This is an appended report  These results have been appended to a previously verified report      Manual Differential(PHLEBS Do Not Order) [[id number]]  (Abnormal) Collected: 02/24/23 2020    Lab Status: Final result Specimen: Blood from Arm, Right Updated: 02/24/23 2125     Segmented % 51 %      Bands % 5 %      Lymphocytes % 29 %      Monocytes % 7 %      Eosinophils, % 7 %      Basophils % 0 %      Atypical Lymphocytes % 1 %      Absolute Neutrophils 3 82 Thousand/uL      Lymphocytes Absolute 1 98 Thousand/uL      Monocytes Absolute 0 48 Thousand/uL      Eosinophils Absolute 0 48 Thousand/uL      Basophils Absolute 0 00 Thousand/uL      Total Counted --     Anisocytosis Present     Platelet Estimate Adequate    Basic metabolic panel [[id number]] Collected: 02/24/23 2020    Lab Status: Final result Specimen: Blood from Arm, Right Updated: 02/24/23 2054     Sodium 136 mmol/L      Potassium 4 2 mmol/L      Chloride 100 mmol/L      CO2 26 mmol/L      ANION GAP 10 mmol/L      BUN 22 mg/dL      Creatinine 0 68 mg/dL      Glucose 81 mg/dL      Calcium 9 1 mg/dL      eGFR 122 ml/min/1 73sq m     Narrative:      Mario guidelines for Chronic Kidney Disease (CKD):   •  Stage 1 with normal or high GFR (GFR > 90 mL/min/1 73 square meters)  •  Stage 2 Mild CKD (GFR = 60-89 mL/min/1 73 square meters)  •  Stage 3A Moderate CKD (GFR = 45-59 mL/min/1 73 square meters)  •  Stage 3B Moderate CKD (GFR = 30-44 mL/min/1 73 square meters)  •  Stage 4 Severe CKD (GFR = 15-29 mL/min/1 73 square meters)  •  Stage 5 End Stage CKD (GFR <15 mL/min/1 73 square meters)  Note: GFR calculation is accurate only with a steady state creatinine                 No orders to display              Procedures  Procedures ED Course                                             Medical Decision Making  41-year-old male presenting with ankle and foot swelling  No evidence for renal failure  No evidence for acute kidney injury  There is no calf pain tenderness or asymmetry  Chronic back pain: chronic illness or injury  Pedal edema: acute illness or injury  Amount and/or Complexity of Data Reviewed  Independent Historian: parent     Details: Discussed with patient's father  External Data Reviewed: labs and notes  Labs: ordered  Decision-making details documented in ED Course  Details: No evidence for acute kidney injury or acute renal failure  Discussion of management or test interpretation with external provider(s): Multiple previous emergency department and outpatient records reviewed    Risk  OTC drugs  Prescription drug management  Risk Details: Multiple previous emergency department and outpatient records reviewed  Normal pulses low risk for vascular insufficiency  No calf pain tenderness and low suspicion for DVT  Able to ambulate  Disposition  Final diagnoses:   Pedal edema   Chronic back pain     Time reflects when diagnosis was documented in both MDM as applicable and the Disposition within this note     Time User Action Codes Description Comment    2/24/2023  9:41 PM Cynthia PETTIT Add [R60 0] Pedal edema     2/24/2023  9:41 PM Nacho Cano Add [M54 9,  G89 29] Chronic back pain       ED Disposition     ED Disposition   Discharge    Condition   Stable    Date/Time   Fri Feb 24, 2023  9:41 PM    Comment   Ludie Ill discharge to home/self care                 Follow-up Information     Follow up With Specialties Details Why [address],  Family Medicine Schedule an appointment as soon as possible for a visit   [id number] ([address]) [place] [address]  [phone]            Current Discharge Medication List      CONTINUE these medications which have NOT CHANGED Details   acetaminophen (TYLENOL) 500 mg tablet Take 1 tablet (500 mg total) by mouth every 6 (six) hours as needed for mild pain for up to 23 days  Qty: 90 tablet, Refills: 0    Associated Diagnoses: Intractable low back pain      celecoxib (CeleBREX) 200 mg capsule Take 1 capsule (200 mg total) by mouth 2 (two) times a day for 7 days  Qty: 14 capsule, Refills: 0    Associated Diagnoses: Right shoulder tendonitis      doxycycline hyclate (VIBRAMYCIN) 100 mg capsule Take 1 capsule (100 mg total) by mouth every 12 (twelve) hours  Qty: 60 capsule, Refills: 0    Associated Diagnoses: Bacteremia      famotidine (PEPCID) 20 mg tablet Take 1 tablet (20 mg total) by mouth 2 (two) times a day  Qty: 60 tablet, Refills: 0    Associated Diagnoses: GERD (gastroesophageal reflux disease)      ! ! gabapentin (NEURONTIN) 300 mg capsule Take 2 capsules (600 mg total) by mouth 3 (three) times a day  Qty: 180 capsule, Refills: 0    Associated Diagnoses: Intractable low back pain      !! gabapentin (NEURONTIN) 400 mg capsule Take 1 capsule (400 mg total) by mouth 3 (three) times a day  Qty: 90 capsule, Refills: 0    Associated Diagnoses: Acute right-sided low back pain with right-sided sciatica; Chronic midline thoracic back pain      ketorolac (TORADOL) 10 mg tablet Take 1 tablet (10 mg total) by mouth every 6 (six) hours as needed for moderate pain for up to 8 days  Qty: 30 tablet, Refills: 0    Associated Diagnoses: Intractable low back pain      !! methocarbamol (ROBAXIN) 750 mg tablet Take 1 tablet (750 mg total) by mouth every 8 (eight) hours  Qty: 90 tablet, Refills: 0    Associated Diagnoses: Acute right-sided low back pain with right-sided sciatica;  Chronic midline thoracic back pain      !! methocarbamol 1000 MG TABS Take 1,000 mg by mouth every 8 (eight) hours  Qty: 90 tablet, Refills: 0    Associated Diagnoses: Intractable low back pain      morphine (MSIR) 15 mg tablet Take 3 tablets (45 mg total) by mouth every 6 (six) hours as needed for severe pain for up to 10 days Max Daily Amount: 180 mg  Qty: 30 tablet, Refills: 0    Associated Diagnoses: Intractable low back pain      QUEtiapine (SEROquel) 100 mg tablet Take 1 5 tablets (150 mg total) by mouth daily at bedtime  Qty: 45 tablet, Refills: 0    Associated Diagnoses: Anxiety       ! ! - Potential duplicate medications found  Please discuss with provider  No discharge procedures on file      PDMP Review       Value Time User    PDMP Reviewed  Yes 12/5/2022  5:14 PM Lissette Us DO          ED Provider  Electronically Signed by           Jia Ying DO  02/24/23 2156       Jia Ying DO  02/24/23 2157

## 2023-02-26 ENCOUNTER — HOSPITAL ENCOUNTER (EMERGENCY)
Facility: HOSPITAL | Age: 38
Discharge: HOME | End: 2023-02-26
Attending: EMERGENCY MEDICINE

## 2023-02-26 VITALS
DIASTOLIC BLOOD PRESSURE: 82 MMHG | HEART RATE: 117 BPM | SYSTOLIC BLOOD PRESSURE: 127 MMHG | RESPIRATION RATE: 18 BRPM | OXYGEN SATURATION: 98 % | TEMPERATURE: 97.1 F

## 2023-02-26 DIAGNOSIS — G89.29 OTHER CHRONIC PAIN: Primary | ICD-10-CM

## 2023-02-26 LAB
ATRIAL RATE: 107 BPM
P AXIS: 62 DEGREES
PR INTERVAL: 148 MS
QRS AXIS: 26 DEGREES
QRSD INTERVAL: 82 MS
QT INTERVAL: 332 MS
QTC INTERVAL: 443 MS
T WAVE AXIS: 19 DEGREES
VENTRICULAR RATE: 107 BPM

## 2023-02-26 RX ORDER — KETOROLAC TROMETHAMINE 30 MG/ML
15 INJECTION, SOLUTION INTRAMUSCULAR; INTRAVENOUS ONCE
Status: COMPLETED | OUTPATIENT
Start: 2023-02-26 | End: 2023-02-26

## 2023-02-26 RX ORDER — OXYCODONE HYDROCHLORIDE AND ACETAMINOPHEN 5; 325 MG/1; MG/1
1 TABLET ORAL ONCE
Status: COMPLETED | OUTPATIENT
Start: 2023-02-26 | End: 2023-02-26

## 2023-02-26 RX ADMIN — OXYCODONE HYDROCHLORIDE AND ACETAMINOPHEN 1 TABLET: 5; 325 TABLET ORAL at 10:46

## 2023-02-26 RX ADMIN — KETOROLAC TROMETHAMINE 15 MG: 30 INJECTION, SOLUTION INTRAMUSCULAR; INTRAVENOUS at 10:44

## 2023-02-26 NOTE — ED PROVIDER NOTES
History  Chief Complaint   Patient presents with   • Ankle Pain     Bilateral ankle pain was seen here yesterday for same  States has pain in ankles from pressure and use of crutches  Pain for approx 4 days, denies any injury       Patient is a 60-year-old male, past medical history of Hep C, heroin abuse, anxiety, arthritis, and chronic pain, who presents to the emergency department for persistent bilateral lower extremity swelling and pain  Patient states that this has been present for several days  It is getting worse  He states he was seen here yesterday for the pain  They did some lab work which was normal and his pain was treated in the emergency department  He states he felt better after leaving but the pain has returned  He has been using crutches that he has had without relief  No other modifying factors  No associated symptoms  States that he has an appointment with both orthopedics and pain management coming up, but cannot wait until then to be seen  Denies any prior injuries or falls  No other concerns or complaints at this time  Chart reviewed  Patient seen yesterday on 2/24/2023 for ankle swelling  CBC and BMP were normal   He was given morphine and Toradol with improvement of his pain  He was discharged with instructions to follow-up with PCP  Prior to Admission Medications   Prescriptions Last Dose Informant Patient Reported? Taking?    QUEtiapine (SEROquel) 100 mg tablet   No No   Sig: Take 1 5 tablets (150 mg total) by mouth daily at bedtime   acetaminophen (TYLENOL) 500 mg tablet   No No   Sig: Take 1 tablet (500 mg total) by mouth every 6 (six) hours as needed for mild pain for up to 23 days   celecoxib (CeleBREX) 200 mg capsule   No No   Sig: Take 1 capsule (200 mg total) by mouth 2 (two) times a day for 7 days   doxycycline hyclate (VIBRAMYCIN) 100 mg capsule   No No   Sig: Take 1 capsule (100 mg total) by mouth every 12 (twelve) hours   famotidine (PEPCID) 20 mg tablet No No   Sig: Take 1 tablet (20 mg total) by mouth 2 (two) times a day   Patient not taking: Reported on 2/21/2023   gabapentin (NEURONTIN) 300 mg capsule   No No   Sig: Take 2 capsules (600 mg total) by mouth 3 (three) times a day   gabapentin (NEURONTIN) 400 mg capsule   No No   Sig: Take 1 capsule (400 mg total) by mouth 3 (three) times a day   methocarbamol (ROBAXIN) 750 mg tablet   No No   Sig: Take 1 tablet (750 mg total) by mouth every 8 (eight) hours   methocarbamol 1000 MG TABS   No No   Sig: Take 1,000 mg by mouth every 8 (eight) hours      Facility-Administered Medications: None       Past Medical History:   Diagnosis Date   • Addiction to drug [facility])    • Anxiety    • Arthritis    • Chronic pain    • Hepatitis    • Hepatitis C virus    • Heroin abuse ([facility] ) 05/02/2018   • Moderate episode of recurrent major depressive disorder ([facility] ) 02/05/2020   • Tobacco use disorder     last assessed 11/2/15        Past Surgical History:   Procedure Laterality Date   • EPIDURAL BLOCK INJECTION Bilateral 4/19/2018    Procedure: T9-10 INTERLAMINAR EPIDURAL STEROID INJECTION;  Surgeon: Quincy Mcpherson MD;  Location: [facility] OR;  Service: Pain Management    • IR PICC PLACEMENT SINGLE LUMEN  1/27/2023       Family History   Problem Relation Age of Onset   • No Known Problems Father    • Hepatitis Family      I have reviewed and agree with the history as documented      E-Cigarette/Vaping     E-Cigarette/Vaping Substances   • Nicotine No    • THC No    • CBD No    • Flavoring No    • Other No    • Unknown No      Social History     Tobacco Use   • Smoking status: Every Day     Packs/day: 1 00     Types: Cigarettes   • Smokeless tobacco: Former   • Tobacco comments:     current every day smoker per allscript    Substance Use Topics   • Alcohol use: Not Currently     Comment: occasional / social per allscript    • Drug use: Not Currently     Types: Heroin, Prescription, Marijuana     Comment: past opiate abuser / intravenous drug use per allscript                      Review of Systems   Constitutional: Negative for chills and fever  Respiratory: Negative for shortness of breath  Cardiovascular: Negative for chest pain  Gastrointestinal: Negative for nausea  Musculoskeletal:        Bilateral ankle swelling, pain   All other systems reviewed and are negative  Physical Exam  Physical Exam  Vitals and nursing note reviewed  Constitutional:       General: He is not in acute distress  Appearance: He is well-developed  He is not diaphoretic  HENT:      Head: Normocephalic and atraumatic  Right Ear: External ear normal       Left Ear: External ear normal       Nose: Nose normal    Eyes:      General: Lids are normal  No scleral icterus  Cardiovascular:      Rate and Rhythm: Normal rate and regular rhythm  Heart sounds: Normal heart sounds  No murmur heard  No friction rub  No gallop  Pulmonary:      Effort: Pulmonary effort is normal  No respiratory distress  Breath sounds: Normal breath sounds  No wheezing or rales  Abdominal:      Palpations: Abdomen is soft  Tenderness: There is no abdominal tenderness  There is no guarding or rebound  Musculoskeletal:         General: No deformity  Normal range of motion  Cervical back: Normal range of motion and neck supple  Comments: There is a moderate amount of swelling to the distal lower extremities  Patient states he is tender over the bilateral inner ankles  No obvious deformities  Full range of motion  2+ DP and PT pulses bilaterally  Extremities are warm  Skin:     General: Skin is warm and dry  Neurological:      General: No focal deficit present  Mental Status: He is alert     Psychiatric:         Mood and Affect: Mood normal          Behavior: Behavior normal          Vital Signs  ED Triage Vitals [02/25/23 1522]   Temperature Pulse Respirations Blood Pressure SpO2   98 °F (36 7 °C) (!) 113 18 139/96 98 % Temp Source Heart Rate Source Patient Position - Orthostatic VS BP Location FiO2 (%)   Temporal Monitor Lying Right arm --      Pain Score       9           Vitals:    02/25/23 1522   BP: 139/96   Pulse: (!) 113   Patient Position - Orthostatic VS: Lying         Visual Acuity      ED Medications  Medications   oxyCODONE (ROXICODONE) IR tablet 5 mg (5 mg Oral Given 2/25/23 1657)   ketorolac (TORADOL) injection 15 mg (15 mg Intramuscular Given 2/25/23 1659)       Diagnostic Studies  Results Reviewed     Procedure Component Value Units Date/Time    Blood culture #1 [[id number]] Collected: 02/25/23 1539    Lab Status: Preliminary result Specimen: Blood from Arm, Right Updated: 02/26/23 1401     Blood Culture Received in Microbiology Lab  Culture in Progress  Blood culture #2 [[id number]] Collected: 02/25/23 1539    Lab Status: Preliminary result Specimen: Blood from Arm, Right Updated: 02/26/23 1401     Blood Culture Received in Microbiology Lab  Culture in Progress  UA w Reflex to Microscopic w Reflex to Culture [[id number]] Collected: 02/25/23 1702    Lab Status: Final result Specimen: Urine, Clean Catch Updated: 02/25/23 1720     Color, UA Yellow     Clarity, UA Clear     Specific [place], UA 1 020     pH, UA 6 5     Leukocytes, UA Negative     Nitrite, UA Negative     Protein, UA Negative mg/dl      Glucose, UA Negative mg/dl      Ketones, UA Negative mg/dl      Urobilinogen, UA 0 2 E U /dl      Bilirubin, UA Negative     Occult Blood, UA Negative    CBC and differential [[id number]]  (Abnormal) Collected: 02/25/23 1539    Lab Status: Final result Specimen: Blood from Arm, Right Updated: 02/25/23 1641     WBC 4 83 Thousand/uL      RBC 3 24 Million/uL      Hemoglobin 9 2 g/dL      Hematocrit 29 5 %      MCV 91 fL      MCH 28 4 pg      MCHC 31 2 g/dL      RDW 15 4 %      MPV 8 6 fL      Platelets 478 Thousands/uL     Narrative: This is an appended report    These results have been appended to a previously verified report      Manual Differential(PHLEBS Do Not Order) [[id number]]  (Abnormal) Collected: 02/25/23 1539    Lab Status: Final result Specimen: Blood from Arm, Right Updated: 02/25/23 1641     Segmented % 42 %      Bands % 4 %      Lymphocytes % 34 %      Monocytes % 14 %      Eosinophils, % 5 %      Basophils % 0 %      Atypical Lymphocytes % 1 %      Absolute Neutrophils 2 22 Thousand/uL      Lymphocytes Absolute 1 64 Thousand/uL      Monocytes Absolute 0 68 Thousand/uL      Eosinophils Absolute 0 24 Thousand/uL      Basophils Absolute 0 00 Thousand/uL      Total Counted --     Anisocytosis Present     Helmet Cells Present     Hypochromia Present     Tear Drop Cells Present     Platelet Estimate Adequate    Procalcitonin [[id number]]  (Normal) Collected: 02/25/23 1539    Lab Status: Final result Specimen: Blood from Arm, Right Updated: 02/25/23 1625     Procalcitonin 0 07 ng/ml     Comprehensive metabolic panel [[id number]] Collected: 02/25/23 1539    Lab Status: Final result Specimen: Blood from Arm, Right Updated: 02/25/23 1616     Sodium 140 mmol/L      Potassium 4 3 mmol/L      Chloride 104 mmol/L      CO2 28 mmol/L      ANION GAP 8 mmol/L      BUN 25 mg/dL      Creatinine 0 67 mg/dL      Glucose 84 mg/dL      Calcium 9 1 mg/dL      AST 17 U/L      ALT 22 U/L      Alkaline Phosphatase 57 U/L      Total Protein 7 0 g/dL      Albumin 3 6 g/dL      Total Bilirubin 0 25 mg/dL      eGFR 122 ml/min/1 73sq m     Narrative:      [place] guidelines for Chronic Kidney Disease (CKD):   •  Stage 1 with normal or high GFR (GFR > 90 mL/min/1 73 square meters)  •  Stage 2 Mild CKD (GFR = 60-89 mL/min/1 73 square meters)  •  Stage 3A Moderate CKD (GFR = 45-59 mL/min/1 73 square meters)  •  Stage 3B Moderate CKD (GFR = 30-44 mL/min/1 73 square meters)  •  Stage 4 Severe CKD (GFR = 15-29 mL/min/1 73 square meters)  •  Stage 5 End Stage CKD (GFR <15 mL/min/1 73 square meters)  Note: GFR calculation is accurate only with a steady state creatinine    Lactic acid [[id number]]  (Normal) Collected: 02/25/23 1539    Lab Status: Final result Specimen: Blood from Arm, Right Updated: 02/25/23 1614     LACTIC ACID 1 5 mmol/L     Narrative:      Result may be elevated if tourniquet was used during collection  Protime-INR [[id number]]  (Normal) Collected: 02/25/23 1539    Lab Status: Final result Specimen: Blood from Arm, Right Updated: 02/25/23 1612     Protime 12 8 seconds      INR 0 94    APTT [[id number]]  (Normal) Collected: 02/25/23 1539    Lab Status: Final result Specimen: Blood from Arm, Right Updated: 02/25/23 1612     PTT 27 seconds                  VAS lower limb venous duplex study, complete bilateral   Final Result by Yue Floyd MD (02/25 2021)      XR ankle 3+ views LEFT   ED Interpretation by Aditya Forde DO (02/25 1601)   No acute osseous abnormality      Final Result by Lokesh Dodson DO (1986)   Addendum (preliminary) 1 of 1 by Lokesh Dodson DO (02/26 5922)   ADDENDUM:      There is prominence of the anterior process of the calcaneus  Possibility    of calcaneonavicular coalition may be considered  Final      No acute osseous abnormality  Periarticular soft tissue swelling  Workstation performed: [id number]         XR ankle 3+ views RIGHT   ED Interpretation by Aditya Forde DO (02/25 1601)   No acute osseous abnormality      Final Result by Lokesh Dodson DO (02/26 2764)      No acute osseous abnormality  Cannot exclude calcaneonavicular coalition  Nonemergent MRI may be of further value  Orthopedic follow-up may be helpful  The study was marked in [facility] for immediate notification        Workstation performed: [id number]                    Procedures  ECG 12 Lead Documentation Only    Date/Time: 2/26/2023 3:19 PM  Performed by: Aditya Forde DO  Authorized by: Aditya Forde DO     ECG reviewed by me, the ED Provider: yes Patient location:  ED  Interpretation:     Interpretation: normal    Rate:     ECG rate:  107    ECG rate assessment: tachycardic    Rhythm:     Rhythm: sinus tachycardia    Ectopy:     Ectopy: none    QRS:     QRS axis:  Normal  Conduction:     Conduction: normal    ST segments:     ST segments:  Normal  T waves:     T waves: normal               ED Course  ED Course as of 02/26/23 1532   Sat Feb 25, 2023   1559 WBC: 4 83   1559 Hemoglobin(!): 9 2   1600 Platelet Count: 161   1614 LACTIC ACID: 1 5   1617 Comprehensive metabolic panel  WNL   8499 Procalcitonin: 0 07   1650 Discussed with vascular tech  Negative for DVT   1710 Patient reevaluated  Resting comfortably  Discussed negative x-rays and duplex  As there is no indication for further imaging or testing at this time will discharge  Discussed orthopedic and pain management follow-up  Return precautions discussed  Patient verbalized understanding and agreement plan of care  Medical Decision Making  Patient is a 41 y o  male who presents to the ED for bilateral persistent lower extremity pain and swelling  Patient is nontoxic and well-appearing  He is tachycardic but otherwise vitals are stable  Unclear etiology of swelling and pain  Likely venous stasis and/or osteoarthritis  Given symptoms are bilateral, low suspicion for DVT/PE (also, patient has no history of such)  Do not suspect infection  Given patient is able to ambulate and has full range of motion, doubt fracture, doubt dislocation  Given patient is tachycardic and has a history of IV drug use, will repeat labs and add on blood cultures  We will get plain films to evaluate for any osseous abnormality  We will get bilateral duplex to evaluate for DVT  Will control pain  If everything is normal, plan to discharge with orthopedic and pain management follow-up      Plan: Labs, xrays, duplex, pain control, reassessment Portions of the record may have been created with voice recognition software  Occasional wrong word or "sound a like" substitutions may have occurred due to the inherent limitations of voice recognition software  Read the chart carefully and recognize, using context, where substitutions have occurred  Acute bilateral ankle pain: acute illness or injury  Ankle swelling: acute illness or injury  Amount and/or Complexity of Data Reviewed  External Data Reviewed: notes  Details: Please see HPI  Labs: ordered  Decision-making details documented in ED Course  Radiology: ordered and independent interpretation performed  Details: No acute osseous abnormality, no DVT  ECG/medicine tests: ordered and independent interpretation performed  Details: Sinus tachy      Risk  OTC drugs  Prescription drug management  Disposition  Final diagnoses:   Acute bilateral ankle pain   Ankle swelling     Time reflects when diagnosis was documented in both MDM as applicable and the Disposition within this note     Time User Action Codes Description Comment    2/25/2023  5:19 PM Para [place] Add [T82 [id number],  M25 [id number]] Acute bilateral ankle pain     2/25/2023  5:19 PM Para [place] Add [X39 [id number]] Ankle swelling       ED Disposition     ED Disposition   Discharge    Condition   Stable    Date/Time   Sat Feb 25, 2023  5:09 PM    Comment   Savita Turcios discharge to home/self care                 Follow-up Information     Follow up With Specialties Details Why Contact Info Additional [address] [facility]   [address] [address]) [place] [address] [address] [facility] Emergency Department Emergency Medicine  As needed [address] [phone]  [address] [place] [address] Emergency Department, [address], [address]          Discharge Medication List as of 2/25/2023  5:19 PM      CONTINUE these medications which have NOT CHANGED    Details   acetaminophen (TYLENOL) 500 mg tablet Take 1 tablet (500 mg total) by mouth every 6 (six) hours as needed for mild pain for up to 23 days, Starting Fri 2/17/2023, Until Sun 3/12/2023 at 2359, Normal      celecoxib (CeleBREX) 200 mg capsule Take 1 capsule (200 mg total) by mouth 2 (two) times a day for 7 days, Starting Fri 2/17/2023, Until Fri 2/24/2023, Normal      doxycycline hyclate (VIBRAMYCIN) 100 mg capsule Take 1 capsule (100 mg total) by mouth every 12 (twelve) hours, Starting Fri 2/17/2023, Until Sun 3/19/2023, Normal      famotidine (PEPCID) 20 mg tablet Take 1 tablet (20 mg total) by mouth 2 (two) times a day, Starting Fri 2/17/2023, Normal      !! gabapentin (NEURONTIN) 300 mg capsule Take 2 capsules (600 mg total) by mouth 3 (three) times a day, Starting Fri 2/17/2023, Normal      !! gabapentin (NEURONTIN) 400 mg capsule Take 1 capsule (400 mg total) by mouth 3 (three) times a day, Starting Sat 2/11/2023, Normal      !! methocarbamol (ROBAXIN) 750 mg tablet Take 1 tablet (750 mg total) by mouth every 8 (eight) hours, Starting Sat 2/11/2023, Normal      !! methocarbamol 1000 MG TABS Take 1,000 mg by mouth every 8 (eight) hours, Starting Fri 2/17/2023, Normal      QUEtiapine (SEROquel) 100 mg tablet Take 1 5 tablets (150 mg total) by mouth daily at bedtime, Starting Fri 2/17/2023, Normal       !! - Potential duplicate medications found  Please discuss with provider  No discharge procedures on file      PDMP Review       Value Time User    PDMP Reviewed  Yes 12/5/2022  5:14 PM Eran Hurtado DO          ED Provider  Electronically Signed by           Kei Carpio DO  02/26/23 [id number]

## 2023-02-26 NOTE — ED PROVIDER NOTES
History  Chief Complaint   Patient presents with   • Pain     Pt c/o right leg and left ankle pain  Was seen here yesterday and states he was told to come back if he had pain     HPI  36-year-old male history of chronic pain well-known to this facility recent admission for bacteremia secondary to IV drug use, intractable low back pain with numerous visits since discharge seeking pain medication  Per review of chart patient is no longer being prescribed opioid medications by his primary doctor  He is reporting follow-up appointment tomorrow with pain management and subsequent appointment on the 28th with orthopedics for his chronic issues  No new changes or new pain today  He reports he was told by the ER doctor who saw him yesterday that he was to come back to the ER he was having worsening pain  Patient recently did undergo neurosurgery evaluation 3 days ago also had an MRI at the time last admission which was unremarkable for urgent emergent findings  No neurosurgical interventions planned for his pain  He was discharged last in the hospital with a 10-day supply of morphine as needed  It appears he depleted his supply prior to the 10 days  Denies any pain medication allergies  Prior to Admission Medications   Prescriptions Last Dose Informant Patient Reported? Taking?    QUEtiapine (SEROquel) 100 mg tablet   No No   Sig: Take 1 5 tablets (150 mg total) by mouth daily at bedtime   acetaminophen (TYLENOL) 500 mg tablet   No No   Sig: Take 1 tablet (500 mg total) by mouth every 6 (six) hours as needed for mild pain for up to 23 days   celecoxib (CeleBREX) 200 mg capsule   No No   Sig: Take 1 capsule (200 mg total) by mouth 2 (two) times a day for 7 days   doxycycline hyclate (VIBRAMYCIN) 100 mg capsule   No No   Sig: Take 1 capsule (100 mg total) by mouth every 12 (twelve) hours   famotidine (PEPCID) 20 mg tablet   No No   Sig: Take 1 tablet (20 mg total) by mouth 2 (two) times a day   Patient not taking: Reported on 2/21/2023   gabapentin (NEURONTIN) 300 mg capsule   No No   Sig: Take 2 capsules (600 mg total) by mouth 3 (three) times a day   gabapentin (NEURONTIN) 400 mg capsule   No No   Sig: Take 1 capsule (400 mg total) by mouth 3 (three) times a day   methocarbamol (ROBAXIN) 750 mg tablet   No No   Sig: Take 1 tablet (750 mg total) by mouth every 8 (eight) hours   methocarbamol 1000 MG TABS   No No   Sig: Take 1,000 mg by mouth every 8 (eight) hours      Facility-Administered Medications: None       Past Medical History:   Diagnosis Date   • Addiction to drug [facility])    • Anxiety    • Arthritis    • Chronic pain    • Hepatitis    • Hepatitis C virus    • Heroin abuse ([facility] [address] [facility] ) 05/02/2018   • Moderate episode of recurrent major depressive disorder ([facility] ) 02/05/2020   • Tobacco use disorder     last assessed 11/2/15        Past Surgical History:   Procedure Laterality Date   • EPIDURAL BLOCK INJECTION Bilateral 4/19/2018    Procedure: T9-10 INTERLAMINAR EPIDURAL STEROID INJECTION;  Surgeon: Janneth Farmer MD;  Location: [facility];  Service: Pain Management    • IR PICC PLACEMENT SINGLE LUMEN  1/27/2023       Family History   Problem Relation Age of Onset   • No Known Problems Father    • Hepatitis Family      I have reviewed and agree with the history as documented      E-Cigarette/Vaping     E-Cigarette/Vaping Substances   • Nicotine No    • THC No    • CBD No    • Flavoring No    • Other No    • Unknown No      Social History     Tobacco Use   • Smoking status: Every Day     Packs/day: 1 00     Types: Cigarettes   • Smokeless tobacco: Former   • Tobacco comments:     current every day smoker per allscript    Substance Use Topics   • Alcohol use: Not Currently     Comment: occasional / social per allscript    • Drug use: Not Currently     Types: Heroin, Prescription, Marijuana     Comment: past opiate abuser / intravenous drug use per allscript                      Review of Systems   Constitutional: Negative for chills and fever  HENT: Negative for ear pain and sore throat  Eyes: Negative for pain and visual disturbance  Respiratory: Negative for cough and shortness of breath  Cardiovascular: Negative for chest pain and palpitations  Gastrointestinal: Negative for abdominal pain and vomiting  Genitourinary: Negative for dysuria and hematuria  Musculoskeletal: Positive for back pain and myalgias  Negative for arthralgias  Skin: Negative for color change and rash  Neurological: Negative for seizures and syncope  All other systems reviewed and are negative  Physical Exam  Physical Exam  Vitals and nursing note reviewed  Exam conducted with a chaperone present  Constitutional:       General: He is not in acute distress  Appearance: Normal appearance  He is well-developed  He is not ill-appearing or diaphoretic  HENT:      Head: Normocephalic and atraumatic  Right Ear: External ear normal       Left Ear: External ear normal       Nose: Nose normal       Mouth/Throat:      Mouth: Mucous membranes are moist       Pharynx: Oropharynx is clear  Eyes:      Conjunctiva/sclera: Conjunctivae normal    Cardiovascular:      Rate and Rhythm: Normal rate and regular rhythm  Heart sounds: No murmur heard  Pulmonary:      Effort: Pulmonary effort is normal  No respiratory distress  Breath sounds: Normal breath sounds  Abdominal:      Palpations: Abdomen is soft  Tenderness: There is no abdominal tenderness  There is no guarding or rebound  Musculoskeletal:         General: No swelling  Cervical back: Neck supple  Right lower leg: No edema  Left lower leg: No edema  Skin:     General: Skin is warm and dry  Capillary Refill: Capillary refill takes less than 2 seconds  Neurological:      General: No focal deficit present  Mental Status: He is alert  Mental status is at baseline  Sensory: No sensory deficit  Motor: No weakness        Gait: Gait abnormal       Comments: Moving all extremities good strength   Psychiatric:         Mood and Affect: Mood normal          Vital Signs  ED Triage Vitals [02/26/23 1006]   Temperature Pulse Respirations Blood Pressure SpO2   (!) 97 1 °F (36 2 °C) (!) 117 18 127/82 98 %      Temp Source Heart Rate Source Patient Position - Orthostatic VS BP Location FiO2 (%)   Temporal Monitor Sitting Right arm --      Pain Score       10 - Worst Possible Pain           Vitals:    02/26/23 1006   BP: 127/82   Pulse: (!) 117   Patient Position - Orthostatic VS: Sitting         Visual Acuity      ED Medications  Medications   ketorolac (TORADOL) injection 15 mg (has no administration in time range)   oxyCODONE-acetaminophen (PERCOCET) 5-325 mg per tablet 1 tablet (has no administration in time range)       Diagnostic Studies  Results Reviewed     None                 No orders to display              Procedures  Procedures         ED Course                                             Medical Decision Making  64-year-old male presented to the ER seeking pain relief for chronic pain  Recent admission here for chronic pain issues in the past with pain seeking behavior  Patient exhausted as needed analgesics this is day 9 of the 10 days for which supply was written  Reports she is due to see pain management tomorrow and orthopedics the following day  No new issues today  No need for new work-up as no change in chronic symptoms  We will provide one-time administration of pain medication here in the ER counseled on concerns for drug-seeking behavior, informed we will not be prescribing any narcotic prescriptions for discharge and the patient must follow-up with pain management    Informed the patient that if pain management does not feel narcotic prescription is appropriate after his evaluation tomorrow we will no longer be providing pain medication in the ER and will be following the recommendations of pain management specialty  Other chronic pain: complicated acute illness or injury  Risk  Prescription drug management  Disposition  Final diagnoses:   Other chronic pain     Time reflects when diagnosis was documented in both MDM as applicable and the Disposition within this note     Time User Action Codes Description Comment    2/26/2023 10:23 AM Yuri Soto Add [G89 29] Other chronic pain       ED Disposition     ED Disposition   Discharge    Condition   Stable    Date/Time   Sun Feb 26, 2023 10:23 AM    Comment   Anisa Gallardo discharge to home/self care  Follow-up Information     Follow up With Specialties Details Why Contact Info Additional Information    [facility] Schedule an appointment as soon as possible for a visit   [address] [facility] [address]  [facility], [address] [place] [address], [id number],  [address] [place] [address], [facility] Schedule an appointment as soon as possible for a visit   [address] [address] [place] [address] [phone]             Patient's Medications   Discharge Prescriptions    No medications on file       No discharge procedures on file      PDMP Review       Value Time User    PDMP Reviewed  Yes 12/5/2022  5:14 PM Michoacano Damon DO          ED Provider  Electronically Signed by           Alexandra Noel DO  02/26/23 7475

## 2023-02-27 ENCOUNTER — CONSULT (OUTPATIENT)
Dept: PAIN MEDICINE | Facility: CLINIC | Age: 38
End: 2023-02-27

## 2023-02-27 ENCOUNTER — APPOINTMENT (OUTPATIENT)
Dept: RADIOLOGY | Facility: MEDICAL CENTER | Age: 38
End: 2023-02-27

## 2023-02-27 ENCOUNTER — TELEPHONE (OUTPATIENT)
Dept: PAIN MEDICINE | Facility: MEDICAL CENTER | Age: 38
End: 2023-02-27

## 2023-02-27 VITALS
BODY MASS INDEX: 30.96 KG/M2 | RESPIRATION RATE: 16 BRPM | HEART RATE: 114 BPM | WEIGHT: 228.6 LBS | HEIGHT: 72 IN | SYSTOLIC BLOOD PRESSURE: 115 MMHG | DIASTOLIC BLOOD PRESSURE: 80 MMHG

## 2023-02-27 DIAGNOSIS — M54.9 MID BACK PAIN: ICD-10-CM

## 2023-02-27 DIAGNOSIS — M25.572 CHRONIC PAIN OF BOTH ANKLES: ICD-10-CM

## 2023-02-27 DIAGNOSIS — M54.2 NECK PAIN: ICD-10-CM

## 2023-02-27 DIAGNOSIS — G89.29 CHRONIC PAIN OF BOTH ANKLES: ICD-10-CM

## 2023-02-27 DIAGNOSIS — M51.34 DEGENERATION OF THORACIC INTERVERTEBRAL DISC: ICD-10-CM

## 2023-02-27 DIAGNOSIS — M54.41 ACUTE RIGHT-SIDED LOW BACK PAIN WITH RIGHT-SIDED SCIATICA: Primary | ICD-10-CM

## 2023-02-27 DIAGNOSIS — T40.601A OPIATE OVERDOSE, ACCIDENTAL OR UNINTENTIONAL, INITIAL ENCOUNTER (HCC): ICD-10-CM

## 2023-02-27 DIAGNOSIS — F11.10 HEROIN ABUSE (HCC): ICD-10-CM

## 2023-02-27 DIAGNOSIS — M51.34 DEGENERATION OF INTERVERTEBRAL DISC OF THORACIC REGION: ICD-10-CM

## 2023-02-27 DIAGNOSIS — M25.571 CHRONIC PAIN OF BOTH ANKLES: ICD-10-CM

## 2023-02-27 DIAGNOSIS — M79.18 MYOFASCIAL PAIN: ICD-10-CM

## 2023-02-27 RX ORDER — METHYLPREDNISOLONE 4 MG/1
TABLET ORAL
Qty: 21 TABLET | Refills: 0 | Status: SHIPPED | OUTPATIENT
Start: 2023-02-27

## 2023-02-27 RX ORDER — DULOXETIN HYDROCHLORIDE 30 MG/1
30 CAPSULE, DELAYED RELEASE ORAL DAILY
Qty: 30 CAPSULE | Refills: 1 | Status: SHIPPED | OUTPATIENT
Start: 2023-02-27 | End: 2023-03-29

## 2023-02-27 RX ORDER — KETOROLAC TROMETHAMINE 10 MG/1
TABLET, FILM COATED ORAL
COMMUNITY
Start: 2023-02-24 | End: 2023-02-27

## 2023-02-27 RX ORDER — GABAPENTIN 600 MG/1
600 TABLET ORAL 3 TIMES DAILY
Qty: 90 TABLET | Refills: 1 | Status: SHIPPED | OUTPATIENT
Start: 2023-02-27 | End: 2023-03-29

## 2023-02-27 NOTE — PROGRESS NOTES
Assessment:  1  Acute right-sided low back pain with right-sided sciatica    2  Degeneration of intervertebral disc of thoracic region    3  Degeneration of thoracic intervertebral disc    4  Myofascial pain    5  Neck pain    6  Opiate overdose, accidental or unintentional, initial encounter ([facility] )    7  Heroin abuse ([facility] )        Plan:  Patient is a 70-year-old male with complaints of mid back pain, and right leg pain presents to office for initial consultation  Vas Doppler exam bilateral lower extremities was within normal limits  X-ray of lumbar spine does show a grade 1 anterior listhesis of L4 on L5 and grade 1 retrolisthesis of L5 on S1 with mild instability at L4 on L5  MRI of lumbar spine shows small central disc protrusion with mild bilateral foraminal encroachment  History of opioid overdose and heroin use/abuse  He is not a candidate for opioid management we will try to control patient's pain through conservative management and interventional management  1   We will order an x-ray of the thoracic spine to assess the pathology behind patient's mid back pain  2  We will provide patient with physical therapy for lumbar core and mid back strengthening exercises  3  We will trial Cymbalta 30 mg p o  nightly for neuropathic pain  4  We will provide patient with a Medrol Dosepak for exacerbation of radicular symptoms  5  We will order x-rays of bilateral feet weightbearing to assess for arthritic changes in the ankles based on those results we will refer to podiatry  6  We will convert gabapentin to 600 mg tablets 3 times a day  7  We will provide patient with diclofenac 50 mg p o  twice daily for arthritic plane  8  Follow-up in 6 weeks        History of Present Illness: The patient is a 41 y o  male who presents for consultation in regards to Back Pain and Leg Pain (Right sided)  Symptoms have been present for 2 months  Symptoms began following a non work related injury   Pain is reported to be 10 on the numeric rating scale  Symptoms are felt constantly and worst in the morning, afternoon  Symptoms are characterized as shooting, sharp, dull/aching and throbbing  Symptoms are associated with right leg weakness  Aggravating factors include lying down, standing, bending, leaning forward, leaning bckward, sitting, walking and exercise  Relieving factors include nothing  No change in symptoms with relaxation, coughing/sneezing and bowel movements  Patient has not tried any pain relieving modalities at this time  Medications to relieve symptoms include gabapentin  Review of Systems:    Review of Systems   All other systems reviewed and are negative            Past Medical History:   Diagnosis Date   • Addiction to drug [facility])    • Anxiety    • Arthritis    • Chronic pain    • Hepatitis    • Hepatitis C virus    • Heroin abuse ([facility] ) 05/02/2018   • Moderate episode of recurrent major depressive disorder ([facility] ) 02/05/2020   • Tobacco use disorder     last assessed 11/2/15        Past Surgical History:   Procedure Laterality Date   • EPIDURAL BLOCK INJECTION Bilateral 4/19/2018    Procedure: T9-10 INTERLAMINAR EPIDURAL STEROID INJECTION;  Surgeon: Rani Pratt MD;  Location: [facility];  Service: Pain Management    • IR PICC PLACEMENT SINGLE LUMEN  1/27/2023       Family History   Problem Relation Age of Onset   • No Known Problems Father    • Hepatitis Family        Social History     Occupational History   • Occupation:       Comment: [employer]    Tobacco Use   • Smoking status: Every Day     Packs/day: 1 00     Types: Cigarettes   • Smokeless tobacco: Former   • Tobacco comments:     current every day smoker per allscript    Vaping Use   • Vaping Use: Not on file   Substance and Sexual Activity   • Alcohol use: Not Currently     Comment: occasional / social per allscript    • Drug use: Not Currently     Types: Heroin, Prescription, Marijuana     Comment: past opiate abuser / intravenous drug use per allscript                  • Sexual activity: Yes         Current Outpatient Medications:   •  acetaminophen (TYLENOL) 500 mg tablet, Take 1 tablet (500 mg total) by mouth every 6 (six) hours as needed for mild pain for up to 23 days, Disp: 90 tablet, Rfl: 0  •  celecoxib (CeleBREX) 200 mg capsule, Take 1 capsule (200 mg total) by mouth 2 (two) times a day for 7 days, Disp: 14 capsule, Rfl: 0  •  doxycycline hyclate (VIBRAMYCIN) 100 mg capsule, Take 1 capsule (100 mg total) by mouth every 12 (twelve) hours, Disp: 60 capsule, Rfl: 0  •  gabapentin (NEURONTIN) 300 mg capsule, Take 2 capsules (600 mg total) by mouth 3 (three) times a day, Disp: 180 capsule, Rfl: 0  •  gabapentin (NEURONTIN) 400 mg capsule, Take 1 capsule (400 mg total) by mouth 3 (three) times a day, Disp: 90 capsule, Rfl: 0  •  ketorolac (TORADOL) 10 mg tablet, , Disp: , Rfl:   •  methocarbamol (ROBAXIN) 750 mg tablet, Take 1 tablet (750 mg total) by mouth every 8 (eight) hours, Disp: 90 tablet, Rfl: 0  •  methocarbamol 1000 MG TABS, Take 1,000 mg by mouth every 8 (eight) hours, Disp: 90 tablet, Rfl: 0  •  QUEtiapine (SEROquel) 100 mg tablet, Take 1 5 tablets (150 mg total) by mouth daily at bedtime, Disp: 45 tablet, Rfl: 0  •  famotidine (PEPCID) 20 mg tablet, Take 1 tablet (20 mg total) by mouth 2 (two) times a day (Patient not taking: Reported on 2/21/2023), Disp: 60 tablet, Rfl: 0    Allergies   Allergen Reactions   • Clindamycin/Lincomycin Hives   • Pollen Extract Other (See Comments)       Physical Exam:    /80   Pulse (!) 114   Resp 16   Ht 6' (1 829 m)   Wt 104 kg (228 lb 9 6 oz)   BMI 31 00 kg/m²     Constitutional: normal, well developed, well nourished, alert, in no distress and non-toxic and no overt pain behavior    Eyes: anicteric  HEENT: grossly intact  Neck: supple, symmetric, trachea midline and no masses   Pulmonary:even and unlabored  Cardiovascular:No edema or pitting edema present  Skin:Normal without rashes or lesions and well hydrated  Psychiatric:Mood and affect appropriate  Neurologic:Cranial Nerves II-XII grossly intact  Musculoskeletal:antalgic     Thoracic Spine examination demonstrates  Bilateral thoracic paraspinal musculature tender to palpation with muscle spasms noted throughout her paraspinals  Lumbar/Sacral Spine examination demonstrates  Decreased range of motion lumbar spine with pain upon: flexion, lateral rotation to the left/right, and bending to the left/right  Bilateral lumbar paraspinals tender to palpation  Muscle spasms noted in the lumbar area bilaterally  4/5 lower extremity strength in all muscle groups bilaterally  Positive seated straight leg raise for bilateral lower extremities  Sensitivity to light touch intact bilateral lower extremities  2+ reflexes in the patella and Achilles  No ankle clonus    Imaging    Narrative & Impression   LUMBAR SPINE     INDICATION:   Leg pain      COMPARISON:  1/23/2023     VIEWS:  XR SPINE LUMBAR COMPLETE W BENDING MINIMUM 6 VIEWS        FINDINGS:     There are 5 non rib bearing lumbar vertebral bodies       There is no evidence of acute fracture or destructive osseous lesion      There is grade 1 anterolisthesis of L4 on L5  This may be slightly increased with flexion best visualized on the coned-down image  There is grade 1 retrolisthesis of L5 on S1 without instability       There is stable moderate disc space narrowing at L5-S1      The pedicles appear intact      Soft tissues are unremarkable      IMPRESSION:     1   Grade 1 anterolisthesis of L4 on L5 with suggestion of mild instability      2   Grade 1 retrolisthesis of L5 on S1 with moderate disc space narrowing  No instability               XR spine cervical complete 4 or 5 vw non injury    (Results Pending)   XR spine thoracic 3 vw    (Results Pending)       No orders of the defined types were placed in this encounter

## 2023-02-27 NOTE — PATIENT INSTRUCTIONS
Core Strengthening Exercises   WHAT YOU NEED TO KNOW:   What do I need to know about core strengthening exercises? Your core includes the muscles of your lower back, hip, pelvis, and abdomen  Core strengthening exercises help heal and strengthen these muscles  This helps prevent another injury, and keeps your pelvis, spine, and hips in the correct position  What do I need to know about exercise safety? Talk to your healthcare provider before you start an exercise program  A physical therapist can teach you how to do core strengthening exercises safely  Do the exercises on a mat or firm surface  A firm surface will support your spine and prevent low back pain  Do not do these exercises on a bed  Move slowly and smoothly  Avoid fast or jerky motions  Stop if you feel pain  You may feel some discomfort at first, but you should not feel pain  Tell your provider or physical therapist if you have pain while you exercise  Regular exercise will help decrease your discomfort over time  Breathe normally during core exercises  Do not hold your breath  This may cause an increase in blood pressure and prevent muscle strengthening  Your healthcare provider will tell you when to inhale and exhale during the exercise  Begin all of your exercises with abdominal bracing  Abdominal bracing helps warm up your core muscles  You can also practice abdominal bracing throughout the day  Lie on your back with your knees bent and feet flat on the floor  Place your arms in a relaxed position beside your body  Tighten your abdominal muscles  Pull your belly button in and up toward your spine  Hold for 5 seconds  Relax your muscles  Repeat 10 times  How do I perform core strengthening exercises? Your healthcare provider will tell you how often to do these exercises  The provider will also tell you how many repetitions of each exercise you should do  Hold each exercise for 5 seconds or as directed   As you get stronger, increase your hold to 10 to 15 seconds  You can do some of these exercises on a stability ball, or with a weight  Ask your healthcare provider how to use a stability ball or weight for these exercises:  Bridging:  Lie on your back with your knees bent and feet flat on the floor  Rest your arms at your side  Tighten your buttocks, and then lift your hips 1 inch off the floor  Hold for 5 seconds  When you can do this exercise without pain for 10 seconds, increase the distance you lift your hips  A good goal is to be able to lift your hips so that your shoulders, hips, and knees are in a straight line  Dead bug:  Lie on your back with your knees bent and feet flat on the floor  Place your arms in a relaxed position beside your body  Begin with abdominal bracing  Next, raise one leg, keeping your knee bent  Hold for 5 seconds  Repeat with the other leg  When you can do this exercise without pain for 10 to 15 seconds, you may raise one straight leg and hold  Repeat with the other leg  Quadruped:  Place your hands and knees on the floor  Keep your wrists directly below your shoulders and your knees directly below your hips  Pull your belly button in toward your spine  Do not flatten or arch your back  Tighten your abdominal muscles below your belly button  Hold for 5 seconds  When you can do this exercise without pain for 10 to 15 seconds, you may extend one arm and hold  Repeat on the other side  Side bridge exercises:      Standing side bridge:  Stand next to a wall and extend one arm toward the wall  Place your palm flat on the wall with your fingers pointing upward  Begin with abdominal bracing  Next, without moving your feet, slowly bend your arm to 90 degrees  Hold for 5 seconds  Repeat on the other side  When you can do this exercise without pain for 10 to 15 seconds, you may do the bent leg side bridge on the floor           Bent leg side bridge:  Lie on one side with your legs, hips, and shoulders in a straight line  Prop yourself up onto your forearm so your elbow is directly below your shoulder  Bend your knees back to 90 degrees  Begin with abdominal bracing  Next, lift your hips and balance yourself on your forearm and knees  Hold for 5 seconds  Repeat on the other side  When you can do this exercise without pain for 10 to 15 seconds, you may do the straight leg side bridge on the floor  Straight leg side bridge:  Lie on one side with your legs, hips, and shoulders in a straight line  Prop yourself up onto your forearm so your elbow is directly below your shoulder  Begin with abdominal bracing  Lift your hips off the floor and balance yourself on your forearm and the outside of your flexed foot  Do not let your ankle bend sideways  Hold for 5 seconds  Repeat on the other side  When you can do this exercise without pain for 10 to 15 seconds, ask your healthcare provider for more advanced exercises  Superman:  Lie on your stomach  Extend your arms forward on the floor  Tighten your abdominal muscles and lift your right hand and left leg off the floor  Hold this position  Slowly return to the starting position  Tighten your abdominal muscles and lift your left hand and right leg off the floor  Hold this position  Slowly return to the starting position  Clam:  Lie on your side with your knees bent  Put your bottom arm under your head to keep your neck in line  Put your top hand on your hip to keep your pelvis from moving  Put your heels together, and keep them together during this exercise  Slowly raise your top knee toward the ceiling  Then lower your leg so your knees are together  Repeat this exercise 10 times  Then switch sides and do the exercise 10 times with the other leg  Curl up:  Lie on your back with your knees bent and feet flat on the floor  Place your hands, palms down, underneath your lower back   Next, with your elbows on the floor, lift your shoulders and chest 2 to 3 inches off the floor  Keep your head in line with your shoulders  Hold this position  Slowly return to the starting position  Straight leg raises:  Lie on your back with one leg straight  Bend the other knee and place your foot flat on the floor  Tighten your abdominal muscles  Keep your leg straight and slowly lift it straight up 6 to 12 inches off the floor  Hold this position  Lower your leg slowly  Do as many repetitions as directed on this side  Repeat with the other leg  Plank:  Lie on your stomach  Bend your elbows and place your forearms flat on the floor  Lift your chest, stomach, and knees off the floor  Make sure your elbows are below your shoulders  Your body should be in a straight line  Do not let your hips or lower back sink to the ground  Squeeze your abdominal muscles together and hold for 15 seconds  To make this exercise harder, hold for 30 seconds or lift 1 leg at a time  Bicycles:  Lie on your back  Bend both knees and bring them toward your chest  Your calves should be parallel to the floor  Place the palms of your hands on the back of your head  Straighten your right leg and keep it lifted 2 inches off the floor  Raise your head and shoulders off the floor and twist towards your left  Keep your head and shoulders lifted  Bend your right knee while you straighten your left leg  Keep your left leg 2 inches off the floor  Twist your head and chest towards the left leg  Continue to straighten 1 leg at a time and twist        When should I call my doctor or physical therapist?   You have sharp or worsening pain during exercise or at rest     You have questions or concerns about your condition, care, or exercise program     CARE AGREEMENT:   You have the right to help plan your care  Learn about your health condition and how it may be treated  Discuss treatment options with your healthcare providers to decide what care you want to receive   You always have the right to refuse treatment  The above information is an  only  It is not intended as medical advice for individual conditions or treatments  Talk to your doctor, nurse or pharmacist before following any medical regimen to see if it is safe and effective for you  © Copyright [employer] 2022 Information is for End User's use only and may not be sold, redistributed or otherwise used for commercial purposes

## 2023-02-27 NOTE — TELEPHONE ENCOUNTER
Caller: patient    Doctor: Catalina Seaman    Reason for call: patient seen dr Catalina Seaman today would like ODILIA to see Dr Calderon Limon   Patient would like to go to [facility] office    Call back#: [phone]

## 2023-03-03 LAB
BACTERIA BLD CULT: NORMAL
BACTERIA BLD CULT: NORMAL

## 2023-03-12 NOTE — PROGRESS NOTES
Outpatient Progress Note - [facility] Infectious Disease   [place] 41 y o  male MRN: [id number]  Encounter: [id number]      Assessment/Plan:  1  MSSA bacteremia with possible mitral valve endocarditis  Admitted to [facility] 1/18 to 2/17 with back pain found to have MSSA bacteremia  This is likely due to #3  Bacteremia cleared  TTE could not rule out a small vegetation of the tip of the anterior leaflet of the mitral valve however CECILIO was negative  There is concern for metastatic foci of infection due to severe low back/right hip pain although MRI imaging was negative  Patient completed a 4-week course of IV antibiotics inpatient as he was not a candidate for home antibiotic infusion via PICC line  He completed antibiotics on 2/16/2023 and is now on p o  Doxy until ESR/CRP normalize due to #2  Patient is tolerating oral Doxy  No recent labs  -Continue oral doxycycline 100 mg every 12h until ESR/CRP normalize  -Refill sent for doxycycline    -Reviewed doxycycline administration instructions with patient  -Recommend repeat ESR and CRP now and again in 1 month  -Follow-up with ID in 1 month     2  Lower back pain/right hip pain  Per patient this occurred after a fall  CT lumbar spine showed DJD  He has elevated ESR and CRP which may be due to #1 though during admission could not rule out osteomyelitis/discitis as he could not tolerate MRI with contrast   MRI L-spine without contrast negative for OM/discitis  He was seen by outpatient neurosurgery on 2/23 with no plan for neurosurgical intervention  Patient has seen outpatient pain management on 2/27 and now on multimodal pain regimen   -Continue close outpatient follow-up with pain management  -Follow-up with neurosurgery as needed     3  Opiate use disorder with active IV drug abuse  Patient was initiated on Suboxone during recent admission  He remains sober    -Ongoing follow-up with Suboxone clinic     4  History of hepatitis C    Patient reports prior treatment with [facility] 2022  Recent hepatitis C PCR negative confirming 12-week SVR  HIV in January 2023 negative  Above assessment and plan discussed in detail with patient during examination  Antibiotics:  PO doxy     Subjective:  Patient presents to outpatient ID office for management of MSSA bacteremia and suspected endocarditis, osteomyelitis  Patient was recently admitted to [address] [place] [address] 1/18 to 2/17 with Staph aureus bacteremia  Patient was initially seen in the ED at outside hospital on 1/16/2023 complaining of ambulatory dysfunction due to severe right lower back pain with radiation to posterior lower extremity  Blood cultures were positive and he was admitted but signed out [address]  He subsequently presented to [address] [place] [address] on 1/18 since it was closer to his family  He was then admitted and infectious diseases consulted  Patient admitted to using IV fentanyl the day prior to coming to the hospital   He reported history of hep C completing treatment in the summer 2022 although SVR was not confirmed  There is also documentation of an HIV diagnosis in his chart but patient denied this and lab review shows multiple negative HIV tests  Blood cultures from 1/16, 1/18 positive for MSSA  IV vancomycin was changed to IV cefazolin  He eventually cleared bacteremia on 1/20  Patient was unfortunately not a candidate for home infusion and he remained inpatient for 4 weeks and completed course of IV cefazolin on 2/16  Imaging could not confirm nor rule out discitis/osteomyelitis  In the setting of Staph aureus bacteremia with elevated ESR, and persistent low back and right hip pain, plan was to repeat ESR once he completed IV antibiotic course to determine if oral antibiotics will be continued for extended period of time for possible osteomyelitis  By 2/16 when he was done with IV antibiotic course, CRP had increased though ESR normalized    Plan was then to transition to p o  antibiotic until normalization of CRP  He now remains on p o  doxycycline  He was discharged on 2/17  Since hospital discharge, patient was seen in the ED on 2/20 requesting morphine refill  He was seen by outpatient neurosurgery on 2/23  He complained of persistent mid low back pain and unchanged right hip/knee issues  From a neurosurgical perspective no surgical intervention was required and they recommended him to outpatient PT and pain management  Patient was seen back in the ED on 2/24 and 2/25 with bilateral ankle swelling  He was seen again in the ED on 2/26 with right leg and left ankle pain  He was seen by outpatient pain management on 2/27 and prescribed outpatient physical therapy, x-ray, Cymbalta and Medrol Dosepak  He was prescribed diclofenac and gabapentin that was increased  He is not taking the cymbalta  Has had no pain relief  Pt has no fever, chills, sweats; no nausea, vomiting, diarrhea; no cough, shortness of breath; tolerating the abx with out side effect  Ambulating with a cane  He remains sober  Objective:    Vitals:   Vitals:    03/15/23 1315   BP: 112/62   Pulse: 85   Temp: (!) 96 9 °F (36 1 °C)   SpO2: 96%       Physical Exam:     General Appearance:  Alert, interactive, nontoxic, no acute distress  Antalgic  Gait  Ambulating with cane  HEENT: Oropharynx moist without lesions  Poor dentition  Lungs:   Clear to auscultation bilaterally; no wheezes, rhonchi or rales; respirations unlabored   Heart:  RRR; no murmur   Abdomen:   Soft, non-tender, non-distended, positive bowel sounds  No palpable organomegaly  Extremities: No clubbing, cyanosis or edema   Skin: No new rashes or lesions  No draining wounds noted  Labs, Imaging, & Other studies:   All pertinent labs and imaging studies were personally reviewed by me from recent hospitalization       The following portions of the patient's history were reviewed and updated as appropriate: allergies, current medications, past family history, past medical history, past social history, past surgical history and problem list

## 2023-03-15 ENCOUNTER — OFFICE VISIT (OUTPATIENT)
Age: 38
End: 2023-03-15

## 2023-03-15 VITALS
WEIGHT: 234.4 LBS | TEMPERATURE: 96.9 F | OXYGEN SATURATION: 96 % | SYSTOLIC BLOOD PRESSURE: 112 MMHG | HEART RATE: 85 BPM | BODY MASS INDEX: 31.75 KG/M2 | HEIGHT: 72 IN | DIASTOLIC BLOOD PRESSURE: 62 MMHG

## 2023-03-15 DIAGNOSIS — R78.81 BACTEREMIA DUE TO METHICILLIN SUSCEPTIBLE STAPHYLOCOCCUS AUREUS (MSSA): Primary | ICD-10-CM

## 2023-03-15 DIAGNOSIS — B95.61 BACTEREMIA DUE TO METHICILLIN SUSCEPTIBLE STAPHYLOCOCCUS AUREUS (MSSA): Primary | ICD-10-CM

## 2023-03-15 DIAGNOSIS — M54.6 CHRONIC MIDLINE THORACIC BACK PAIN: ICD-10-CM

## 2023-03-15 DIAGNOSIS — F11.10 HEROIN ABUSE (HCC): ICD-10-CM

## 2023-03-15 DIAGNOSIS — R78.81 BACTEREMIA: ICD-10-CM

## 2023-03-15 DIAGNOSIS — G89.29 CHRONIC MIDLINE THORACIC BACK PAIN: ICD-10-CM

## 2023-03-15 RX ORDER — DOXYCYCLINE HYCLATE 100 MG/1
100 CAPSULE ORAL EVERY 12 HOURS SCHEDULED
Qty: 60 CAPSULE | Refills: 1 | Status: SHIPPED | OUTPATIENT
Start: 2023-03-15 | End: 2023-05-14

## 2023-03-20 ENCOUNTER — APPOINTMENT (OUTPATIENT)
Dept: LAB | Facility: MEDICAL CENTER | Age: 38
End: 2023-03-20

## 2023-03-20 DIAGNOSIS — M86.9 OSTEOMYELITIS (HCC): ICD-10-CM

## 2023-03-20 DIAGNOSIS — B95.61 BACTEREMIA DUE TO METHICILLIN SUSCEPTIBLE STAPHYLOCOCCUS AUREUS (MSSA): ICD-10-CM

## 2023-03-20 DIAGNOSIS — R78.81 BACTEREMIA DUE TO METHICILLIN SUSCEPTIBLE STAPHYLOCOCCUS AUREUS (MSSA): ICD-10-CM

## 2023-03-20 DIAGNOSIS — Z79.2 LONG TERM (CURRENT) USE OF ANTIBIOTICS: ICD-10-CM

## 2023-03-20 DIAGNOSIS — M46.40 DISCITIS: ICD-10-CM

## 2023-03-20 LAB
BASOPHILS # BLD AUTO: 0.03 THOUSANDS/ÂΜL (ref 0–0.1)
BASOPHILS NFR BLD AUTO: 1 % (ref 0–1)
CRP SERPL QL: 9.2 MG/L
EOSINOPHIL # BLD AUTO: 0.1 THOUSAND/ÂΜL (ref 0–0.61)
EOSINOPHIL NFR BLD AUTO: 2 % (ref 0–6)
ERYTHROCYTE [DISTWIDTH] IN BLOOD BY AUTOMATED COUNT: 14.4 % (ref 11.6–15.1)
ERYTHROCYTE [SEDIMENTATION RATE] IN BLOOD: 41 MM/HOUR (ref 0–14)
HCT VFR BLD AUTO: 37.2 % (ref 36.5–49.3)
HGB BLD-MCNC: 11.6 G/DL (ref 12–17)
IMM GRANULOCYTES # BLD AUTO: 0.01 THOUSAND/UL (ref 0–0.2)
IMM GRANULOCYTES NFR BLD AUTO: 0 % (ref 0–2)
LYMPHOCYTES # BLD AUTO: 1.97 THOUSANDS/ÂΜL (ref 0.6–4.47)
LYMPHOCYTES NFR BLD AUTO: 35 % (ref 14–44)
MCH RBC QN AUTO: 27 PG (ref 26.8–34.3)
MCHC RBC AUTO-ENTMCNC: 31.2 G/DL (ref 31.4–37.4)
MCV RBC AUTO: 87 FL (ref 82–98)
MONOCYTES # BLD AUTO: 0.51 THOUSAND/ÂΜL (ref 0.17–1.22)
MONOCYTES NFR BLD AUTO: 9 % (ref 4–12)
NEUTROPHILS # BLD AUTO: 3.02 THOUSANDS/ÂΜL (ref 1.85–7.62)
NEUTS SEG NFR BLD AUTO: 53 % (ref 43–75)
NRBC BLD AUTO-RTO: 0 /100 WBCS
PLATELET # BLD AUTO: 348 THOUSANDS/UL (ref 149–390)
PMV BLD AUTO: 9.5 FL (ref 8.9–12.7)
RBC # BLD AUTO: 4.29 MILLION/UL (ref 3.88–5.62)
WBC # BLD AUTO: 5.64 THOUSAND/UL (ref 4.31–10.16)

## 2023-03-26 ENCOUNTER — HOSPITAL ENCOUNTER (EMERGENCY)
Facility: HOSPITAL | Age: 38
Discharge: HOME | End: 2023-03-26
Attending: EMERGENCY MEDICINE

## 2023-03-26 ENCOUNTER — APPOINTMENT (EMERGENCY)
Dept: RADIOLOGY | Facility: HOSPITAL | Age: 38
End: 2023-03-26

## 2023-03-26 VITALS
RESPIRATION RATE: 18 BRPM | HEART RATE: 114 BPM | DIASTOLIC BLOOD PRESSURE: 77 MMHG | OXYGEN SATURATION: 95 % | TEMPERATURE: 97.8 F | SYSTOLIC BLOOD PRESSURE: 120 MMHG

## 2023-03-26 DIAGNOSIS — M25.551 ACUTE RIGHT HIP PAIN: ICD-10-CM

## 2023-03-26 DIAGNOSIS — M25.561 ACUTE PAIN OF RIGHT KNEE: Primary | ICD-10-CM

## 2023-03-26 RX ORDER — ACETAMINOPHEN 325 MG/1
975 TABLET ORAL ONCE
Status: COMPLETED | OUTPATIENT
Start: 2023-03-26 | End: 2023-03-26

## 2023-03-26 RX ORDER — KETOROLAC TROMETHAMINE 30 MG/ML
15 INJECTION, SOLUTION INTRAMUSCULAR; INTRAVENOUS ONCE
Status: COMPLETED | OUTPATIENT
Start: 2023-03-26 | End: 2023-03-26

## 2023-03-26 RX ADMIN — ACETAMINOPHEN 325MG 975 MG: 325 TABLET ORAL at 03:10

## 2023-03-26 RX ADMIN — KETOROLAC TROMETHAMINE 15 MG: 30 INJECTION, SOLUTION INTRAMUSCULAR; INTRAVENOUS at 01:57

## 2023-03-26 NOTE — DISCHARGE INSTRUCTIONS
You have been seen for right hip and knee pain  Please take Tylenol Motrin as needed for discomfort  Return to the emergency department if you develop worsening pain, weakness/numbness/tingling extremities, incontinence, urinary retention or any other symptoms of concern  Please follow up with orthopedics by calling the number provided

## 2023-03-26 NOTE — ED PROVIDER NOTES
History  Chief Complaint   Patient presents with   • Hip Pain     Patient states he fell earlier today and has been having right sided  having hip and knee pain  Raphael Arboleda is a 41y o  year old male with PMH of MSSA bacteremia, chronic pain, HCV infection, heroin abuse presenting to the [facility] ED for right hip and knee pain  Patient reporting history of chronic right knee and hip pain  Reported to have history of bacteremia and reportedly concern as outpatient for infection of these areas  At approximately 1500 yesterday the patient was walking downstairs when his right knee buckled  This caused him to fall onto his right hip  Did not strike his head or have loss of consciousness  Patient reporting pain in the posterior right hip and right knee since that time  No reported weakness/numbness in extremities  No reported incontinence or anesthesia  No reported chest pain dyspnea or abdominal pain  Patient currently following with infectious disease and on course of doxycycline as outpatient  Patient reportedly has history of chronic right knee pain and and is reportedly scheduled for outpatient evaluation by orthopedics  The patient has not taken/received any medications at home for relief of pain  History provided by:  Patient   used: No    Hip Pain  Associated symptoms: no abdominal pain, no chest pain, no fever and no shortness of breath        Prior to Admission Medications   Prescriptions Last Dose Informant Patient Reported? Taking?    DULoxetine (CYMBALTA) 30 mg delayed release capsule   No No   Sig: Take 1 capsule (30 mg total) by mouth daily   QUEtiapine (SEROquel) 100 mg tablet   No No   Sig: Take 1 5 tablets (150 mg total) by mouth daily at bedtime   diclofenac sodium (VOLTAREN) 50 mg EC tablet   No No   Sig: Take 1 tablet (50 mg total) by mouth 2 (two) times a day   doxycycline hyclate (VIBRAMYCIN) 100 mg capsule   No No   Sig: Take 1 capsule (100 mg total) by mouth every 12 (twelve) hours   famotidine (PEPCID) 20 mg tablet   No No   Sig: Take 1 tablet (20 mg total) by mouth 2 (two) times a day   Patient not taking: Reported on 2/21/2023   gabapentin (NEURONTIN) 600 MG tablet   No No   Sig: Take 1 tablet (600 mg total) by mouth 3 (three) times a day   methocarbamol (ROBAXIN) 750 mg tablet   No No   Sig: Take 1 tablet (750 mg total) by mouth every 8 (eight) hours   methocarbamol 1000 MG TABS   No No   Sig: Take 1,000 mg by mouth every 8 (eight) hours   methylPREDNISolone 4 MG tablet therapy pack   No No   Sig: Use as directed on package      Facility-Administered Medications: None       Past Medical History:   Diagnosis Date   • Addiction to drug [facility])    • Anxiety    • Arthritis    • Chronic pain    • Hepatitis    • Hepatitis C virus    • [facility] 05/02/2018   • Moderate episode of recurrent major depressive disorder ([facility] ) 02/05/2020   • Tobacco use disorder     last assessed 11/2/15        Past Surgical History:   Procedure Laterality Date   • EPIDURAL BLOCK INJECTION Bilateral 4/19/2018    Procedure: T9-10 INTERLAMINAR EPIDURAL STEROID INJECTION;  Surgeon: Sofya Solis MD;  Location: [facility] MAIN OR;  Service: Pain Management    • IR PICC PLACEMENT SINGLE LUMEN  1/27/2023       Family History   Problem Relation Age of Onset   • No Known Problems Father    • Hepatitis Family      I have reviewed and agree with the history as documented      E-Cigarette/Vaping     E-Cigarette/Vaping Substances   • Nicotine No    • THC No    • CBD No    • Flavoring No    • Other No    • Unknown No      Social History     Tobacco Use   • Smoking status: Every Day     Packs/day: 1 00     Types: Cigarettes   • Smokeless tobacco: Former   • Tobacco comments:     current every day smoker per allscript    Substance Use Topics   • Alcohol use: Not Currently     Comment: occasional / social per allscript    • Drug use: Not Currently     Types: Heroin, Prescription, Marijuana Comment: past opiate abuser / intravenous drug use per allscript                      Review of Systems   Constitutional: Negative for chills and fever  Respiratory: Negative for shortness of breath  Cardiovascular: Negative for chest pain  Gastrointestinal: Negative for abdominal pain  Musculoskeletal: Positive for arthralgias  Negative for back pain  Skin: Negative for wound  Neurological: Negative for weakness and numbness  All other systems reviewed and are negative  Physical Exam  Physical Exam  Vitals and nursing note reviewed  Constitutional:       General: He is not in acute distress  Appearance: Normal appearance  He is well-developed  He is not ill-appearing, toxic-appearing or diaphoretic  HENT:      Head: Normocephalic and atraumatic  Nose: No congestion or rhinorrhea  Eyes:      General:         Right eye: No discharge  Left eye: No discharge  Cardiovascular:      Rate and Rhythm: Normal rate and regular rhythm  Pulmonary:      Effort: Pulmonary effort is normal  No respiratory distress  Breath sounds: Normal breath sounds  No wheezing or rales  Abdominal:      Palpations: Abdomen is soft  Tenderness: There is no abdominal tenderness  There is no right CVA tenderness, left CVA tenderness, guarding or rebound  Musculoskeletal:      Cervical back: Normal range of motion  No rigidity or bony tenderness  Thoracic back: No tenderness or bony tenderness  Normal range of motion  Lumbar back: No tenderness or bony tenderness  Normal range of motion  Right hip: Tenderness present  No deformity or bony tenderness  Normal range of motion  Normal strength  Right upper leg: No swelling, deformity, tenderness or bony tenderness  Right knee: No bony tenderness  Normal range of motion  Tenderness present over the patellar tendon  Right lower leg: No swelling or tenderness  No edema  Left lower leg: No swelling or tenderness  No edema  Right ankle: No swelling  No tenderness  Left ankle: No swelling  No tenderness  Right foot: Normal range of motion  No swelling or tenderness  Left foot: Normal range of motion  No swelling or tenderness  Legs:    Skin:     General: Skin is warm  Capillary Refill: Capillary refill takes less than 2 seconds  Neurological:      Mental Status: He is alert and oriented to person, place, and time  GCS: GCS eye subscore is 4  GCS verbal subscore is 5  GCS motor subscore is 6  Comments: 5/5 strength b/l LE  Sensation grossly intact throughout  Psychiatric:         Mood and Affect: Mood normal          Behavior: Behavior normal          Vital Signs  ED Triage Vitals   Temperature Pulse Respirations Blood Pressure SpO2   03/26/23 0136 03/26/23 0136 03/26/23 0136 03/26/23 0136 03/26/23 0136   97 8 °F (36 6 °C) (!) 114 18 120/77 95 %      Temp Source Heart Rate Source Patient Position - Orthostatic VS BP Location FiO2 (%)   03/26/23 0136 -- -- -- --   Temporal          Pain Score       03/26/23 0157       9           Vitals:    03/26/23 0136   BP: 120/77   Pulse: (!) 114         Visual Acuity      ED Medications  Medications   ketorolac (TORADOL) injection 15 mg (15 mg Intramuscular Given 3/26/23 0157)   acetaminophen (TYLENOL) tablet 975 mg (975 mg Oral Given 3/26/23 0310)       Diagnostic Studies  Results Reviewed     None                 XR hip/pelv 2-3 vws right   ED Interpretation by Wanda Monday DO (03/26 0304)   No acute fracture or dislocation  XR knee 4+ views Right injury   ED Interpretation by Wanda Monday, DO (03/26 0304)   No acute fracture or dislocation  Procedures  Procedures         ED Course                               SBIRT 20yo+    Flowsheet Row Most Recent Value   SBIRT (25 yo +)    In order to provide better care to our patients, we are screening all of our patients for alcohol and drug use   Would it be okay to ask you these screening questions? Yes Filed at: 03/26/2023 0137   Initial Alcohol Screen: US AUDIT-C     1  How often do you have a drink containing alcohol? 0 Filed at: 03/26/2023 0137   2  How many drinks containing alcohol do you have on a typical day you are drinking? 0 Filed at: 03/26/2023 0137   3a  Male UNDER 65: How often do you have five or more drinks on one occasion? 0 Filed at: 03/26/2023 0137   3b  FEMALE Any Age, or MALE 65+: How often do you have 4 or more drinks on one occassion? 0 Filed at: 03/26/2023 4583   Audit-C Score 0 Filed at: 03/26/2023 0490   MIGUEL: How many times in the past year have you    Used an illegal drug or used a prescription medication for non-medical reasons? Never Filed at: 03/26/2023 0616                    Medical Decision Making    41 y o  male presenting for right hip and knee pain s/p fall  No fevers or micromotion tenderness to suspect joint space infection  Right lower extremity neurovascular intact  No reported back pain  No reported weakness/numbness/tingling extremities  No reported incontinence or anesthesia  Pain localized to right posterior hip and right knee  Will order x-ray to evaluate for acute fracture/dislocation  Differential includes soft tissue injury to the right knee or MSK strain/contusion  Will treat with analgesia  I have reviewed preliminary ED interpretation of x-rays with the patient  The patient understands that their x-rays will be interpreted independently by a radiologist at a later time  The patient is agreeable to discharge at this time and understands that they may be called back to the emergency department pending formal xray interpretation by radiology  Disposition: I have discussed with the patient our plan to discharge them from the ED and the patient is in agreement with this plan  Discharge Plan: PRN tylenol/motrin for pain, ortho f/u as outpatient  RTED precautions emphasized   The patient was provided a written after visit summary with strict RTED precautions  Followup: I have discussed with the patient plan to follow up with Orthopedics  Contact information provided in AVS     Amount and/or Complexity of Data Reviewed  Radiology: ordered and independent interpretation performed  Risk  OTC drugs  Prescription drug management  Disposition  Final diagnoses:   Acute pain of right knee   Acute right hip pain     Time reflects when diagnosis was documented in both MDM as applicable and the Disposition within this note     Time User Action Codes Description Comment    3/26/2023  1:54 AM Veryl Sherman Add [M25 561] Acute pain of right knee     3/26/2023  1:54 AM Veryl Sherman Add [X11 [id number]] Acute right hip pain       ED Disposition     ED Disposition   Discharge    Condition   Stable    Date/Time   Sun Mar 26, 2023  3:06 AM    Comment   Annie Peraza discharge to home/self care  Follow-up Information     Follow up With Specialties Details Why Contact Info Additional [address] [facility] Schedule an appointment as soon as possible for a visit  To make appointment for reevaluation in 3-5 days   [address] [facility] [facility] Britney Puls [address] [place], [place], [address]          Discharge Medication List as of 3/26/2023  3:06 AM      CONTINUE these medications which have NOT CHANGED    Details   diclofenac sodium (VOLTAREN) 50 mg EC tablet Take 1 tablet (50 mg total) by mouth 2 (two) times a day, Starting Mon 2/27/2023, Until Wed 3/29/2023, Normal      doxycycline hyclate (VIBRAMYCIN) 100 mg capsule Take 1 capsule (100 mg total) by mouth every 12 (twelve) hours, Starting Wed 3/15/2023, Until Sun 5/14/2023, Normal      DULoxetine (CYMBALTA) 30 mg delayed release capsule Take 1 capsule (30 mg total) by mouth daily, Starting Mon 2/27/2023, Until Wed 3/29/2023, Normal      famotidine (PEPCID) 20 mg tablet Take 1 tablet (20 mg total) by mouth 2 (two) times a day, Starting Fri 2/17/2023, Normal      gabapentin (NEURONTIN) 600 MG tablet Take 1 tablet (600 mg total) by mouth 3 (three) times a day, Starting Mon 2/27/2023, Until Wed 3/29/2023, Normal      !! methocarbamol (ROBAXIN) 750 mg tablet Take 1 tablet (750 mg total) by mouth every 8 (eight) hours, Starting Sat 2/11/2023, Normal      !! methocarbamol 1000 MG TABS Take 1,000 mg by mouth every 8 (eight) hours, Starting Fri 2/17/2023, Normal      methylPREDNISolone 4 MG tablet therapy pack Use as directed on package, Normal      QUEtiapine (SEROquel) 100 mg tablet Take 1 5 tablets (150 mg total) by mouth daily at bedtime, Starting Fri 2/17/2023, Normal       !! - Potential duplicate medications found  Please discuss with provider  No discharge procedures on file      PDMP Review       Value Time User    PDMP Reviewed  Yes 12/5/2022  5:14 PM Meghna Singh DO          ED Provider  Electronically Signed by           Aria Cesar DO  03/26/23 6380

## 2023-03-29 ENCOUNTER — OFFICE VISIT (OUTPATIENT)
Dept: INTERNAL MEDICINE CLINIC | Facility: CLINIC | Age: 38
End: 2023-03-29

## 2023-03-29 VITALS
HEART RATE: 97 BPM | DIASTOLIC BLOOD PRESSURE: 76 MMHG | OXYGEN SATURATION: 97 % | WEIGHT: 236 LBS | HEIGHT: 72 IN | BODY MASS INDEX: 31.97 KG/M2 | SYSTOLIC BLOOD PRESSURE: 130 MMHG | TEMPERATURE: 97.2 F

## 2023-03-29 DIAGNOSIS — Z13.1 SCREENING FOR DIABETES MELLITUS (DM): ICD-10-CM

## 2023-03-29 DIAGNOSIS — M25.571 CHRONIC PAIN OF BOTH ANKLES: ICD-10-CM

## 2023-03-29 DIAGNOSIS — M54.41 ACUTE RIGHT-SIDED LOW BACK PAIN WITH RIGHT-SIDED SCIATICA: ICD-10-CM

## 2023-03-29 DIAGNOSIS — G89.29 CHRONIC PAIN OF RIGHT KNEE: ICD-10-CM

## 2023-03-29 DIAGNOSIS — F41.9 ANXIETY: ICD-10-CM

## 2023-03-29 DIAGNOSIS — E83.42 HYPOMAGNESEMIA: ICD-10-CM

## 2023-03-29 DIAGNOSIS — Z00.00 WELL ADULT EXAM: Primary | ICD-10-CM

## 2023-03-29 DIAGNOSIS — G89.29 CHRONIC PAIN OF RIGHT HIP: ICD-10-CM

## 2023-03-29 DIAGNOSIS — M54.6 CHRONIC MIDLINE THORACIC BACK PAIN: ICD-10-CM

## 2023-03-29 DIAGNOSIS — G89.29 CHRONIC PAIN OF BOTH ANKLES: ICD-10-CM

## 2023-03-29 DIAGNOSIS — M25.572 CHRONIC PAIN OF BOTH ANKLES: ICD-10-CM

## 2023-03-29 DIAGNOSIS — M51.34 DEGENERATION OF INTERVERTEBRAL DISC OF THORACIC REGION: ICD-10-CM

## 2023-03-29 DIAGNOSIS — M25.561 CHRONIC PAIN OF RIGHT KNEE: ICD-10-CM

## 2023-03-29 DIAGNOSIS — R60.0 LOCALIZED EDEMA: ICD-10-CM

## 2023-03-29 DIAGNOSIS — Z13.29 SCREENING FOR THYROID DISORDER: ICD-10-CM

## 2023-03-29 DIAGNOSIS — M51.34 DEGENERATION OF THORACIC INTERVERTEBRAL DISC: ICD-10-CM

## 2023-03-29 DIAGNOSIS — Z13.220 SCREENING FOR LIPID DISORDERS: ICD-10-CM

## 2023-03-29 DIAGNOSIS — M79.18 MYOFASCIAL PAIN: ICD-10-CM

## 2023-03-29 DIAGNOSIS — Z23 ENCOUNTER FOR VACCINATION: ICD-10-CM

## 2023-03-29 DIAGNOSIS — G89.29 CHRONIC MIDLINE THORACIC BACK PAIN: ICD-10-CM

## 2023-03-29 DIAGNOSIS — M25.551 CHRONIC PAIN OF RIGHT HIP: ICD-10-CM

## 2023-03-29 DIAGNOSIS — M54.2 NECK PAIN: ICD-10-CM

## 2023-03-29 PROBLEM — F19.20 DRUG DEPENDENCE (HCC): Status: RESOLVED | Noted: 2018-05-02 | Resolved: 2023-03-29

## 2023-03-29 PROBLEM — R26.2 AMBULATORY DYSFUNCTION: Status: RESOLVED | Noted: 2023-01-18 | Resolved: 2023-03-29

## 2023-03-29 PROBLEM — R78.81 BACTEREMIA DUE TO METHICILLIN SUSCEPTIBLE STAPHYLOCOCCUS AUREUS (MSSA): Status: RESOLVED | Noted: 2023-01-18 | Resolved: 2023-03-29

## 2023-03-29 PROBLEM — R10.31 BILATERAL GROIN PAIN: Status: RESOLVED | Noted: 2022-02-09 | Resolved: 2023-03-29

## 2023-03-29 PROBLEM — D72.829 LEUKOCYTOSIS: Status: RESOLVED | Noted: 2023-02-05 | Resolved: 2023-03-29

## 2023-03-29 PROBLEM — Z78.9 ELECTRONIC CIGARETTE USE: Status: ACTIVE | Noted: 2023-03-29

## 2023-03-29 PROBLEM — F19.10 INTRAVENOUS DRUG ABUSE, CONTINUOUS (HCC): Status: RESOLVED | Noted: 2023-01-18 | Resolved: 2023-03-29

## 2023-03-29 PROBLEM — F11.10 HEROIN ABUSE (HCC): Status: RESOLVED | Noted: 2018-05-02 | Resolved: 2023-03-29

## 2023-03-29 PROBLEM — Z79.899 DRUG THERAPY CONTINUED: Status: RESOLVED | Noted: 2018-05-02 | Resolved: 2023-03-29

## 2023-03-29 PROBLEM — F19.10 POLYSUBSTANCE ABUSE (HCC): Status: ACTIVE | Noted: 2022-03-06

## 2023-03-29 PROBLEM — B95.61 BACTEREMIA DUE TO METHICILLIN SUSCEPTIBLE STAPHYLOCOCCUS AUREUS (MSSA): Status: RESOLVED | Noted: 2023-01-18 | Resolved: 2023-03-29

## 2023-03-29 PROBLEM — R10.32 BILATERAL GROIN PAIN: Status: RESOLVED | Noted: 2022-02-09 | Resolved: 2023-03-29

## 2023-03-29 PROBLEM — M25.511 ACUTE PAIN OF RIGHT SHOULDER: Status: RESOLVED | Noted: 2022-06-07 | Resolved: 2023-03-29

## 2023-03-29 PROBLEM — R19.7 DIARRHEA: Status: RESOLVED | Noted: 2023-02-09 | Resolved: 2023-03-29

## 2023-03-29 RX ORDER — GABAPENTIN 600 MG/1
600 TABLET ORAL 3 TIMES DAILY
Qty: 90 TABLET | Refills: 1 | Status: SHIPPED | OUTPATIENT
Start: 2023-03-29 | End: 2023-04-28

## 2023-03-29 RX ORDER — QUETIAPINE FUMARATE 100 MG/1
150 TABLET, FILM COATED ORAL
Qty: 45 TABLET | Refills: 0 | OUTPATIENT
Start: 2023-03-29

## 2023-03-29 RX ORDER — METHOCARBAMOL 750 MG/1
750 TABLET, FILM COATED ORAL EVERY 8 HOURS SCHEDULED
Qty: 90 TABLET | Refills: 0 | Status: SHIPPED | OUTPATIENT
Start: 2023-03-29

## 2023-03-29 RX ORDER — QUETIAPINE FUMARATE 100 MG/1
150 TABLET, FILM COATED ORAL
Qty: 45 TABLET | Refills: 0 | Status: SHIPPED | OUTPATIENT
Start: 2023-03-29

## 2023-03-29 NOTE — PROGRESS NOTES
BMI Counseling: Body mass index is 32 01 kg/m²  The BMI is above normal  Nutrition recommendations include decreasing portion sizes and encouraging healthy choices of fruits and vegetables  Exercise recommendations include moderate physical activity 150 minutes/week  No pharmacotherapy was ordered  Rationale for BMI follow-up plan is due to patient being overweight or obese       Assessment/Plan:  Problem List Items Addressed This Visit        Nervous and Auditory    RESOLVED: Acute right-sided low back pain with right-sided sciatica    Relevant Medications    methocarbamol (ROBAXIN) 750 mg tablet    gabapentin (NEURONTIN) 600 MG tablet       Musculoskeletal and Integument    Degeneration of thoracic intervertebral disc    Relevant Medications    gabapentin (NEURONTIN) 600 MG tablet    Degeneration of intervertebral disc of thoracic region    Relevant Medications    gabapentin (NEURONTIN) 600 MG tablet       Other    RESOLVED: Myofascial pain    Relevant Medications    gabapentin (NEURONTIN) 600 MG tablet    Hypomagnesemia    Relevant Orders    Magnesium    Chronic midline thoracic back pain    Relevant Medications    methocarbamol (ROBAXIN) 750 mg tablet    Anxiety    Relevant Medications    QUEtiapine (SEROquel) 100 mg tablet    Other Relevant Orders    Prolactin   Other Visit Diagnoses     Well adult exam    -  Primary    Relevant Orders    Comprehensive metabolic panel    CBC and differential    Hemoglobin A1C    Lipid Panel with Direct LDL reflex    TSH, 3rd generation with Free T4 reflex    Neck pain        Relevant Medications    gabapentin (NEURONTIN) 600 MG tablet    Chronic pain of both ankles        Screening for lipid disorders        Screening for diabetes mellitus (DM)        Screening for thyroid disorder        Encounter for vaccination        Chronic pain of right hip        Relevant Orders    XR hip/pelv 2-3 vws right if performed    C-reactive protein    Chronic pain of right knee        Relevant Orders    XR knee 3 vw right non injury    C-reactive protein    Localized edema               Diagnoses and all orders for this visit:    Well adult exam  -     Comprehensive metabolic panel; Future  -     CBC and differential; Future  -     Hemoglobin A1C; Future  -     Lipid Panel with Direct LDL reflex; Future  -     TSH, 3rd generation with Free T4 reflex; Future    Acute right-sided low back pain with right-sided sciatica  -     methocarbamol (ROBAXIN) 750 mg tablet; Take 1 tablet (750 mg total) by mouth every 8 (eight) hours  -     gabapentin (NEURONTIN) 600 MG tablet; Take 1 tablet (600 mg total) by mouth 3 (three) times a day    Degeneration of intervertebral disc of thoracic region  -     gabapentin (NEURONTIN) 600 MG tablet; Take 1 tablet (600 mg total) by mouth 3 (three) times a day    Degeneration of thoracic intervertebral disc  -     gabapentin (NEURONTIN) 600 MG tablet; Take 1 tablet (600 mg total) by mouth 3 (three) times a day    Myofascial pain  -     gabapentin (NEURONTIN) 600 MG tablet; Take 1 tablet (600 mg total) by mouth 3 (three) times a day    Neck pain  -     gabapentin (NEURONTIN) 600 MG tablet; Take 1 tablet (600 mg total) by mouth 3 (three) times a day    Chronic pain of both ankles    Screening for lipid disorders    Screening for diabetes mellitus (DM)    Screening for thyroid disorder    Encounter for vaccination    Anxiety  -     Prolactin; Future  -     QUEtiapine (SEROquel) 100 mg tablet; Take 1 5 tablets (150 mg total) by mouth daily at bedtime    Chronic midline thoracic back pain  -     methocarbamol (ROBAXIN) 750 mg tablet; Take 1 tablet (750 mg total) by mouth every 8 (eight) hours    Chronic pain of right hip  -     XR hip/pelv 2-3 vws right if performed; Future  -     C-reactive protein; Future    Chronic pain of right knee  -     XR knee 3 vw right non injury; Future  -     C-reactive protein; Future    Hypomagnesemia  -     Magnesium;  Future    Localized edema      No problem-specific Assessment & Plan notes found for this encounter  A/P: Stable  Will check routine labs  Discussed BMI and will give info on diet and exercise  Wean tobacco and e cigs  Discussed vaccines and defers Td and flu  Get into see a DDS and an eye  Doc  ?cause of his hip and knee pain  Doubt OM since pt afebrile and not looking ill  Will check xays and continue NSAID for now  Hold on pain meds due to past drug dependency  May need PT and ortho  Consider blood cultures if CRP is up and additional imaging  Continue current treatment, including suboxone  RTC 1-2 weeks for f/u  Subjective:      Patient ID: Jean Magdaleno is a 41 y o  male  WM, former pt of Dr Loida Rodriguez and seeing Dr Cheri Shipman for suboxone, presents to establish  PMH includes polysubstance abuse, e cig use, tobacco abuse, sepsis, Hep C s/p treatment, chronic LBP, Juan Daniel/MDD, and hypomag  PSH is denied  Daily e cig and tobacco Smoker and denies ETOH  Doing ok, but c/o chronic right knee and hip pain since 1/23  Pt was admitted for sepsis, but reports no one addressed the pain  Pt having trouble walking  Requesting pain meds  Taking NSAID's and tracey  No trauma  No swelling or redness  NO increase temp  NO fever or chills  Was an IV drug abuser in the past    No falls  Denies depression/JUAN DANIEL being out of control  No suicidal or violent ideations  Working construction  No recent travel  No fever, chills, or sweats  No unexplained wt change  Denies CP, SOB, palpitations, edema, orthopnea, or PND  No sz or syncope  No changes in bowel or bladder habits  No trouble swallowing  Appetite and sleep are good  Due to see both a DDS and an eye doctor  Currently due for labs and vaccines                  The following portions of the patient's history were reviewed and updated as appropriate:   He has a past medical history of Addiction to drug [facility]), Anxiety, Arthritis, Chronic pain, Hepatitis, Hepatitis C virus, Heroin abuse ([facility] [address] ) (05/02/2018), Moderate episode of recurrent major depressive disorder ([facility] [address] ) (02/05/2020), and Tobacco use disorder  ,  does not have any pertinent problems on file  ,   has a past surgical history that includes Epidural block injection (Bilateral, 4/19/2018) and IR PICC placement single lumen (1/27/2023)  ,  family history includes Hepatitis in his family; No Known Problems in his father  ,   reports that he has been smoking cigarettes  He has a 20 00 pack-year smoking history  He has quit using smokeless tobacco  He reports that he does not currently use alcohol  He reports that he does not currently use drugs after having used the following drugs: Heroin, Prescription, and Marijuana  ,  is allergic to clindamycin/lincomycin and pollen extract     Current Outpatient Medications   Medication Sig Dispense Refill   • doxycycline hyclate (VIBRAMYCIN) 100 mg capsule Take 1 capsule (100 mg total) by mouth every 12 (twelve) hours 60 capsule 1   • gabapentin (NEURONTIN) 600 MG tablet Take 1 tablet (600 mg total) by mouth 3 (three) times a day 90 tablet 1   • methocarbamol (ROBAXIN) 750 mg tablet Take 1 tablet (750 mg total) by mouth every 8 (eight) hours 90 tablet 0   • QUEtiapine (SEROquel) 100 mg tablet Take 1 5 tablets (150 mg total) by mouth daily at bedtime 45 tablet 0     No current facility-administered medications for this visit  Review of Systems   Constitutional: Positive for activity change  Negative for chills, diaphoresis, fatigue and fever  HENT: Negative  Eyes: Negative for visual disturbance  Respiratory: Negative for cough, chest tightness, shortness of breath and wheezing  Cardiovascular: Positive for leg swelling  Negative for chest pain and palpitations  Gastrointestinal: Negative for abdominal pain, constipation, diarrhea, nausea and vomiting  Endocrine: Negative for cold intolerance and heat intolerance  Genitourinary: Negative for difficulty urinating, dysuria and frequency     Musculoskeletal: Positive for arthralgias, back pain and gait problem  Negative for joint swelling and myalgias  Neurological: Negative for dizziness, seizures, syncope, weakness, light-headedness, numbness and headaches  Psychiatric/Behavioral: Negative for confusion, dysphoric mood and sleep disturbance  The patient is not nervous/anxious  PHQ-2/9 Depression Screening          Objective:  Vitals:    03/29/23 1357   BP: 130/76   BP Location: Left arm   Patient Position: Sitting   Cuff Size: Standard   Pulse: 97   Temp: (!) 97 2 °F (36 2 °C)   SpO2: 97%   Weight: 107 kg (236 lb)   Height: 6' (1 829 m)     Body mass index is 32 01 kg/m²  Physical Exam  Vitals and nursing note reviewed  Constitutional:       General: He is not in acute distress  Appearance: Normal appearance  He is not ill-appearing  HENT:      Head: Normocephalic and atraumatic  Mouth/Throat:      Mouth: Mucous membranes are moist    Eyes:      Extraocular Movements: Extraocular movements intact  Conjunctiva/sclera: Conjunctivae normal       Pupils: Pupils are equal, round, and reactive to light  Neck:      Vascular: No carotid bruit  Cardiovascular:      Rate and Rhythm: Normal rate and regular rhythm  Heart sounds: Normal heart sounds  No murmur heard  Pulmonary:      Effort: Pulmonary effort is normal  No respiratory distress  Breath sounds: Normal breath sounds  No wheezing, rhonchi or rales  Abdominal:      General: Bowel sounds are normal  There is no distension  Palpations: Abdomen is soft  There is no mass  Tenderness: There is no abdominal tenderness  There is no right CVA tenderness, left CVA tenderness, guarding or rebound  Musculoskeletal:         General: Tenderness present  No swelling or deformity  Cervical back: Normal range of motion and neck supple  No rigidity or tenderness  Right lower leg: Edema present  Left lower leg: Edema present  Comments: bilat LE edema 1/4  Right hip joint w/o any gross deformities, increase temp, erythema, or swelling  NO crepitus  No effusions or ballotment  Joanne Eulonia ROM wnl but increase tenderness with ROM  Tenderness diffusely  Right knee any gross deformities, increase temp, erythema, or swelling  No effusion or ballotment  No crepitus  Collaterals intact  Negative Drawer's  Negative Sulema's  ROM wnl  Tenderness diffusely       Lymphadenopathy:      Cervical: No cervical adenopathy  Neurological:      General: No focal deficit present  Mental Status: He is alert and oriented to person, place, and time  Mental status is at baseline  Cranial Nerves: No cranial nerve deficit  Motor: No weakness  Coordination: Coordination normal       Gait: Gait normal    Psychiatric:         Mood and Affect: Mood normal          Behavior: Behavior normal          Thought Content: Thought content normal          Judgment: Judgment normal          BMI Counseling: Body mass index is 32 01 kg/m²  The BMI is above normal  Nutrition recommendations include reducing portion sizes, decreasing overall calorie intake, reducing intake of saturated fat and trans fat and reducing intake of cholesterol  Exercise recommendations include moderate aerobic physical activity for 150 minutes/week  Tobacco Cessation Counseling: Tobacco cessation counseling and education was provided  The patient is sincerely urged to quit consumption of tobacco  He is not ready to quit tobacco  The numerous health risks of tobacco consumption were discussed  If he decides to quit, there are a number of helpful adjunctive aids, and he can see me to discuss nicotine replacement therapy, chantix, or bupropion anytime in the future

## 2023-03-29 NOTE — PATIENT INSTRUCTIONS
Obesity   AMBULATORY CARE:   Obesity  means your body mass index (BMI) is greater than 30  Your healthcare provider will use your age, height, and weight to measure your BMI  The risks of obesity include  many health problems, including injuries or physical disability  Diabetes (high blood sugar level)    High blood pressure or high cholesterol    Heart disease    Stroke    Gallbladder or liver disease    Cancer of the colon, breast, prostate, liver, or kidney    Sleep apnea    Arthritis or gout    Screening  is done to check for health conditions before you have signs or symptoms  If you are 28to 79years old, your blood sugar level may be checked every 3 years for signs of prediabetes or diabetes  Your healthcare provider will check your blood pressure at each visit  High blood pressure can lead to a stroke or other problems  Your provider may check for signs of heart disease, cancer, or other health problems  Seek care immediately if:   You have a severe headache, confusion, or difficulty speaking  You have weakness on one side of your body  You have chest pain, sweating, or shortness of breath  Call your doctor if:   You have symptoms of gallbladder or liver disease, such as pain in your upper abdomen  You have knee or hip pain and discomfort while walking  You have symptoms of diabetes, such as intense hunger and thirst, and frequent urination  You have symptoms of sleep apnea, such as snoring or daytime sleepiness  You have questions or concerns about your condition or care  Treatment for obesity  focuses on helping you lose weight to improve your health  Even a small decrease in BMI can reduce the risk for many health problems  Your healthcare provider will help you set a weight-loss goal   Lifestyle changes  are the first step in treating obesity  These include making healthy food choices and getting regular physical activity   Your healthcare provider may suggest a weight-loss program that involves coaching, education, and therapy  Medicine  may help you lose weight when it is used with a healthy foods and physical activity  Surgery  can help you lose weight if you have obesity along with other health problems  Several types of weight-loss surgery are available  Ask your healthcare provider for more information  Tips for safe weight loss:   Set small, realistic goals  An example of a small goal is to walk for 20 minutes 5 days a week  Anther goal is to lose 5% of your body weight  Ask for support  Tell friends, family members, and coworkers about your goals  Ask someone to lose weight with you  You may also want to join a weight-loss support group  Identify foods or triggers that may cause you to overeat  Remove tempting high-calorie foods from your home and workplace  Place a bowl of fresh fruit on your kitchen counter  If stress causes you to eat, find other ways to cope with stress  A counselor or therapist may be able to help you  Track your daily calories and activity  Write down what you eat and drink  Also write down how many minutes of physical activity you do each day  Track your weekly weight  Weigh yourself in the morning, before you eat or drink anything but after you use the bathroom  Use the same scale, in the same place, and in similar clothing each time  Only weigh yourself 1 to 2 times each week, or as directed  You may become discouraged if you weigh yourself every day  Eating changes: You will need to eat 500 to 1,000 fewer calories each day than you currently eat to lose 1 to 2 pounds a week  The following changes will help you cut calories:  Eat smaller portions  Use small plates, no larger than 9 inches in diameter  Fill your plate half full of fruits and vegetables  Measure your food using measuring cups until you know what a serving size looks like  Eat 3 meals and 1 or 2 snacks each day  Plan your meals in advance   Promise Carbajal and eat at home most of the time  Eat slowly  Do not skip meals  Skipping meals can lead to overeating later in the day  This can make it harder for you to lose weight  Talk with a dietitian to help you make a meal plan and schedule that is right for you  Eat fruits and vegetables at every meal   They are low in calories and high in fiber, which makes you feel full  Do not add butter, margarine, or cream sauce to vegetables  Use herbs to season steamed vegetables  Eat less fat and fewer fried foods  Eat more baked or grilled chicken and fish  These protein sources are lower in calories and fat than red meat  Limit fast food  Dress your salads with olive oil and vinegar instead of bottled dressing  Limit the amount of sugar you eat  Do not drink sugary beverages  Limit alcohol  Activity changes:  Physical activity is good for your body in many ways  It helps you burn calories and build strong muscles  It decreases stress and depression, and improves your mood  It can also help you sleep better  Talk to your healthcare provider before you begin an exercise program   Exercise for at least 30 minutes 5 days a week  Start slowly  Set aside time each day for physical activity that you enjoy and that is convenient for you  It is best to do both weight training and an activity that increases your heart rate, such as walking, bicycling, or swimming  Find ways to be more active  Do yard work and housecleaning  Walk up the stairs instead of using elevators  Spend your leisure time going to events that require walking, such as outdoor festivals or fairs  This extra physical activity can help you lose weight and keep it off  Follow up with your doctor as directed: You may need to meet with a dietitian  Write down your questions so you remember to ask them during your visits    © Copyright Eitan Martinez 2022 Information is for End User's use only and may not be sold, redistributed or otherwise used for commercial purposes  The above information is an  only  It is not intended as medical advice for individual conditions or treatments  Talk to your doctor, nurse or pharmacist before following any medical regimen to see if it is safe and effective for you  Low Fat Diet   AMBULATORY CARE:   A low-fat diet  is an eating plan that is low in total fat, unhealthy fat, and cholesterol  You may need to follow a low-fat diet if you have trouble digesting or absorbing fat  You may also need to follow this diet if you have high cholesterol  You can also lower your cholesterol by increasing the amount of fiber in your diet  Soluble fiber is a type of fiber that helps to decrease cholesterol levels  Different types of fat in food:   Limit unhealthy fats  A diet that is high in cholesterol, saturated fat, and trans fat may cause unhealthy cholesterol levels  Unhealthy cholesterol levels increase your risk of heart disease  Cholesterol:  Limit intake of cholesterol to less than 200 mg per day  Cholesterol is found in meat, eggs, and dairy  Saturated fat:  Limit saturated fat to less than 7% of your total daily calories  Ask your dietitian how many calories you need each day  Saturated fat is found in butter, cheese, ice cream, whole milk, and palm oil  Saturated fat is also found in meat, such as beef, pork, chicken skin, and processed meats  Processed meats include sausage, hot dogs, and bologna  Trans fat:  Avoid trans fat as much as possible  Trans fat is used in fried and baked foods  Foods that say trans fat free on the label may still have up to 0 5 grams of trans fat per serving  Include healthy fats  Replace foods that are high in saturated and trans fat with foods high in healthy fats  This may help to decrease high cholesterol levels  Monounsaturated fats: These are found in avocados, nuts, and vegetable oils, such as olive, canola, and sunflower oil      Polyunsaturated fats: These can be found in vegetable oils, such as soybean or corn oil  Omega-3 fats can help to decrease the risk of heart disease  Omega-3 fats are found in fish, such as salmon, herring, trout, and tuna  Omega-3 fats can also be found in plant foods, such as walnuts, flaxseed, soybeans, and canola oil         Foods to limit or avoid:   Grains:      Snacks that are made with partially hydrogenated oils, such as chips, regular crackers, and butter-flavored popcorn    High-fat baked goods, such as biscuits, croissants, doughnuts, pies, cookies, and pastries    Dairy:      Whole milk, 2% milk, and yogurt and ice cream made with whole milk    Half and half creamer, heavy cream, and whipping cream    Cheese, cream cheese, and sour cream    Meats and proteins:      High-fat cuts of meat (T-bone steak, regular hamburger, and ribs)    Fried meat, poultry (turkey and chicken), and fish    Poultry (chicken and turkey) with skin    Cold cuts (salami or bologna), hot dogs, lyons, and sausage    Whole eggs and egg yolks    Vegetables and fruits with added fat:      Fried vegetables or vegetables in butter or high-fat sauces, such as cream or cheese sauces    Fried fruit or fruit served with butter or cream    Fats:      Butter, stick margarine, and shortening    Coconut, palm oil, and palm kernel oil    Foods to include:       Grains:      Whole-grain breads, cereals, pasta, and brown rice    Low-fat crackers and pretzels    Vegetables and fruits:      Fresh, frozen, or canned vegetables (no salt or low-sodium)    Fresh, frozen, dried, or canned fruit (canned in light syrup or fruit juice)    Avocado    Low-fat dairy products:      Nonfat (skim) or 1% milk    Nonfat or low-fat cheese, yogurt, and cottage cheese    Meats and proteins:      Chicken or turkey with no skin    Baked or broiled fish    Lean beef and pork (loin, round, extra lean hamburger)    Beans and peas, unsalted nuts, soy products    Egg whites and substitutes    Seeds and nuts    Fats:      Unsaturated oil, such as canola, olive, peanut, soybean, or sunflower oil    Soft or liquid margarine and vegetable oil spread    Low-fat salad dressing  Other ways to decrease fat:   Read food labels before you buy foods  Choose foods that have less than 30% of calories from fat  Choose low-fat or fat-free dairy products  Remember that fat free does not mean calorie free  These foods still contain calories, and too many calories can lead to weight gain  Trim fat from meat and avoid fried food  Trim all visible fat from meat before you cook it  Remove the skin from poultry  Do not payton meat, fish, or poultry  Bake, roast, boil, or broil these foods instead  Avoid fried foods  Eat a baked potato instead of Western [place] fries  Steam vegetables instead of sautéing them in butter  Add less fat to foods  Use imitation lyons bits on salads and baked potatoes instead of regular lyons bits  Use fat-free or low-fat salad dressings instead of regular dressings  Use low-fat or nonfat butter-flavored topping instead of regular butter or margarine on popcorn and other foods  Ways to decrease fat in recipes:  Replace high-fat ingredients with low-fat or nonfat ones  This may cause baked goods to be drier than usual  You may need to use nonfat cooking spray on pans to prevent food from sticking  You also may need to change the amount of other ingredients, such as water, in the recipe  Try the following:  Use low-fat or light margarine instead of regular margarine or shortening  Use lean ground turkey breast or chicken, or lean ground beef (less than 5% fat) instead of hamburger  Add 1 teaspoon of canola oil to 8 ounces of skim milk instead of using cream or half and half  Use grated zucchini, carrots, or apples in breads instead of coconut  Use blenderized, low-fat cottage cheese, plain tofu, or low-fat ricotta cheese instead of cream cheese       Use 1 egg white and 1 teaspoon of canola oil, or use ¼ cup (2 ounces) of fat-free egg substitute instead of a whole egg  Replace half of the oil that is called for in a recipe with applesauce when you bake  Use 3 tablespoons of cocoa powder and 1 tablespoon of canola oil instead of a square of baking chocolate  How to increase fiber:  Eat enough high-fiber foods to get 20 to 30 grams of fiber every day  Slowly increase your fiber intake to avoid stomach cramps, gas, and other problems  Eat 3 ounces of whole-grain foods each day  An ounce is about 1 slice of bread  Eat whole-grain breads, such as whole-wheat bread  Whole wheat, whole-wheat flour, or other whole grains should be listed as the first ingredient on the food label  Replace white flour with whole-grain flour or use half of each in recipes  Whole-grain flour is heavier than white flour, so you may have to add more yeast or baking powder  Eat a high-fiber cereal for breakfast   Oatmeal is a good source of soluble fiber  Look for cereals that have bran or fiber in the name  Choose whole-grain products, such as brown rice, barley, and whole-wheat pasta  Eat more beans, peas, and lentils  For example, add beans to soups or salads  Eat at least 5 cups of fruits and vegetables each day  Eat fruits and vegetables with the peel because the peel is high in fiber  © Copyright Marta Kevin 2022 Information is for End User's use only and may not be sold, redistributed or otherwise used for commercial purposes  The above information is an  only  It is not intended as medical advice for individual conditions or treatments  Talk to your doctor, nurse or pharmacist before following any medical regimen to see if it is safe and effective for you  Heart Healthy Diet   AMBULATORY CARE:   A heart healthy diet  is an eating plan low in unhealthy fats and sodium (salt)  The plan is high in healthy fats and fiber   A heart healthy diet helps improve your cholesterol levels and lowers your risk for heart disease and stroke  A dietitian will teach you how to read and understand food labels  Heart healthy diet guidelines to follow:   Choose foods that contain healthy fats:      Unsaturated fats  include monounsaturated and polyunsaturated fats  Unsaturated fat is found in foods such as soybean, canola, olive, corn, and safflower oils  It is also found in soft tub margarine that is made with liquid vegetable oil  Omega-3 fat  is found in certain fish, such as salmon, tuna, and trout, and in walnuts and flaxseed  Eat fish high in omega-3 fats at least 2 times a week  Limit or do not have unhealthy fats:      Cholesterol  is found in animal foods, such as eggs and lobster, and in dairy products made from whole milk  Limit cholesterol to less than 200 mg each day  Saturated fat  is found in meats, such as lyons and hamburger  It is also found in chicken or turkey skin, whole milk, and butter  Limit saturated fat to less than 7% of your total daily calories  Trans fat  is found in packaged foods, such as potato chips and cookies  It is also in hard margarine, some fried foods, and shortening  Do not eat foods that contain trans fats  Get 20 to 30 grams of fiber each day  Fruits, vegetables, whole-grain foods, and legumes (cooked beans) are good sources of fiber  Limit sodium as directed  You may be told to limit sodium, such as to 2,000 mg or less each day  Choose low-sodium or no-salt-added foods  Add little or no salt to food you prepare  Use herbs and spices in place of salt         Include the following in your heart healthy plan:  Ask your dietitian or healthcare provider how many servings to have each day from the following food groups:  Grains:      Whole-wheat breads, cereals, and pastas, and brown rice    Low-fat, low-sodium crackers and chips    Vegetables:      Broccoli, green beans, green peas, and spinach    Collards, kale, and lima beans    Carrots, sweet potatoes, tomatoes, and peppers    Canned vegetables with no salt added    Fruits:      Bananas, peaches, pears, and pineapple    Grapes, raisins, and dates    Oranges, tangerines, grapefruit, orange juice, and grapefruit juice    Apricots, mangoes, melons, and papaya    Raspberries and strawberries    Canned fruit with no added sugar    Low-fat dairy:      Nonfat (skim) milk, 1% milk, and low-fat almond, cashew, or soy milks fortified with calcium    Low-fat cheese, regular or frozen yogurt, and cottage cheese    Meats and proteins:      Lean cuts of beef and pork (loin, leg, round), skinless chicken and turkey    Legumes, soy products, egg whites, or nuts    Limit or do not include the following in your heart healthy plan:   Foods and liquids that contain unhealthy fats and oils:      Whole or 2% milk, cream cheese, sour cream, or cheese    High-fat cuts of beef (T-bone steaks, ribs), chicken or turkey with skin, and organ meats such as liver    Butter, stick margarine, shortening, and cooking oils such as coconut or palm oil    Foods and liquids high in sodium:      Packaged foods, such as frozen dinners, cookies, macaroni and cheese, and cereals with more than 300 mg of sodium per serving    Vegetables with added sodium, such as instant potatoes, vegetables with added sauces, or regular canned vegetables    Cured or smoked meats, such as hot dogs, lyons, and sausage    High-sodium ketchup, barbecue sauce, salad dressing, pickles, olives, soy sauce, or miso    Foods and liquids high in sugar:      Candy, cake, cookies, pies, or doughnuts    Soft drinks (soda), sports drinks, or sweetened tea    Canned or dry mixes for cakes, soups, sauces, or gravies    Other healthy heart guidelines:   Do not smoke  Nicotine and other chemicals in cigarettes and cigars can cause lung and heart damage  Ask your healthcare provider for information if you currently smoke and need help to quit   E-cigarettes or smokeless tobacco still contain nicotine  Talk to your provider before you use these products  Limit or do not drink alcohol as directed  Alcohol can damage your heart and raise your blood pressure  Your healthcare provider may give you specific daily and weekly limits  The general recommended limit is 1 drink a day for women 21 or older and for men 72 or older  Do not have more than 3 drinks within 24 hours or 7 within a week  The recommended limit is 2 drinks a day for men 24to 59years of age  Do not have more than 4 drinks within 24 hours or 14 within a week  A drink of alcohol is 12 ounces of beer, 5 ounces of wine, or 1½ ounces of liquor  Maintain a healthy weight  Extra body weight makes your heart work harder  Ask your provider what a healthy weight is for you  He or she can help you create a safe weight loss plan, if needed  Exercise regularly  Exercise can help you maintain a healthy weight and improve your blood pressure and cholesterol levels  Regular exercise can also decrease your risk for heart problems  Ask your provider about the best exercise plan for you  Do not start an exercise program without asking your provider  Follow up with your doctor or cardiologist as directed:  Write down your questions so you remember to ask them during your visits  © Copyright Tonio Russian 2022 Information is for End User's use only and may not be sold, redistributed or otherwise used for commercial purposes  The above information is an  only  It is not intended as medical advice for individual conditions or treatments  Talk to your doctor, nurse or pharmacist before following any medical regimen to see if it is safe and effective for you  Cigarette Smoking and Your Health   AMBULATORY CARE:   Risks to your health if you smoke:  Nicotine and other chemicals found in tobacco and e-cigarettes can damage every cell in your body   Even if you are a light smoker, you have an increased risk for cancer, heart disease, and lung disease  If you are pregnant or have diabetes, smoking increases your risk for complications  Nicotine can affect an adolescent's developing brain  This can lead to trouble thinking, learning, or paying attention  Benefits to your health if you stop smoking: You decrease respiratory symptoms such as coughing, wheezing, and shortness of breath  You reduce your risk for cancers of the lung, mouth, throat, kidney, bladder, pancreas, stomach, and cervix  If you already have cancer, you increase the benefits of chemotherapy  You also reduce your risk for cancer returning or a second cancer from developing  You reduce your risk for heart disease, blood clots, heart attack, and stroke  You reduce your risk for lung infections, and diseases such as pneumonia, asthma, chronic bronchitis, and emphysema  Your circulation improves  More oxygen can be delivered to your body  If you have diabetes, you lower your risk for complications, such as kidney, artery, and eye diseases  You also lower your risk for nerve damage  Nerve damage can lead to amputations, poor vision, and blindness  You improve your body's ability to heal and to fight infections  An adolescent can help his or her brain and body develop in a healthy way  Talk to your adolescent about all the health risks of nicotine  If you can, start talking about nicotine when your child is younger than 12 years  This may make it easier for him or her not to start using nicotine as a teenager or adult  Explain to him or her that it is best never to start  It can be hard to try to quit later  Benefits to the health of others if you stop smoking:  Tobacco is harmful to nonsmokers who breathe in your secondhand smoke  The following are ways the health of others around you may improve when you stop smoking: You lower the risks for lung cancer and heart disease in nonsmoking adults      If you are pregnant, you lower the risk for miscarriage, early delivery, low birth weight, and stillbirth  You also lower your baby's risk for SIDS, obesity, developmental delay, and neurobehavioral problems, such as ADHD  If you have children, you lower their risk for ear infections, colds, pneumonia, bronchitis, and asthma  Follow up with your doctor as directed:  Write down your questions so you remember to ask them during your visits  For support and more information:   [employer]  [address] [place] [address],[address]  [address]  Phone: [facility] [address]  Phone: [phone]  Web Address: [employer]    Smokefree  gov  Phone: 9- 972 - 418-4653  Web Address: [employer] smokefree  gov  © Copyright [facility] 2022 Information is for End User's use only and may not be sold, redistributed or otherwise used for commercial purposes  The above information is an  only  It is not intended as medical advice for individual conditions or treatments  Talk to your doctor, nurse or pharmacist before following any medical regimen to see if it is safe and effective for you

## 2023-04-03 NOTE — ASSESSMENT & PLAN NOTE
Blood cultures positive for staph aureus on 1/16/23  Negative blood cultures on 1/20/23  History of IV drug abuse  CECILIO negative for vegetations  CT negative for osteomyelitis    1/26 - ID recommending IV Ancef 2g Q8 ending 2/16/2023, PICC line scheduled for 1/27, patient not able to return home with PICC line in place due to IV drug abuse, social work is looking for placement      Plan:  Continue IV Ancef Day 7 (total days of IV antibiotics 9)  PICC placement scheduled Hospitalist Internal Medicine

## 2023-05-25 ENCOUNTER — TELEPHONE (OUTPATIENT)
Dept: INFECTIOUS DISEASES | Facility: CLINIC | Age: 38
End: 2023-05-25

## 2023-05-25 NOTE — TELEPHONE ENCOUNTER
Pt calls office back I informed him that we were calling to confirm his upcoming appt also to remind him of labs hat are due prior to that appt     Pt verbalizes understanding and gives thanks

## 2023-05-25 NOTE — TELEPHONE ENCOUNTER
Called and lmom for patient today regarding appointment  Reminded patient of his upcoming appointment and to also have labs done prior to the appointment  Informed patient if there are any further questions to call the office

## 2023-05-26 ENCOUNTER — APPOINTMENT (OUTPATIENT)
Dept: LAB | Facility: MEDICAL CENTER | Age: 38
End: 2023-05-26

## 2023-05-26 DIAGNOSIS — F41.9 ANXIETY: ICD-10-CM

## 2023-05-26 DIAGNOSIS — R78.81 BACTEREMIA: ICD-10-CM

## 2023-05-26 DIAGNOSIS — M25.561 CHRONIC PAIN OF RIGHT KNEE: ICD-10-CM

## 2023-05-26 DIAGNOSIS — Z00.00 WELL ADULT EXAM: ICD-10-CM

## 2023-05-26 DIAGNOSIS — M25.551 CHRONIC PAIN OF RIGHT HIP: ICD-10-CM

## 2023-05-26 DIAGNOSIS — G89.29 CHRONIC PAIN OF RIGHT KNEE: ICD-10-CM

## 2023-05-26 DIAGNOSIS — E83.42 HYPOMAGNESEMIA: ICD-10-CM

## 2023-05-26 DIAGNOSIS — G89.29 CHRONIC PAIN OF RIGHT HIP: ICD-10-CM

## 2023-05-26 LAB
ALBUMIN SERPL BCP-MCNC: 3.8 G/DL (ref 3.5–5)
ALP SERPL-CCNC: 118 U/L (ref 46–116)
ALT SERPL W P-5'-P-CCNC: 90 U/L (ref 12–78)
ANION GAP SERPL CALCULATED.3IONS-SCNC: 0 MMOL/L (ref 4–13)
AST SERPL W P-5'-P-CCNC: 70 U/L (ref 5–45)
BASOPHILS # BLD AUTO: 0.03 THOUSANDS/ÂΜL (ref 0–0.1)
BASOPHILS NFR BLD AUTO: 1 % (ref 0–1)
BILIRUB SERPL-MCNC: 0.4 MG/DL (ref 0.2–1)
BUN SERPL-MCNC: 15 MG/DL (ref 5–25)
CALCIUM SERPL-MCNC: 9.4 MG/DL (ref 8.3–10.1)
CHLORIDE SERPL-SCNC: 103 MMOL/L (ref 96–108)
CHOLEST SERPL-MCNC: 172 MG/DL
CO2 SERPL-SCNC: 28 MMOL/L (ref 21–32)
CREAT SERPL-MCNC: 0.98 MG/DL (ref 0.6–1.3)
CRP SERPL QL: 11 MG/L
EOSINOPHIL # BLD AUTO: 0.35 THOUSAND/ÂΜL (ref 0–0.61)
EOSINOPHIL NFR BLD AUTO: 6 % (ref 0–6)
ERYTHROCYTE [DISTWIDTH] IN BLOOD BY AUTOMATED COUNT: 14.9 % (ref 11.6–15.1)
ERYTHROCYTE [SEDIMENTATION RATE] IN BLOOD: 36 MM/HOUR (ref 0–14)
EST. AVERAGE GLUCOSE BLD GHB EST-MCNC: 105 MG/DL
GFR SERPL CREATININE-BSD FRML MDRD: 98 ML/MIN/1.73SQ M
GLUCOSE P FAST SERPL-MCNC: 102 MG/DL (ref 65–99)
HBA1C MFR BLD: 5.3 %
HCT VFR BLD AUTO: 43.7 % (ref 36.5–49.3)
HDLC SERPL-MCNC: 34 MG/DL
HGB BLD-MCNC: 13.8 G/DL (ref 12–17)
IMM GRANULOCYTES # BLD AUTO: 0.01 THOUSAND/UL (ref 0–0.2)
IMM GRANULOCYTES NFR BLD AUTO: 0 % (ref 0–2)
LDLC SERPL CALC-MCNC: 109 MG/DL (ref 0–100)
LYMPHOCYTES # BLD AUTO: 1.63 THOUSANDS/ÂΜL (ref 0.6–4.47)
LYMPHOCYTES NFR BLD AUTO: 29 % (ref 14–44)
MAGNESIUM SERPL-MCNC: 2.1 MG/DL (ref 1.6–2.6)
MCH RBC QN AUTO: 26.1 PG (ref 26.8–34.3)
MCHC RBC AUTO-ENTMCNC: 31.6 G/DL (ref 31.4–37.4)
MCV RBC AUTO: 83 FL (ref 82–98)
MONOCYTES # BLD AUTO: 0.46 THOUSAND/ÂΜL (ref 0.17–1.22)
MONOCYTES NFR BLD AUTO: 8 % (ref 4–12)
NEUTROPHILS # BLD AUTO: 3.07 THOUSANDS/ÂΜL (ref 1.85–7.62)
NEUTS SEG NFR BLD AUTO: 56 % (ref 43–75)
NRBC BLD AUTO-RTO: 0 /100 WBCS
PLATELET # BLD AUTO: 317 THOUSANDS/UL (ref 149–390)
PMV BLD AUTO: 10.1 FL (ref 8.9–12.7)
POTASSIUM SERPL-SCNC: 4.6 MMOL/L (ref 3.5–5.3)
PROLACTIN SERPL-MCNC: 23.24 NG/ML
PROT SERPL-MCNC: 8.2 G/DL (ref 6.4–8.4)
RBC # BLD AUTO: 5.28 MILLION/UL (ref 3.88–5.62)
SODIUM SERPL-SCNC: 131 MMOL/L (ref 135–147)
TRIGL SERPL-MCNC: 146 MG/DL
TSH SERPL DL<=0.05 MIU/L-ACNC: 1.59 UIU/ML (ref 0.45–4.5)
WBC # BLD AUTO: 5.55 THOUSAND/UL (ref 4.31–10.16)

## 2023-05-31 ENCOUNTER — OFFICE VISIT (OUTPATIENT)
Age: 38
End: 2023-05-31

## 2023-05-31 VITALS
HEIGHT: 72 IN | HEART RATE: 92 BPM | DIASTOLIC BLOOD PRESSURE: 78 MMHG | TEMPERATURE: 96.5 F | WEIGHT: 231.2 LBS | SYSTOLIC BLOOD PRESSURE: 122 MMHG | OXYGEN SATURATION: 98 % | BODY MASS INDEX: 31.31 KG/M2

## 2023-05-31 DIAGNOSIS — M25.50 JOINT PAIN: Primary | ICD-10-CM

## 2023-05-31 DIAGNOSIS — R78.81 BACTEREMIA: ICD-10-CM

## 2023-05-31 RX ORDER — KETOROLAC TROMETHAMINE 10 MG/1
10 TABLET, FILM COATED ORAL EVERY 12 HOURS PRN
Qty: 30 TABLET | Refills: 0 | Status: SHIPPED | OUTPATIENT
Start: 2023-05-31

## 2023-05-31 RX ORDER — DOXYCYCLINE HYCLATE 100 MG/1
100 CAPSULE ORAL EVERY 12 HOURS SCHEDULED
Qty: 60 CAPSULE | Refills: 2 | Status: SHIPPED | OUTPATIENT
Start: 2023-05-31 | End: 2023-08-29

## 2023-05-31 NOTE — PATIENT INSTRUCTIONS
-Continue oral doxycycline 100 mg every 12h until ESR/CRP normalize  -Recommend repeat ESR and CRP again in 6-8 weeks  -Follow-up with ID in 6-8 weeks    Doxycycline administration instructions:  -Please take with glass of water and situ upright for 30 minutes after   -Separate from multivitamins and antacids by 4 hours   -Avoid prolonged sun exposure and/or wear sunscreen due photosensitivity

## 2023-05-31 NOTE — PROGRESS NOTES
Outpatient Progress Note - [facility] Infectious Disease   Miley Cunha 41 y o  male MRN: [id number]  Encounter: [id number]    Assessment/Plan:  1  MSSA bacteremia with possible mitral valve endocarditis   Admitted to [facility] 1/18 to 2/17 with back pain found to have MSSA bacteremia   This is likely due to #3  Bacteremia cleared  TTE could not rule out a small vegetation of the tip of the anterior leaflet of the mitral valve however CECILIO was negative  There is concern for metastatic foci of infection due to severe low back/right hip pain although MRI imaging was negative   Patient completed a 4-week course of IV antibiotics inpatient as he was not a candidate for home antibiotic infusion via PICC line   He completed IV antibiotics on 2/16/2023 and is now on p o  Doxy until ESR/CRP normalize due to #2   Patient is tolerating oral Doxy   Recent labs 5/26 showed normal CBC, Cr; CRP 11 (9 2) and ESR 36 (41), with mild elevation in AST/ALT  In the setting of #2, no plan for additional IV abx as pain is somewhat improving, and ESR is stable  -Continue oral doxycycline 100 mg every 12h until ESR/CRP normalize  -Reviewed doxycycline administration instructions with patient  -Recommend repeat CBCd, Cr, ESR and CRP again in 6-8 weeks  -Follow-up with ID in 6-8 weeks  -Follow up with PCP      2  Possible multifocal osteomyelitis  Per patient low back pain and right hip pain occurred after a fall  He has elevated ESR and CRP which may be due to #1 though during admission could not rule out osteomyelitis/discitis as he could not tolerate MRI with contrast   MRI L-spine without contrast negative for OM/discitis   He was seen by outpatient neurosurgery on 2/23 with no plan for neurosurgical intervention   Patient has seen outpatient pain management  and now on multimodal pain regimen  He recently was evaluated by ortho at [facility] and had NM bone scan 3/31 which showed increased uptake at R SI joint, R knee, and right clavicle   MRI of R clavicle with findings concerning for osteoarthropathy with suspected superimposed infection; MRI of R knee showed large bone infarcts in the distal femur, proximal tibia and tibial plateaus, suspicious for developing infection; MRI pelvis showed findings concerning for infectious sacroiliitis, avascular necrosis of left femoral head, and partial tear of the right gluteus medius tendon  Consider possibility of multi-focal osteomyelitis  Pt was already treated with 4 week course of IV abx as above and remains on PO doxy  No additional IV abx are required   -Continue antibiotics as above   -Continue close outpatient follow-up with pain management  -Follow-up with neurosurgery and Ortho as needed     3  Opiate use disorder with hx IV drug abuse   Patient was initiated on Suboxone during recent admission  He remains sober and follows Dr Puma Castaneda at [facility] who is able to Rx suboxone    -Ongoing follow-up with Suboxone clinic     4   History of hepatitis C   Patient reports prior treatment with [facility] 2022   Recent hepatitis C PCR negative confirming 12-week SVR  HIV in January 2023 negative  Now with slight elevation in AST and ALT, unknown significance  Above assessment and plan discussed in detail with patient and father during examination  Antibiotics:  PO doxy     Subjective:  Patient presents to outpatient ID office for management of presumed OM and MSSA bacteremia  He was last seen by outpatient ID in April  He underwent MRI of shoulder, pelvis, and knee on 4/25  MRIs showed concern for multifocal OM of the right shoulder, right distal femur, and right SI joint  He continues to follow up with ortho  I had long discussion with patient regarding management of chronic OM  He has already been tx with 4 week course of IV abx and now on PO abx  All in all, he has been on abx for nearly 6 months  He continues to have pain in shoulder/knee/hip  Minimal back pain and now ambulating without cane   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 \" Patient is not having new focal sx likely chest pain SOB, cough, diarrhea, abdominal pain  He continues to have chronic join pains, with swelling of his hands and ankles  I sent message to the MA that called his SO and prior PCP Dr Pranav Muñoz  Additionally, patient admits to buying morphine/oxy on the street because he cannot get a provider to Rx pain medications  He is on robaxin, seroquel, gabapentin without relief  States the toradol he was on previously helped  I offered him an Rx for this  I encouraged patient to stop purchasing opioids on the street as I am concerned about additional laced substances like fenatnyl, or Xylazane  He expressed understanding  He will continue to follow up with his new PCP, Caroline Cyr at [facility] who also rx his suboxone  Objective:    Vitals:   Vitals:    05/31/23 1334   BP: 122/78   Pulse: 92   Temp: (!) 96 5 °F (35 8 °C)   SpO2: 98%       Physical Exam:    General Appearance:  Alert, interactive, nontoxic, no acute distress  Normal gait, ambulating without cane  HEENT: Oropharynx moist without lesions  Lungs:   Clear to auscultation bilaterally; no wheezes, rhonchi or rales; respirations unlabored   Heart:  RRR; no murmur   Abdomen:   Soft, non-tender, non-distended, positive bowel sounds  No palpable organomegaly  Extremities: No clubbing, cyanosis or edema   Skin: No new rashes or lesions  No draining wounds noted         Labs, Imaging, & Other studies:   All pertinent labs and " imaging studies were personally reviewed by me from 5/26      Results from last 7 days   Lab Units 05/26/23  1336   HEMOGLOBIN g/dL 13 8   PLATELETS Thousands/uL 317   WBC Thousand/uL 5 55     Results from last 7 days   Lab Units 05/26/23  1336   ALK PHOS U/L 118*   ALT U/L 90*   AST U/L 70*   BUN mg/dL 15   CALCIUM mg/dL 9 4   CHLORIDE mmol/L 103   CO2 mmol/L 28   CREATININE mg/dL 0 98   EGFR ml/min/1 73sq m 98   POTASSIUM mmol/L 4 6   SODIUM mmol/L 131*         Results from last 7 days   Lab Units 05/26/23  1336   CRP mg/L 11 0*     Results from last 7 days   Lab Units 05/26/23  1336   SED RATE mm/hour 36*           The following portions of the patient's history were reviewed and updated as appropriate: allergies, current medications, past family history, past medical history, past social history, past surgical history and problem list

## 2023-06-13 ENCOUNTER — HOSPITAL ENCOUNTER (OUTPATIENT)
Dept: ULTRASOUND IMAGING | Facility: HOSPITAL | Age: 38
Discharge: HOME | End: 2023-06-13
Payer: COMMERCIAL

## 2023-06-13 DIAGNOSIS — R79.89 OTHER SPECIFIED ABNORMAL FINDINGS OF BLOOD CHEMISTRY: ICD-10-CM

## 2023-06-13 DIAGNOSIS — Z86.19 PERSONAL HISTORY OF OTHER INFECTIOUS AND PARASITIC DISEASES: ICD-10-CM

## 2023-06-13 PROCEDURE — 76705 ECHO EXAM OF ABDOMEN: CPT

## 2023-08-29 DIAGNOSIS — R78.81 BACTEREMIA: ICD-10-CM

## 2023-08-29 RX ORDER — DOXYCYCLINE HYCLATE 100 MG/1
100 CAPSULE ORAL EVERY 12 HOURS SCHEDULED
Qty: 60 CAPSULE | Refills: 0 | Status: SHIPPED | OUTPATIENT
Start: 2023-08-29 | End: 2023-09-12

## 2023-08-29 NOTE — TELEPHONE ENCOUNTER
Pt calls office requesting refills on doxycycline. Informed pt that we need to schedule him for his follow up as well pt is now scheduled for 9/11 and refills have been sent to pt's pharmacy.

## 2023-11-29 ENCOUNTER — APPOINTMENT (OUTPATIENT)
Dept: LAB | Facility: HOSPITAL | Age: 38
End: 2023-11-29
Payer: COMMERCIAL

## 2023-11-29 DIAGNOSIS — R78.81 BACTEREMIA: ICD-10-CM

## 2023-11-29 DIAGNOSIS — M00.9 PYOGENIC ARTHRITIS, UPPER ARM (HCC): ICD-10-CM

## 2023-11-29 LAB
ALBUMIN SERPL BCP-MCNC: 4.5 G/DL (ref 3.5–5)
ALP SERPL-CCNC: 79 U/L (ref 34–104)
ALT SERPL W P-5'-P-CCNC: 45 U/L (ref 7–52)
ANION GAP SERPL CALCULATED.3IONS-SCNC: 8 MMOL/L
AST SERPL W P-5'-P-CCNC: 30 U/L (ref 13–39)
BASOPHILS # BLD AUTO: 0.03 THOUSANDS/ÂΜL (ref 0–0.1)
BASOPHILS NFR BLD AUTO: 1 % (ref 0–1)
BILIRUB SERPL-MCNC: 0.33 MG/DL (ref 0.2–1)
BUN SERPL-MCNC: 17 MG/DL (ref 5–25)
CALCIUM SERPL-MCNC: 9.6 MG/DL (ref 8.4–10.2)
CHLORIDE SERPL-SCNC: 100 MMOL/L (ref 96–108)
CO2 SERPL-SCNC: 28 MMOL/L (ref 21–32)
CREAT SERPL-MCNC: 0.91 MG/DL (ref 0.6–1.3)
CRP SERPL QL: 11.9 MG/L
EOSINOPHIL # BLD AUTO: 0.25 THOUSAND/ÂΜL (ref 0–0.61)
EOSINOPHIL NFR BLD AUTO: 4 % (ref 0–6)
ERYTHROCYTE [DISTWIDTH] IN BLOOD BY AUTOMATED COUNT: 13.6 % (ref 11.6–15.1)
ERYTHROCYTE [SEDIMENTATION RATE] IN BLOOD: 30 MM/HOUR (ref 0–14)
GFR SERPL CREATININE-BSD FRML MDRD: 107 ML/MIN/1.73SQ M
GLUCOSE SERPL-MCNC: 110 MG/DL (ref 65–140)
HCT VFR BLD AUTO: 39.6 % (ref 36.5–49.3)
HGB BLD-MCNC: 12.8 G/DL (ref 12–17)
IMM GRANULOCYTES # BLD AUTO: 0.03 THOUSAND/UL (ref 0–0.2)
IMM GRANULOCYTES NFR BLD AUTO: 1 % (ref 0–2)
LYMPHOCYTES # BLD AUTO: 2.27 THOUSANDS/ÂΜL (ref 0.6–4.47)
LYMPHOCYTES NFR BLD AUTO: 34 % (ref 14–44)
MCH RBC QN AUTO: 27.9 PG (ref 26.8–34.3)
MCHC RBC AUTO-ENTMCNC: 32.3 G/DL (ref 31.4–37.4)
MCV RBC AUTO: 87 FL (ref 82–98)
MONOCYTES # BLD AUTO: 0.69 THOUSAND/ÂΜL (ref 0.17–1.22)
MONOCYTES NFR BLD AUTO: 10 % (ref 4–12)
NEUTROPHILS # BLD AUTO: 3.38 THOUSANDS/ÂΜL (ref 1.85–7.62)
NEUTS SEG NFR BLD AUTO: 50 % (ref 43–75)
NRBC BLD AUTO-RTO: 0 /100 WBCS
PLATELET # BLD AUTO: 365 THOUSANDS/UL (ref 149–390)
PMV BLD AUTO: 9.3 FL (ref 8.9–12.7)
POTASSIUM SERPL-SCNC: 4.1 MMOL/L (ref 3.5–5.3)
PROT SERPL-MCNC: 8.1 G/DL (ref 6.4–8.4)
RBC # BLD AUTO: 4.58 MILLION/UL (ref 3.88–5.62)
SODIUM SERPL-SCNC: 136 MMOL/L (ref 135–147)
WBC # BLD AUTO: 6.65 THOUSAND/UL (ref 4.31–10.16)

## 2023-11-29 PROCEDURE — 85025 COMPLETE CBC W/AUTO DIFF WBC: CPT

## 2023-11-29 PROCEDURE — 80053 COMPREHEN METABOLIC PANEL: CPT

## 2023-11-29 PROCEDURE — 36415 COLL VENOUS BLD VENIPUNCTURE: CPT

## 2023-11-29 PROCEDURE — 86140 C-REACTIVE PROTEIN: CPT

## 2023-11-29 PROCEDURE — 85652 RBC SED RATE AUTOMATED: CPT

## 2023-12-07 NOTE — PATIENT INSTRUCTIONS
-Continue oral doxycycline 100 mg every 12h until ESR/CRP normalize  -Recommend repeat ESR and CRP now and again in 1 month  -Follow-up with us  in 1 month    Doxycycline administration instructions:  -Please take with glass of water and situ upright for 30 minutes after   -Separate from multivitamins and antacids by 4 hours   -Avoid prolonged sun exposure and/or wear sunscreen due photosensitivity Price (Do Not Change): 0.00 Instructions: This plan will send the code FBSE to the PM system.  DO NOT or CHANGE the price. Detail Level: Simple

## 2024-09-24 ENCOUNTER — OFFICE VISIT (OUTPATIENT)
Dept: URGENT CARE | Facility: MEDICAL CENTER | Age: 39
End: 2024-09-24

## 2024-09-24 VITALS
OXYGEN SATURATION: 97 % | BODY MASS INDEX: 27.53 KG/M2 | HEART RATE: 76 BPM | TEMPERATURE: 97.9 F | DIASTOLIC BLOOD PRESSURE: 64 MMHG | RESPIRATION RATE: 16 BRPM | WEIGHT: 203 LBS | SYSTOLIC BLOOD PRESSURE: 108 MMHG

## 2024-09-24 DIAGNOSIS — L03.116 CELLULITIS OF LEFT ANKLE: ICD-10-CM

## 2024-09-24 DIAGNOSIS — S91.332A PUNCTURE WOUND OF LEFT FOOT, INITIAL ENCOUNTER: Primary | ICD-10-CM

## 2024-09-24 PROCEDURE — G0382 LEV 3 HOSP TYPE B ED VISIT: HCPCS | Performed by: PHYSICIAN ASSISTANT

## 2024-09-24 PROCEDURE — 90715 TDAP VACCINE 7 YRS/> IM: CPT | Performed by: PHYSICIAN ASSISTANT

## 2024-09-24 PROCEDURE — 90471 IMMUNIZATION ADMIN: CPT | Performed by: PHYSICIAN ASSISTANT

## 2024-09-24 PROCEDURE — 90715 TDAP VACCINE 7 YRS/> IM: CPT

## 2024-09-24 RX ORDER — DOXYCYCLINE 100 MG/1
100 CAPSULE ORAL 2 TIMES DAILY
Qty: 10 CAPSULE | Refills: 0 | Status: SHIPPED | OUTPATIENT
Start: 2024-09-24 | End: 2024-09-29

## 2024-09-24 NOTE — PROGRESS NOTES
[facility]        NAME: Jason Peterson is a 39 y.o. male  : 1985    MRN: [id number]  DATE: 2024  TIME: 10:59 AM    Assessment and Plan   Puncture wound of left foot, initial encounter [S91.332A]  1. Puncture wound of left foot, initial encounter  Tdap Vaccine greater than or equal to 8yo      2. Cellulitis of left ankle  doxycycline monohydrate (MONODOX) 100 mg capsule            Patient Instructions     Pt has suffered puncture wound of the left foot and now appears to have developed a cellulitis of the left ankle. He has been taking leftover Doxy at home which has not  and reports improvement. I gave him an extra supply so he has enough for 7-10 days. Rec elevation, warm compresses, keeping wound clean and dry, and Tylenol/NSAIDs for pain and swelling. He was given Tdap vaccine today. Should be reevaluated if condition persists/worsens despite treatment.    Follow up with PCP in 3-5 days.  Proceed to  ER if symptoms worsen.    If tests have been performed at [facility], our office will contact you with results if changes need to be made to the care plan discussed with you at the visit.  You can review your full results on [facility].    Chief Complaint     Chief Complaint   Patient presents with    Foot Pain     Accidentally touched inner left foot with drill bit while working on a project at home on Saturday night. Need work note for yesterday and today.         History of Present Illness       Pt presents 3 days after suffering puncture wound of his left medial foot/heel. He was working on a project at home in which he had an object between his feet and was using a drill and the drill accidentally touched the inner aspect of his left foot. He now reports swelling, redness, pain in the medial left ankle. Denies fever, chills. Has leftover Doxy from previous Rx that he has taken for the past day and reports it is improving with the medication. He is not UTD with his  tetanus status.         Review of Systems   Review of Systems   Constitutional: Negative.    Respiratory: Negative.     Cardiovascular: Negative.    Gastrointestinal: Negative.    Genitourinary: Negative.    Musculoskeletal: Negative.    Skin:  Positive for color change (left medial ankle) and wound (left medial foot/heel).   Neurological: Negative.          Current Medications       Current Outpatient Medications:     doxycycline monohydrate (MONODOX) 100 mg capsule, Take 1 capsule (100 mg total) by mouth 2 (two) times a day for 5 days Take with food (non-dairy)., Disp: 10 capsule, Rfl: 0    gabapentin (NEURONTIN) 600 MG tablet, Take 1 tablet (600 mg total) by mouth 3 (three) times a day, Disp: 90 tablet, Rfl: 1    QUEtiapine (SEROquel) 100 mg tablet, TAKE 1 AND 1/2 tablets by mouth at bedtime daily, Disp: 45 tablet, Rfl: 0    ketorolac (TORADOL) 10 mg tablet, Take 1 tablet (10 mg total) by mouth every 12 (twelve) hours as needed for moderate pain (Patient not taking: Reported on 9/24/2024), Disp: 30 tablet, Rfl: 0    methocarbamol (ROBAXIN) 750 mg tablet, Take 1 tablet (750 mg total) by mouth every 8 (eight) hours (Patient not taking: Reported on 9/24/2024), Disp: 90 tablet, Rfl: 0    Current Allergies     Allergies as of 09/24/2024 - Reviewed 09/24/2024   Allergen Reaction Noted    Clindamycin/lincomycin Hives 02/25/2016    Pollen extract Other (See Comments) 03/05/2022            The following portions of the patient's history were reviewed and updated as appropriate: allergies, current medications, past family history, past medical history, past social history, past surgical history and problem list.     Past Medical History:   Diagnosis Date    Addiction to drug (HCC)     Anxiety     Arthritis     Chronic pain     Hepatitis     Hepatitis C virus     Heroin abuse (HCC) 05/02/2018    Moderate episode of recurrent major depressive disorder (HCC) 02/05/2020    Tobacco use disorder     last assessed 11/2/15         Past Surgical History:   Procedure Laterality Date    EPIDURAL BLOCK INJECTION Bilateral 4/19/2018    Procedure: T9-10 INTERLAMINAR EPIDURAL STEROID INJECTION;  Surgeon: Scotty Moulton MD;  Location: [facility];  Service: Pain Management     IR PICC PLACEMENT SINGLE LUMEN  1/27/2023       Family History   Problem Relation Age of Onset    No Known Problems Father     Hepatitis Family          Medications have been verified.        Objective   /64 (BP Location: Right arm, Patient Position: Sitting, Cuff Size: Standard)   Pulse 76   Temp 97.9 °F (36.6 °C) (Temporal)   Resp 16   Wt 92.1 kg (203 lb)   SpO2 97%   BMI 27.53 kg/m²   No LMP for male patient.       Physical Exam     Physical Exam  Vitals reviewed.   Constitutional:       General: He is not in acute distress.     Appearance: He is well-developed.   Musculoskeletal:         General: Normal range of motion.   Skin:     Comments: Medial aspect of left heel with evidence of healing puncture wound. No active bleeding/discharge. Proximal to this in medial left ankle is area of erythema, STS, increased warmth to the touch.    Neurological:      Mental Status: He is alert and oriented to person, place, and time.      Sensory: No sensory deficit.

## 2024-09-24 NOTE — LETTER
September 24, 2024     Patient: Jason Peterson   YOB: 1985   Date of Visit: 9/24/2024       To Whom it May Concern:    Jason Peterson was seen in my clinic on 9/24/2024. He may return to work on 9/25/2024 . He is to be excused for dates of 9/23/2024-9/24/2024.    If you have any questions or concerns, please don't hesitate to call.         Sincerely,          Kane Corea PA-C        CC: No Recipients

## 2024-10-24 ENCOUNTER — OFFICE VISIT (OUTPATIENT)
Dept: URGENT CARE | Facility: MEDICAL CENTER | Age: 39
End: 2024-10-24
Payer: COMMERCIAL

## 2024-10-24 VITALS
WEIGHT: 202 LBS | DIASTOLIC BLOOD PRESSURE: 86 MMHG | SYSTOLIC BLOOD PRESSURE: 118 MMHG | OXYGEN SATURATION: 96 % | BODY MASS INDEX: 28.28 KG/M2 | RESPIRATION RATE: 20 BRPM | HEART RATE: 78 BPM | HEIGHT: 71 IN | TEMPERATURE: 97.2 F

## 2024-10-24 DIAGNOSIS — J06.9 ACUTE URI: Primary | ICD-10-CM

## 2024-10-24 DIAGNOSIS — J06.9 URI WITH COUGH AND CONGESTION: ICD-10-CM

## 2024-10-24 PROCEDURE — G0382 LEV 3 HOSP TYPE B ED VISIT: HCPCS

## 2024-10-24 RX ORDER — GUAIFENESIN 600 MG/1
1200 TABLET, EXTENDED RELEASE ORAL EVERY 12 HOURS SCHEDULED
Qty: 40 TABLET | Refills: 0 | Status: SHIPPED | OUTPATIENT
Start: 2024-10-24

## 2024-10-24 RX ORDER — FLUTICASONE PROPIONATE 50 MCG
1 SPRAY, SUSPENSION (ML) NASAL DAILY
Qty: 11.1 ML | Refills: 0 | Status: SHIPPED | OUTPATIENT
Start: 2024-10-24

## 2024-10-24 NOTE — PATIENT INSTRUCTIONS
"Pt appears to have a viral upper respiratory infection. Antibiotics are contraindicated at this time and would not help you feel better faster.      Although the symptoms are troublesome, usually the patient's body is able to recover from a viral infection on an average time of 7-10 days.  Fever, if any, typically resolves after 3-5 days.  If patient has sore throat, typically this resolves within 3-5 days.  Any nasal congestion, runny nose, post nasal drip typically begin to  improve after 7-10 days.  Any cough may linger over a couple weeks.  Please note that having a cough is not necessarily a bad thing.  It often times is part of our body's protective mechanism to help keep our airways clear.       Please note that yellow mucous doesn't necessarily mean a \"bacterial\" infection.  Yellow mucous doesn't automatically mean that an antibiotic is needed.  It is not unusual for mucus to become more discolored in the days after the start of an upper respiratory infection.  Often times this is due to mucous that has thickened  with white blood cells that have flooded the mucosa to try and fight the viral infection.       Ear Pain may occur when the eustachian tubes become blocked with mucous or swollen due to acute inflammation from illness. May give Ibuprofen or Tylenol as needed for comfort.  May also use warm compress against ear for comfort.  If ear ache is persisting and not improving over 2-3 days or if there is any gross drainage coming from ear, please seek further evaluation.       Natural remedies to help provide comfort for cough/ cold symptoms include: one teaspoon of honey (only in infants over 1 year of age), increased vitamin C (oranges, everton, etc.), ginger, and drinking plenty of fluids. Vaporizer by bedside.     If you should have prolonged symptoms (Greater than 10 days), worsening symptoms, or any new symptoms please seek further medical attention.    "

## 2024-10-24 NOTE — PROGRESS NOTES
[facility]        NAME: Jason Peterson is a 39 y.o. male  : 1985    MRN: [id number]  DATE: 2024  TIME: 11:53 AM    Assessment and Plan   Acute URI [J06.9]  1. Acute URI  guaiFENesin (MUCINEX) 600 mg 12 hr tablet    fluticasone (FLONASE) 50 mcg/act nasal spray      2. URI with cough and congestion              Patient Instructions       Follow up with PCP in 3-5 days.  Proceed to  ER if symptoms worsen.    If tests are performed, our office will contact you with results only if changes need to made to the care plan discussed with you at the visit. You can review your full results on [facility].    Chief Complaint     Chief Complaint   Patient presents with    Cold Like Symptoms     3 days: head cold, fatigue,runny nose, headache. Pt took Dayquil but it's providing minimal relief         History of Present Illness       Patient here with x3 days of URI symptoms. Having headache, fatigue, runny nose, and congestion. Slight cough which is non-productive. Had chills yesterday. Taking dayquil without continued improvement, reports it helps for short term. Last dose was this AM. Denies any fevers. Significant fatigue. He did not do any home COVID tests.     Was unable to go to work due to fatigue. Patient is off the weekend. Will give an additional day to recover and the weekend, allowing him to return on Monday provided he is fever free.         Review of Systems   Review of Systems   Constitutional:  Positive for chills and fatigue. Negative for fever.   HENT:  Positive for congestion, rhinorrhea, sinus pressure and sinus pain. Negative for ear discharge, ear pain, sore throat and trouble swallowing.    Respiratory:  Positive for cough. Negative for chest tightness and shortness of breath.    Cardiovascular:  Negative for chest pain and palpitations.   Gastrointestinal:  Negative for abdominal pain, constipation, diarrhea, nausea and vomiting.   Musculoskeletal:  Positive for  "myalgias. Negative for arthralgias and back pain.   Skin:  Negative for color change and rash.   Neurological:  Positive for headaches. Negative for seizures and syncope.   All other systems reviewed and are negative.        Current Medications       Current Outpatient Medications:     fluticasone (FLONASE) 50 mcg/act nasal spray, 1 spray into each nostril daily, Disp: 11.1 mL, Rfl: 0    guaiFENesin (MUCINEX) 600 mg 12 hr tablet, Take 2 tablets (1,200 mg total) by mouth every 12 (twelve) hours, Disp: 40 tablet, Rfl: 0    Current Allergies     Allergies as of 10/24/2024 - Reviewed 10/24/2024   Allergen Reaction Noted    Clindamycin/lincomycin Hives 02/25/2016    Pollen extract Other (See Comments) 03/05/2022            The following portions of the patient's history were reviewed and updated as appropriate: allergies, current medications, past family history, past medical history, past social history, past surgical history and problem list.     Past Medical History:   Diagnosis Date    Addiction to drug (HCC)     Anxiety     Arthritis     Chronic pain     Hepatitis     Hepatitis C virus     Heroin abuse (HCC) 05/02/2018    Moderate episode of recurrent major depressive disorder (HCC) 02/05/2020    Tobacco use disorder     last assessed 11/2/15        Past Surgical History:   Procedure Laterality Date    EPIDURAL BLOCK INJECTION Bilateral 4/19/2018    Procedure: T9-10 INTERLAMINAR EPIDURAL STEROID INJECTION;  Surgeon: Scotty Moulton MD;  Location: [facility];  Service: Pain Management     IR PICC PLACEMENT SINGLE LUMEN  1/27/2023       Family History   Problem Relation Age of Onset    No Known Problems Father     Hepatitis Family          Medications have been verified.        Objective   /86   Pulse 78   Temp (!) 97.2 °F (36.2 °C)   Resp 20   Ht 5' 11\" (1.803 m)   Wt 91.6 kg (202 lb)   SpO2 96%   BMI 28.17 kg/m²        Physical Exam     Physical Exam  Vitals and nursing note reviewed. "   Constitutional:       General: He is awake. He is not in acute distress.     Appearance: Normal appearance. He is well-developed and normal weight. He is ill-appearing.   HENT:      Head: Normocephalic and atraumatic.      Right Ear: Tympanic membrane, ear canal and external ear normal.      Left Ear: Tympanic membrane, ear canal and external ear normal.      Nose: Congestion and rhinorrhea present. Rhinorrhea is clear.      Mouth/Throat:      Lips: Pink.      Mouth: Mucous membranes are moist.      Pharynx: Oropharynx is clear.   Eyes:      Extraocular Movements: Extraocular movements intact.      Conjunctiva/sclera: Conjunctivae normal.      Pupils: Pupils are equal, round, and reactive to light.   Cardiovascular:      Rate and Rhythm: Normal rate and regular rhythm.      Pulses: Normal pulses.      Heart sounds: Normal heart sounds.   Pulmonary:      Effort: Pulmonary effort is normal.      Breath sounds: Rhonchi present.   Abdominal:      General: Abdomen is flat. Bowel sounds are normal.      Palpations: Abdomen is soft.   Musculoskeletal:         General: Normal range of motion.      Cervical back: Full passive range of motion without pain, normal range of motion and neck supple.   Skin:     General: Skin is warm and dry.      Capillary Refill: Capillary refill takes less than 2 seconds.      Findings: No rash.   Neurological:      General: No focal deficit present.      Mental Status: He is alert and oriented to person, place, and time.   Psychiatric:         Mood and Affect: Mood normal.         Behavior: Behavior normal. Behavior is cooperative.

## 2024-10-24 NOTE — LETTER
October 24, 2024     Patient: Jason Peterson   YOB: 1985   Date of Visit: 10/24/2024       To Whom it May Concern:    Jason Peterson was seen in my clinic on 10/24/2024. He may return to work on 10/28/2024 provided he is fever free x24 hours without fever reducing medicines .    If you have any questions or concerns, please don't hesitate to call.         Sincerely,          LADONNA Marmolejo        CC: No Recipients

## 2024-11-15 ENCOUNTER — OFFICE VISIT (OUTPATIENT)
Dept: URGENT CARE | Facility: MEDICAL CENTER | Age: 39
End: 2024-11-15
Payer: COMMERCIAL

## 2024-11-15 VITALS
SYSTOLIC BLOOD PRESSURE: 122 MMHG | OXYGEN SATURATION: 97 % | RESPIRATION RATE: 20 BRPM | DIASTOLIC BLOOD PRESSURE: 76 MMHG | HEART RATE: 87 BPM

## 2024-11-15 DIAGNOSIS — B34.9 VIRAL ILLNESS: Primary | ICD-10-CM

## 2024-11-15 PROCEDURE — G0381 LEV 2 HOSP TYPE B ED VISIT: HCPCS | Performed by: PHYSICIAN ASSISTANT

## 2024-11-15 NOTE — PROGRESS NOTES
[facility]        NAME: Jason Peterson is a 39 y.o. male  : 1985    MRN: [id number]  DATE: November 15, 2024  TIME: 10:54 AM    Assessment and Plan   Viral illness [B34.9]  1. Viral illness              Patient Instructions     Tylenol or Ibuprofen as needed for fever or pain  Drink plenty of fluids  Over the Counter cold medication to control symptoms  If symptoms fail to improve follow up with PCP  If symptoms worsen have yourself rechecked    Follow up with PCP in 3-5 days.  Proceed to  ER if symptoms worsen.    If tests have been performed at [facility], our office will contact you with results if changes need to be made to the care plan discussed with you at the visit.  You can review your full results on [facility].    Chief Complaint     Chief Complaint   Patient presents with    Nasal Congestion     Sx stated yesterday. Has Sinus drainange and headache. Taking dayquil. No fevers or N/V/D. No SOB.     Cough         History of Present Illness       Patient presents with headache, runny and stuffy nose which started yesterday morning.  He has an intermittent dry cough.  Patient denies fever, chills, sore throat, nausea, vomiting, diarrhea or bodyaches.        Review of Systems   Review of Systems   Constitutional:  Negative for chills and fever.   HENT:  Positive for congestion and rhinorrhea. Negative for sore throat.    Respiratory:  Positive for cough.    Gastrointestinal:  Negative for diarrhea, nausea and vomiting.   Musculoskeletal:  Negative for myalgias.   Neurological:  Positive for headaches.         Current Medications       Current Outpatient Medications:     fluticasone (FLONASE) 50 mcg/act nasal spray, 1 spray into each nostril daily (Patient not taking: Reported on 11/15/2024), Disp: 11.1 mL, Rfl: 0    guaiFENesin (MUCINEX) 600 mg 12 hr tablet, Take 2 tablets (1,200 mg total) by mouth every 12 (twelve) hours (Patient not taking: Reported on 11/15/2024), Disp: 40 tablet,  Rfl: 0    Current Allergies     Allergies as of 11/15/2024 - Reviewed 11/15/2024   Allergen Reaction Noted    Clindamycin/lincomycin Hives 02/25/2016    Pollen extract Other (See Comments) 03/05/2022            The following portions of the patient's history were reviewed and updated as appropriate: allergies, current medications, past family history, past medical history, past social history, past surgical history and problem list.     Past Medical History:   Diagnosis Date    Addiction to drug (HCC)     Anxiety     Arthritis     Chronic pain     Hepatitis     Hepatitis C virus     Heroin abuse (HCC) 05/02/2018    Moderate episode of recurrent major depressive disorder (HCC) 02/05/2020    Tobacco use disorder     last assessed 11/2/15        Past Surgical History:   Procedure Laterality Date    EPIDURAL BLOCK INJECTION Bilateral 4/19/2018    Procedure: T9-10 INTERLAMINAR EPIDURAL STEROID INJECTION;  Surgeon: Scotty Moulton MD;  Location: [facility];  Service: Pain Management     IR PICC PLACEMENT SINGLE LUMEN  1/27/2023       Family History   Problem Relation Age of Onset    No Known Problems Father     Hepatitis Family          Medications have been verified.        Objective   /76   Pulse 87   Resp 20   SpO2 97%   No LMP for male patient.       Physical Exam     Physical Exam  Vitals and nursing note reviewed.   Constitutional:       Appearance: Normal appearance.   HENT:      Head: Normocephalic and atraumatic.      Right Ear: Tympanic membrane normal.      Left Ear: Tympanic membrane normal.      Nose: Congestion present.      Mouth/Throat:      Mouth: Mucous membranes are moist.      Pharynx: Oropharynx is clear.   Eyes:      Conjunctiva/sclera: Conjunctivae normal.   Cardiovascular:      Rate and Rhythm: Normal rate and regular rhythm.      Heart sounds: Normal heart sounds.   Pulmonary:      Effort: Pulmonary effort is normal.      Breath sounds: Normal breath sounds.   Musculoskeletal:       Cervical back: Neck supple.   Lymphadenopathy:      Cervical: No cervical adenopathy.   Skin:     General: Skin is warm.   Neurological:      Mental Status: He is alert.

## 2024-11-18 ENCOUNTER — HOSPITAL ENCOUNTER (EMERGENCY)
Facility: HOSPITAL | Age: 39
Discharge: HOME | End: 2024-11-18
Attending: EMERGENCY MEDICINE
Payer: COMMERCIAL

## 2024-11-18 VITALS
DIASTOLIC BLOOD PRESSURE: 85 MMHG | OXYGEN SATURATION: 98 % | HEART RATE: 94 BPM | TEMPERATURE: 98 F | RESPIRATION RATE: 18 BRPM | SYSTOLIC BLOOD PRESSURE: 141 MMHG

## 2024-11-18 DIAGNOSIS — S40.851A: Primary | ICD-10-CM

## 2024-11-18 DIAGNOSIS — Z18.39: Primary | ICD-10-CM

## 2024-11-18 PROCEDURE — 99284 EMERGENCY DEPT VISIT MOD MDM: CPT | Performed by: EMERGENCY MEDICINE

## 2024-11-18 PROCEDURE — 99282 EMERGENCY DEPT VISIT SF MDM: CPT

## 2024-11-18 RX ORDER — DOXYCYCLINE 100 MG/1
200 CAPSULE ORAL ONCE
Status: COMPLETED | OUTPATIENT
Start: 2024-11-18 | End: 2024-11-18

## 2024-11-18 RX ADMIN — DOXYCYCLINE HYCLATE 200 MG: 100 CAPSULE ORAL at 10:31

## 2024-11-18 NOTE — ED PROVIDER NOTES
Time reflects when diagnosis was documented in both MDM as applicable and the Disposition within this note       Time User Action Codes Description Comment    11/18/2024 10:39 AM Quique Kramer Add [S40.851A,  Z18.39] Embedded tick of axilla, right, initial encounter           ED Disposition       ED Disposition   Discharge    Condition   Stable    Date/Time   Mon Nov 18, 2024 10:40 AM    Comment   Jason Nicholas discharge to home/self care.                   Assessment & Plan       Medical Decision Making  39-year-old male presenting with an embedded tick in the right upper arm/axilla region.  The tick appears intact without any attempts at removal previous to ER visit.  Tick is not engorged.  Based on history of tick bite likely less than 12 hours old.  No erythema migrans.  No other symptoms.  Plan to remove ticks see procedure note for details tick removed in pieces but complete removal upon further visualization.  Nonetheless after discussion of risks and benefits we will treat with 200 mg p.o. doxycycline x 1.  Return to ER precautions discussed.    Problems Addressed:  Embedded tick of axilla, right, initial encounter: acute illness or injury    Risk  Prescription drug management.             Medications   doxycycline hyclate (VIBRAMYCIN) capsule 200 mg (200 mg Oral Given 11/18/24 1031)       ED Risk Strat Scores                           SBIRT 20yo+      Flowsheet Row Most Recent Value   Initial Alcohol Screen: US AUDIT-C     1. How often do you have a drink containing alcohol? 0 Filed at: 11/18/2024 1022   2. How many drinks containing alcohol do you have on a typical day you are drinking?  0 Filed at: 11/18/2024 1022   3a. Male UNDER 65: How often do you have five or more drinks on one occasion? 0 Filed at: 11/18/2024 1022   3b. FEMALE Any Age, or MALE 65+: How often do you have 4 or more drinks on one occassion? 0 Filed at: 11/18/2024 1022   Audit-C Score 0 Filed at: 11/18/2024 1022   MIGUEL: How many  times in the past year have you...    Used an illegal drug or used a prescription medication for non-medical reasons? Never Filed at: 11/18/2024 1022                            History of Present Illness       Chief Complaint   Patient presents with    Tick Removal     Pt is here to have a tick removed out of his right upper arm       Past Medical History:   Diagnosis Date    Addiction to drug (HCC)     Anxiety     Arthritis     Chronic pain     Hepatitis     Hepatitis C virus     Heroin abuse (HCC) 05/02/2018    Moderate episode of recurrent major depressive disorder (HCC) 02/05/2020    Tobacco use disorder     last assessed 11/2/15       Past Surgical History:   Procedure Laterality Date    EPIDURAL BLOCK INJECTION Bilateral 4/19/2018    Procedure: T9-10 INTERLAMINAR EPIDURAL STEROID INJECTION;  Surgeon: Scotty Moulton MD;  Location: [facility];  Service: Pain Management     IR PICC PLACEMENT SINGLE LUMEN  1/27/2023      Family History   Problem Relation Age of Onset    No Known Problems Father     Hepatitis Family       Social History     Tobacco Use    Smoking status: Every Day     Current packs/day: 0.50     Average packs/day: 0.5 packs/day for 20.0 years (10.0 ttl pk-yrs)     Types: Cigarettes    Smokeless tobacco: Former    Tobacco comments:     current every day smoker per allscript    Vaping Use    Vaping status: Every Day    Substances: Nicotine, Flavoring   Substance Use Topics    Alcohol use: Not Currently     Comment: occasional / social per allscript     Drug use: Not Currently     Types: Heroin, Prescription, Marijuana     Comment: past opiate abuser./ intravenous drug use per allscript                     E-Cigarette/Vaping    E-Cigarette Use Current Every Day User       E-Cigarette/Vaping Substances    Nicotine Yes     THC No     CBD No     Flavoring Yes     Other No     Unknown No       I have reviewed and agree with the history as documented.     31-year-old male presenting for tick bite.   Patient has an embedded tick in the right upper extremity near the axilla.  First noticed it this morning.  Tick is not engorged.  No constitutional symptoms.  No rash.  Did not attempt any removal.  Clindamycin allergy..  Tdap up-to-date 2024.          Review of Systems   Constitutional:  Negative for chills and fever.   HENT:  Negative for ear pain and sore throat.    Eyes:  Negative for pain and visual disturbance.   Respiratory:  Negative for cough and shortness of breath.    Cardiovascular:  Negative for chest pain and palpitations.   Gastrointestinal:  Negative for abdominal pain and vomiting.   Genitourinary:  Negative for dysuria and hematuria.   Musculoskeletal:  Negative for arthralgias and back pain.   Skin:  Positive for wound. Negative for color change and rash.   Neurological:  Negative for seizures and syncope.   All other systems reviewed and are negative.          Objective       ED Triage Vitals [11/18/24 1023]   Temperature Pulse Blood Pressure Respirations SpO2 Patient Position - Orthostatic VS   98 °F (36.7 °C) 94 141/85 18 98 % Sitting      Temp Source Heart Rate Source BP Location FiO2 (%) Pain Score    Tympanic Monitor Left arm -- 5      Vitals      Date and Time Temp Pulse SpO2 Resp BP Pain Score FACES Pain Rating User   11/18/24 1023 98 °F (36.7 °C) 94 98 % 18 141/85 5 -- BMD            Physical Exam  Vitals and nursing note reviewed.   Constitutional:       General: He is not in acute distress.     Appearance: He is well-developed.   HENT:      Head: Normocephalic and atraumatic.   Eyes:      Conjunctiva/sclera: Conjunctivae normal.   Cardiovascular:      Rate and Rhythm: Normal rate and regular rhythm.      Heart sounds: No murmur heard.  Pulmonary:      Effort: Pulmonary effort is normal. No respiratory distress.      Breath sounds: Normal breath sounds.   Abdominal:      Palpations: Abdomen is soft.      Tenderness: There is no abdominal tenderness.   Musculoskeletal:         General:  No swelling.      Cervical back: Neck supple.      Comments: On the inner aspect of the proximal right upper extremity near the axilla is an embedded tick.  The tick is intact.  The head is embedded in the wound.  There is no surrounding erythema drainage or other lesions.   Skin:     General: Skin is warm and dry.      Capillary Refill: Capillary refill takes less than 2 seconds.   Neurological:      Mental Status: He is alert.   Psychiatric:         Mood and Affect: Mood normal.         Results Reviewed       None            No orders to display       Foreign Body - Embedded    Date/Time: 11/18/2024 10:38 AM    Performed by: Quique Kramer DO  Authorized by: Quique Kramer DO    Patient location:  ED  Other Assisting Provider: No    Consent:     Consent obtained:  Verbal    Risks discussed:  Bleeding, incomplete removal, nerve damage, infection, pain, worsening of condition and poor cosmetic result    Alternatives discussed:  No treatment, delayed treatment, observation and alternative treatment  Universal protocol:     Immediately prior to procedure, a time out was called: yes      Patient identity confirmed:  Verbally with patient  Location:     Location:  Arm    Arm location:  R upper arm    Depth:  Intradermal    Tendon involvement:  None  Pre-procedure details:     Imaging:  None    Neurovascular status: intact    Anesthesia (see MAR for exact dosages):     Anesthesia method:  None  Procedure details:     Dissection of underlying tissues: no      Bloodless field: yes      Removal mechanism:  Forceps    Removal Method:  Open    Procedure complexity:  Simple    Foreign bodies recovered:  1    Description:  Single black/dark brown tick, removed in pieces    Intact foreign body removal: yes    Post-procedure details:     Neurovascular status: intact      Confirmation:  No additional foreign bodies on visualization    Skin closure:  None    Dressing: band-aid.    Patient tolerance of procedure:   Tolerated well, no immediate complications      ED Medication and Procedure Management   Prior to Admission Medications   Prescriptions Last Dose Informant Patient Reported? Taking?   fluticasone (FLONASE) 50 mcg/act nasal spray Not Taking  No No   Si spray into each nostril daily   Patient not taking: Reported on 2024   guaiFENesin (MUCINEX) 600 mg 12 hr tablet Not Taking  No No   Sig: Take 2 tablets (1,200 mg total) by mouth every 12 (twelve) hours   Patient not taking: Reported on 2024      Facility-Administered Medications: None     Discharge Medication List as of 2024 10:40 AM        CONTINUE these medications which have NOT CHANGED    Details   fluticasone (FLONASE) 50 mcg/act nasal spray 1 spray into each nostril daily, Starting Thu 10/24/2024, Normal      guaiFENesin (MUCINEX) 600 mg 12 hr tablet Take 2 tablets (1,200 mg total) by mouth every 12 (twelve) hours, Starting Thu 10/24/2024, Normal           No discharge procedures on file.  ED SEPSIS DOCUMENTATION   Time reflects when diagnosis was documented in both MDM as applicable and the Disposition within this note       Time User Action Codes Description Comment    2024 10:39 AM Quique Kramer Add [S40.851A,  Z18.39] Embedded tick of axilla, right, initial encounter                  Quique Kramer DO  24 1044

## 2024-11-18 NOTE — DISCHARGE INSTRUCTIONS
Thank you for visiting the Emergency Department today.    Tick was removed.  No residual tick parts or head parts were seen in the wound.  Furthermore, treated with one-time dose of doxycycline 200 mg which is adequate treatment for any possible tickborne illness such as Lyme disease.  No further treatment needed.  No further testing needed.  Return if additional concerns.  May dress with Band-Aid.  May cover with antibiotic ointment over-the-counter as directed.  Return for signs of worsening infection.

## 2024-11-18 NOTE — Clinical Note
Jason Peterson was seen and treated in our emergency department on 11/18/2024.                Diagnosis:     Jason  .    He may return on this date:     Patient seen and examined in the Emergency Department on 11/18/2024.  Please excuse for any absence from work associated with this.  Tick bite right upper extremity successfully removed and treated for possible infection.     If you have any questions or concerns, please don't hesitate to call.      Quique Kramer, DO    ______________________________           _______________          _______________  Hospital Representative                              Date                                Time

## (undated) DEVICE — SYRINGE 5ML LL

## (undated) DEVICE — NEEDLE SPINAL 25G X 3.5 IN QUINCKE

## (undated) DEVICE — CHLORAPREP HI-LITE 10.5ML ORANGE

## (undated) DEVICE — SYRINGE 3ML LL

## (undated) DEVICE — TRAY EPIDURAL PERIFIX 20GA X 3.5IN TUOHY 8ML

## (undated) DEVICE — FLEXIBLE ADHESIVE BANDAGE,X-LARGE: Brand: CURITY

## (undated) DEVICE — GLOVE SRG BIOGEL 8